# Patient Record
Sex: MALE | Race: WHITE | NOT HISPANIC OR LATINO | Employment: OTHER | URBAN - METROPOLITAN AREA
[De-identification: names, ages, dates, MRNs, and addresses within clinical notes are randomized per-mention and may not be internally consistent; named-entity substitution may affect disease eponyms.]

---

## 2019-08-06 ENCOUNTER — HOSPITAL ENCOUNTER (OUTPATIENT)
Facility: HOSPITAL | Age: 84
Setting detail: OBSERVATION
Discharge: HOME HEALTH | End: 2019-08-07
Attending: EMERGENCY MEDICINE | Admitting: INTERNAL MEDICINE
Payer: MEDICARE

## 2019-08-06 ENCOUNTER — APPOINTMENT (EMERGENCY)
Dept: RADIOLOGY | Facility: HOSPITAL | Age: 84
End: 2019-08-06
Payer: MEDICARE

## 2019-08-06 DIAGNOSIS — T63.461A TOXIC REACTION TO HORNETS, WASPS AND BEES, ACCIDENTAL OR UNINTENTIONAL, INITIAL ENCOUNTER: ICD-10-CM

## 2019-08-06 DIAGNOSIS — T63.441A BEE STING: ICD-10-CM

## 2019-08-06 DIAGNOSIS — N28.9 RENAL INSUFFICIENCY: ICD-10-CM

## 2019-08-06 DIAGNOSIS — T78.2XXA ANAPHYLAXIS: Primary | ICD-10-CM

## 2019-08-06 DIAGNOSIS — R00.0 TACHYCARDIA: ICD-10-CM

## 2019-08-06 DIAGNOSIS — T63.441A TOXIC REACTION TO HORNETS, WASPS AND BEES, ACCIDENTAL OR UNINTENTIONAL, INITIAL ENCOUNTER: ICD-10-CM

## 2019-08-06 DIAGNOSIS — I50.9 CONGESTIVE HEART FAILURE (CHF) (HCC): ICD-10-CM

## 2019-08-06 DIAGNOSIS — T63.451A TOXIC REACTION TO HORNETS, WASPS AND BEES, ACCIDENTAL OR UNINTENTIONAL, INITIAL ENCOUNTER: ICD-10-CM

## 2019-08-06 PROBLEM — N17.9 AKI (ACUTE KIDNEY INJURY) (HCC): Status: ACTIVE | Noted: 2019-08-06

## 2019-08-06 PROBLEM — E78.5 DYSLIPIDEMIA: Status: ACTIVE | Noted: 2019-08-06

## 2019-08-06 PROBLEM — I10 ESSENTIAL HYPERTENSION: Status: ACTIVE | Noted: 2019-08-06

## 2019-08-06 LAB
ALBUMIN SERPL BCP-MCNC: 3.8 G/DL (ref 3.5–5)
ALP SERPL-CCNC: 86 U/L (ref 46–116)
ALT SERPL W P-5'-P-CCNC: 17 U/L (ref 12–78)
ANION GAP SERPL CALCULATED.3IONS-SCNC: 14 MMOL/L (ref 4–13)
AST SERPL W P-5'-P-CCNC: 15 U/L (ref 5–45)
ATRIAL RATE: 109 BPM
ATRIAL RATE: 115 BPM
ATRIAL RATE: 182 BPM
BASOPHILS # BLD AUTO: 0.03 THOUSANDS/ΜL (ref 0–0.1)
BASOPHILS NFR BLD AUTO: 0 % (ref 0–1)
BILIRUB SERPL-MCNC: 1 MG/DL (ref 0.2–1)
BUN SERPL-MCNC: 19 MG/DL (ref 5–25)
CALCIUM SERPL-MCNC: 8.9 MG/DL (ref 8.3–10.1)
CHLORIDE SERPL-SCNC: 103 MMOL/L (ref 100–108)
CO2 SERPL-SCNC: 22 MMOL/L (ref 21–32)
CREAT SERPL-MCNC: 1.32 MG/DL (ref 0.6–1.3)
EOSINOPHIL # BLD AUTO: 0.03 THOUSAND/ΜL (ref 0–0.61)
EOSINOPHIL NFR BLD AUTO: 0 % (ref 0–6)
ERYTHROCYTE [DISTWIDTH] IN BLOOD BY AUTOMATED COUNT: 13.5 % (ref 11.6–15.1)
GFR SERPL CREATININE-BSD FRML MDRD: 49 ML/MIN/1.73SQ M
GLUCOSE SERPL-MCNC: 147 MG/DL (ref 65–140)
HCT VFR BLD AUTO: 43.6 % (ref 36.5–49.3)
HGB BLD-MCNC: 14.4 G/DL (ref 12–17)
IMM GRANULOCYTES # BLD AUTO: 0.05 THOUSAND/UL (ref 0–0.2)
IMM GRANULOCYTES NFR BLD AUTO: 0 % (ref 0–2)
LYMPHOCYTES # BLD AUTO: 1.96 THOUSANDS/ΜL (ref 0.6–4.47)
LYMPHOCYTES NFR BLD AUTO: 16 % (ref 14–44)
MCH RBC QN AUTO: 30.5 PG (ref 26.8–34.3)
MCHC RBC AUTO-ENTMCNC: 33 G/DL (ref 31.4–37.4)
MCV RBC AUTO: 92 FL (ref 82–98)
MONOCYTES # BLD AUTO: 0.67 THOUSAND/ΜL (ref 0.17–1.22)
MONOCYTES NFR BLD AUTO: 5 % (ref 4–12)
NEUTROPHILS # BLD AUTO: 9.68 THOUSANDS/ΜL (ref 1.85–7.62)
NEUTS SEG NFR BLD AUTO: 79 % (ref 43–75)
NRBC BLD AUTO-RTO: 0 /100 WBCS
NT-PROBNP SERPL-MCNC: 1138 PG/ML
P AXIS: 46 DEGREES
PLATELET # BLD AUTO: 141 THOUSANDS/UL (ref 149–390)
PMV BLD AUTO: 10.9 FL (ref 8.9–12.7)
POTASSIUM SERPL-SCNC: 3.4 MMOL/L (ref 3.5–5.3)
PROT SERPL-MCNC: 7.1 G/DL (ref 6.4–8.2)
QRS AXIS: 62 DEGREES
QRS AXIS: 65 DEGREES
QRS AXIS: 67 DEGREES
QRSD INTERVAL: 106 MS
QRSD INTERVAL: 80 MS
QRSD INTERVAL: 88 MS
QT INTERVAL: 286 MS
QT INTERVAL: 348 MS
QT INTERVAL: 382 MS
QTC INTERVAL: 451 MS
QTC INTERVAL: 479 MS
QTC INTERVAL: 514 MS
RBC # BLD AUTO: 4.72 MILLION/UL (ref 3.88–5.62)
SODIUM SERPL-SCNC: 139 MMOL/L (ref 136–145)
T WAVE AXIS: 107 DEGREES
T WAVE AXIS: 60 DEGREES
T WAVE AXIS: 89 DEGREES
TROPONIN I SERPL-MCNC: <0.02 NG/ML
VENTRICULAR RATE: 109 BPM
VENTRICULAR RATE: 114 BPM
VENTRICULAR RATE: 150 BPM
WBC # BLD AUTO: 12.42 THOUSAND/UL (ref 4.31–10.16)

## 2019-08-06 PROCEDURE — 96375 TX/PRO/DX INJ NEW DRUG ADDON: CPT

## 2019-08-06 PROCEDURE — 94640 AIRWAY INHALATION TREATMENT: CPT

## 2019-08-06 PROCEDURE — 99291 CRITICAL CARE FIRST HOUR: CPT | Performed by: EMERGENCY MEDICINE

## 2019-08-06 PROCEDURE — 71045 X-RAY EXAM CHEST 1 VIEW: CPT

## 2019-08-06 PROCEDURE — 94760 N-INVAS EAR/PLS OXIMETRY 1: CPT

## 2019-08-06 PROCEDURE — 1123F ACP DISCUSS/DSCN MKR DOCD: CPT | Performed by: EMERGENCY MEDICINE

## 2019-08-06 PROCEDURE — 99219 PR INITIAL OBSERVATION CARE/DAY 50 MINUTES: CPT | Performed by: INTERNAL MEDICINE

## 2019-08-06 PROCEDURE — 96365 THER/PROPH/DIAG IV INF INIT: CPT

## 2019-08-06 PROCEDURE — 36415 COLL VENOUS BLD VENIPUNCTURE: CPT | Performed by: EMERGENCY MEDICINE

## 2019-08-06 PROCEDURE — 84484 ASSAY OF TROPONIN QUANT: CPT | Performed by: EMERGENCY MEDICINE

## 2019-08-06 PROCEDURE — 96376 TX/PRO/DX INJ SAME DRUG ADON: CPT

## 2019-08-06 PROCEDURE — 93005 ELECTROCARDIOGRAM TRACING: CPT

## 2019-08-06 PROCEDURE — 80053 COMPREHEN METABOLIC PANEL: CPT | Performed by: EMERGENCY MEDICINE

## 2019-08-06 PROCEDURE — 99292 CRITICAL CARE ADDL 30 MIN: CPT | Performed by: EMERGENCY MEDICINE

## 2019-08-06 PROCEDURE — 83880 ASSAY OF NATRIURETIC PEPTIDE: CPT | Performed by: EMERGENCY MEDICINE

## 2019-08-06 PROCEDURE — 85025 COMPLETE CBC W/AUTO DIFF WBC: CPT | Performed by: EMERGENCY MEDICINE

## 2019-08-06 PROCEDURE — 93010 ELECTROCARDIOGRAM REPORT: CPT | Performed by: INTERNAL MEDICINE

## 2019-08-06 PROCEDURE — 99285 EMERGENCY DEPT VISIT HI MDM: CPT

## 2019-08-06 PROCEDURE — 96366 THER/PROPH/DIAG IV INF ADDON: CPT

## 2019-08-06 RX ORDER — HEPARIN SODIUM 5000 [USP'U]/ML
5000 INJECTION, SOLUTION INTRAVENOUS; SUBCUTANEOUS EVERY 8 HOURS SCHEDULED
Status: DISCONTINUED | OUTPATIENT
Start: 2019-08-06 | End: 2019-08-07 | Stop reason: HOSPADM

## 2019-08-06 RX ORDER — HYDROMORPHONE HCL/PF 1 MG/ML
0.2 SYRINGE (ML) INJECTION ONCE
Status: COMPLETED | OUTPATIENT
Start: 2019-08-06 | End: 2019-08-06

## 2019-08-06 RX ORDER — FENTANYL CITRATE 50 UG/ML
50 INJECTION, SOLUTION INTRAMUSCULAR; INTRAVENOUS ONCE
Status: COMPLETED | OUTPATIENT
Start: 2019-08-06 | End: 2019-08-06

## 2019-08-06 RX ORDER — METHYLPREDNISOLONE SOD SUCC 125 MG
1 VIAL (EA) INJECTION ONCE
Status: COMPLETED | OUTPATIENT
Start: 2019-08-06 | End: 2019-08-06

## 2019-08-06 RX ORDER — FAMOTIDINE 20 MG/1
20 TABLET, FILM COATED ORAL DAILY
Status: DISCONTINUED | OUTPATIENT
Start: 2019-08-07 | End: 2019-08-07 | Stop reason: HOSPADM

## 2019-08-06 RX ORDER — ONDANSETRON 2 MG/ML
4 INJECTION INTRAMUSCULAR; INTRAVENOUS EVERY 6 HOURS PRN
Status: DISCONTINUED | OUTPATIENT
Start: 2019-08-06 | End: 2019-08-07 | Stop reason: HOSPADM

## 2019-08-06 RX ORDER — METOPROLOL TARTRATE 5 MG/5ML
5 INJECTION INTRAVENOUS ONCE
Status: COMPLETED | OUTPATIENT
Start: 2019-08-06 | End: 2019-08-06

## 2019-08-06 RX ORDER — SODIUM CHLORIDE 9 MG/ML
125 INJECTION, SOLUTION INTRAVENOUS ONCE
Status: COMPLETED | OUTPATIENT
Start: 2019-08-06 | End: 2019-08-07

## 2019-08-06 RX ORDER — LISINOPRIL 5 MG/1
5 TABLET ORAL DAILY
Status: DISCONTINUED | OUTPATIENT
Start: 2019-08-07 | End: 2019-08-07 | Stop reason: HOSPADM

## 2019-08-06 RX ORDER — DIPHENHYDRAMINE HCL 25 MG
25 TABLET ORAL EVERY 6 HOURS PRN
Status: DISCONTINUED | OUTPATIENT
Start: 2019-08-06 | End: 2019-08-07 | Stop reason: HOSPADM

## 2019-08-06 RX ORDER — CHLORAL HYDRATE 500 MG
1 CAPSULE ORAL DAILY
COMMUNITY
Start: 2012-10-03

## 2019-08-06 RX ORDER — EPINEPHRINE 1 MG/ML
0.3 INJECTION, SOLUTION, CONCENTRATE INTRAVENOUS ONCE
Status: COMPLETED | OUTPATIENT
Start: 2019-08-06 | End: 2019-08-06

## 2019-08-06 RX ORDER — ASPIRIN 81 MG/1
81 TABLET, CHEWABLE ORAL EVERY OTHER DAY
Status: DISCONTINUED | OUTPATIENT
Start: 2019-08-06 | End: 2019-08-07 | Stop reason: HOSPADM

## 2019-08-06 RX ORDER — ACETAMINOPHEN 325 MG/1
650 TABLET ORAL EVERY 6 HOURS PRN
Status: DISCONTINUED | OUTPATIENT
Start: 2019-08-06 | End: 2019-08-07 | Stop reason: HOSPADM

## 2019-08-06 RX ORDER — MAGNESIUM SULFATE HEPTAHYDRATE 40 MG/ML
2 INJECTION, SOLUTION INTRAVENOUS ONCE
Status: COMPLETED | OUTPATIENT
Start: 2019-08-06 | End: 2019-08-06

## 2019-08-06 RX ORDER — LISINOPRIL 5 MG/1
5 TABLET ORAL DAILY
COMMUNITY
End: 2020-08-13 | Stop reason: HOSPADM

## 2019-08-06 RX ORDER — DIAPER,BRIEF,INFANT-TODD,DISP
EACH MISCELLANEOUS 4 TIMES DAILY PRN
Status: DISCONTINUED | OUTPATIENT
Start: 2019-08-06 | End: 2019-08-07 | Stop reason: HOSPADM

## 2019-08-06 RX ORDER — SIMVASTATIN 20 MG
20 TABLET ORAL DAILY
COMMUNITY
End: 2020-10-22 | Stop reason: SDUPTHER

## 2019-08-06 RX ORDER — ALBUTEROL SULFATE 2.5 MG/3ML
2.5 SOLUTION RESPIRATORY (INHALATION) EVERY 6 HOURS PRN
Status: DISCONTINUED | OUTPATIENT
Start: 2019-08-06 | End: 2019-08-07 | Stop reason: HOSPADM

## 2019-08-06 RX ORDER — POTASSIUM CHLORIDE 20 MEQ/1
40 TABLET, EXTENDED RELEASE ORAL ONCE
Status: COMPLETED | OUTPATIENT
Start: 2019-08-06 | End: 2019-08-06

## 2019-08-06 RX ORDER — FAMOTIDINE 20 MG/1
1 TABLET, FILM COATED ORAL DAILY
COMMUNITY
End: 2021-07-21 | Stop reason: ALTCHOICE

## 2019-08-06 RX ORDER — LANOLIN ALCOHOL/MO/W.PET/CERES
3 CREAM (GRAM) TOPICAL
Status: DISCONTINUED | OUTPATIENT
Start: 2019-08-06 | End: 2019-08-07 | Stop reason: HOSPADM

## 2019-08-06 RX ORDER — LEVALBUTEROL 1.25 MG/.5ML
1.25 SOLUTION, CONCENTRATE RESPIRATORY (INHALATION) ONCE
Status: COMPLETED | OUTPATIENT
Start: 2019-08-06 | End: 2019-08-06

## 2019-08-06 RX ORDER — METOPROLOL TARTRATE 5 MG/5ML
5 INJECTION INTRAVENOUS EVERY 6 HOURS PRN
Status: DISCONTINUED | OUTPATIENT
Start: 2019-08-06 | End: 2019-08-07 | Stop reason: HOSPADM

## 2019-08-06 RX ORDER — PRAVASTATIN SODIUM 40 MG
40 TABLET ORAL
Status: DISCONTINUED | OUTPATIENT
Start: 2019-08-06 | End: 2019-08-07 | Stop reason: HOSPADM

## 2019-08-06 RX ORDER — IPRATROPIUM BROMIDE AND ALBUTEROL SULFATE 2.5; .5 MG/3ML; MG/3ML
3 SOLUTION RESPIRATORY (INHALATION)
Status: DISCONTINUED | OUTPATIENT
Start: 2019-08-06 | End: 2019-08-07

## 2019-08-06 RX ADMIN — LEVALBUTEROL HYDROCHLORIDE 1.25 MG: 1.25 SOLUTION, CONCENTRATE RESPIRATORY (INHALATION) at 13:43

## 2019-08-06 RX ADMIN — DIPHENHYDRAMINE HYDROCHLORIDE 50 MG: 50 INJECTION, SOLUTION INTRAMUSCULAR; INTRAVENOUS at 23:43

## 2019-08-06 RX ADMIN — IPRATROPIUM BROMIDE AND ALBUTEROL SULFATE 3 ML: 2.5; .5 SOLUTION RESPIRATORY (INHALATION) at 21:42

## 2019-08-06 RX ADMIN — ASPIRIN 81 MG 81 MG: 81 TABLET ORAL at 22:40

## 2019-08-06 RX ADMIN — HEPARIN SODIUM 5000 UNITS: 5000 INJECTION INTRAVENOUS; SUBCUTANEOUS at 21:13

## 2019-08-06 RX ADMIN — HYDROMORPHONE HYDROCHLORIDE 0.2 MG: 1 INJECTION, SOLUTION INTRAMUSCULAR; INTRAVENOUS; SUBCUTANEOUS at 14:59

## 2019-08-06 RX ADMIN — METOPROLOL TARTRATE 5 MG: 1 INJECTION, SOLUTION INTRAVENOUS at 13:31

## 2019-08-06 RX ADMIN — MAGNESIUM SULFATE HEPTAHYDRATE 2 G: 40 INJECTION, SOLUTION INTRAVENOUS at 13:28

## 2019-08-06 RX ADMIN — SODIUM CHLORIDE 125 ML/HR: 0.9 INJECTION, SOLUTION INTRAVENOUS at 21:13

## 2019-08-06 RX ADMIN — SODIUM CHLORIDE 1000 ML: 0.9 INJECTION, SOLUTION INTRAVENOUS at 13:29

## 2019-08-06 RX ADMIN — Medication 1 APPLICATION: at 12:02

## 2019-08-06 RX ADMIN — METOPROLOL TARTRATE 5 MG: 1 INJECTION, SOLUTION INTRAVENOUS at 11:50

## 2019-08-06 RX ADMIN — POTASSIUM CHLORIDE 40 MEQ: 1500 TABLET, EXTENDED RELEASE ORAL at 21:13

## 2019-08-06 RX ADMIN — HYDROCORTISONE 1 APPLICATION: 1 CREAM TOPICAL at 18:57

## 2019-08-06 RX ADMIN — FENTANYL CITRATE 50 MCG: 50 INJECTION, SOLUTION INTRAMUSCULAR; INTRAVENOUS at 11:50

## 2019-08-06 RX ADMIN — PRAVASTATIN SODIUM 40 MG: 40 TABLET ORAL at 21:13

## 2019-08-06 RX ADMIN — DIPHENHYDRAMINE HCL 25 MG: 25 TABLET ORAL at 17:53

## 2019-08-06 RX ADMIN — MELATONIN 3 MG: 3 TAB ORAL at 23:09

## 2019-08-06 RX ADMIN — FENTANYL CITRATE 50 MCG: 50 INJECTION, SOLUTION INTRAMUSCULAR; INTRAVENOUS at 13:30

## 2019-08-06 RX ADMIN — EPINEPHRINE 0.3 MG: 1 INJECTION, SOLUTION, CONCENTRATE INTRAVENOUS at 11:28

## 2019-08-06 NOTE — H&P
History and Physical - [age] [facility] Internal Medicine    Patient Information: Katty Monzon 80 y o  male MRN: [id number]  Unit/Bed#: ED 23 Encounter: [id number]  Admitting Physician: Devonte Altamirano MD  PCP: Olive Maria DO  Date of Admission:  08/06/19    Assessment/Plan:    Hospital Problem List:     Principal Problem:    Bee sting  Active Problems:    Essential hypertension    Dyslipidemia    OLLIE (acute kidney injury) ([facility] [address] )      Plan for the Primary Problem(s):  · 1  SOB secondary to bee stings- patient given epinephrine and is saturating well  · 2  HTN- will continue home medications  Metoprolol IV added prn  · 3  OLLIE- prerenal   On IVF  · 4  Dyslipidemia on statin  ·     Plan for Additional Problems:   ·     VTE Prophylaxis: Heparin  / sequential compression device   Code Status:   POLST: There is no POLST form on file for this patient (pre-hospital)    Anticipated Length of Stay:  Patient will be admitted on an Observation basis with an anticipated length of stay of  Less than 2 midnights  Justification for Hospital Stay: SOB, bee sting    Total Time for Visit, including Counseling / Coordination of Care: 30 minutes  Greater than 50% of this total time spent on direct patient counseling and coordination of care  Chief Complaint:   SOB,     History of Present Illness:    Katty Monzon is a 80 y o  male who presents with pmhx of HTN, dyslipidemia comes in with complaints of increased SOB after getting bit by bees after carrying out the trash  Patient was found to be wheezing in ER and was given epinephrine and benadryl  His blood pressure was also found to be very elevated and HR fluctuating from 's  It has now improved  Patient will be admitted for further observation  Review of Systems:    Review of Systems   Constitutional: Negative  HENT: Negative  Respiratory: Positive for shortness of breath and wheezing  Negative for apnea, cough, choking, chest tightness and stridor  Cardiovascular: Positive for palpitations  Negative for chest pain and leg swelling  Gastrointestinal: Negative  Genitourinary: Negative  Musculoskeletal: Negative  Skin: Positive for color change and rash  Negative for pallor and wound  Neurological: Positive for weakness  Negative for dizziness, tremors, seizures, syncope, facial asymmetry, speech difficulty, light-headedness, numbness and headaches  Past Medical and Surgical History:     No past medical history on file  No past surgical history on file  Meds/Allergies:    Prior to Admission medications    Medication Sig Start Date End Date Taking? Authorizing Provider   aspirin 81 MG tablet Take 81 mg by mouth every other day 10/3/12  Yes Historical Provider, MD   B Complex Vitamins (VITAMIN B COMPLEX PO) Take 482 2 mg by mouth daily 10/3/12  Yes Historical Provider, MD   cholecalciferol (VITAMIN D3) 1,000 units tablet Take 1,000 Units by mouth daily 10/3/12  Yes Historical Provider, MD   famotidine (PEPCID) 20 mg tablet Take 1 tablet by mouth daily   Yes Historical Provider, MD   lisinopril (ZESTRIL) 5 mg tablet Take 5 mg by mouth daily   Yes Historical Provider, MD   metoprolol tartrate (LOPRESSOR) 25 mg tablet Take 12 5 mg by mouth daily 8/12/13  Yes Historical Provider, MD   Multiple Vitamins-Minerals (PRESERVISION AREDS 2 PO) Take 24 mg by mouth 2 (two) times a day   Yes Historical Provider, MD   [place]-3 1000 MG CAPS Take 1 tablet by mouth daily 10/3/12  Yes Historical Provider, MD   simvastatin (ZOCOR) 20 mg tablet Take 20 mg by mouth   Yes Historical Provider, MD     I have reviewed home medications with patient personally      Allergies: No Known Allergies    Social History:     Marital Status: /Civil Union   Occupation:   Patient Pre-hospital Living Situation: home  Patient Pre-hospital Level of Mobility: walks  Patient Pre-hospital Diet Restrictions: cardiac  Substance Use History:   Social History     Substance and Sexual Activity   Alcohol Use Not on file     Social History     Tobacco Use   Smoking Status Not on file     Social History     Substance and Sexual Activity   Drug Use Not on file       Family History:    non-contributory    Physical Exam:     Vitals:   Blood Pressure: (!) 185/86 (08/06/19 1530)  Pulse: 89 (08/06/19 1530)  Temperature: 100 °F (37 8 °C) (08/06/19 1400)  Temp Source: Tympanic (08/06/19 1400)  Respirations: (!) 26 (08/06/19 1530)  Height: 5' 3" (160 cm) (08/06/19 1118)  Weight - Scale: 67 5 kg (148 lb 13 oz) (08/06/19 1118)  SpO2: 95 % (08/06/19 1530)    Physical Exam   Constitutional: He is oriented to person, place, and time  He appears well-developed and well-nourished  HENT:   Head: Normocephalic and atraumatic  Eyes: Pupils are equal, round, and reactive to light  EOM are normal    Neck: Normal range of motion  Neck supple  No JVD present  No tracheal deviation present  No thyromegaly present  Cardiovascular: Normal rate, regular rhythm and normal heart sounds  Exam reveals no gallop and no friction rub  No murmur heard  Pulmonary/Chest: Effort normal and breath sounds normal  No stridor  No respiratory distress  He has no wheezes  Abdominal: Soft  Bowel sounds are normal  He exhibits no distension  There is no tenderness  There is no guarding  Musculoskeletal: Normal range of motion  He exhibits no edema  Neurological: He is alert and oriented to person, place, and time  Skin: Skin is warm and dry  There is erythema  Bee stings on arm, erythema   Vitals reviewed  Additional Data:     Lab Results: I have personally reviewed pertinent reports        Results from last 7 days   Lab Units 08/06/19  1127   WBC Thousand/uL 12 42*   HEMOGLOBIN g/dL 14 4   HEMATOCRIT % 43 6   PLATELETS Thousands/uL 141*   NEUTROS PCT % 79*   LYMPHS PCT % 16   MONOS PCT % 5   EOS PCT % 0     Results from last 7 days   Lab Units 08/06/19  1127   POTASSIUM mmol/L 3 4*   CHLORIDE mmol/L 103   CO2 mmol/L 22   BUN mg/dL 19   CREATININE mg/dL 1 32*   CALCIUM mg/dL 8 9   ALK PHOS U/L 86   ALT U/L 17   AST U/L 15           Imaging: I have personally reviewed pertinent reports  Xr Chest 1 View Portable    Result Date: 8/6/2019  Narrative: CHEST INDICATION:   sob  COMPARISON:  None EXAM PERFORMED/VIEWS:  XR CHEST PORTABLE FINDINGS: Heart shadow appears unremarkable  Atherosclerotic vascular calcifications are noted  Interstitial prominence bilaterally with additional hazy densities in the left mid to lower lung field and right lung base  No pneumothorax  No significant effusion  Osseous structures appear within normal limits for patient age  Impression: Interstitial prominence bilaterally with additional hazy densities in the left mid to lower lung field and right lung base  Workstation performed: [id number]       EKG, Pathology, and Other Studies Reviewed on Admission:   · EKG:     Allscripts / Epic Records Reviewed: Yes     ** Please Note: This note has been constructed using a voice recognition system   **

## 2019-08-06 NOTE — ED PROVIDER NOTES
Pt Name: Julian Nash  MRN: [id number]  [place] 1934  Age/Sex: 80 y o  male  Date of evaluation: 8/6/2019  PCP: Misael Ayers DO    CHIEF COMPLAINT    Chief Complaint   Patient presents with    Bee Sting     pt was stung by approx 40 Hornets;EMS removed 22 stingers, PTA medicated Benadryl 50mg & Solumedrol 125mg IV         HPI    80 y o  male presenting with shortness of breath  Patient states he was stung by multiple hornets, per EMS he was found in his neighbor's house with approximately 40 dead hornets around him, some in his clothes, they state that they removed 22 separate stingers from his skin  Patient was given 50 of Benadryl and 125 Solu-Medrol IV prior to arrival   He now complains of shortness of breath, some twitching, and dry mouth  He denies fevers, loss conscious, nausea, vomiting, swelling of throat, changes in urine or bowel movements  HPI      Past Medical and Surgical History    No past medical history on file  No past surgical history on file  No family history on file  Social History     Tobacco Use    Smoking status: Not on file   Substance Use Topics    Alcohol use: Never     Frequency: Never     Binge frequency: Never    Drug use: Not on file           Allergies    No Known Allergies    Home Medications    Prior to Admission medications    Not on File           Review of Systems    Review of Systems   Constitutional: Negative for appetite change, chills and diaphoresis  HENT: Negative for drooling, facial swelling, trouble swallowing and voice change  Respiratory: Positive for chest tightness and shortness of breath  Negative for apnea and wheezing  Cardiovascular: Negative for chest pain and leg swelling  Gastrointestinal: Negative for abdominal distention, abdominal pain, diarrhea, nausea and vomiting  Genitourinary: Negative for dysuria and urgency  Musculoskeletal: Negative for arthralgias, back pain, gait problem and neck pain     Skin: Positive for rash  Negative for color change and wound  Neurological: Negative for seizures, speech difficulty, weakness and headaches  Psychiatric/Behavioral: Negative for agitation, behavioral problems and dysphoric mood  The patient is not nervous/anxious  All other systems reviewed and negative  Physical Exam      ED Triage Vitals   Temperature Pulse Respirations Blood Pressure SpO2   08/06/19 1118 08/06/19 1118 08/06/19 1118 08/06/19 1118 08/06/19 1118   (!) 100 6 °F (38 1 °C) (!) 116 (!) 30 (!) 196/94 95 %      Temp Source Heart Rate Source Patient Position - Orthostatic VS BP Location FiO2 (%)   08/06/19 1106 08/06/19 1118 08/06/19 1118 08/06/19 1118 --   Oral Monitor Sitting Left arm       Pain Score       08/06/19 1150       Worst Possible Pain               Physical Exam   Constitutional: He is oriented to person, place, and time  He appears well-developed and well-nourished  He appears distressed  HENT:   Head: Normocephalic and atraumatic  Nose: Nose normal    Oropharynx clear and dry, no significant swelling   Eyes: Pupils are equal, round, and reactive to light  Conjunctivae and EOM are normal    Neck: Normal range of motion  Neck supple  No tracheal deviation present  Cardiovascular: Normal rate, regular rhythm, normal heart sounds and intact distal pulses  No murmur heard  Pulmonary/Chest: No stridor  He is in respiratory distress  He has wheezes  He has no rales  Patient tachypneic, in some respiratory distress, breathing rapidly and shallowly, wheezes throughout all lung fields, bibasilar crackles   Abdominal: Soft  He exhibits no distension  There is no tenderness  There is no rebound and no guarding  Musculoskeletal: Normal range of motion  He exhibits edema  He exhibits no deformity  Edema of arms and ears   Neurological: He is alert and oriented to person, place, and time  Skin: Skin is warm  Rash noted  He is diaphoretic     Erythematous rash on both arms and head as well as somewhat on trunk  Multiple rounds lesions consistent with insect stings, primarily on arms and ears the face although 1 noticed on right groin  Stinger in place in left forehead   Psychiatric: He has a normal mood and affect  His behavior is normal  Judgment and thought content normal    Nursing note and vitals reviewed  Diagnostic Results  EKG Interpretation 1131    Rate:  109  BPM  Rhythm:  Sinus tachycardia with PVCs   Axis:  Normal   Intervals: Normal, no blocks, QTc  514 ms  Q waves:  Normal   T waves:  T-waves flattened in aVL, V3, V5 V6   ST segments:  No significant elevations or depressions     Impression:  Sinus tachycardia with PVCs and nonspecific ST-T changes but no STEMI      EKG for comparison:  None available    EKG interpreted by me  EKG Interpretation 1146    Rate:  150  BPM  Rhythm:  Supraventricular tachycardia with PVCs   Axis:  Normal   Intervals: Normal, no blocks, QTc  451 ms  Q waves:  Q-wave in V1   T waves:  Flattening in inferior leads   ST segments:  ST depression in lateral leads     Impression:  Supraventricular tachycardia interrupted by PVCs with nonspecific T-wave changes and  lateral ST depression concerning for rate dependent ischemia    EKG for comparison:  ST depressions new from prior study    EKG interpreted by me  EKG Interpretation 1154    Rate:  114  BPM  Rhythm:  Sinus tachycardia with PVCs   Axis:  Normal   Intervals: Normal, no blocks, QTc  479 ms  Q waves:  None   T waves:  Flattened in inferior and lateral leads, inverted in V3   ST segments:  No significant elevations or depressions     Impression:  Sinus tachycardia with PVCs and nonspecific ST-T changes but no STEMI    EKG for comparison:  ST segment depressions resolved in the interval since last study    EKG interpreted by me         Labs:    Results for orders placed or performed during the hospital encounter of 08/06/19   CBC and differential   Result Value Ref Range    WBC 12 42 (H) 4 31 - 10 16 Thousand/uL    RBC 4 72 3 88 - 5 62 Million/uL    Hemoglobin 14 4 12 0 - 17 0 g/dL    Hematocrit 43 6 36 5 - 49 3 %    MCV 92 82 - 98 fL    MCH 30 5 26 8 - 34 3 pg    MCHC 33 0 31 4 - 37 4 g/dL    RDW 13 5 11 6 - 15 1 %    MPV 10 9 8 9 - 12 7 fL    Platelets 871 (L) 380 - 390 Thousands/uL    nRBC 0 /100 WBCs    Neutrophils Relative 79 (H) 43 - 75 %    Immat GRANS % 0 0 - 2 %    Lymphocytes Relative 16 14 - 44 %    Monocytes Relative 5 4 - 12 %    Eosinophils Relative 0 0 - 6 %    Basophils Relative 0 0 - 1 %    Neutrophils Absolute 9 68 (H) 1 85 - 7 62 Thousands/µL    Immature Grans Absolute 0 05 0 00 - 0 20 Thousand/uL    Lymphocytes Absolute 1 96 0 60 - 4 47 Thousands/µL    Monocytes Absolute 0 67 0 17 - 1 22 Thousand/µL    Eosinophils Absolute 0 03 0 00 - 0 61 Thousand/µL    Basophils Absolute 0 03 0 00 - 0 10 Thousands/µL   Comprehensive metabolic panel   Result Value Ref Range    Sodium 139 136 - 145 mmol/L    Potassium 3 4 (L) 3 5 - 5 3 mmol/L    Chloride 103 100 - 108 mmol/L    CO2 22 21 - 32 mmol/L    ANION GAP 14 (H) 4 - 13 mmol/L    BUN 19 5 - 25 mg/dL    Creatinine 1 32 (H) 0 60 - 1 30 mg/dL    Glucose 147 (H) 65 - 140 mg/dL    Calcium 8 9 8 3 - 10 1 mg/dL    AST 15 5 - 45 U/L    ALT 17 12 - 78 U/L    Alkaline Phosphatase 86 46 - 116 U/L    Total Protein 7 1 6 4 - 8 2 g/dL    Albumin 3 8 3 5 - 5 0 g/dL    Total Bilirubin 1 00 0 20 - 1 00 mg/dL    eGFR 49 ml/min/1 73sq m   NT-BNP PRO (BNP for [place], [place], [place], [place], [place], [facility], [facility], [place] campuses)   Result Value Ref Range    NT-proBNP 1,138 (H) <450 pg/mL   Troponin I   Result Value Ref Range    Troponin I <0 02 <=0 04 ng/mL       All labs reviewed and utilized in the medical decision making process    Radiology:    XR chest 1 view portable   Final Result      Interstitial prominence bilaterally with additional hazy densities in the left mid to lower lung field and right lung base              Workstation performed: [id number]             All radiology studies independently viewed by me and interpreted by the radiologist     Procedure    CriticalCare Time  Performed by: Daruis Corbin MD  Authorized by: Darius Corbin MD     Critical care provider statement:     Critical care time (minutes):  75    Critical care was necessary to treat or prevent imminent or life-threatening deterioration of the following conditions:  Cardiac failure (anaphylaxis)    Critical care was time spent personally by me on the following activities:  Obtaining history from patient or surrogate, development of treatment plan with patient or surrogate, discussions with consultants, evaluation of patient's response to treatment, examination of patient, ordering and performing treatments and interventions, ordering and review of laboratory studies, re-evaluation of patient's condition, ordering and review of radiographic studies and review of old charts            ED Course of Care and Re-Assessments      Stinger removed from left forehead with gentle horizontal traction with Hemoccult card  Initial presentation consistent with anaphylaxis based on skin findings and respiratory distress, already given Solu-Medrol and Benadryl prior to arrival, given epinephrine 0 3 mg IM  Given cautious fluid bolus  Approximately 15-20 minutes after administration, patient began having episodes of supraventricular tachycardia that resolved with vagal maneuvers  Pain initially treated with fentanyl, as well as a let to ears with improvement of pain  After recurrent SVT, accompanied by hypertension, metoprolol given with improvement of tachycardia, patient still having occasional episodes 2-3 hours after appy, resolved with magnesium and additional metoprolol      Discussed with critical care team for possible step-down admission versus med surg tele, patient evaluated by Dr Lito Gallardo of critical care team, who recommended further fluids and Xopenex and observation, after that completed, patient seemed more stable and comfortable, admitted Internal Medicine, hemodynamically stable and comfortable at that time    Medications   aspirin tablet 81 mg (has no administration in time range)   famotidine (PEPCID) tablet 20 mg (has no administration in time range)   lisinopril (ZESTRIL) tablet 5 mg (has no administration in time range)   metoprolol tartrate (LOPRESSOR) partial tablet 12 5 mg (has no administration in time range)   pravastatin (PRAVACHOL) tablet 40 mg (has no administration in time range)   sodium chloride 0 9 % infusion (has no administration in time range)   ondansetron (ZOFRAN) injection 4 mg (has no administration in time range)   heparin (porcine) subcutaneous injection 5,000 Units (has no administration in time range)   acetaminophen (TYLENOL) tablet 650 mg (has no administration in time range)   metoprolol (LOPRESSOR) injection 5 mg (has no administration in time range)   diphenhydrAMINE (BENADRYL) tablet 25 mg (25 mg Oral Given 8/6/19 1753)   potassium chloride (K-DUR,KLOR-CON) CR tablet 40 mEq (has no administration in time range)   hydrocortisone 1 % cream (1 application Topical Given 8/6/19 1857)   albuterol inhalation solution 2 5 mg (has no administration in time range)   EPINEPHrine PF (ADRENALIN) 1 mg/mL injection 0 3 mg (0 mg Intramuscular Override Pull 8/6/19 1451)   EPINEPHrine (ADRENALIN) 1 mg/mL injection **ADS Override Pull** (0 3 mg Intramuscular Given 8/6/19 1128)   diphenhydrAMINE (FOR EMS ONLY) (BENADRYL) injection 50 mg (0 mg Does not apply Given to EMS 8/6/19 1449)   methylPREDNISolone sodium succinate (FOR EMS ONLY) (Solu-MEDROL) 125 MG injection 125 mg (0 mg Does not apply Given to EMS 8/6/19 1449)   fentanyl citrate (PF) 100 MCG/2ML 50 mcg (50 mcg Intravenous Given 8/6/19 1150)   metoprolol (LOPRESSOR) injection 5 mg (5 mg Intravenous Given 8/6/19 1150)   LET gel 1 application (1 application Topical Given 8/6/19 1202)   magnesium sulfate 2 g/50 mL IVPB (premix) 2 g (0 g Intravenous Stopped 8/6/19 1540)   metoprolol (LOPRESSOR) injection 5 mg (5 mg Intravenous Given 8/6/19 1331)   fentanyl citrate (PF) 100 MCG/2ML 50 mcg (50 mcg Intravenous Given 8/6/19 1330)   levalbuterol (XOPENEX) inhalation solution 1 25 mg (1 25 mg Nebulization Given 8/6/19 1343)   sodium chloride 0 9 % bolus 1,000 mL (0 mL Intravenous Stopped 8/6/19 1355)   HYDROmorphone (DILAUDID) injection 0 2 mg (0 2 mg Intravenous Given 8/6/19 1459)           FINAL IMPRESSION    Final diagnoses:   Toxic reaction to hornets, wasps and bees, accidental or unintentional, initial encounter   Congestive heart failure (CHF) ([facility] [address] )   Tachycardia   Renal insufficiency   Anaphylaxis          DISPOSITION/PLAN    Presentation is above felt most likely secondary to anaphylaxis versus possible reaction to large venom load  Patient initially tachycardia, SVT and hypertension possibly secondary to anticholinergic toxidrome versus potential effects from epinephrine used to treat anaphylaxis, although runs of SVT 2 hours after single dose of epinephrine unlikely to be caused by that medication  Chest x-ray and lab work also consistent with congestive heart failure with patient stating he has no history of same, wife states that he had an echo last month that was read as normal   Unclear if congestive heart failure contributing to symptoms or if it is an effect of tachycardia and stress to the heart from envenomation and allergic reaction  Treated as above, observed in emergency department until stabilized admitted Internal Medicine, hemodynamically stable and comfortable at that time    Time reflects when diagnosis was documented in both MDM as applicable and the Disposition within this note     Time User Action Codes Description Comment    8/6/2019  3:58 PM Alexander Dylan Add [L02 178B,  F34 686H,  T63 461A] Toxic reaction to hornets, wasps and bees, accidental or unintentional, initial encounter     8/6/2019  3:58 PM Patricia Buddle Add [I50 9] Congestive heart failure (CHF) ([facility] [address] )     8/6/2019  3:59 PM Patricia Buddle Add [R00 0] Tachycardia     8/6/2019  3:59 PM Patricia Buddle Add [N28 9] Renal insufficiency     8/6/2019  8:26 PM Floria Keto T Add [T78  2XXA] Anaphylaxis     8/6/2019  8:26 PM Patricia Buddle Modify [W34 568H,  S18 932K,  T63 461A] Toxic reaction to hornets, wasps and bees, accidental or unintentional, initial encounter     8/6/2019  8:26 PM Gaurav Reamer  2XXA] Anaphylaxis       ED Disposition     ED Disposition Condition Date/Time Comment    Admit Stable Tue Aug 6, 2019  3:58 PM Case was discussed with NANCY and the patient's admission status was agreed to be Admission Status: observation status to the service of Dr Gary Gonzalez   Follow-up Information    None           PATIENT REFERRED TO:    No follow-up provider specified  DISCHARGE MEDICATIONS:    Current Discharge Medication List      CONTINUE these medications which have NOT CHANGED    Details   aspirin 81 MG tablet Take 81 mg by mouth every other day      B Complex Vitamins (VITAMIN B COMPLEX PO) Take 482 2 mg by mouth daily      cholecalciferol (VITAMIN D3) 1,000 units tablet Take 1,000 Units by mouth daily      famotidine (PEPCID) 20 mg tablet Take 1 tablet by mouth daily      lisinopril (ZESTRIL) 5 mg tablet Take 5 mg by mouth daily      metoprolol tartrate (LOPRESSOR) 25 mg tablet Take 12 5 mg by mouth daily      Multiple Vitamins-Minerals (PRESERVISION AREDS 2 PO) Take 24 mg by mouth 2 (two) times a day      [place]-3 1000 MG CAPS Take 1 tablet by mouth daily      simvastatin (ZOCOR) 20 mg tablet Take 20 mg by mouth             No discharge procedures on file           MD Esperanza Wiggins MD  08/06/19 2029

## 2019-08-06 NOTE — PLAN OF CARE
Problem: PAIN - ADULT  Goal: Verbalizes/displays adequate comfort level or baseline comfort level  Description  Interventions:  - Encourage patient to monitor pain and request assistance  - Assess pain using appropriate pain scale  - Administer analgesics based on type and severity of pain and evaluate response  - Implement non-pharmacological measures as appropriate and evaluate response  - Consider cultural and social influences on pain and pain management  - Notify physician/advanced practitioner if interventions unsuccessful or patient reports new pain  Outcome: Not Progressing     Problem: INFECTION - ADULT  Goal: Absence or prevention of progression during hospitalization  Description  INTERVENTIONS:  - Assess and monitor for signs and symptoms of infection  - Monitor lab/diagnostic results  - Monitor all insertion sites, i e  indwelling lines, tubes, and drains  - Monitor endotracheal (as able) and nasal secretions for changes in amount and color  - [place] appropriate cooling/warming therapies per order  - Administer medications as ordered  - Instruct and encourage patient and family to use good hand hygiene technique  - Identify and instruct in appropriate isolation precautions for identified infection/condition  Outcome: Not Progressing     Problem: SAFETY ADULT  Goal: Patient will remain free of falls  Description  INTERVENTIONS:  - Assess patient frequently for physical needs  -  Identify cognitive and physical deficits and behaviors that affect risk of falls    -  [place] fall precautions as indicated by assessment   - Educate patient/family on patient safety including physical limitations  - Instruct patient to call for assistance with activity based on assessment  - Modify environment to reduce risk of injury  - Consider OT/PT consult to assist with strengthening/mobility  Outcome: Not Progressing  Goal: Maintain or return to baseline ADL function  Description  INTERVENTIONS:  -  Assess patient's ability to carry out ADLs; assess patient's baseline for ADL function and identify physical deficits which impact ability to perform ADLs (bathing, care of mouth/teeth, toileting, grooming, dressing, etc )  - Assess/evaluate cause of self-care deficits   - Assess range of motion  - Assess patient's mobility; develop plan if impaired  - Assess patient's need for assistive devices and provide as appropriate  - Encourage maximum independence but intervene and supervise when necessary  ¯ Involve family in performance of ADLs  ¯ Assess for home care needs following discharge   ¯ Request OT consult to assist with ADL evaluation and planning for discharge  ¯ Provide patient education as appropriate  Outcome: Not Progressing  Goal: Maintain or return mobility status to optimal level  Description  INTERVENTIONS:  - Assess patient's baseline mobility status (ambulation, transfers, stairs, etc )    - Identify cognitive and physical deficits and behaviors that affect mobility  - Identify mobility aids required to assist with transfers and/or ambulation (gait belt, sit-to-stand, lift, walker, cane, etc )  - [place] fall precautions as indicated by assessment  - Record patient progress and toleration of activity level on Mobility SBAR; progress patient to next Phase/Stage  - Instruct patient to call for assistance with activity based on assessment  - Request Rehabilitation consult to assist with strengthening/weightbearing, etc   Outcome: Not Progressing     Problem: DISCHARGE PLANNING  Goal: Discharge to home or other facility with appropriate resources  Description  INTERVENTIONS:  - Identify barriers to discharge w/patient and caregiver  - Arrange for needed discharge resources and transportation as appropriate  - Identify discharge learning needs (meds, wound care, etc )  - Arrange for interpretive services to assist at discharge as needed  - Refer to Case Management Department for coordinating discharge planning if the patient needs post-hospital services based on physician/advanced practitioner order or complex needs related to functional status, cognitive ability, or social support system  Outcome: Not Progressing     Problem: Knowledge Deficit  Goal: Patient/family/caregiver demonstrates understanding of disease process, treatment plan, medications, and discharge instructions  Description  Complete learning assessment and assess knowledge base    Interventions:  - Provide teaching at level of understanding  - Provide teaching via preferred learning methods  Outcome: Not Progressing

## 2019-08-07 VITALS
DIASTOLIC BLOOD PRESSURE: 64 MMHG | RESPIRATION RATE: 17 BRPM | SYSTOLIC BLOOD PRESSURE: 143 MMHG | WEIGHT: 148.81 LBS | BODY MASS INDEX: 26.37 KG/M2 | HEIGHT: 63 IN | HEART RATE: 83 BPM | OXYGEN SATURATION: 92 % | TEMPERATURE: 98.4 F

## 2019-08-07 LAB
ANION GAP SERPL CALCULATED.3IONS-SCNC: 16 MMOL/L (ref 4–13)
BUN SERPL-MCNC: 28 MG/DL (ref 5–25)
CALCIUM SERPL-MCNC: 9 MG/DL (ref 8.3–10.1)
CHLORIDE SERPL-SCNC: 102 MMOL/L (ref 100–108)
CO2 SERPL-SCNC: 21 MMOL/L (ref 21–32)
CREAT SERPL-MCNC: 1.6 MG/DL (ref 0.6–1.3)
ERYTHROCYTE [DISTWIDTH] IN BLOOD BY AUTOMATED COUNT: 13.7 % (ref 11.6–15.1)
GFR SERPL CREATININE-BSD FRML MDRD: 39 ML/MIN/1.73SQ M
GLUCOSE SERPL-MCNC: 146 MG/DL (ref 65–140)
GLUCOSE SERPL-MCNC: 155 MG/DL (ref 65–140)
HCT VFR BLD AUTO: 43.1 % (ref 36.5–49.3)
HGB BLD-MCNC: 14.5 G/DL (ref 12–17)
MCH RBC QN AUTO: 30.7 PG (ref 26.8–34.3)
MCHC RBC AUTO-ENTMCNC: 33.6 G/DL (ref 31.4–37.4)
MCV RBC AUTO: 91 FL (ref 82–98)
PLATELET # BLD AUTO: 138 THOUSANDS/UL (ref 149–390)
PMV BLD AUTO: 10.7 FL (ref 8.9–12.7)
POTASSIUM SERPL-SCNC: 4.3 MMOL/L (ref 3.5–5.3)
RBC # BLD AUTO: 4.73 MILLION/UL (ref 3.88–5.62)
SODIUM SERPL-SCNC: 139 MMOL/L (ref 136–145)
WBC # BLD AUTO: 7.31 THOUSAND/UL (ref 4.31–10.16)

## 2019-08-07 PROCEDURE — 94760 N-INVAS EAR/PLS OXIMETRY 1: CPT

## 2019-08-07 PROCEDURE — 99217 PR OBSERVATION CARE DISCHARGE MANAGEMENT: CPT | Performed by: INTERNAL MEDICINE

## 2019-08-07 PROCEDURE — 85027 COMPLETE CBC AUTOMATED: CPT | Performed by: INTERNAL MEDICINE

## 2019-08-07 PROCEDURE — 80048 BASIC METABOLIC PNL TOTAL CA: CPT | Performed by: INTERNAL MEDICINE

## 2019-08-07 PROCEDURE — 82948 REAGENT STRIP/BLOOD GLUCOSE: CPT

## 2019-08-07 PROCEDURE — 94640 AIRWAY INHALATION TREATMENT: CPT

## 2019-08-07 RX ORDER — LEVALBUTEROL 1.25 MG/.5ML
1.25 SOLUTION, CONCENTRATE RESPIRATORY (INHALATION) EVERY 6 HOURS PRN
Status: DISCONTINUED | OUTPATIENT
Start: 2019-08-07 | End: 2019-08-07

## 2019-08-07 RX ORDER — DIPHENHYDRAMINE HCL 25 MG
25 TABLET ORAL EVERY 6 HOURS PRN
Qty: 30 TABLET | Refills: 0 | Status: ON HOLD | OUTPATIENT
Start: 2019-08-07 | End: 2020-09-16 | Stop reason: CLARIF

## 2019-08-07 RX ORDER — LEVALBUTEROL 1.25 MG/.5ML
1.25 SOLUTION, CONCENTRATE RESPIRATORY (INHALATION)
Status: DISCONTINUED | OUTPATIENT
Start: 2019-08-07 | End: 2019-08-07

## 2019-08-07 RX ORDER — DIAPER,BRIEF,INFANT-TODD,DISP
EACH MISCELLANEOUS 4 TIMES DAILY PRN
Qty: 30 G | Refills: 0 | Status: SHIPPED | OUTPATIENT
Start: 2019-08-07 | End: 2021-07-21 | Stop reason: ALTCHOICE

## 2019-08-07 RX ADMIN — LISINOPRIL 5 MG: 5 TABLET ORAL at 10:15

## 2019-08-07 RX ADMIN — FAMOTIDINE 20 MG: 20 TABLET ORAL at 10:16

## 2019-08-07 RX ADMIN — DIPHENHYDRAMINE HYDROCHLORIDE 50 MG: 50 INJECTION, SOLUTION INTRAMUSCULAR; INTRAVENOUS at 10:33

## 2019-08-07 RX ADMIN — IPRATROPIUM BROMIDE AND ALBUTEROL SULFATE 3 ML: 2.5; .5 SOLUTION RESPIRATORY (INHALATION) at 01:40

## 2019-08-07 RX ADMIN — LEVALBUTEROL HYDROCHLORIDE 1.25 MG: 1.25 SOLUTION, CONCENTRATE RESPIRATORY (INHALATION) at 08:29

## 2019-08-07 RX ADMIN — PRAVASTATIN SODIUM 40 MG: 40 TABLET ORAL at 17:00

## 2019-08-07 RX ADMIN — METOPROLOL TARTRATE 12.5 MG: 25 TABLET ORAL at 10:15

## 2019-08-07 RX ADMIN — DIPHENHYDRAMINE HYDROCHLORIDE 50 MG: 50 INJECTION, SOLUTION INTRAMUSCULAR; INTRAVENOUS at 04:43

## 2019-08-07 RX ADMIN — DIPHENHYDRAMINE HYDROCHLORIDE 50 MG: 50 INJECTION, SOLUTION INTRAMUSCULAR; INTRAVENOUS at 17:00

## 2019-08-07 RX ADMIN — HEPARIN SODIUM 5000 UNITS: 5000 INJECTION INTRAVENOUS; SUBCUTANEOUS at 05:38

## 2019-08-07 RX ADMIN — IPRATROPIUM BROMIDE 0.5 MG: 0.5 SOLUTION RESPIRATORY (INHALATION) at 08:29

## 2019-08-07 NOTE — PLAN OF CARE
Problem: PAIN - ADULT  Goal: Verbalizes/displays adequate comfort level or baseline comfort level  Description  Interventions:  - Encourage patient to monitor pain and request assistance  - Assess pain using appropriate pain scale  - Administer analgesics based on type and severity of pain and evaluate response  - Implement non-pharmacological measures as appropriate and evaluate response  - Consider cultural and social influences on pain and pain management  - Notify physician/advanced practitioner if interventions unsuccessful or patient reports new pain  Outcome: Progressing     Problem: INFECTION - ADULT  Goal: Absence or prevention of progression during hospitalization  Description  INTERVENTIONS:  - Assess and monitor for signs and symptoms of infection  - Monitor lab/diagnostic results  - Monitor all insertion sites, i e  indwelling lines, tubes, and drains  - Monitor endotracheal (as able) and nasal secretions for changes in amount and color  - [place] appropriate cooling/warming therapies per order  - Administer medications as ordered  - Instruct and encourage patient and family to use good hand hygiene technique  - Identify and instruct in appropriate isolation precautions for identified infection/condition  Outcome: Progressing     Problem: SAFETY ADULT  Goal: Patient will remain free of falls  Description  INTERVENTIONS:  - Assess patient frequently for physical needs  -  Identify cognitive and physical deficits and behaviors that affect risk of falls    -  [place] fall precautions as indicated by assessment   - Educate patient/family on patient safety including physical limitations  - Instruct patient to call for assistance with activity based on assessment  - Modify environment to reduce risk of injury  - Consider OT/PT consult to assist with strengthening/mobility  Outcome: Progressing  Goal: Maintain or return to baseline ADL function  Description  INTERVENTIONS:  -  Assess patient's ability to carry out ADLs; assess patient's baseline for ADL function and identify physical deficits which impact ability to perform ADLs (bathing, care of mouth/teeth, toileting, grooming, dressing, etc )  - Assess/evaluate cause of self-care deficits   - Assess range of motion  - Assess patient's mobility; develop plan if impaired  - Assess patient's need for assistive devices and provide as appropriate  - Encourage maximum independence but intervene and supervise when necessary  ¯ Involve family in performance of ADLs  ¯ Assess for home care needs following discharge   ¯ Request OT consult to assist with ADL evaluation and planning for discharge  ¯ Provide patient education as appropriate  Outcome: Progressing  Goal: Maintain or return mobility status to optimal level  Description  INTERVENTIONS:  - Assess patient's baseline mobility status (ambulation, transfers, stairs, etc )    - Identify cognitive and physical deficits and behaviors that affect mobility  - Identify mobility aids required to assist with transfers and/or ambulation (gait belt, sit-to-stand, lift, walker, cane, etc )  - [place] fall precautions as indicated by assessment  - Record patient progress and toleration of activity level on Mobility SBAR; progress patient to next Phase/Stage  - Instruct patient to call for assistance with activity based on assessment  - Request Rehabilitation consult to assist with strengthening/weightbearing, etc   Outcome: Progressing     Problem: DISCHARGE PLANNING  Goal: Discharge to home or other facility with appropriate resources  Description  INTERVENTIONS:  - Identify barriers to discharge w/patient and caregiver  - Arrange for needed discharge resources and transportation as appropriate  - Identify discharge learning needs (meds, wound care, etc )  - Arrange for interpretive services to assist at discharge as needed  - Refer to Case Management Department for coordinating discharge planning if the patient needs post-hospital services based on physician/advanced practitioner order or complex needs related to functional status, cognitive ability, or social support system  Outcome: Progressing     Problem: Knowledge Deficit  Goal: Patient/family/caregiver demonstrates understanding of disease process, treatment plan, medications, and discharge instructions  Description  Complete learning assessment and assess knowledge base    Interventions:  - Provide teaching at level of understanding  - Provide teaching via preferred learning methods  Outcome: Progressing

## 2019-08-07 NOTE — RESPIRATORY THERAPY NOTE
RT Protocol Note  Julian Nash 80 y o  male MRN: [id number]  Unit/Bed#: -01 Encounter: [id number]    Assessment    Principal Problem:    Bee sting  Active Problems:    Essential hypertension    Dyslipidemia    OLLIE (acute kidney injury) ([facility] )      Home Pulmonary Medications:  Unsure by pt       No past medical history on file    Social History     Socioeconomic History    Marital status: /Civil Union     Spouse name: Not on file    Number of children: Not on file    Years of education: Not on file    Highest education level: Not on file   Occupational History    Not on file   Social Needs    Financial resource strain: Not on file    Food insecurity:     Worry: Not on file     Inability: Not on file    Transportation needs:     Medical: Not on file     Non-medical: Not on file   Tobacco Use    Smoking status: Not on file   Substance and Sexual Activity    Alcohol use: Never     Frequency: Never     Binge frequency: Never    Drug use: Not on file    Sexual activity: Not on file   Lifestyle    Physical activity:     Days per week: Not on file     Minutes per session: Not on file    Stress: Not on file   Relationships    Social connections:     Talks on phone: Not on file     Gets together: Not on file     Attends Alevism service: Not on file     Active member of club or organization: Not on file     Attends meetings of clubs or organizations: Not on file     Relationship status: Not on file    Intimate partner violence:     Fear of current or ex partner: Not on file     Emotionally abused: Not on file     Physically abused: Not on file     Forced sexual activity: Not on file   Other Topics Concern    Not on file   Social History Narrative    Not on file       Subjective         Objective    Physical Exam:   Assessment Type: During-treatment  General Appearance: Alert, Awake  Respiratory Pattern: Dyspnea with exertion, Tachypneic, Spontaneous, Symmetrical, Normal  Chest Assessment: Chest expansion symmetrical, Trachea midline  Bilateral Breath Sounds: Scattered, Expiratory wheezes, Diminished, Coarse  Cough: None  O2 Device: 4L NC     Vitals:  Blood pressure 158/88, pulse 89, temperature 98 1 °F (36 7 °C), resp  rate (!) 24, height 5' 3" (1 6 m), weight 67 5 kg (148 lb 13 oz), SpO2 95 %  Imaging and other studies: I have personally reviewed pertinent reports  O2 Device: 4L NC      Plan    Respiratory Plan: Mild Distress pathway        Resp Comments: pt came into ED for multiple BEE stings today  SOB and slight distress  States he has inhalers at home but does not know what kind  TXs ordered  CHF stated on Chart  COPD stated by pt

## 2019-08-07 NOTE — PLAN OF CARE
Problem: PAIN - ADULT  Goal: Verbalizes/displays adequate comfort level or baseline comfort level  Description  Interventions:  - Encourage patient to monitor pain and request assistance  - Assess pain using appropriate pain scale  - Administer analgesics based on type and severity of pain and evaluate response  - Implement non-pharmacological measures as appropriate and evaluate response  - Consider cultural and social influences on pain and pain management  - Notify physician/advanced practitioner if interventions unsuccessful or patient reports new pain  8/7/2019 1748 by Mirian Tam RN  Outcome: Adequate for Discharge  8/7/2019 1447 by Mirian Tam RN  Outcome: Progressing     Problem: INFECTION - ADULT  Goal: Absence or prevention of progression during hospitalization  Description  INTERVENTIONS:  - Assess and monitor for signs and symptoms of infection  - Monitor lab/diagnostic results  - Monitor all insertion sites, i e  indwelling lines, tubes, and drains  - Monitor endotracheal (as able) and nasal secretions for changes in amount and color  - [place] appropriate cooling/warming therapies per order  - Administer medications as ordered  - Instruct and encourage patient and family to use good hand hygiene technique  - Identify and instruct in appropriate isolation precautions for identified infection/condition  8/7/2019 1748 by Mirian Tam RN  Outcome: Adequate for Discharge  8/7/2019 1447 by Mirian Tam RN  Outcome: Progressing     Problem: SAFETY ADULT  Goal: Patient will remain free of falls  Description  INTERVENTIONS:  - Assess patient frequently for physical needs  -  Identify cognitive and physical deficits and behaviors that affect risk of falls    -  [place] fall precautions as indicated by assessment   - Educate patient/family on patient safety including physical limitations  - Instruct patient to call for assistance with activity based on assessment  - Modify environment to reduce risk of injury  - Consider OT/PT consult to assist with strengthening/mobility  8/7/2019 1748 by Travon Earl RN  Outcome: Adequate for Discharge  8/7/2019 1447 by Travon aErl RN  Outcome: Progressing  Goal: Maintain or return to baseline ADL function  Description  INTERVENTIONS:  -  Assess patient's ability to carry out ADLs; assess patient's baseline for ADL function and identify physical deficits which impact ability to perform ADLs (bathing, care of mouth/teeth, toileting, grooming, dressing, etc )  - Assess/evaluate cause of self-care deficits   - Assess range of motion  - Assess patient's mobility; develop plan if impaired  - Assess patient's need for assistive devices and provide as appropriate  - Encourage maximum independence but intervene and supervise when necessary  ¯ Involve family in performance of ADLs  ¯ Assess for home care needs following discharge   ¯ Request OT consult to assist with ADL evaluation and planning for discharge  ¯ Provide patient education as appropriate  8/7/2019 1748 by Travon Earl RN  Outcome: Adequate for Discharge  8/7/2019 1447 by Travon Earl RN  Outcome: Progressing  Goal: Maintain or return mobility status to optimal level  Description  INTERVENTIONS:  - Assess patient's baseline mobility status (ambulation, transfers, stairs, etc )    - Identify cognitive and physical deficits and behaviors that affect mobility  - Identify mobility aids required to assist with transfers and/or ambulation (gait belt, sit-to-stand, lift, walker, cane, etc )  - [place] fall precautions as indicated by assessment  - Record patient progress and toleration of activity level on Mobility SBAR; progress patient to next Phase/Stage  - Instruct patient to call for assistance with activity based on assessment  - Request Rehabilitation consult to assist with strengthening/weightbearing, etc   8/7/2019 1748 by Travon Earl RN  Outcome: Adequate for Discharge  8/7/2019 1447 by Lorna Osorio RN  Outcome: Progressing     Problem: DISCHARGE PLANNING  Goal: Discharge to home or other facility with appropriate resources  Description  INTERVENTIONS:  - Identify barriers to discharge w/patient and caregiver  - Arrange for needed discharge resources and transportation as appropriate  - Identify discharge learning needs (meds, wound care, etc )  - Arrange for interpretive services to assist at discharge as needed  - Refer to Case Management Department for coordinating discharge planning if the patient needs post-hospital services based on physician/advanced practitioner order or complex needs related to functional status, cognitive ability, or social support system  8/7/2019 1748 by Lorna Osorio RN  Outcome: Adequate for Discharge  8/7/2019 1447 by Lorna Osorio RN  Outcome: Progressing     Problem: Knowledge Deficit  Goal: Patient/family/caregiver demonstrates understanding of disease process, treatment plan, medications, and discharge instructions  Description  Complete learning assessment and assess knowledge base    Interventions:  - Provide teaching at level of understanding  - Provide teaching via preferred learning methods  8/7/2019 1748 by Lorna Osorio RN  Outcome: Adequate for Discharge  8/7/2019 1447 by Lorna Osorio RN  Outcome: Progressing

## 2019-08-07 NOTE — SOCIAL WORK
Nurse expressed concerns about patient's ability to walk independently  CM phoned wife-Rosalia who informed there is a walker at home for patient  Wife states she would like [facility] services at home for patient  A post acute care recommendation was made by your care team for [address] 78  Discussed Freedom of Choice with caregiver  List of agencies given to caregiver via in person  caregiver aware the list is custom filtered for them by preference  and that [facility]'s post acute providers are designated  Referral sent  Nurse and SLIM notified

## 2019-08-07 NOTE — UTILIZATION REVIEW
Initial Clinical Review    Admission: Date/Time/Statement: Observation 8/6/19 @1559    Orders Placed This Encounter   Procedures    Place in Observation     Standing Status:   Standing     Number of Occurrences:   1     Order Specific Question:   Admitting Physician     Answer:   Roselia Cano     Order Specific Question:   Level of Care     Answer:   Med Surg [16]     ED Arrival Information     Expected Arrival Acuity Means of Arrival Escorted By Service Admission Type    - 8/6/2019 10:59 [facility] General Medicine Emergency    Arrival Complaint    Multi Bee sting        Chief Complaint   Patient presents with    Bee Sting     pt was stung by approx 40 Hornets;EMS removed 22 stingers, PTA medicated Benadryl 50mg & Solumedrol 125mg IV     Assessment/Plan:  80year old male to the ED from home via EMS after being stung by multiple hornets  Removed 22 stingers from his skin  Given benadryl and Solumedrol IV prior to arrival   Upon arrival to the ED, he complains of shortness of breath, twitching and dry mouth  Oropharynx clear and dry, no significant swelling  He has some tachypnea, wheezing, and rapid shallow breathing with bibasilar crackles  Arms and ears are edematous  Multiple rounds lesions consistent with insect stings, primarily on arms and ears the face although 1 noticed on right groin  Stinger in place in left forehead  Heart rate has improved since getting epinephrine and benadryl  Anticipated Length of Stay:  Patient will be admitted on an Observation basis with an anticipated length of stay of  Less than 2 midnights     Justification for Hospital Stay: SOB, bee sting    ED Triage Vitals   Temperature Pulse Respirations Blood Pressure SpO2   08/06/19 1118 08/06/19 1118 08/06/19 1118 08/06/19 1118 08/06/19 1118   (!) 100 6 °F (38 1 °C) (!) 116 (!) 30 (!) 196/94 95 %      Temp Source Heart Rate Source Patient Position - Orthostatic VS BP Location FiO2 (%) 08/06/19 1106 08/06/19 1118 08/06/19 1118 08/06/19 1118 --   Oral Monitor Sitting Left arm       Pain Score       08/06/19 1150       Worst Possible Pain        Wt Readings from Last 1 Encounters:   08/06/19 67 5 kg (148 lb 13 oz)     Additional Vital Signs:   Date/Time Temp Pulse Resp BP MAP (mmHg) SpO2 O2 Device   08/07/19 0833 -- -- -- -- -- 92 % None (Room air)   08/07/19 0831 -- -- -- -- -- 92 % --   08/07/19 07:50:42 98 3 °F (36 8 °C) 96 18 145/84 104 95 % --   08/07/19 0616 -- 97 -- -- -- 95 % --   08/07/19 0303 98 1 °F (36 7 °C) 112Abnormal  32Abnormal  138/74 95 94 % --   08/07/19 0302 98 1 °F (36 7 °C) 112Abnormal  -- 138/74 95 94 % --   08/06/19 19:33:28 98 1 °F (36 7 °C) 97 30Abnormal  178/102Abnormal  127 96 % --   08/06/19 1830 -- 94 25Abnormal  194/88Abnormal  -- 95 % --   08/06/19 1800 -- 97 18 177/107Abnormal  -- 95 % --   08/06/19 1730 -- 94 25Abnormal  185/93Abnormal  -- 95 % --   08/06/19 1700 -- 92 22 183/86Abnormal  -- 95 % --   08/06/19 1530 -- 89 26Abnormal  185/86Abnormal  -- 95 % --   08/06/19 1500 -- 86 34Abnormal  179/91Abnormal  -- 95 % Nasal cannula   08/06/19 1430 -- 87 31Abnormal  160/111Abnormal  -- 95 % --   08/06/19 1400 100 °F (37 8 °C) 88 31Abnormal  191/93Abnormal  -- 95 % --   08/06/19 1330 -- 97 30Abnormal  136/97 -- 95 % --   08/06/19 1230 -- 108Abnormal  22 159/89 -- 95 % --   08/06/19 1156 -- 115Abnormal  30Abnormal  191/97Abnormal  -- 95 % Nasal cannula   08/06/19 1130 -- 116Abnormal  41Abnormal  196/105Abnormal           Pertinent Labs/Diagnostic Test Results:   CXR:  Interstitial prominence bilaterally with additional hazy densities in the left mid to lower lung field and right lung base    [id number];  Rate:  109  BPM  Rhythm:  Sinus tachycardia with PVCs   Axis:  Normal   Intervals: Normal, no blocks, QTc  514 ms  Q waves:  Normal   T waves:  T-waves flattened in aVL, V3, V5 V6   ST segments:  No significant elevations or depressions   Impression:  Sinus tachycardia with PVCs and nonspecific ST-T changes but no STEMI    EKG 1146: Rate:  150  BPM  Rhythm:  Supraventricular tachycardia with PVCs   Axis:  Normal   Intervals: Normal, no blocks, QTc  451 ms  Q waves:  Q-wave in V1   T waves:  Flattening in inferior leads   ST segments:  ST depression in lateral leads      Impression:  Supraventricular tachycardia interrupted by PVCs with nonspecific T-wave changes and  lateral ST depression concerning for rate dependent ischemia       Results from last 7 days   Lab Units 08/07/19  0250 08/06/19  1127   WBC Thousand/uL 7 31 12 42*   HEMOGLOBIN g/dL 14 5 14 4   HEMATOCRIT % 43 1 43 6   PLATELETS Thousands/uL 138* 141*   NEUTROS ABS Thousands/µL  --  9 68*         Results from last 7 days   Lab Units 08/07/19  0250 08/06/19  1127   SODIUM mmol/L 139 139   POTASSIUM mmol/L 4 3 3 4*   CHLORIDE mmol/L 102 103   CO2 mmol/L 21 22   ANION GAP mmol/L 16* 14*   BUN mg/dL 28* 19   CREATININE mg/dL 1 60* 1 32*   EGFR ml/min/1 73sq m 39 49   CALCIUM mg/dL 9 0 8 9     Results from last 7 days   Lab Units 08/06/19  1127   AST U/L 15   ALT U/L 17   ALK PHOS U/L 86   TOTAL PROTEIN g/dL 7 1   ALBUMIN g/dL 3 8   TOTAL BILIRUBIN mg/dL 1 00         Results from last 7 days   Lab Units 08/07/19  0250 08/06/19  1127   GLUCOSE RANDOM mg/dL 155* 147*       Results from last 7 days   Lab Units 08/06/19  1127   TROPONIN I ng/mL <0 02       Results from last 7 days   Lab Units 08/06/19  1127   NT-PRO BNP pg/mL 1,138*     ED Treatment:   Medication Administration from 08/06/2019 1059 to 08/06/2019 1908       Date/Time Order Dose Route Action     08/06/2019 1128 EPINEPHrine (ADRENALIN) 1 mg/mL injection **ADS Override Pull** 0 3 mg Intramuscular Given     08/06/2019 1150 fentanyl citrate (PF) 100 MCG/2ML 50 mcg 50 mcg Intravenous Given     08/06/2019 1150 metoprolol (LOPRESSOR) injection 5 mg 5 mg Intravenous Given     08/06/2019 2218 LET gel 1 application 1 application Topical Given     08/06/2019 1328 magnesium sulfate 2 g/50 mL IVPB (premix) 2 g 2 g Intravenous New Bag     08/06/2019 1331 metoprolol (LOPRESSOR) injection 5 mg 5 mg Intravenous Given     08/06/2019 1330 fentanyl citrate (PF) 100 MCG/2ML 50 mcg 50 mcg Intravenous Given     08/06/2019 1343 levalbuterol (XOPENEX) inhalation solution 1 25 mg 1 25 mg Nebulization Given     08/06/2019 1329 sodium chloride 0 9 % bolus 1,000 mL 1,000 mL Intravenous New Bag     08/06/2019 1459 HYDROmorphone (DILAUDID) injection 0 2 mg 0 2 mg Intravenous Given     08/06/2019 1753 diphenhydrAMINE (BENADRYL) tablet 25 mg 25 mg Oral Given     08/06/2019 1857 hydrocortisone 1 % cream 1 application Topical Given        Admitting Diagnosis: Tachycardia [R00 0]  Renal insufficiency [N28 9]  Bee sting [T63 441A]  Congestive heart failure (CHF) ([address] ) [I50 9]  Toxic reaction to hornets, wasps and bees, accidental or unintentional, initial encounter [T63 451A, T63 441A, T63 461A]  Age/Sex: 80 y o  male  Admission Orders:  Neuro checks every 4 hours    Current Facility-Administered Medications:  acetaminophen 650 mg Oral Q6H PRN   albuterol 2 5 mg Nebulization Q6H PRN   aspirin 81 mg Oral Every Other Day   diphenhydrAMINE (BENADRYL) IVPB 50 mg Intravenous Q6H x3   diphenhydrAMINE 25 mg Oral Q6H PRN   famotidine 20 mg Oral Daily   heparin (porcine) 5,000 Units Subcutaneous Q8H [facility]   hydrocortisone  Topical 4x Daily PRN   lisinopril 5 mg Oral Daily   melatonin 3 mg Oral HS   metoprolol 5 mg Intravenous Q6H PRN   metoprolol tartrate 12 5 mg Oral Daily   ondansetron 4 mg Intravenous Q6H PRN   pravastatin 40 mg Oral Daily With Dinner     Dilaudid IV x1  None    Network Utilization Review Department  Phone: [phone]; Fax [phone]  Renetta@"[employer]"  org  ATTENTION: Please call with any questions or concerns to [phone]  and carefully listen to the prompts so that you are directed to the right person     Send all requests for admission clinical reviews, approved or denied determinations and any other requests to fax [phone]   All voicemails are confidential

## 2019-08-07 NOTE — WOUND OSTOMY CARE
Progress Note - Wound   Lizy Summers 80 y o  male MRN: [id number]  Unit/Bed#: -01 Encounter: [id number]      Assessment:  Wound care to see patient at request of nursing for assessment of potential pressure injury  Patient cooperative with assessment  Minimal assist of one with turning  Per nursing patient wears briefs at home and is occasionally incontinent  B/l heels, sacrum and buttocks are intact with no redness or wounds noted  No maceration noted to the sacral slit  Recommend offloading and hydraguard cream      Lower back just above the buttocks - likely the brief line, noted with a well defined linear shaped area of redness that is intact and blanchable  Per nursing this area is fading now and improving since patient has not been wearing briefs in the hospital  All aspects are blanchable and non-tender- no pressure injury noted  Recommend offloading of pressure / hydraguard cream     Educated patient on the importance of frequent offloading of pressure via turning, repositioning and weight-shifting  Verbalized understanding of plan of care  Patient tolerated well  Primary nurse aware of plan of care  Plan:   1  Apply hydraguard to b/l heels, buttocks, lower back and sacrum BID and PRN for prevention and protection  2  Apply skin nourishing cream the entire skin daily for moisture  3  Turn and reposition patient every  2 hours   4  Elevate heels off of bed with pillows to offload pressure   5  Apply EHOB waffle cushion to chair when OOB, if able    Objective:    Vitals: Blood pressure 145/84, pulse 96, temperature 98 3 °F (36 8 °C), resp  rate 18, height 5' 3" (1 6 m), weight 67 5 kg (148 lb 13 oz), SpO2 92 %  ,Body mass index is 26 36 kg/m²  Wound care will sign off as patient has no active wounds, please re-consult if needed     Manny Marie RN BSN CWON

## 2019-08-07 NOTE — DISCHARGE SUMMARY
Discharge Summary - [facility]    Patient Information: Emmanuelle Benjamin 80 y o  male MRN: [id number]  Unit/Bed#: -01 Encounter: [id number]    Discharging Physician / Practitioner: Alana Winter MD  PCP: Breanna Aldrich DO  Admission Date: 8/6/2019  Discharge Date: 08/07/19    Disposition:     Home    Reason for Admission: Bee sting    Discharge Diagnoses:     Principal Problem:    Bee sting  Active Problems:    Essential hypertension    Dyslipidemia    OLLIE (acute kidney injury) ([address] )  Resolved Problems:    * No resolved hospital problems  *      Consultations During Hospital Stay:  · none    Procedures Performed:     ·     Significant Findings / Test Results:     ·     Incidental Findings:   ·      Test Results Pending at Discharge (will require follow up):   ·      Outpatient Tests Requested:  ·     Complications:  None    History of Present Illness:     Emmanuelle Benjamin is a 80 y o  male who presents with pmhx of HTN, dyslipidemia comes in with complaints of increased SOB after getting bit by bees after carrying out the trash  Patient was found to be wheezing in ER and was given epinephrine and benadryl  His blood pressure was also found to be very elevated and HR fluctuating from 's  It has now improved  Patient will be admitted for further observation  Hospital Course:     Emmanuelle Benjamin is a 80 y o  male patient who originally presented to the hospital on 8/6/2019 due to complaints of bee stings  Patient was very SOB and was given IV epinephrine for anaphylaxis  He was also tachycardic and was observed overnight  Patient HR normalized and came back to baseline  Patient itch also has improved  He is currently hemodynamically stable and will be discharged home with PO benadryl and cortisone cream for itching  Condition at Discharge: stable     Discharge Day Visit / Exam:     Subjective:  Patient seen and examined at bedside  Patient has no new complaints    Vitals: Blood Pressure: 127/61 (08/07/19 1118)  Pulse: 93 (08/07/19 1118)  Temperature: (!) 97 4 °F (36 3 °C) (08/07/19 1118)  Temp Source: Tympanic (08/06/19 1400)  Respirations: 19 (08/07/19 1118)  Height: 5' 3" (160 cm) (08/06/19 1118)  Weight - Scale: 67 5 kg (148 lb 13 oz) (08/06/19 1118)  SpO2: 93 % (08/07/19 1118)  Exam:   Physical Exam   Constitutional: He is oriented to person, place, and time  He appears well-developed and well-nourished  HENT:   Head: Normocephalic and atraumatic  Eyes: Pupils are equal, round, and reactive to light  EOM are normal    Neck: Normal range of motion  Neck supple  No JVD present  No tracheal deviation present  No thyromegaly present  Cardiovascular: Normal rate, regular rhythm and normal heart sounds  Exam reveals no gallop and no friction rub  No murmur heard  Pulmonary/Chest: Effort normal and breath sounds normal  No stridor  No respiratory distress  He has no wheezes  Abdominal: Soft  Bowel sounds are normal  He exhibits no distension  There is no tenderness  There is no guarding  Musculoskeletal: Normal range of motion  He exhibits no edema  Neurological: He is alert and oriented to person, place, and time  He displays normal reflexes  No cranial nerve deficit  Coordination normal    Skin: Skin is warm and dry  Vitals reviewed  Discussion with Family:     Discharge instructions/Information to patient and family:   See after visit summary for information provided to patient and family  Provisions for Follow-Up Care:  See after visit summary for information related to follow-up care and any pertinent home health orders  Planned Readmission: none     Discharge Statement:  I spent 50 minutes discharging the patient  This time was spent on the day of discharge  I had direct contact with the patient on the day of discharge   Greater than 50% of the total time was spent examining patient, answering all patient questions, arranging and discussing plan of care with patient as well as directly providing post-discharge instructions  Additional time then spent on discharge activities  Discharge Medications:  See after visit summary for reconciled discharge medications provided to patient and family        ** Please Note: This note has been constructed using a voice recognition system **

## 2019-08-07 NOTE — SOCIAL WORK
CM met w/ pt notified of OBS status and provided OBS notice  Signed OBS in pt chart  Pt currently lives w/ wife in [place]  Pt sees Dr Kev Moreno for PCP  Pt uses [facility] in [place] and denies any barriers to Rx  No anticipated needs  Wife will transp home for d/c

## 2019-08-07 NOTE — DISCHARGE INSTR - OTHER ORDERS
1  Apply hydraguard to b/l heels, buttocks, lower back and sacrum BID and PRN for prevention and protection  2  Apply skin nourishing cream the entire skin daily for moisture  3  Turn and reposition patient every  2 hours   4  Elevate heels off of bed with pillows to offload pressure   5   Apply EHOB waffle cushion to chair when OOB, if able

## 2020-08-09 ENCOUNTER — HOSPITAL ENCOUNTER (INPATIENT)
Facility: HOSPITAL | Age: 85
LOS: 4 days | Discharge: NURSING, REHAB OR LONG-TERM CARE | DRG: 057 | End: 2020-08-13
Attending: EMERGENCY MEDICINE | Admitting: INTERNAL MEDICINE
Payer: MEDICARE

## 2020-08-09 ENCOUNTER — APPOINTMENT (EMERGENCY)
Dept: CT IMAGING | Facility: HOSPITAL | Age: 85
DRG: 057 | End: 2020-08-09
Payer: MEDICARE

## 2020-08-09 DIAGNOSIS — K59.00 CONSTIPATION, UNSPECIFIED CONSTIPATION TYPE: ICD-10-CM

## 2020-08-09 DIAGNOSIS — R10.13 DYSPEPSIA: ICD-10-CM

## 2020-08-09 DIAGNOSIS — S22.000S THORACIC COMPRESSION FRACTURE, SEQUELA: ICD-10-CM

## 2020-08-09 DIAGNOSIS — R29.6 MULTIPLE FALLS: Primary | ICD-10-CM

## 2020-08-09 DIAGNOSIS — S32.000S COMPRESSION FX, LUMBAR SPINE, SEQUELA: ICD-10-CM

## 2020-08-09 DIAGNOSIS — R26.2 AMBULATORY DYSFUNCTION: ICD-10-CM

## 2020-08-09 PROBLEM — I49.9 IRREGULAR HEARTBEAT: Status: ACTIVE | Noted: 2020-08-09

## 2020-08-09 PROBLEM — S22.080A CLOSED COMPRESSION FRACTURE OF THORACOLUMBAR VERTEBRA (HCC): Status: ACTIVE | Noted: 2020-08-09

## 2020-08-09 PROBLEM — S32.010A CLOSED COMPRESSION FRACTURE OF THORACOLUMBAR VERTEBRA (HCC): Status: ACTIVE | Noted: 2020-08-09

## 2020-08-09 LAB
ALBUMIN SERPL BCP-MCNC: 3.6 G/DL (ref 3.5–5)
ALP SERPL-CCNC: 51 U/L (ref 46–116)
ALT SERPL W P-5'-P-CCNC: 16 U/L (ref 12–78)
ANION GAP SERPL CALCULATED.3IONS-SCNC: 10 MMOL/L (ref 4–13)
APTT PPP: 24 SECONDS (ref 23–37)
AST SERPL W P-5'-P-CCNC: 19 U/L (ref 5–45)
ATRIAL RATE: 67 BPM
BACTERIA UR QL AUTO: ABNORMAL /HPF
BASOPHILS # BLD AUTO: 0.01 THOUSANDS/ΜL (ref 0–0.1)
BASOPHILS NFR BLD AUTO: 0 % (ref 0–1)
BILIRUB SERPL-MCNC: 0.93 MG/DL (ref 0.2–1)
BILIRUB UR QL STRIP: NEGATIVE
BUN SERPL-MCNC: 38 MG/DL (ref 5–25)
CALCIUM SERPL-MCNC: 9.2 MG/DL (ref 8.3–10.1)
CHLORIDE SERPL-SCNC: 96 MMOL/L (ref 100–108)
CLARITY UR: CLEAR
CO2 SERPL-SCNC: 28 MMOL/L (ref 21–32)
COLOR UR: YELLOW
CREAT SERPL-MCNC: 1.52 MG/DL (ref 0.6–1.3)
EOSINOPHIL # BLD AUTO: 0.02 THOUSAND/ΜL (ref 0–0.61)
EOSINOPHIL NFR BLD AUTO: 0 % (ref 0–6)
ERYTHROCYTE [DISTWIDTH] IN BLOOD BY AUTOMATED COUNT: 13.9 % (ref 11.6–15.1)
GFR SERPL CREATININE-BSD FRML MDRD: 41 ML/MIN/1.73SQ M
GLUCOSE SERPL-MCNC: 126 MG/DL (ref 65–140)
GLUCOSE UR STRIP-MCNC: NEGATIVE MG/DL
HCT VFR BLD AUTO: 41.9 % (ref 36.5–49.3)
HGB BLD-MCNC: 14.4 G/DL (ref 12–17)
HGB UR QL STRIP.AUTO: ABNORMAL
IMM GRANULOCYTES # BLD AUTO: 0.04 THOUSAND/UL (ref 0–0.2)
IMM GRANULOCYTES NFR BLD AUTO: 0 % (ref 0–2)
INR PPP: 1.16 (ref 0.84–1.19)
KETONES UR STRIP-MCNC: NEGATIVE MG/DL
LEUKOCYTE ESTERASE UR QL STRIP: ABNORMAL
LIPASE SERPL-CCNC: 195 U/L (ref 73–393)
LYMPHOCYTES # BLD AUTO: 1.2 THOUSANDS/ΜL (ref 0.6–4.47)
LYMPHOCYTES NFR BLD AUTO: 13 % (ref 14–44)
MCH RBC QN AUTO: 31.5 PG (ref 26.8–34.3)
MCHC RBC AUTO-ENTMCNC: 34.4 G/DL (ref 31.4–37.4)
MCV RBC AUTO: 92 FL (ref 82–98)
MONOCYTES # BLD AUTO: 0.84 THOUSAND/ΜL (ref 0.17–1.22)
MONOCYTES NFR BLD AUTO: 9 % (ref 4–12)
NEUTROPHILS # BLD AUTO: 6.95 THOUSANDS/ΜL (ref 1.85–7.62)
NEUTS SEG NFR BLD AUTO: 78 % (ref 43–75)
NITRITE UR QL STRIP: NEGATIVE
NON-SQ EPI CELLS URNS QL MICRO: ABNORMAL /HPF
NRBC BLD AUTO-RTO: 0 /100 WBCS
P AXIS: 35 DEGREES
PH UR STRIP.AUTO: 7 [PH] (ref 4.5–8)
PLATELET # BLD AUTO: 137 THOUSANDS/UL (ref 149–390)
PMV BLD AUTO: 11.4 FL (ref 8.9–12.7)
POTASSIUM SERPL-SCNC: 3.8 MMOL/L (ref 3.5–5.3)
PR INTERVAL: 256 MS
PROT SERPL-MCNC: 7.3 G/DL (ref 6.4–8.2)
PROT UR STRIP-MCNC: NEGATIVE MG/DL
PROTHROMBIN TIME: 14.9 SECONDS (ref 11.6–14.5)
QRS AXIS: 3 DEGREES
QRSD INTERVAL: 80 MS
QT INTERVAL: 404 MS
QTC INTERVAL: 427 MS
RBC # BLD AUTO: 4.57 MILLION/UL (ref 3.88–5.62)
RBC #/AREA URNS AUTO: ABNORMAL /HPF
SODIUM SERPL-SCNC: 134 MMOL/L (ref 136–145)
SP GR UR STRIP.AUTO: 1.01 (ref 1–1.03)
T WAVE AXIS: -52 DEGREES
TROPONIN I SERPL-MCNC: <0.02 NG/ML
UROBILINOGEN UR QL STRIP.AUTO: 0.2 E.U./DL
VENTRICULAR RATE: 67 BPM
WBC # BLD AUTO: 9.06 THOUSAND/UL (ref 4.31–10.16)
WBC #/AREA URNS AUTO: ABNORMAL /HPF

## 2020-08-09 PROCEDURE — 85730 THROMBOPLASTIN TIME PARTIAL: CPT | Performed by: EMERGENCY MEDICINE

## 2020-08-09 PROCEDURE — 93010 ELECTROCARDIOGRAM REPORT: CPT | Performed by: INTERNAL MEDICINE

## 2020-08-09 PROCEDURE — 85025 COMPLETE CBC W/AUTO DIFF WBC: CPT | Performed by: EMERGENCY MEDICINE

## 2020-08-09 PROCEDURE — 85610 PROTHROMBIN TIME: CPT | Performed by: EMERGENCY MEDICINE

## 2020-08-09 PROCEDURE — 70450 CT HEAD/BRAIN W/O DYE: CPT

## 2020-08-09 PROCEDURE — 71260 CT THORAX DX C+: CPT

## 2020-08-09 PROCEDURE — 84484 ASSAY OF TROPONIN QUANT: CPT | Performed by: EMERGENCY MEDICINE

## 2020-08-09 PROCEDURE — 83690 ASSAY OF LIPASE: CPT | Performed by: EMERGENCY MEDICINE

## 2020-08-09 PROCEDURE — 96361 HYDRATE IV INFUSION ADD-ON: CPT

## 2020-08-09 PROCEDURE — 82607 VITAMIN B-12: CPT | Performed by: INTERNAL MEDICINE

## 2020-08-09 PROCEDURE — 99285 EMERGENCY DEPT VISIT HI MDM: CPT | Performed by: EMERGENCY MEDICINE

## 2020-08-09 PROCEDURE — 99223 1ST HOSP IP/OBS HIGH 75: CPT | Performed by: PHYSICIAN ASSISTANT

## 2020-08-09 PROCEDURE — 96360 HYDRATION IV INFUSION INIT: CPT

## 2020-08-09 PROCEDURE — 74177 CT ABD & PELVIS W/CONTRAST: CPT

## 2020-08-09 PROCEDURE — 99285 EMERGENCY DEPT VISIT HI MDM: CPT

## 2020-08-09 PROCEDURE — 81001 URINALYSIS AUTO W/SCOPE: CPT

## 2020-08-09 PROCEDURE — 80053 COMPREHEN METABOLIC PANEL: CPT | Performed by: EMERGENCY MEDICINE

## 2020-08-09 PROCEDURE — 1123F ACP DISCUSS/DSCN MKR DOCD: CPT | Performed by: PHYSICIAN ASSISTANT

## 2020-08-09 PROCEDURE — 93005 ELECTROCARDIOGRAM TRACING: CPT

## 2020-08-09 PROCEDURE — G1004 CDSM NDSC: HCPCS

## 2020-08-09 PROCEDURE — 36415 COLL VENOUS BLD VENIPUNCTURE: CPT | Performed by: EMERGENCY MEDICINE

## 2020-08-09 RX ORDER — OXYCODONE HYDROCHLORIDE 5 MG/1
2.5 TABLET ORAL EVERY 4 HOURS PRN
Status: DISCONTINUED | OUTPATIENT
Start: 2020-08-09 | End: 2020-08-13 | Stop reason: HOSPADM

## 2020-08-09 RX ORDER — SENNOSIDES 8.6 MG
2 TABLET ORAL DAILY PRN
Status: DISCONTINUED | OUTPATIENT
Start: 2020-08-09 | End: 2020-08-13 | Stop reason: HOSPADM

## 2020-08-09 RX ORDER — CHLORAL HYDRATE 500 MG
1000 CAPSULE ORAL DAILY
Status: DISCONTINUED | OUTPATIENT
Start: 2020-08-10 | End: 2020-08-13 | Stop reason: HOSPADM

## 2020-08-09 RX ORDER — HYDROMORPHONE HCL/PF 1 MG/ML
0.2 SYRINGE (ML) INJECTION EVERY 4 HOURS PRN
Status: DISCONTINUED | OUTPATIENT
Start: 2020-08-09 | End: 2020-08-13 | Stop reason: HOSPADM

## 2020-08-09 RX ORDER — ACETAMINOPHEN 325 MG/1
975 TABLET ORAL EVERY 8 HOURS SCHEDULED
Status: DISCONTINUED | OUTPATIENT
Start: 2020-08-09 | End: 2020-08-13 | Stop reason: HOSPADM

## 2020-08-09 RX ORDER — OXYCODONE HYDROCHLORIDE 5 MG/1
5 TABLET ORAL EVERY 4 HOURS PRN
Status: DISCONTINUED | OUTPATIENT
Start: 2020-08-09 | End: 2020-08-13 | Stop reason: HOSPADM

## 2020-08-09 RX ORDER — GABAPENTIN 100 MG/1
100 CAPSULE ORAL
Status: DISCONTINUED | OUTPATIENT
Start: 2020-08-09 | End: 2020-08-13 | Stop reason: HOSPADM

## 2020-08-09 RX ORDER — LIDOCAINE 50 MG/G
2 PATCH TOPICAL DAILY
Status: COMPLETED | OUTPATIENT
Start: 2020-08-10 | End: 2020-08-10

## 2020-08-09 RX ORDER — ONDANSETRON 2 MG/ML
4 INJECTION INTRAMUSCULAR; INTRAVENOUS EVERY 6 HOURS PRN
Status: DISCONTINUED | OUTPATIENT
Start: 2020-08-09 | End: 2020-08-13 | Stop reason: HOSPADM

## 2020-08-09 RX ORDER — MELATONIN
1000 DAILY
Status: DISCONTINUED | OUTPATIENT
Start: 2020-08-10 | End: 2020-08-13 | Stop reason: HOSPADM

## 2020-08-09 RX ORDER — LISINOPRIL 5 MG/1
5 TABLET ORAL DAILY
Status: DISCONTINUED | OUTPATIENT
Start: 2020-08-10 | End: 2020-08-11

## 2020-08-09 RX ORDER — PRAVASTATIN SODIUM 40 MG
40 TABLET ORAL
Status: DISCONTINUED | OUTPATIENT
Start: 2020-08-10 | End: 2020-08-13 | Stop reason: HOSPADM

## 2020-08-09 RX ORDER — HEPARIN SODIUM 5000 [USP'U]/ML
5000 INJECTION, SOLUTION INTRAVENOUS; SUBCUTANEOUS EVERY 8 HOURS SCHEDULED
Status: DISCONTINUED | OUTPATIENT
Start: 2020-08-09 | End: 2020-08-13 | Stop reason: HOSPADM

## 2020-08-09 RX ORDER — FAMOTIDINE 20 MG/1
20 TABLET, FILM COATED ORAL DAILY
Status: DISCONTINUED | OUTPATIENT
Start: 2020-08-10 | End: 2020-08-13 | Stop reason: HOSPADM

## 2020-08-09 RX ORDER — ASPIRIN 81 MG/1
81 TABLET, CHEWABLE ORAL DAILY
Status: DISCONTINUED | OUTPATIENT
Start: 2020-08-10 | End: 2020-08-13 | Stop reason: HOSPADM

## 2020-08-09 RX ORDER — POLYETHYLENE GLYCOL 3350 17 G/17G
17 POWDER, FOR SOLUTION ORAL DAILY
Status: DISCONTINUED | OUTPATIENT
Start: 2020-08-10 | End: 2020-08-13 | Stop reason: HOSPADM

## 2020-08-09 RX ADMIN — IOHEXOL 100 ML: 350 INJECTION, SOLUTION INTRAVENOUS at 17:19

## 2020-08-09 RX ADMIN — SODIUM CHLORIDE 500 ML: 0.9 INJECTION, SOLUTION INTRAVENOUS at 17:41

## 2020-08-09 NOTE — ED PROVIDER NOTES
History  Chief Complaint   Patient presents with    Constipation     C/o ABD discomfort  LBM two days ago  Neg OTC remedies  History provided by:  Patient and relative   used: No    Constipation   Associated symptoms: abdominal pain and back pain    Associated symptoms: no nausea and no vomiting    80year-old male brought for evaluation abdominal pain, loss of appetite over the last 2 days  Last bowel movement at least 2 days ago  Reports having a fall a few days ago, landing on his side and then falling again yesterday landing directly on his back  He was initially seen at his family doctor's office 2 days ago, had x-rays of the lumbar spine noting age indeterminate compression fractures of all lumbar vertebral bodies with worst at L4/L5  Patient has been taking extra-strength Tylenol with some mild improvement in pain  He struck his head when he fell yesterday  Takes 81 mg aspirin  No headache, dizziness, vomiting  No external signs of trauma on exam   Abdomen somewhat distended significant tenderness in the left lower quadrant with some guarding  Has a reducible umbilical hernia which is tender  Given multiple falls, age indeterminate lumbar fractures and new abdominal pain will CT head, chest/abdomen/pelvis with recons of the spine  He declines pain control at this time  Also check labs and will re-evaluate  Prior to Admission Medications   Prescriptions Last Dose Informant Patient Reported? Taking?    B Complex Vitamins (VITAMIN B COMPLEX PO)  Spouse/Significant Other Yes Yes   Sig: Take 482 2 mg by mouth daily   Multiple Vitamins-Minerals (PRESERVISION AREDS 2 PO)  Spouse/Significant Other Yes Yes   Sig: Take 24 mg by mouth 2 (two) times a day   Omega-3 1000 MG CAPS  Spouse/Significant Other Yes Yes   Sig: Take 1 tablet by mouth daily   aspirin 81 MG tablet 8/8/2020 at Unknown time Spouse/Significant Other Yes Yes   Sig: Take 81 mg by mouth every other day cholecalciferol (VITAMIN D3) 1,000 units tablet  Spouse/Significant Other Yes Yes   Sig: Take 1,000 Units by mouth daily   diphenhydrAMINE (BENADRYL) 25 mg tablet   No Yes   Sig: Take 1 tablet (25 mg total) by mouth every 6 (six) hours as needed for itching   famotidine (PEPCID) 20 mg tablet Unknown at Unknown time Spouse/Significant Other Yes No   Sig: Take 1 tablet by mouth daily   hydrochlorothiazide (MICROZIDE) 12 5 mg capsule   Yes Yes   Sig: Take 12 5 mg by mouth daily   hydrocortisone 1 % cream   No No   Sig: Apply topically 4 (four) times a day as needed for irritation for up to 14 days   lisinopril (ZESTRIL) 5 mg tablet  Spouse/Significant Other Yes Yes   Sig: Take 5 mg by mouth daily   metoprolol tartrate (LOPRESSOR) 25 mg tablet  Spouse/Significant Other Yes Yes   Sig: Take 12 5 mg by mouth daily   simvastatin (ZOCOR) 20 mg tablet  Spouse/Significant Other Yes Yes   Sig: Take 20 mg by mouth      Facility-Administered Medications: None       Past Medical History:   Diagnosis Date    Hyperlipidemia     Hypertension        History reviewed  No pertinent surgical history  History reviewed  No pertinent family history  I have reviewed and agree with the history as documented  E-Cigarette/Vaping     E-Cigarette/Vaping Substances     Social History     Tobacco Use    Smoking status: Never Smoker    Smokeless tobacco: Never Used   Substance Use Topics    Alcohol use: Never     Frequency: Never     Binge frequency: Never    Drug use: Never       Review of Systems   Constitutional: Positive for activity change and appetite change  Negative for diaphoresis and fatigue  Respiratory: Negative for chest tightness and shortness of breath  Gastrointestinal: Positive for abdominal distention, abdominal pain and constipation  Negative for nausea and vomiting  Musculoskeletal: Positive for back pain  Negative for neck pain  Skin: Negative for color change and wound     Neurological: Negative for dizziness, weakness and headaches  All other systems reviewed and are negative  Physical Exam  Physical Exam  Vitals signs and nursing note reviewed  Constitutional:       Comments: Appears uncomfortable  HENT:      Head: Normocephalic and atraumatic  Eyes:      Extraocular Movements: Extraocular movements intact  Pupils: Pupils are equal, round, and reactive to light  Neck:      Musculoskeletal: Normal range of motion and neck supple  Comments: No C-spine tenderness  Abdominal:      Comments: Somewhat distended  Significant tenderness in left lower quadrant with some guarding  Reducible umbilical hernia which is tender  Musculoskeletal: Normal range of motion  Right lower leg: No edema  Left lower leg: No edema  Comments: Tenderness of the mid lumbar spine without step-off  No ecchymosis  Skin:     General: Skin is warm and dry  Findings: No bruising or erythema  Neurological:      General: No focal deficit present  Mental Status: He is alert and oriented to person, place, and time  Mental status is at baseline     Psychiatric:         Mood and Affect: Mood normal          Behavior: Behavior normal          Vital Signs  ED Triage Vitals [08/09/20 1445]   Temperature Pulse Respirations Blood Pressure SpO2   98 4 °F (36 9 °C) 79 16 161/77 94 %      Temp Source Heart Rate Source Patient Position - Orthostatic VS BP Location FiO2 (%)   Oral Monitor Sitting Right arm --      Pain Score       7           Vitals:    08/12/20 1100 08/12/20 1454 08/12/20 2348 08/13/20 0701   BP: 145/65 148/66 144/60 141/83   Pulse: 74 76 59 67   Patient Position - Orthostatic VS: Lying Sitting Lying Lying         Visual Acuity  Visual Acuity      Most Recent Value   L Pupil Size (mm)  3   R Pupil Size (mm)  3   L Pupil Shape  Round   R Pupil Shape  Round          ED Medications  Medications   sodium chloride 0 9 % bolus 500 mL (0 mL Intravenous Stopped 8/9/20 2003)   iohexol (OMNIPAQUE) 350 MG/ML injection (MULTI-DOSE) 100 mL (100 mL Intravenous Given 8/9/20 1719)   lidocaine (LIDODERM) 5 % patch 2 patch (2 patches Topical Patch Removed 8/10/20 2127)       Diagnostic Studies  Results Reviewed     Procedure Component Value Units Date/Time    Vitamin B12 [[id number]]  (Abnormal) Collected:  08/09/20 1535    Lab Status:  Final result Specimen:  Blood from Arm, Left Updated:  08/10/20 1545     Vitamin B-12 972 pg/mL     Urine Microscopic [[id number]]  (Abnormal) Collected:  08/09/20 1810    Lab Status:  Final result Specimen:  Urine, Clean Catch Updated:  08/09/20 1807     RBC, UA 0-1 /hpf      WBC, UA 0-1 /hpf      Epithelial Cells Occasional /hpf      Bacteria, UA Occasional /hpf     Urine Macroscopic, POC [[id number]]  (Abnormal) Collected:  08/09/20 1810    Lab Status:  Final result Specimen:  Urine Updated:  08/09/20 1754     Color, UA Yellow     Clarity, UA Clear     pH, UA 7 0     Leukocytes, UA Trace     Nitrite, UA Negative     Protein, UA Negative mg/dl      Glucose, UA Negative mg/dl      Ketones, UA Negative mg/dl      Urobilinogen, UA 0 2 E U /dl      Bilirubin, UA Negative     Blood, UA Trace     Specific [place], UA 1 010    Narrative:       CLINITEK RESULT    CBC and differential [[id number]]  (Abnormal) Collected:  08/09/20 1535    Lab Status:  Final result Specimen:  Blood from Arm, Left Updated:  08/09/20 1619     WBC 9 06 Thousand/uL      RBC 4 57 Million/uL      Hemoglobin 14 4 g/dL      Hematocrit 41 9 %      MCV 92 fL      MCH 31 5 pg      MCHC 34 4 g/dL      RDW 13 9 %      MPV 11 4 fL      Platelets 529 Thousands/uL      nRBC 0 /100 WBCs      Neutrophils Relative 78 %      Immat GRANS % 0 %      Lymphocytes Relative 13 %      Monocytes Relative 9 %      Eosinophils Relative 0 %      Basophils Relative 0 %      Neutrophils Absolute 6 95 Thousands/µL      Immature Grans Absolute 0 04 Thousand/uL      Lymphocytes Absolute 1 20 Thousands/µL      Monocytes Absolute 0 84 Thousand/µL      Eosinophils Absolute 0 02 Thousand/µL      Basophils Absolute 0 01 Thousands/µL     Troponin I [[id number]]  (Normal) Collected:  08/09/20 1535    Lab Status:  Final result Specimen:  Blood from Arm, Left Updated:  08/09/20 1600     Troponin I <0 02 ng/mL     APTT [[id number]]  (Normal) Collected:  08/09/20 1535    Lab Status:  Final result Specimen:  Blood from Arm, Left Updated:  08/09/20 1559     PTT 24 seconds     Protime-INR [[id number]]  (Abnormal) Collected:  08/09/20 1535    Lab Status:  Final result Specimen:  Blood from Arm, Left Updated:  08/09/20 1559     Protime 14 9 seconds      INR 1 16    Comprehensive metabolic panel [[id number]]  (Abnormal) Collected:  08/09/20 1535    Lab Status:  Final result Specimen:  Blood from Arm, Left Updated:  08/09/20 1559     Sodium 134 mmol/L      Potassium 3 8 mmol/L      Chloride 96 mmol/L      CO2 28 mmol/L      ANION GAP 10 mmol/L      BUN 38 mg/dL      Creatinine 1 52 mg/dL      Glucose 126 mg/dL      Calcium 9 2 mg/dL      AST 19 U/L      ALT 16 U/L      Alkaline Phosphatase 51 U/L      Total Protein 7 3 g/dL      Albumin 3 6 g/dL      Total Bilirubin 0 93 mg/dL      eGFR 41 ml/min/1 73sq m     Narrative:       [place] guidelines for Chronic Kidney Disease (CKD):     Stage 1 with normal or high GFR (GFR > 90 mL/min/1 73 square meters)    Stage 2 Mild CKD (GFR = 60-89 mL/min/1 73 square meters)    Stage 3A Moderate CKD (GFR = 45-59 mL/min/1 73 square meters)    Stage 3B Moderate CKD (GFR = 30-44 mL/min/1 73 square meters)    Stage 4 Severe CKD (GFR = 15-29 mL/min/1 73 square meters)    Stage 5 End Stage CKD (GFR <15 mL/min/1 73 square meters)  Note: GFR calculation is accurate only with a steady state creatinine    Lipase [[id number]]  (Normal) Collected:  08/09/20 1535    Lab Status:  Final result Specimen:  Blood from Arm, Left Updated:  08/09/20 1559     Lipase 195 u/L                  XR spine lumbar 2 or 3 views injury   Final Result by Bri Stein MD (08/12 6728)      Progressive loss of height of the known L2 compression deformity  Compression deformities of L1, L4 and L5 are unchanged in appearance  The study was marked in [facility] for immediate notification  Workstation performed: [id number]         VAS carotid complete study   Final Result by Chelsy Braga MD (08/10 1713)      CT head without contrast   Final Result by Alden Medel MD (08/09 1035)   1  No acute intracranial abnormality  2  Peripheral cortical sulcal enlargement  Disproportionate enlargement of the lateral and 3rd ventricles relative to the peripheral cortical sulcal enlargement  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  Clinical correlation is    recommended  Workstation performed: [id number]         CT chest abdomen pelvis w contrast   Final Result by Alden Medel MD (58/65 4139)   1  Age indeterminate superior endplate compression deformities of the L1, L2, L4 and L5 vertebral bodies  2   Age indeterminate mild superior endplate compression deformity of the T3 and T11 vertebral body  3   Hepatic steatosis  4   Colonic diverticulosis without evidence of acute diverticulitis  5   Cholelithiasis  Workstation performed: [id number]         CT recon only thoracolumbar   Final Result by Alden Medel MD (17/20 9313)   1  Age indeterminate superior compression deformities of the L1, L2, L4, L5 vertebral bodies  No evident retropulsed fragment  2   Age indeterminate compression deformities of the T3 and T11 vertebral body  3   Moderate degenerative changes throughout the thoracolumbar spine           Workstation performed: [id number]                    Procedures  ECG 12 Lead Documentation Only    Date/Time: 8/9/2020 4:32 PM  Performed by: Rema Mota MD  Authorized by: Rema Mota MD     Indications / Diagnosis:  ACS  ECG reviewed by me, the ED Provider: yes Patient location:  ED  Previous ECG:     Previous ECG:  Compared to current    Comparison ECG info:  8/6/19    Similarity:  No change  Rate:     ECG rate:  67  Rhythm:     Rhythm: sinus rhythm    Ectopy:     Ectopy: none    QRS:     QRS axis:  Normal  ST segments:     ST segments:  Normal  Q waves:     Q waves:  III             ED Course       US AUDIT      Most Recent Value   Initial Alcohol Screen: US AUDIT-C    1  How often do you have a drink containing alcohol?  0 Filed at: 08/09/2020 1444   2  How many drinks containing alcohol do you have on a typical day you are drinking? 0 Filed at: 08/09/2020 1444   3a  Male UNDER 65: How often do you have five or more drinks on one occasion? 0 Filed at: 08/09/2020 1444   3b  FEMALE Any Age, or MALE 65+: How often do you have 4 or more drinks on one occassion? 0 Filed at: 08/09/2020 1444   Audit-C Score  0 Filed at: 08/09/2020 1444                  RAUL/DAST-10      Most Recent Value   How many times in the past year have you    Used an illegal drug or used a prescription medication for non-medical reasons? Never Filed at: 08/09/2020 1444                                MDM  Number of Diagnoses or Management Options  Compression fx, lumbar spine, sequela: new and requires workup  Multiple falls: new and requires workup  Thoracic compression fracture, sequela: new and requires workup  Diagnosis management comments: 70-year-old male with multiple recent falls at home  Found on imaging to have thoracic compression fractures as well as lumbar compression fractures of unknown age complaining of new back pain  No significant tenderness or step-off on exam   No retropulsion of fragments on CT  Patient also complaining constipation over the last 2 days  No obstruction on imaging  Admitted for pain control, PT         Amount and/or Complexity of Data Reviewed  Clinical lab tests: ordered and reviewed  Tests in the radiology section of CPT®: ordered and reviewed  Discuss the patient with other providers: yes    Patient Progress  Patient progress: stable        Disposition  Final diagnoses:   Multiple falls   Compression fx, lumbar spine, sequela   Thoracic compression fracture, sequela     Time reflects when diagnosis was documented in both MDM as applicable and the Disposition within this note     Time User Action Codes Description Comment    8/9/2020  7:40 PM Doneramonita Parks J Add [R29 6] Multiple falls     8/9/2020  7:41 PM [address]  [S32 000S] Compression fx, lumbar spine, sequela     8/9/2020  7:41 PM CherelleKate mottajohnreal [address]  [S22 000A] Compression fracture of thoracic vertebra, initial encounter ([facility] [address] )     8/9/2020  8:04 PM Gloria Villarreal Remove [S22 000A] Compression fracture of thoracic vertebra, initial encounter ([facility] [address] )     8/9/2020  8:04 PM Doneramonita Radon J Add [S22 000S] Thoracic compression fracture, sequela     8/13/2020 10:18 AM General DadAris Add [R10 13] Dyspepsia     8/13/2020 10:18 AM Dipesh Buttner Add [R26 2] Ambulatory dysfunction     8/13/2020 10:18 AM Dipesh Buttner Add [K59 00] Constipation, unspecified constipation type       ED Disposition     ED Disposition Condition Date/Time Comment    Admit Stable Sun Aug 9, 2020  8:04 PM Case was discussed with Austin Jimenez, Inpatient under Dr Casi Arellano MD Documentation      Evern Modest Name, [facility] (Company and Unit #)  [facility]      RN Documentation      Most Recent Value   Accepting Facility Name, [facility] (Company and Unit #)  [facility]      Follow-up Information     Follow up With Specialties Details Why Contact Info Additional [address], DO Family Medicine Follow up in 1 week(s)  [address]  [place] [id number] [address] Follow up in 1 month(s)  [address] [place] [address]  [facility], [address] [place] [address] [address], [place], [place], [place]    [address] [facility]  Follow up Agency will call within 48hrs to schedule first visit  Any questions please call number provided  [address] [facility] [address]  [phone]             Discharge Medication List as of 8/13/2020 10:51 AM      START taking these medications    Details   acetaminophen (TYLENOL) 325 mg tablet Take 3 tablets (975 mg total) by mouth every 8 (eight) hours, Starting Thu 8/13/2020, Normal      calcium carbonate (TUMS) 500 mg chewable tablet Chew 1 tablet (500 mg total) daily for 14 days, Starting Thu 8/13/2020, Until Thu 8/27/2020, Normal      dexamethasone (DECADRON) 4 mg tablet Multiple Dosages:Starting Thu 8/13/2020, Last dose on Sun 8/16/2020, THEN Starting Mon 8/17/2020, Last dose on Thu 8/20/2020, THEN Starting Fri 8/21/2020, Last dose on Mon 8/24/2020, THEN Starting Tue 8/25/2020, Last dose on Fri 8/28/2020, THEN Star ting Sat 8/29/2020, Last dose on Sat 9/5/2020Take 1 tablet (4 mg total) by mouth 2 (two) times a day with meals for 4 days, THEN 0 5 tablets (2 mg total) 3 (three) times a day with meals for 4 days, THEN 0 5 tablets (2 mg total) 2 (two) times a day with  meals for 4 days, THEN 0 5 tablets (2 mg total) daily with breakfast for 4 days, THEN 0 5 tablets (2 mg total) every other day for 8 days  , Normal      gabapentin (NEURONTIN) 100 mg capsule Take 1 capsule (100 mg total) by mouth daily at bedtime, Starting Thu 8/13/2020, Until Sat 9/12/2020, Normal      oxyCODONE (ROXICODONE) 5 mg immediate release tablet Take 0 5 tablets (2 5 mg total) by mouth every 4 (four) hours as needed for moderate pain for up to 3 daysMax Daily Amount: 15 mg, Starting Thu 8/13/2020, Until Sun 8/16/2020, Print      polyethylene glycol (MIRALAX) 17 g packet Take 17 g by mouth daily, Starting Thu 8/13/2020, Normal         CONTINUE these medications which have NOT CHANGED    Details   aspirin 81 MG tablet Take 81 mg by mouth every other day, Starting Wed 10/3/2012, Historical Med      B Complex Vitamins (VITAMIN B COMPLEX PO) Take 482 2 mg by mouth daily, Starting Wed 10/3/2012, Historical Med      cholecalciferol (VITAMIN D3) 1,000 units tablet Take 1,000 Units by mouth daily, Starting Wed 10/3/2012, Historical Med      diphenhydrAMINE (BENADRYL) 25 mg tablet Take 1 tablet (25 mg total) by mouth every 6 (six) hours as needed for itching, Starting Wed 8/7/2019, Print      hydrochlorothiazide (MICROZIDE) 12 5 mg capsule Take 12 5 mg by mouth daily, Historical Med      metoprolol tartrate (LOPRESSOR) 25 mg tablet Take 12 5 mg by mouth daily, Starting Mon 8/12/2013, Historical Med      Multiple Vitamins-Minerals (PRESERVISION AREDS 2 PO) Take 24 mg by mouth 2 (two) times a day, Historical Med      Omega-3 1000 MG CAPS Take 1 tablet by mouth daily, Starting Wed 10/3/2012, Historical Med      simvastatin (ZOCOR) 20 mg tablet Take 20 mg by mouth, Historical Med      famotidine (PEPCID) 20 mg tablet Take 1 tablet by mouth daily, Historical Med      hydrocortisone 1 % cream Apply topically 4 (four) times a day as needed for irritation for up to 14 days, Starting Wed 8/7/2019, Until Wed 8/21/2019, Print         STOP taking these medications       lisinopril (ZESTRIL) 5 mg tablet Comments:   Reason for Stopping:                 PDMP Review     None          ED Provider  Electronically Signed by           Bharati Caballero MD  08/14/20 1423

## 2020-08-10 ENCOUNTER — APPOINTMENT (INPATIENT)
Dept: ULTRASOUND IMAGING | Facility: HOSPITAL | Age: 85
DRG: 057 | End: 2020-08-10
Payer: MEDICARE

## 2020-08-10 PROBLEM — G91.2 NPH (NORMAL PRESSURE HYDROCEPHALUS) (HCC): Status: ACTIVE | Noted: 2020-08-10

## 2020-08-10 PROBLEM — H40.1290 NORMAL PRESSURE GLAUCOMA: Status: ACTIVE | Noted: 2020-08-10

## 2020-08-10 LAB — VIT B12 SERPL-MCNC: 972 PG/ML (ref 100–900)

## 2020-08-10 PROCEDURE — 99223 1ST HOSP IP/OBS HIGH 75: CPT | Performed by: PHYSICIAN ASSISTANT

## 2020-08-10 PROCEDURE — 99232 SBSQ HOSP IP/OBS MODERATE 35: CPT | Performed by: INTERNAL MEDICINE

## 2020-08-10 PROCEDURE — 93880 EXTRACRANIAL BILAT STUDY: CPT | Performed by: SURGERY

## 2020-08-10 PROCEDURE — 93880 EXTRACRANIAL BILAT STUDY: CPT

## 2020-08-10 RX ORDER — DOCUSATE SODIUM 100 MG/1
100 CAPSULE, LIQUID FILLED ORAL 2 TIMES DAILY
Status: DISCONTINUED | OUTPATIENT
Start: 2020-08-10 | End: 2020-08-13 | Stop reason: HOSPADM

## 2020-08-10 RX ORDER — DEXAMETHASONE SODIUM PHOSPHATE 4 MG/ML
4 INJECTION, SOLUTION INTRA-ARTICULAR; INTRALESIONAL; INTRAMUSCULAR; INTRAVENOUS; SOFT TISSUE EVERY 8 HOURS SCHEDULED
Status: DISCONTINUED | OUTPATIENT
Start: 2020-08-10 | End: 2020-08-13 | Stop reason: HOSPADM

## 2020-08-10 RX ADMIN — DOCUSATE SODIUM 100 MG: 100 CAPSULE, LIQUID FILLED ORAL at 17:06

## 2020-08-10 RX ADMIN — ACETAMINOPHEN 975 MG: 325 TABLET, FILM COATED ORAL at 13:05

## 2020-08-10 RX ADMIN — ACETAMINOPHEN 975 MG: 325 TABLET, FILM COATED ORAL at 21:26

## 2020-08-10 RX ADMIN — POLYETHYLENE GLYCOL 3350 17 G: 17 POWDER, FOR SOLUTION ORAL at 08:23

## 2020-08-10 RX ADMIN — Medication 1000 UNITS: at 08:23

## 2020-08-10 RX ADMIN — DEXAMETHASONE SODIUM PHOSPHATE 4 MG: 4 INJECTION, SOLUTION INTRAMUSCULAR; INTRAVENOUS at 21:26

## 2020-08-10 RX ADMIN — GABAPENTIN 100 MG: 100 CAPSULE ORAL at 21:26

## 2020-08-10 RX ADMIN — LISINOPRIL 5 MG: 5 TABLET ORAL at 08:23

## 2020-08-10 RX ADMIN — Medication 1000 MG: at 08:23

## 2020-08-10 RX ADMIN — HEPARIN SODIUM 5000 UNITS: 5000 INJECTION INTRAVENOUS; SUBCUTANEOUS at 13:05

## 2020-08-10 RX ADMIN — HEPARIN SODIUM 5000 UNITS: 5000 INJECTION INTRAVENOUS; SUBCUTANEOUS at 21:26

## 2020-08-10 RX ADMIN — DOCUSATE SODIUM 100 MG: 100 CAPSULE, LIQUID FILLED ORAL at 10:45

## 2020-08-10 RX ADMIN — ACETAMINOPHEN 975 MG: 325 TABLET, FILM COATED ORAL at 05:16

## 2020-08-10 RX ADMIN — HEPARIN SODIUM 5000 UNITS: 5000 INJECTION INTRAVENOUS; SUBCUTANEOUS at 05:16

## 2020-08-10 RX ADMIN — DEXAMETHASONE SODIUM PHOSPHATE 4 MG: 4 INJECTION, SOLUTION INTRAMUSCULAR; INTRAVENOUS at 13:05

## 2020-08-10 RX ADMIN — GABAPENTIN 100 MG: 100 CAPSULE ORAL at 00:17

## 2020-08-10 RX ADMIN — METOPROLOL TARTRATE 12.5 MG: 25 TABLET, FILM COATED ORAL at 08:24

## 2020-08-10 RX ADMIN — LIDOCAINE 2 PATCH: 50 PATCH CUTANEOUS at 08:23

## 2020-08-10 RX ADMIN — ACETAMINOPHEN 975 MG: 325 TABLET, FILM COATED ORAL at 00:18

## 2020-08-10 RX ADMIN — ASPIRIN 81 MG 81 MG: 81 TABLET ORAL at 08:23

## 2020-08-10 RX ADMIN — FAMOTIDINE 20 MG: 20 TABLET, FILM COATED ORAL at 08:23

## 2020-08-10 RX ADMIN — HEPARIN SODIUM 5000 UNITS: 5000 INJECTION INTRAVENOUS; SUBCUTANEOUS at 00:18

## 2020-08-10 RX ADMIN — PRAVASTATIN SODIUM 40 MG: 40 TABLET ORAL at 17:05

## 2020-08-10 NOTE — ASSESSMENT & PLAN NOTE
· Patient states no BM in 1 week  · Abdomen is distended, most tender LLQ  · CT A/P w/o evidence of ascites/obstruction  · Start bowel regimen

## 2020-08-10 NOTE — PROGRESS NOTES
Progress Note - Paola Mcfadden 1934, 80 y o  male MRN: [id number]    Unit/Bed#: S -01 Encounter: [id number]    Primary Care Provider: Noni Sewell DO   Date and time admitted to hospital: 8/9/2020  2:45 PM        NPH (normal pressure hydrocephalus) [facility])  Assessment & Plan  Patient states he has been having trouble with his balance  Patient states he feels some lightheadedness when he has trouble with balance  States that he does have difficulties holding his urine and sometimes has difficulties making it to the bathroom on time  CT Brain: IMPRESSION:  1  No acute intracranial abnormality  2  Peripheral cortical sulcal enlargement  Disproportionate enlargement of the lateral and 3rd ventricles relative to the peripheral cortical sulcal enlargement  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  Clinical correlation is   Recommended  It is possible that patient's multiple falls could be secondary to his NPH  1  Neurosurgery consult appreciated  2  Will recommend patient walker if his NPH gets worse  Irregular heartbeat  Assessment & Plan  · Patient has a history of PVC  · Continue to monitor  Constipation  Assessment & Plan  · Patient states no BM in 2 days - 1 week  · Abdomen is distended, lower quadrant tenderness on palpation  · Start bowel regimen   · Colace and Senna    Ambulatory dysfunction  Assessment & Plan  · Since multiple falls, unable to ambulate without pain  · Pain control  · PT/OT    Essential hypertension  Assessment & Plan  · /74  · Continue home medications: lisinopril, lopressor    * Closed compression fracture of thoracolumbar vertebra (HCC)  Assessment & Plan  · Patient with multiple falls over the last few weeks-- most recently on Tuesday of this week   · CT thoracolumbar spine: 1  Age indeterminate superior compression deformities of the L1, L2, L4, L5 vertebral bodies  No evident retropulsed fragment   2   Age indeterminate compression deformities of the T3 and T11 vertebral body  3   Moderate degenerative changes throughout the thoracolumbar spine  · Pain control  · PT/OT  · Neurosurgery consulted  VTE Pharmacologic Prophylaxis:   Pharmacologic: Enoxaparin (Lovenox)  Mechanical VTE Prophylaxis in Place: Yes    Discussions with Specialists or Other Care Team Provider:  Neuro surgery and PT/OT    Education and Discussions with Family / Patient:  Discussed with patient who will update his wife  Current Length of Stay: 1 day(s)    Current Patient Status: Inpatient     Discharge Plan / Estimated Discharge Date:  24-48 hours depending on patient's pain control  Code Status: Level 1 - Full Code      Subjective:   Patient states he feels pain in his back  Patient denies having any dizziness, abdominal pain, or any fevers  Patient's only complaint at this time is his back pain  Patient states that the pain medications have helped slightly, and pain is improved since yesterday  Objective:     Vitals:   Temp (24hrs), Av 2 °F (36 8 °C), Min:97 8 °F (36 6 °C), Max:98 4 °F (36 9 °C)    Temp:  [97 8 °F (36 6 °C)-98 4 °F (36 9 °C)] 97 8 °F (36 6 °C)  HR:  [63-72] 70  Resp:  [15-16] 15  BP: (132-167)/(62-77) 132/62  SpO2:  [94 %-97 %] 94 %  Body mass index is 27 34 kg/m²  Input and Output Summary (last 24 hours): Intake/Output Summary (Last 24 hours) at 8/10/2020 195  Last data filed at 8/10/2020 0501  Gross per 24 hour   Intake 500 ml   Output 100 ml   Net 400 ml       Physical Exam:     Physical Exam  Constitutional:       General: He is not in acute distress  Appearance: He is ill-appearing  He is not diaphoretic  HENT:      Head: Normocephalic and atraumatic  Eyes:      General:         Right eye: No discharge  Left eye: No discharge  Extraocular Movements: Extraocular movements intact  Conjunctiva/sclera: Conjunctivae normal       Pupils: Pupils are equal, round, and reactive to light  Neck:      Musculoskeletal: No neck rigidity or muscular tenderness  Cardiovascular:      Rate and Rhythm: Normal rate and regular rhythm  Pulses: Normal pulses  Heart sounds: Normal heart sounds  No murmur  No gallop  Pulmonary:      Effort: Pulmonary effort is normal  No respiratory distress  Breath sounds: Normal breath sounds  No wheezing  Abdominal:      General: Abdomen is flat  Bowel sounds are normal  There is distension  Palpations: Abdomen is soft  Tenderness: There is abdominal tenderness  Musculoskeletal:         General: No tenderness  Right lower leg: No edema  Left lower leg: No edema  Comments: Patient has a positive right leg raise test    Lymphadenopathy:      Cervical: No cervical adenopathy  Skin:     General: Skin is dry  Capillary Refill: Capillary refill takes less than 2 seconds  Findings: No bruising, erythema or rash  Neurological:      Mental Status: He is alert  Additional Data:     Labs:    Results from last 7 days   Lab Units 08/09/20  1535   WBC Thousand/uL 9 06   HEMOGLOBIN g/dL 14 4   HEMATOCRIT % 41 9   PLATELETS Thousands/uL 137*   NEUTROS PCT % 78*   LYMPHS PCT % 13*   MONOS PCT % 9   EOS PCT % 0     Results from last 7 days   Lab Units 08/09/20  1535   POTASSIUM mmol/L 3 8   CHLORIDE mmol/L 96*   CO2 mmol/L 28   BUN mg/dL 38*   CREATININE mg/dL 1 52*   CALCIUM mg/dL 9 2   ALK PHOS U/L 51   ALT U/L 16   AST U/L 19     Results from last 7 days   Lab Units 08/09/20  1535   INR  1 16       * I Have Reviewed All Lab Data Listed Above  * Additional Pertinent Lab Tests Reviewed:  All Labs Within Last 24 Hours Reviewed    Imaging:    Imaging Reports Reviewed Today Include:   Imaging Personally Reviewed by Myself Includes:      Recent Cultures (last 7 days):           Last 24 Hours Medication List:   Current Facility-Administered Medications   Medication Dose Route Frequency Provider Last Rate    acetaminophen 975 mg Oral Q8H [address] Gretta KALANI Erwinmalvin, PA-C      aspirin  81 mg Oral Daily Gretta Erwinmalvin, PA-C      cholecalciferol  1,000 Units Oral Daily Gretta KALANI Erwinmalvin, PA-C      dexamethasone  4 mg Intravenous Q8H Cat Esparza MD      docusate sodium  100 mg Oral BID Navya Charles MD      famotidine  20 mg Oral Daily Gretta KALANI Erwin, PA-C      fish oil  1,000 mg Oral Daily Gretta KALANI Erwinmalvin, PA-C      gabapentin  100 mg Oral HS Gretta KALANI Erwin, PA-C      heparin (porcine)  5,000 Units Subcutaneous Q8H [address] Gretta KALANI Erwinmalvin, PA-C      HYDROmorphone  0 2 mg Intravenous Q4H PRN Gretta KALANI [place], PA-C      lidocaine  2 patch Topical Daily Gretta KALANI [place], PA-C      lisinopril  5 mg Oral Daily Gretta KALANI [place], PA-C      metoprolol tartrate  12 5 mg Oral Daily Gretta KALANI [place], PA-C      ondansetron  4 mg Intravenous Q6H PRN Gretta KALANI Erwin, PA-C      oxyCODONE  2 5 mg Oral Q4H PRN Gretta KALANI Erwin, PA-C      oxyCODONE  5 mg Oral Q4H PRN Gretta [facility]AL[facility], PA-[facility]      polyethylene glycol  17 g Oral Daily Gretta Erwinmalvin, PA-C      pravastatin  40 mg Oral Daily With [employer] [facility] KALANI Shayvictorinomalvin, PA-C      senna  2 tablet Oral Daily PRN Gretta URIARTE [place], PA-C          Today, Patient Was Seen By: Navya Charles MD    ** Please Note: This note has been constructed using a voice recognition system   **

## 2020-08-10 NOTE — ASSESSMENT & PLAN NOTE
· Patient with multiple falls over the last few weeks-- most recently on Tuesday of this week   · CT thoracolumbar spine: 1  Age indeterminate superior compression deformities of the L1, L2, L4, L5 vertebral bodies  No evident retropulsed fragment  2   Age indeterminate compression deformities of the T3 and T11 vertebral body  3   Moderate degenerative changes throughout the thoracolumbar spine    · ED D/W Trauma-- no indication for transfer given acuity of multi-level compression fracture  · Pain control  · PT/OT  · Neurosurgery consultation-- appreciate input

## 2020-08-10 NOTE — OCCUPATIONAL THERAPY NOTE
Occupational Therapy - Cancel Note        Patient Name: Isabel HERMANQ Date: 8/10/2020          OT orders received  Chart review completed  Per neurosurgery, recommend pt wear LSO brace when upright and at 45 degree head of bed due to noted compression fx  At this time pt does not have brace with him  Will continue to follow as appropriate and pending LSO brace fitting vs  LSO brace from home         Fe Castro, OTS

## 2020-08-10 NOTE — H&P
H&P- Trung Yepez 1934, 80 y o  male MRN: [id number]    Unit/Bed#: FT 04 Encounter: [id number]    Primary Care Provider: Katelynn Antonio DO   Date and time admitted to hospital: 8/9/2020  2:45 PM    * Closed compression fracture of thoracolumbar vertebra [facility])  Assessment & Plan  · Patient with multiple falls over the last few weeks-- most recently on Tuesday of this week   · CT thoracolumbar spine: 1  Age indeterminate superior compression deformities of the L1, L2, L4, L5 vertebral bodies  No evident retropulsed fragment  2   Age indeterminate compression deformities of the T3 and T11 vertebral body  3   Moderate degenerative changes throughout the thoracolumbar spine  · ED D/W Trauma-- no indication for transfer given acuity of multi-level compression fracture  · Pain control  · PT/OT  · Neurosurgery consultation-- appreciate input    Irregular heartbeat  Assessment & Plan  · Noted on telemetry-- PVCs vs a fib  · Monitor on telemetry overnight  · On Lopressor BID already  · Would need to discuss AC in this patient with frequent falls    Constipation  Assessment & Plan  · Patient states no BM in 1 week, though told the ED last was 2 days ago; likely 2/2 limited mobility w/ pain   · Abdomen is distended, most tender LLQ  · CT A/P w/o evidence of ascites/obstruction  · Start bowel regimen     Ambulatory dysfunction  Assessment & Plan  · Since multiple falls, unable to ambulate without pain  · Pain control  · PT/OT    Essential hypertension  Assessment & Plan  · /74  · Continue home medications: lisinopril, lopressor      VTE Prophylaxis: Heparin  / sequential compression device   Code Status: Level 1  POLST: POLST form is not discussed and not completed at this time  Discussion with family: family aware of admission    Anticipated Length of Stay:  Patient will be admitted on an Inpatient basis with an anticipated length of stay of  > 2 midnights     Justification for Hospital Stay: tx and eval of multilevel compression fracture      Total Time for Visit, including Counseling / Coordination of Care: 30 minutes  Greater than 50% of this total time spent on direct patient counseling and coordination of care  Chief Complaint:   Back pain, abdominal pain     History of Present Illness:    Fredi Wu is a 80 y o  male with PMH significant for HTN, HLD, presents to the ED for evaluation of back pain and abdominal pain x2 days  Patient reports that he has fallen 2 times in the last few weeks, the most recent one was on Tuesday of this week  He reports that when he fell he fell straight onto his back  He was able to get himself back up  Saw PCP 2 days ago, dx with multiple compression fractures  Has not been using brace at home  Has been taking OTC tylenol with minimal improvement  Pain is improved with rest, worsened with exertion  No numbness/tingling  No bowel/bladder incontinence  Non-radiating pain  Patient is typically independent with ambulation and lives at home with daughter  Also reports LLQ abdominal pain  No BM in 2 days  + loss of appetite  No fevers/chills  No nausea/vomiting  Review of Systems:    Review of Systems   Constitutional: Positive for appetite change  HENT: Negative  Eyes: Negative  Respiratory: Negative  Cardiovascular: Negative  Gastrointestinal: Positive for abdominal pain and constipation  Genitourinary: Negative  Musculoskeletal: Positive for back pain  Skin: Negative  Neurological: Negative  Hematological: Negative  Psychiatric/Behavioral: Negative  Past Medical and Surgical History:     Past Medical History:   Diagnosis Date    Hyperlipidemia     Hypertension        History reviewed  No pertinent surgical history  Meds/Allergies:    Prior to Admission medications    Medication Sig Start Date End Date Taking?  Authorizing Provider   aspirin 81 MG tablet Take 81 mg by mouth every other day 10/3/12  Yes Historical Provider, MD CARIAS Complex Vitamins (VITAMIN B COMPLEX PO) Take 482 2 mg by mouth daily 10/3/12  Yes Historical Provider, MD   cholecalciferol (VITAMIN D3) 1,000 units tablet Take 1,000 Units by mouth daily 10/3/12  Yes Historical Provider, MD   diphenhydrAMINE (BENADRYL) 25 mg tablet Take 1 tablet (25 mg total) by mouth every 6 (six) hours as needed for itching 8/7/19  Yes Loulou Ahmadi MD   lisinopril (ZESTRIL) 5 mg tablet Take 5 mg by mouth daily   Yes Historical Provider, MD   metoprolol tartrate (LOPRESSOR) 25 mg tablet Take 12 5 mg by mouth daily 8/12/13  Yes Historical Provider, MD   Multiple Vitamins-Minerals (PRESERVISION AREDS 2 PO) Take 24 mg by mouth 2 (two) times a day   Yes Historical Provider, MD   Vandervoort-3 1000 MG CAPS Take 1 tablet by mouth daily 10/3/12  Yes Historical Provider, MD   simvastatin (ZOCOR) 20 mg tablet Take 20 mg by mouth   Yes Historical Provider, MD   famotidine (PEPCID) 20 mg tablet Take 1 tablet by mouth daily    Historical Provider, MD   hydrocortisone 1 % cream Apply topically 4 (four) times a day as needed for irritation for up to 14 days 8/7/19 8/21/19  Loulou Ahmadi MD     I have reviewed home medications using allscripts  Allergies: No Known Allergies    Social History:     Marital Status: /Civil Union   Occupation: Retired  Patient Pre-hospital Living Situation: home with family  Patient Pre-hospital Level of Mobility: typically independent  Patient Pre-hospital Diet Restrictions: none  Substance Use History:   Social History     Substance and Sexual Activity   Alcohol Use Never    Frequency: Never    Binge frequency: Never     Social History     Tobacco Use   Smoking Status Never Smoker   Smokeless Tobacco Never Used     Social History     Substance and Sexual Activity   Drug Use Never       Family History:    History reviewed  No pertinent family history      Physical Exam:     Vitals:   Blood Pressure: 157/74 (08/09/20 2000)  Pulse: 72 (08/09/20 2000)  Temperature: 98 4 °F (36 9 °C) (08/09/20 1445)  Temp Source: Oral (08/09/20 1445)  Respirations: 16 (08/09/20 2000)  Weight - Scale: 63 5 kg (139 lb 15 9 oz) (08/09/20 1445)  SpO2: 97 % (08/09/20 2000)    Physical Exam  Constitutional:       Appearance: Normal appearance  HENT:      Head: Normocephalic and atraumatic  Mouth/Throat:      Mouth: Mucous membranes are moist    Eyes:      Pupils: Pupils are equal, round, and reactive to light  Cardiovascular:      Heart sounds: No murmur  No friction rub  No gallop  Comments: Normal rate; irregular rhythm-- some ectopy noted  Pulmonary:      Effort: Pulmonary effort is normal  No respiratory distress  Abdominal:      General: Bowel sounds are normal  There is distension  Palpations: Abdomen is soft  Tenderness: There is abdominal tenderness (LLQ)  There is guarding (LLQ)  Hernia: A hernia (umbilical-- reducible-- mildly tender) is present  Musculoskeletal:         General: Tenderness (mildline thoracolumbar spine; paraspinal regoin of thoracolumbar spine) present  No swelling  Comments: Limited ROM of b/l LE given pain in back  Neurological:      General: No focal deficit present  Mental Status: He is alert  Mental status is at baseline  Cranial Nerves: No cranial nerve deficit  Sensory: No sensory deficit  Motor: Weakness present  Gait: Abnormal gait: generalized b/l LE  Psychiatric:         Mood and Affect: Mood normal          Behavior: Behavior normal          Additional Data:     Lab Results: I have personally reviewed pertinent reports        Results from last 7 days   Lab Units 08/09/20  1535   WBC Thousand/uL 9 06   HEMOGLOBIN g/dL 14 4   HEMATOCRIT % 41 9   PLATELETS Thousands/uL 137*   NEUTROS PCT % 78*   LYMPHS PCT % 13*   MONOS PCT % 9   EOS PCT % 0     Results from last 7 days   Lab Units 08/09/20  1535   SODIUM mmol/L 134*   POTASSIUM mmol/L 3 8   CHLORIDE mmol/L 96*   CO2 mmol/L 28   BUN mg/dL 38*   CREATININE mg/dL 1 52*   ANION GAP mmol/L 10   CALCIUM mg/dL 9 2   ALBUMIN g/dL 3 6   TOTAL BILIRUBIN mg/dL 0 93   ALK PHOS U/L 51   ALT U/L 16   AST U/L 19   GLUCOSE RANDOM mg/dL 126     Results from last 7 days   Lab Units 08/09/20  1535   INR  1 16                   Imaging: I have personally reviewed pertinent reports  CT head without contrast   Final Result by Santi Priest MD (98/55 [id number])   1  No acute intracranial abnormality  2  Peripheral cortical sulcal enlargement  Disproportionate enlargement of the lateral and 3rd ventricles relative to the peripheral cortical sulcal enlargement  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  Clinical correlation is    recommended  Workstation performed: [id number]         CT chest abdomen pelvis w contrast   Final Result by Santi Priest MD (03/02 1740)   1  Age indeterminate superior endplate compression deformities of the L1, L2, L4 and L5 vertebral bodies  2   Age indeterminate mild superior endplate compression deformity of the T3 and T11 vertebral body  3   Hepatic steatosis  4   Colonic diverticulosis without evidence of acute diverticulitis  5   Cholelithiasis  Workstation performed: [id number]         CT recon only thoracolumbar   Final Result by Santi Priest MD (60/68 8413)   1  Age indeterminate superior compression deformities of the L1, L2, L4, L5 vertebral bodies  No evident retropulsed fragment  2   Age indeterminate compression deformities of the T3 and T11 vertebral body  3   Moderate degenerative changes throughout the thoracolumbar spine  Workstation performed: [id number]             EKG, Pathology, and Other Studies Reviewed on Admission:   · EKG: not available    Allscripts / [facility] Records Reviewed: Yes     ** Please Note: This note has been constructed using a voice recognition system   **

## 2020-08-10 NOTE — ASSESSMENT & PLAN NOTE
· Patient states no BM in 2 days - 1 week  · Abdomen is distended, lower quadrant tenderness on palpation    · Start bowel regimen   · Colace and Senna

## 2020-08-10 NOTE — ASSESSMENT & PLAN NOTE
· Patient with multiple falls over the last few weeks-- most recently on Tuesday of this week   · CT thoracolumbar spine: 1  Age indeterminate superior compression deformities of the L1, L2, L4, L5 vertebral bodies  No evident retropulsed fragment  2   Age indeterminate compression deformities of the T3 and T11 vertebral body  3   Moderate degenerative changes throughout the thoracolumbar spine  · Pain control  · PT/OT  · Neurosurgery consulted

## 2020-08-10 NOTE — CONSULTS
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 compression deformity of L1, L2, L4, and L5      Plan:   · Exam: Alert and oriented to place, year and month, but disoriented to president's name, year, and date  PERRL, EOMI, 3mm conjugate bilaterally  LOPES, finger-to-nose past pointing bilaterally, no drift in both arms;  strength is 5/5 bilaterally throughout  Sensation to LT  intact symmetrically  Tenderness in the Lumbar spine and limited ROM of L hip with tenderness in the left hip  · Imagings reviewed personally and by attending is as follows:  · CT reconstructed thoracolumbar on 08/09/2020 demonstrates age-indeterminate superior compression deformities of the L1, L2, L4, L5 vertebral bodies  No evident retropulsed fragment  Age-indeterminate compression deformity of T3 and T11 vertebral body  Moderate degenerative changes throughout the thoracolumbar spine  · CT head without contrast on 08/09/2020 demonstrates peripheral cortical sulcal enlargement  Disproportionate enlargement of the lateral and 3rd ventricles relative to the peripheral cortical sulcal enlargement suspicion for normal pressure hydrocephalus  · Baseline upright lumbar spine x-rays in brace  · Pain control:  Per primary team  · DVT ppx: SCDs bilateral legs  · Activity:  As tolerated  · PT/OT evaluation & treatment  · Brace:  Wear LSO brace when upright and at 45 degree head of bed  Wear when able and if he feels comfortable  · Medical Mx:  Per primary team  · Neurocheck:  Routine  · Recommend baseline upright lumbar spine x-rays in brace when able  LSO brace  Continue pain control and PT   Ortho evaluation for left hip pain  For NPH- would probably NPH clinic F/U ( plan to be discussed by the Team)  NSx will continue to monitor the patient  Call for question or concern  History of Present Illness     HPI: Joseph Martinez is a 80 y o  male came to emergency room for evaluation of abdominal pain associated with loss of appetite  Patient with PMHx of ambulatory dysfunction with frequent falls, reports fall incident about 2 weeks ago  Per EMR, patient fell twice few days ago, hitting his  head but denies loss of consciousness or seizure  Complains of back pain without radiculopathy pain in the extremities  Patient also has difficulty lifting left hip because of pain  reports gait instability and uses walker for walking  Patient appears slightly confused and poor historian  Hearing deficits  Denies numbness, weakness or paresthesia in the extremities  Patient denies history of fever, chills, rigors, cough or chest pain  Patient has history of hypertension but denies history of diabetes mellitus, congestive heart failure, stroke, bleeding disorder or taking anticoagulant medication except baby aspirin  Patient states he lives with his wife, denies history of smoking cigarettes or drink alcohol  I tried to reach to his wife to get more information but no one was answering  Imaging findings as described in the assessment section above  Review of Systems   Constitutional: Positive for appetite change  Negative for activity change, fatigue, fever and unexpected weight change  Also applied   HENT: Negative for trouble swallowing and voice change  Eyes: Negative for photophobia and visual disturbance  Respiratory: Negative for cough, shortness of breath and wheezing  Cardiovascular: Negative for chest pain and leg swelling  Gastrointestinal: Positive for constipation  Negative for diarrhea, nausea and vomiting  Genitourinary: Negative for difficulty urinating     Musculoskeletal: Positive for gait problem  Negative for neck pain and neck stiffness  Neurological: Negative for dizziness, tremors, seizures, syncope, facial asymmetry, speech difficulty, weakness, light-headedness, numbness and headaches  Hematological: Does not bruise/bleed easily  Psychiatric/Behavioral: Negative for confusion and decreased concentration  Historical Information   Past Medical History:   Diagnosis Date    Hyperlipidemia     Hypertension      History reviewed  No pertinent surgical history  Social History     Substance and Sexual Activity   Alcohol Use Never    Frequency: Never    Binge frequency: Never     Social History     Substance and Sexual Activity   Drug Use Never     Social History     Tobacco Use   Smoking Status Never Smoker   Smokeless Tobacco Never Used     History reviewed  No pertinent family history      Meds/Allergies   all current active meds have been reviewed, current meds:   Current Facility-Administered Medications   Medication Dose Route Frequency    acetaminophen (TYLENOL) tablet 975 mg  975 mg Oral Q8H [facility]    aspirin chewable tablet 81 mg  81 mg Oral Daily    cholecalciferol (VITAMIN D3) tablet 1,000 Units  1,000 Units Oral Daily    dexamethasone (DECADRON) injection 4 mg  4 mg Intravenous Q8H [facility]    docusate sodium (COLACE) capsule 100 mg  100 mg Oral BID    famotidine (PEPCID) tablet 20 mg  20 mg Oral Daily    fish oil capsule 1,000 mg  1,000 mg Oral Daily    gabapentin (NEURONTIN) capsule 100 mg  100 mg Oral HS    heparin (porcine) subcutaneous injection 5,000 Units  5,000 Units Subcutaneous Q8H [facility]    HYDROmorphone (DILAUDID) injection 0 2 mg  0 2 mg Intravenous Q4H PRN    lidocaine (LIDODERM) 5 % patch 2 patch  2 patch Topical Daily    lisinopril (ZESTRIL) tablet 5 mg  5 mg Oral Daily    metoprolol tartrate (LOPRESSOR) tablet 12 5 mg  12 5 mg Oral Daily    ondansetron (ZOFRAN) injection 4 mg  4 mg Intravenous Q6H PRN    oxyCODONE (ROXICODONE) IR tablet 2 5 mg  2 5 mg Oral Q4H PRN    oxyCODONE (ROXICODONE) IR tablet 5 mg  5 mg Oral Q4H PRN    polyethylene glycol (MIRALAX) packet 17 g  17 g Oral Daily    pravastatin (PRAVACHOL) tablet 40 mg  40 mg Oral Daily With Dinner    senna (SENOKOT) tablet 17 2 mg  2 tablet Oral Daily PRN    and PTA meds:   Prior to Admission Medications   Prescriptions Last Dose Informant Patient Reported? Taking?    B Complex Vitamins (VITAMIN B COMPLEX PO)  Spouse/Significant Other Yes Yes   Sig: Take 482 2 mg by mouth daily   Multiple Vitamins-Minerals (PRESERVISION AREDS 2 PO)  Spouse/Significant Other Yes Yes   Sig: Take 24 mg by mouth 2 (two) times a day   Omega-3 1000 MG CAPS  Spouse/Significant Other Yes Yes   Sig: Take 1 tablet by mouth daily   aspirin 81 MG tablet 8/8/2020 at Unknown time Spouse/Significant Other Yes Yes   Sig: Take 81 mg by mouth every other day   cholecalciferol (VITAMIN D3) 1,000 units tablet  Spouse/Significant Other Yes Yes   Sig: Take 1,000 Units by mouth daily   diphenhydrAMINE (BENADRYL) 25 mg tablet   No Yes   Sig: Take 1 tablet (25 mg total) by mouth every 6 (six) hours as needed for itching   famotidine (PEPCID) 20 mg tablet Unknown at Unknown time Spouse/Significant Other Yes No   Sig: Take 1 tablet by mouth daily   hydrocortisone 1 % cream   No No   Sig: Apply topically 4 (four) times a day as needed for irritation for up to 14 days   lisinopril (ZESTRIL) 5 mg tablet  Spouse/Significant Other Yes Yes   Sig: Take 5 mg by mouth daily   metoprolol tartrate (LOPRESSOR) 25 mg tablet  Spouse/Significant Other Yes Yes   Sig: Take 12 5 mg by mouth daily   simvastatin (ZOCOR) 20 mg tablet  Spouse/Significant Other Yes Yes   Sig: Take 20 mg by mouth      Facility-Administered Medications: None     No Known Allergies    Objective   I/O       08/08 0701 - 08/09 0700 08/09 0701 - 08/10 0700 08/10 0701 - 08/11 0700    IV Piggyback  500     Total Intake(mL/kg)  500 (7 9)     Urine (mL/kg/hr)  100     Total Output  100     Net +400                  Physical Exam  Constitutional:       Appearance: Normal appearance  HENT:      Head: Normocephalic and atraumatic  Eyes:      Extraocular Movements: Extraocular movements intact  Neck:      Musculoskeletal: Normal range of motion  Cardiovascular:      Rate and Rhythm: Normal rate  Pulmonary:      Effort: Pulmonary effort is normal    Skin:     General: Skin is warm  Neurological:      General: No focal deficit present  Mental Status: He is alert  GCS: GCS eye subscore is 4  GCS verbal subscore is 5  GCS motor subscore is 6  Cranial Nerves: Cranial nerves are intact  Sensory: Sensation is intact  Motor: Motor function is intact  Coordination: Finger-Nose-Finger Test normal       Deep Tendon Reflexes: Reflexes are normal and symmetric  Reflex Scores:       Tricep reflexes are 2+ on the right side and 2+ on the left side  Bicep reflexes are 2+ on the right side and 2+ on the left side  Brachioradialis reflexes are 2+ on the right side and 2+ on the left side  Patellar reflexes are 2+ on the right side and 2+ on the left side  Achilles reflexes are 2+ on the right side and 2+ on the left side  Comments: Patient slightly confused, hearing deficit but communicates and oriented to place year and month but disoriented to president's name and the date   Psychiatric:         Speech: Speech normal        Neurologic Exam     Mental Status   Speech: speech is normal   Level of consciousness: alert  Unable to perform simple calculations       Cranial Nerves     CN III, IV, VI   Nystagmus: none     CN XI   CN XI normal      Motor Exam   Muscle bulk: normal  Overall muscle tone: normal  Right arm tone: normal  Left arm tone: normal  Right arm pronator drift: absent  Left arm pronator drift: absent  Right leg tone: normal  Left leg tone: normal    Sensory Exam   Light touch normal      Gait, Coordination, and Reflexes     Coordination Finger to nose coordination: normal    Reflexes   Right brachioradialis: 2+  Left brachioradialis: 2+  Right biceps: 2+  Left biceps: 2+  Right triceps: 2+  Left triceps: 2+  Right patellar: 2+  Left patellar: 2+  Right achilles: 2+  Left achilles: 2+  Right : 2+  Left : 2+  Right Gonzalez: absent  Left Gonzalez: absent  Right ankle clonus: absent  Left ankle clonus: absent      Vitals:Blood pressure 167/77, pulse 63, temperature 98 4 °F (36 9 °C), temperature source Oral, resp  rate 16, height 5' (1 524 m), weight 63 5 kg (139 lb 15 9 oz), SpO2 94 %  ,Body mass index is 27 34 kg/m²  Lab Results:   Results from last 7 days   Lab Units 08/09/20  1535   WBC Thousand/uL 9 06   HEMOGLOBIN g/dL 14 4   HEMATOCRIT % 41 9   PLATELETS Thousands/uL 137*   NEUTROS PCT % 78*   MONOS PCT % 9     Results from last 7 days   Lab Units 08/09/20  1535   POTASSIUM mmol/L 3 8   CHLORIDE mmol/L 96*   CO2 mmol/L 28   BUN mg/dL 38*   CREATININE mg/dL 1 52*   CALCIUM mg/dL 9 2   ALK PHOS U/L 51   ALT U/L 16   AST U/L 19             Results from last 7 days   Lab Units 08/09/20  1535   INR  1 16   PTT seconds 24     No results found for: TROPONINT  ABG:No results found for: PHART, WDP4JQJ, PO2ART, EPR8LGS, H5ZUUYKD, BEART, SOURCE    Imaging Studies: I have personally reviewed pertinent reports   , I have personally reviewed pertinent films in PACS and I have personally reviewed pertinent films in PACS with a Radiologist     EKG, Pathology, and Other Studies: I have personally reviewed pertinent reports   , I have personally reviewed pertinent films in PACS and I have personally reviewed pertinent films in PACS with a Radiologist     VTE Prophylaxis: Sequential compression device (Venodyne)     Code Status: Level 1 - Full Code  Advance Directive and Living Will:      Power of :    POLST:      Counseling / Coordination of Care  I spent 20 minutes with the patient

## 2020-08-10 NOTE — ASSESSMENT & PLAN NOTE
Patient states he has been having trouble with his balance  Patient states he feels some lightheadedness when he has trouble with balance  States that he does have difficulties holding his urine and sometimes has difficulties making it to the bathroom on time  CT Brain: IMPRESSION:  1  No acute intracranial abnormality  2  Peripheral cortical sulcal enlargement  Disproportionate enlargement of the lateral and 3rd ventricles relative to the peripheral cortical sulcal enlargement  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  Clinical correlation is   Recommended  It is possible that patient's multiple falls could be secondary to his NPH  1  Neurosurgery consult appreciated  2  Will recommend patient walker if his NPH gets worse

## 2020-08-10 NOTE — PHYSICAL THERAPY NOTE
PHYSICAL THERAPY NOTE          Patient Name: Ruth Templeton  IVOSU'KALANI Date: 8/10/2020   Consult received, chart reviewed  At this time, awaiting further neurology input regarding noted compression fx and potential need for brace for OOB  Will continue to follow      Brandi Pang, PT

## 2020-08-10 NOTE — ASSESSMENT & PLAN NOTE
· Noted on telemetry-- PVCs vs a fib  · Monitor on telemetry overnight  · On Lopressor BID already  · Would need to discuss AC in this patient with frequent falls

## 2020-08-11 LAB
ANION GAP SERPL CALCULATED.3IONS-SCNC: 10 MMOL/L (ref 4–13)
BUN SERPL-MCNC: 41 MG/DL (ref 5–25)
CALCIUM SERPL-MCNC: 8.4 MG/DL (ref 8.3–10.1)
CHLORIDE SERPL-SCNC: 99 MMOL/L (ref 100–108)
CO2 SERPL-SCNC: 25 MMOL/L (ref 21–32)
CREAT SERPL-MCNC: 1.37 MG/DL (ref 0.6–1.3)
GFR SERPL CREATININE-BSD FRML MDRD: 46 ML/MIN/1.73SQ M
GLUCOSE SERPL-MCNC: 128 MG/DL (ref 65–140)
POTASSIUM SERPL-SCNC: 4.2 MMOL/L (ref 3.5–5.3)
SODIUM SERPL-SCNC: 134 MMOL/L (ref 136–145)

## 2020-08-11 PROCEDURE — 97167 OT EVAL HIGH COMPLEX 60 MIN: CPT

## 2020-08-11 PROCEDURE — 99232 SBSQ HOSP IP/OBS MODERATE 35: CPT | Performed by: INTERNAL MEDICINE

## 2020-08-11 PROCEDURE — 80048 BASIC METABOLIC PNL TOTAL CA: CPT | Performed by: INTERNAL MEDICINE

## 2020-08-11 PROCEDURE — NC001 PR NO CHARGE: Performed by: PHYSICIAN ASSISTANT

## 2020-08-11 RX ORDER — SODIUM CHLORIDE 9 MG/ML
75 INJECTION, SOLUTION INTRAVENOUS CONTINUOUS
Status: DISCONTINUED | OUTPATIENT
Start: 2020-08-11 | End: 2020-08-12

## 2020-08-11 RX ORDER — HYDROCHLOROTHIAZIDE 12.5 MG/1
12.5 CAPSULE, GELATIN COATED ORAL DAILY
COMMUNITY
End: 2020-10-22 | Stop reason: SDUPTHER

## 2020-08-11 RX ADMIN — HEPARIN SODIUM 5000 UNITS: 5000 INJECTION INTRAVENOUS; SUBCUTANEOUS at 13:05

## 2020-08-11 RX ADMIN — HEPARIN SODIUM 5000 UNITS: 5000 INJECTION INTRAVENOUS; SUBCUTANEOUS at 05:44

## 2020-08-11 RX ADMIN — DOCUSATE SODIUM 100 MG: 100 CAPSULE, LIQUID FILLED ORAL at 08:32

## 2020-08-11 RX ADMIN — METOPROLOL TARTRATE 12.5 MG: 25 TABLET, FILM COATED ORAL at 08:32

## 2020-08-11 RX ADMIN — DEXAMETHASONE SODIUM PHOSPHATE 4 MG: 4 INJECTION, SOLUTION INTRAMUSCULAR; INTRAVENOUS at 13:05

## 2020-08-11 RX ADMIN — DEXAMETHASONE SODIUM PHOSPHATE 4 MG: 4 INJECTION, SOLUTION INTRAMUSCULAR; INTRAVENOUS at 21:14

## 2020-08-11 RX ADMIN — Medication 1000 UNITS: at 08:32

## 2020-08-11 RX ADMIN — POLYETHYLENE GLYCOL 3350 17 G: 17 POWDER, FOR SOLUTION ORAL at 08:33

## 2020-08-11 RX ADMIN — Medication 1000 MG: at 08:32

## 2020-08-11 RX ADMIN — FAMOTIDINE 20 MG: 20 TABLET, FILM COATED ORAL at 08:32

## 2020-08-11 RX ADMIN — PRAVASTATIN SODIUM 40 MG: 40 TABLET ORAL at 16:59

## 2020-08-11 RX ADMIN — ACETAMINOPHEN 975 MG: 325 TABLET, FILM COATED ORAL at 13:05

## 2020-08-11 RX ADMIN — DOCUSATE SODIUM 100 MG: 100 CAPSULE, LIQUID FILLED ORAL at 17:00

## 2020-08-11 RX ADMIN — SODIUM CHLORIDE 75 ML/HR: 0.9 INJECTION, SOLUTION INTRAVENOUS at 16:10

## 2020-08-11 RX ADMIN — ASPIRIN 81 MG 81 MG: 81 TABLET ORAL at 08:32

## 2020-08-11 RX ADMIN — GABAPENTIN 100 MG: 100 CAPSULE ORAL at 21:14

## 2020-08-11 RX ADMIN — ACETAMINOPHEN 975 MG: 325 TABLET, FILM COATED ORAL at 21:14

## 2020-08-11 RX ADMIN — HEPARIN SODIUM 5000 UNITS: 5000 INJECTION INTRAVENOUS; SUBCUTANEOUS at 21:14

## 2020-08-11 RX ADMIN — LISINOPRIL 5 MG: 5 TABLET ORAL at 08:32

## 2020-08-11 RX ADMIN — ACETAMINOPHEN 975 MG: 325 TABLET, FILM COATED ORAL at 05:44

## 2020-08-11 RX ADMIN — DEXAMETHASONE SODIUM PHOSPHATE 4 MG: 4 INJECTION, SOLUTION INTRAMUSCULAR; INTRAVENOUS at 05:44

## 2020-08-11 NOTE — OCCUPATIONAL THERAPY NOTE
Occupational Therapy Cancel Note        Patient Name: Johnnie Paz  BKTJH'R Date: 8/11/2020    OT orders received and chart review completed  Spoke w/ Karyn VALDEZ and Anyi SALDANA  Pt's family will be bringing brace in later this afternoon   Will cancel OT eval this AM pending brace and continue to follow as appropriate and schedule allows to complete eval    Mayra Hooker OT

## 2020-08-11 NOTE — ASSESSMENT & PLAN NOTE
· Patient states no BM in 2 days - 1 week  · Abdomen is distended, lower quadrant tenderness on palpation  · Patient has some mild abdominal pain today, has been able to have a bowel movement  (8/11)      · Start bowel regimen   · Colace and MiraLax  · Patient states MiraLax helps, will prescribe to patient upon discharge

## 2020-08-11 NOTE — QUICK NOTE
[facility] Attending Physician Supervision Note - Progress Note    I have seen and examined Paola Mcfadden personally and have reviewed the medical record independently  I have reviewed all components of the note performed and documented by the resident physician  I personally performed all the required components for patient evaluation and examined the patient  I was the supervising / collaborating physician on August 11, 2020 in the morning   I agree with the resident's findings and plan of care with the following additions / exceptions: This is a 41-year-old male with L1-L2 0405 compression deformities and was very dysfunction  He is awaiting physical therapy evaluation following brace placement  He has not had a bowel movement and approximately 1 week, no saddle anesthesia no urinary retention and no lower extremity weakness  For completion will get a bladder scan however  Education and Discussions with Family / Patient:  Discussed with patient resident update family    Time Spent for Care: 30 minutes  More than 50% of total time spent on counseling and coordination of care as described above  I attest that this information is true, accurate, and complete to the best of my knowledge      Dread Cano MD

## 2020-08-11 NOTE — DISCHARGE SUMMARY
Discharge- Lay Mendoza 1934, 80 y o  male MRN: [id number]    Unit/Bed#: S -01 Encounter: [id number]    Primary Care Provider: Shy Frazier DO   Date and time admitted to hospital: 8/9/2020  2:45 PM        NPH (normal pressure hydrocephalus) [facility])  Assessment & Plan  Patient states he has been having trouble with his balance  Patient states he feels some lightheadedness when he has trouble with balance  States that he does have difficulties holding his urine and sometimes has difficulties making it to the bathroom on time  CT Brain: IMPRESSION:  1  No acute intracranial abnormality  2  Peripheral cortical sulcal enlargement  Disproportionate enlargement of the lateral and 3rd ventricles relative to the peripheral cortical sulcal enlargement  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  Clinical correlation is   Recommended  It is possible that patient's multiple falls could be secondary to his NPH  1  Patient will follow-up had NPH Clinic    Irregular heartbeat  Assessment & Plan  · Patient has a history of PVC  · Continue to monitor  Constipation  Assessment & Plan  · Patient states no BM in 2 days - 1 week  · Abdomen is distended, lower quadrant tenderness on palpation  · Patient has some mild abdominal pain today, has been able to have a bowel movement  (8/11)      · Start bowel regimen   · Colace and MiraLax  · Patient states MiraLax helps, will prescribe to patient upon discharge  Ambulatory dysfunction  Assessment & Plan  · Since multiple falls, unable to ambulate without pain  · Pain control  · PT/OT    OLLIE (acute kidney injury) ([facility] [address] )  Assessment & Plan  On presentation patient had a creatinine of 1 5 to  Patient's baseline was between 1 1 and 1 2  Patient's cr came down to 1 29 after hydration  Plan:  1  Lisinopril discontinued   2   Monitor BP    Essential hypertension  Assessment & Plan  · BP 140s/80s  · Continue home medications: lopressor  · Will discontinue lisinopril and have patient f/u outpatient  * Closed compression fracture of thoracolumbar vertebra [facility])  Assessment & Plan  · Patient with multiple falls over the last few weeks-- most recently on Tuesday of this week   · CT thoracolumbar spine: 1  Age indeterminate superior compression deformities of the L1, L2, L4, L5 vertebral bodies  No evident retropulsed fragment  2   Age indeterminate compression deformities of the T3 and T11 vertebral body  3   Moderate degenerative changes throughout the thoracolumbar spine  · Pain control  · PT/OT  · Neurosurgery consult appreciated  · Recommending pain management, back brace, physical therapy  Plan:     1  Continue pain management/pt  2  Pt agreeable to go to rehab      Discharging Resident Physician: Naveen Mark MD  Attending: Alf Frias MD  PCP: Lamonte Reynaga DO  Admission Date: 8/9/2020  Discharge Date: 08/13/20    Disposition:     Home    Reason for Admission:  Back pain    Consultations During Hospital Stay:  · Neurosurgery, PT/OT    Procedures Performed:     ·     Significant Findings / Test Results:     CT Head: IMPRESSION:  1  No acute intracranial abnormality  2  Peripheral cortical sulcal enlargement  Disproportionate enlargement of the lateral and 3rd ventricles relative to the peripheral cortical sulcal enlargement  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  Clinical correlation is   Recommended  CT Thoracolumbar:   IMPRESSION:  1  No acute intracranial abnormality  2  Peripheral cortical sulcal enlargement  Disproportionate enlargement of the lateral and 3rd ventricles relative to the peripheral cortical sulcal enlargement  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  Clinical correlation is   recommended      Incidental Findings:   ·      Test Results Pending at Discharge (will require follow up):   ·      Outpatient Tests Requested:  ·     Complications: Hospital Course:     Sergio Mckeon is a 80 y o  male patient with past medical history of hypertension, hyperlipidemia, CHFpEF 50% who originally presented to the hospital on 8/9/2020 due to back pain after experiencing multiple falls, most recent of which was the previous Tuesday  Patient states he fell straight on his back, went to the [facility] ER, for which they prescribed a back brace  Patient has not been using his back brace, states that the back pain has increased significantly, and is also experiencing abdominal pain  On CT was seen that the patient had compression fractures from the L2-L5 vertebra, neurosurgery was consulted  The recommendations were to control patient's pain, obtain LSO brace, and physical therapy  Patient is currently being treated with Decadron, which seems to be helping the patient's back pain  Will discharged with Decadron taper starting with 4 mg twice a day  Patient should follow up as an outpatient with the spine center  On CT of the head it was also found to have enlarged lateral and 3rd ventricle dilation, concerning for NPH  Given patient's history of imbalance, possible urinary incontinence, this is concerning for normal-pressure hydrocephalus  Patient should follow-up as an outpatient with the NPH Clinic at the spine center  Patient will be going to acute rehab  Condition at Discharge: fair     Discharge Day Visit / Exam:     Subjective:  Patient states he feels better today  Is able to tolerate back brace, still trying to get used to it  Has been able to have a bowel movement, and is not complaining of any abdominal pain, nausea, or vomiting      Vitals: Blood Pressure: 141/83 (08/13/20 0701)  Pulse: 67 (08/13/20 0701)  Temperature: 97 6 °F (36 4 °C) (08/13/20 0701)  Temp Source: Oral (08/13/20 0701)  Respirations: 20 (08/13/20 0701)  Height: 5' (152 4 cm) (08/09/20 2326)  Weight - Scale: 63 5 kg (139 lb 15 9 oz) (08/09/20 1445)  SpO2: 96 % (08/13/20 Hudson Gutierrez  Exam:   Physical Exam  Constitutional:       General: He is not in acute distress  Appearance: Normal appearance  He is not ill-appearing  HENT:      Head: Normocephalic and atraumatic  Eyes:      General:         Right eye: No discharge  Left eye: No discharge  Conjunctiva/sclera: Conjunctivae normal       Pupils: Pupils are equal, round, and reactive to light  Cardiovascular:      Rate and Rhythm: Normal rate and regular rhythm  Pulses: Normal pulses  Heart sounds: Normal heart sounds  No murmur  No gallop  Pulmonary:      Effort: Pulmonary effort is normal  No respiratory distress  Breath sounds: Normal breath sounds  No wheezing  Abdominal:      General: Abdomen is flat  Bowel sounds are normal  There is no distension  Palpations: Abdomen is soft  Tenderness: There is no abdominal tenderness  Musculoskeletal:         General: No swelling or tenderness  Right lower leg: No edema  Left lower leg: No edema  Skin:     General: Skin is warm and dry  Capillary Refill: Capillary refill takes less than 2 seconds  Findings: No erythema  Neurological:      General: No focal deficit present  Mental Status: He is alert and oriented to person, place, and time  Discussion with Family:  Called patient's spouse's home, work, mobile  Was not able to reach her  Patient states that he will update his wife  Discharge instructions/Information to patient and family:   See after visit summary for information provided to patient and family  Provisions for Follow-Up Care:  See after visit summary for information related to follow-up care and any pertinent home health orders  Planned Readmission: no     Discharge Medications:  See after visit summary for reconciled discharge medications provided to patient and family        ** Please Note: This note has been constructed using a voice recognition system **

## 2020-08-11 NOTE — ASSESSMENT & PLAN NOTE
On presentation patient had a creatinine of 1 5 to  Patient's baseline was between 1 1 and 1 2  Plan:  1  Gentle hydration: Normal saline at 75 mL/hr  2   Monitor

## 2020-08-11 NOTE — PHYSICAL THERAPY NOTE
PHYSICAL THERAPY NOTE          Patient Name: Carter Zamora  NYPKT'C Date: 8/11/2020  Attempted to see patient this AM for skilled PT evaluation  Pt still does not have LSO in room as recommended by neuro  Pt states wife will be bringing in brace later this afternoon  Will follow up this afternoon as schedule permits      Diego Patel PT

## 2020-08-11 NOTE — PLAN OF CARE
Problem: Potential for Falls  Goal: Patient will remain free of falls  Description: INTERVENTIONS:  - Assess patient frequently for physical needs  -  Identify cognitive and physical deficits and behaviors that affect risk of falls  -  [place] fall precautions as indicated by assessment   - Educate patient/family on patient safety including physical limitations  - Instruct patient to call for assistance with activity based on assessment  - Modify environment to reduce risk of injury  - Consider OT/PT consult to assist with strengthening/mobility  Outcome: Progressing     Problem: Nutrition/Hydration-ADULT  Goal: Nutrient/Hydration intake appropriate for improving, restoring or maintaining nutritional needs  Description: Monitor and assess patient's nutrition/hydration status for malnutrition  Collaborate with interdisciplinary team and initiate plan and interventions as ordered  Monitor patient's weight and dietary intake as ordered or per policy  Utilize nutrition screening tool and intervene as necessary  Determine patient's food preferences and provide high-protein, high-caloric foods as appropriate       INTERVENTIONS:  - Monitor oral intake, urinary output, labs, and treatment plans  - Assess nutrition and hydration status and recommend course of action  - Evaluate amount of meals eaten  - Assist patient with eating if necessary   - Allow adequate time for meals  - Recommend/ encourage appropriate diets, oral nutritional supplements, and vitamin/mineral supplements  - Order, calculate, and assess calorie counts as needed  - Recommend, monitor, and adjust tube feedings and TPN/PPN based on assessed needs  - Assess need for intravenous fluids  - Provide specific nutrition/hydration education as appropriate  - Include patient/family/caregiver in decisions related to nutrition  Outcome: Progressing     Problem: Prexisting or High Potential for Compromised Skin Integrity  Goal: Skin integrity is maintained or improved  Description: INTERVENTIONS:  - Identify patients at risk for skin breakdown  - Assess and monitor skin integrity  - Assess and monitor nutrition and hydration status  - Monitor labs   - Assess for incontinence   - Turn and reposition patient  - Assist with mobility/ambulation  - Relieve pressure over bony prominences  - Avoid friction and shearing  - Provide appropriate hygiene as needed including keeping skin clean and dry  - Evaluate need for skin moisturizer/barrier cream  - Collaborate with interdisciplinary team   - Patient/family teaching  - Consider wound care consult   Outcome: Progressing

## 2020-08-11 NOTE — ASSESSMENT & PLAN NOTE
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

## 2020-08-11 NOTE — SOCIAL WORK
LOS 2 days, not a bundle or readmission  CM met with patient to introduce self, explain role, complete CM assessment, and discuss DC planning  Patient lives with his wife in a 2 floor home with 3 [place]OR[place] with handrails  Patient functioned independent PTA with no use of DME for ambulation and shower chair when bathing  No Hx HHC or STR  No Hx MH or substance use  Patient reports he has LW and POA, reported POA is his wife, CM requested copy of documents  Patient is retired  PCP is Dr Uzma Bello, patient has prescription coverage, and prefers using [facility]  CM discussed care team's recommendation for post acute rehabilitation services pending progress  Patient reports that he would not be interested in STR at this time and would be okay with [facility] services at [place]  CM discussed freedom of choice in agencies and patient reports he would like a referral to [facility]  [facility] referral sent per preference  CM will continue to follow

## 2020-08-11 NOTE — PROGRESS NOTES
Progress Note - Sergio Mckeon 1934, 80 y o  male MRN: [id number]    Unit/Bed#: S -01 Encounter: [id number]    Primary Care Provider: Miri Soni DO   Date and time admitted to hospital: 8/9/2020  2:45 PM        NPH (normal pressure hydrocephalus) [facility])  Assessment & Plan  Patient states he has been having trouble with his balance  Patient states he feels some lightheadedness when he has trouble with balance  States that he does have difficulties holding his urine and sometimes has difficulties making it to the bathroom on time  CT Brain: IMPRESSION:  1  No acute intracranial abnormality  2  Peripheral cortical sulcal enlargement  Disproportionate enlargement of the lateral and 3rd ventricles relative to the peripheral cortical sulcal enlargement  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  Clinical correlation is   Recommended  It is possible that patient's multiple falls could be secondary to his NPH  1  Patient will follow-up had [facility]    Irregular heartbeat  Assessment & Plan  · Patient has a history of PVC  · Continue to monitor  Constipation  Assessment & Plan  · Patient states no BM in 2 days - 1 week  · Abdomen is distended, lower quadrant tenderness on palpation  · Patient has some mild abdominal pain today, has been able to have a bowel movement  (8/11)      · Start bowel regimen   · Colace and MiraLax  · Patient states MiraLax helps, will prescribe to patient upon discharge  OLLIE (acute kidney injury) ([facility] [address] )  Assessment & Plan  On presentation patient had a creatinine of 1 5 to  Patient's baseline was between 1 1 and 1 2  Plan:  1  Gentle hydration: Normal saline at 75 mL/hr  2   Monitor    Essential hypertension  Assessment & Plan  · /74  · Continue home medications: lisinopril, lopressor    * Closed compression fracture of thoracolumbar vertebra (HCC)  Assessment & Plan  · Patient with multiple falls over the last few weeks-- most recently on Tuesday of this week   · CT thoracolumbar spine: 1  Age indeterminate superior compression deformities of the L1, L2, L4, L5 vertebral bodies  No evident retropulsed fragment  2   Age indeterminate compression deformities of the T3 and T11 vertebral body  3   Moderate degenerative changes throughout the thoracolumbar spine  · Pain control  · PT/OT  · Neurosurgery consult appreciated  · Recommending pain management, back brace, physical therapy  Plan:     1  Continue pain management  2  Will try to obtain upright x-rays with back brace  VTE Pharmacologic Prophylaxis:   Pharmacologic: Enoxaparin (Lovenox)  Mechanical VTE Prophylaxis in Place: Yes    Discussions with Specialists or Other Care Team Provider:  Neurosurgery  PT/OT    Education and Discussions with Family / Patient:  Patient understands his care plan and stated that he will update his wife  Current Length of Stay: 2 day(s)    Current Patient Status: Inpatient     Discharge Plan / Estimated Discharge Date:  24 hours pending PT evaluation with back brace       Code Status: Level 1 - Full Code      Subjective:   Patient states he is feeling lot better, still has some mild pain  Was able to have a bowel movement today  Denies having any fevers or chills  Objective:     Vitals:   Temp (24hrs), Av 9 °F (36 6 °C), Min:97 8 °F (36 6 °C), Max:98 °F (36 7 °C)    Temp:  [97 8 °F (36 6 °C)-98 °F (36 7 °C)] 98 °F (36 7 °C)  HR:  [63-94] 63  Resp:  [15-18] 18  BP: (132-152)/(62-76) 152/64  SpO2:  [93 %-95 %] 95 %  Body mass index is 27 34 kg/m²  Input and Output Summary (last 24 hours): Intake/Output Summary (Last 24 hours) at 2020 1551  Last data filed at 2020 0900  Gross per 24 hour   Intake 310 ml   Output 200 ml   Net 110 ml       Physical Exam:     Physical Exam  Constitutional:       General: He is not in acute distress  Appearance: Normal appearance  HENT:      Head: Normocephalic and atraumatic  Mouth/Throat:      Mouth: Mucous membranes are moist       Pharynx: Oropharynx is clear  Eyes:      General:         Right eye: No discharge  Left eye: No discharge  Conjunctiva/sclera: Conjunctivae normal       Pupils: Pupils are equal, round, and reactive to light  Cardiovascular:      Rate and Rhythm: Normal rate and regular rhythm  Pulses: Normal pulses  Heart sounds: Normal heart sounds  No murmur  No friction rub  No gallop  Pulmonary:      Effort: Pulmonary effort is normal  No respiratory distress  Breath sounds: Normal breath sounds  No wheezing  Abdominal:      General: Abdomen is flat  Bowel sounds are normal  There is no distension  Palpations: Abdomen is soft  Tenderness: There is abdominal tenderness (Mild)  Musculoskeletal:         General: No swelling or tenderness  Right lower leg: No edema  Left lower leg: No edema  Comments: Still some moderate pain on leg raise test R>L  Skin:     General: Skin is warm and dry  Findings: No rash  Neurological:      Mental Status: He is alert  Additional Data:     Labs:    Results from last 7 days   Lab Units 08/09/20  1535   WBC Thousand/uL 9 06   HEMOGLOBIN g/dL 14 4   HEMATOCRIT % 41 9   PLATELETS Thousands/uL 137*   NEUTROS PCT % 78*   LYMPHS PCT % 13*   MONOS PCT % 9   EOS PCT % 0     Results from last 7 days   Lab Units 08/11/20  0621 08/09/20  1535   POTASSIUM mmol/L 4 2 3 8   CHLORIDE mmol/L 99* 96*   CO2 mmol/L 25 28   BUN mg/dL 41* 38*   CREATININE mg/dL 1 37* 1 52*   CALCIUM mg/dL 8 4 9 2   ALK PHOS U/L  --  51   ALT U/L  --  16   AST U/L  --  19     Results from last 7 days   Lab Units 08/09/20  1535   INR  1 16       * I Have Reviewed All Lab Data Listed Above  * Additional Pertinent Lab Tests Reviewed:  All Labs For Current Hospital Admission Reviewed    Imaging:    Imaging Reports Reviewed Today Include:   Imaging Personally Reviewed by Myself Includes:      Recent Cultures (last 7 days):           Last 24 Hours Medication List:   Current Facility-Administered Medications   Medication Dose Route Frequency Provider Last Rate    acetaminophen  975 mg Oral Q8H [address] Gretta Toledo, PA-C      aspirin  81 mg Oral Daily Gretta Toledo, PA-C      cholecalciferol  1,000 Units Oral Daily Gretta Toledo, PA-C      dexamethasone  4 mg Intravenous Q8H [address] Gemini Kaba MD      docusate sodium  100 mg Oral BID Gemini Kaba MD      famotidine  20 mg Oral Daily Gretta Toledo, PA-C      fish oil  1,000 mg Oral Daily Gretta Toledo, PA-C      gabapentin  100 mg Oral HS Gretta Toledo, PA-C      heparin (porcine)  5,000 Units Subcutaneous Q8H [address] Gretta Toledo, PA-C      HYDROmorphone  0 2 mg Intravenous Q4H PRN Gretta Toledo, PA-C      metoprolol tartrate  12 5 mg Oral Daily Gretta Toledo, PA-C      ondansetron  4 mg Intravenous Q6H PRN Gretta Toeldo, PA-C      oxyCODONE  2 5 mg Oral Q4H PRN Gretta Toledo, PA-C      oxyCODONE  5 mg Oral Q4H PRN Gretta Toledo, PA-C      polyethylene glycol  17 g Oral Daily Gretta Toledo, PA-C      pravastatin  40 mg Oral Daily With [employer] KALANI Toledo, PA-C      senna  2 tablet Oral Daily PRN Gretta Toledo, PA-C      sodium chloride  75 mL/hr Intravenous Continuous Gemini Kaba MD          Today, Patient Was Seen By: Gemini Kaba MD    ** Please Note: This note has been constructed using a voice recognition system   **

## 2020-08-11 NOTE — ASSESSMENT & PLAN NOTE
· Patient with multiple falls over the last few weeks-- most recently on Tuesday of this week   · CT thoracolumbar spine: 1  Age indeterminate superior compression deformities of the L1, L2, L4, L5 vertebral bodies  No evident retropulsed fragment  2   Age indeterminate compression deformities of the T3 and T11 vertebral body  3   Moderate degenerative changes throughout the thoracolumbar spine  · Pain control  · PT/OT  · Neurosurgery consult appreciated  · Recommending pain management, back brace, physical therapy  Plan:     1  Continue pain management  2  Will try to obtain upright x-rays with back brace

## 2020-08-11 NOTE — PLAN OF CARE
Problem: OCCUPATIONAL THERAPY ADULT  Goal: Performs self-care activities at highest level of function for planned discharge setting  See evaluation for individualized goals  Description: Treatment Interventions: ADL retraining, Functional transfer training, Activityengagement, Equipment evaluation/education          See flowsheet documentation for full assessment, interventions and recommendations  Note: Limitation: Decreased ADL status, Decreased UE strength, Decreased Safe judgement during ADL, Decreased endurance, Decreased cognition, Decreased high-level ADLs, Decreased self-care trans     Assessment: Pt is a 80 y o  male seen for OT evaluation s/p admit [facility] on 8/9/2020 w/ Closed compression fracture of thoracolumbar vertebra ([address] )  Per nuerosurgery, pt requires LSO brace when upright and @ 45 degree head of bed  Comorbidities affecting pt's functional performance at time of assessment include: HTN, hard of hearing, OLLIE, ambulatory dysfunction, and NPH    Personal factors affecting pt at time of IE include:steps to enter environment, difficulty performing ADLS, difficulty performing IADLS , limited insight into deficits, flat affect, decreased initiation and engagement  and health management   Prior to admission, pt was living in a [facility] w/ his spouse w/ 4STE w/ rails and able to live on 1st floor w/ half bath, full flight to get to full bath/bed  Pt reports no use of DME PTA, but reports owning a RW  Upon evaluation: Pt alert and cooperative but had a flat affect  Pt was incontinent prior to therapists arrival and required max A w/ personal hygiene  Pt required max A w/ LB dressing  Pt required Max A w/ donning his LSO brace  Pt required Min A for sit <> stand transitions and Min A w/ use of RW for functional mobility   The following deficits impacting occupational performance: Generalized weakness, decreased ROM, decreased strength, decreased balance, decreased activity tolerance, decreased standing tolerance, impaired attention and increased pain  Pt to benefit from continued skilled OT tx while in the hospital to address deficits as defined above and maximize level of functional independence w ADL's and functional mobility  Occupational Performance areas to address include: bathing/shower, toilet hygiene, dressing, health maintenance, functional mobility, community mobility and clothing management   From OT standpoint, recommendation at time of d/c would be post-acute rehabilitation pending progress w/ ADLs, functional sit <> stand transitions ,functional mobility w/ use of RW and medical optimization     OT Discharge Recommendation: Post-Acute Rehabilitation Services  OT - OK to Discharge: (when medically stable )

## 2020-08-11 NOTE — PLAN OF CARE
Problem: Potential for Falls  Goal: Patient will remain free of falls  Description: INTERVENTIONS:  - Assess patient frequently for physical needs  -  Identify cognitive and physical deficits and behaviors that affect risk of falls  -  [place] fall precautions as indicated by assessment   - Educate patient/family on patient safety including physical limitations  - Instruct patient to call for assistance with activity based on assessment  - Modify environment to reduce risk of injury  - Consider OT/PT consult to assist with strengthening/mobility  Outcome: Progressing     Problem: Nutrition/Hydration-ADULT  Goal: Nutrient/Hydration intake appropriate for improving, restoring or maintaining nutritional needs  Description: Monitor and assess patient's nutrition/hydration status for malnutrition  Collaborate with interdisciplinary team and initiate plan and interventions as ordered  Monitor patient's weight and dietary intake as ordered or per policy  Utilize nutrition screening tool and intervene as necessary  Determine patient's food preferences and provide high-protein, high-caloric foods as appropriate       INTERVENTIONS:  - Monitor oral intake, urinary output, labs, and treatment plans  - Assess nutrition and hydration status and recommend course of action  - Evaluate amount of meals eaten  - Assist patient with eating if necessary   - Allow adequate time for meals  - Recommend/ encourage appropriate diets, oral nutritional supplements, and vitamin/mineral supplements  - Order, calculate, and assess calorie counts as needed  - Recommend, monitor, and adjust tube feedings and TPN/PPN based on assessed needs  - Assess need for intravenous fluids  - Provide specific nutrition/hydration education as appropriate  - Include patient/family/caregiver in decisions related to nutrition  Outcome: Progressing     Problem: Prexisting or High Potential for Compromised Skin Integrity  Goal: Skin integrity is maintained or improved  Description: INTERVENTIONS:  - Identify patients at risk for skin breakdown  - Assess and monitor skin integrity  - Assess and monitor nutrition and hydration status  - Monitor labs   - Assess for incontinence   - Turn and reposition patient  - Assist with mobility/ambulation  - Relieve pressure over bony prominences  - Avoid friction and shearing  - Provide appropriate hygiene as needed including keeping skin clean and dry  - Evaluate need for skin moisturizer/barrier cream  - Collaborate with interdisciplinary team   - Patient/family teaching  - Consider wound care consult   Outcome: Progressing

## 2020-08-11 NOTE — OCCUPATIONAL THERAPY NOTE
Occupational Therapy Evaluation     Patient Name: Sergio LIONYCDavP Date: 8/11/2020  Problem List  Principal Problem:    Closed compression fracture of thoracolumbar vertebra ([facility] )  Active Problems:    Essential hypertension    OLLIE (acute kidney injury) ([facility] )    Ambulatory dysfunction    Constipation    Irregular heartbeat    NPH (normal pressure hydrocephalus) ([facility] )    Past Medical History  Past Medical History:   Diagnosis Date    Hyperlipidemia     Hypertension      Past Surgical History  History reviewed  No pertinent surgical history  Pt seen for OT eval from 2130-7777  Reported in flowsheet as 1451        08/11/20 1451   Note Type   Note type Eval only   Restrictions/Precautions   Weight Bearing Precautions Per Order No   Braces or Orthoses LSO   Other Precautions Bed Alarm; Chair Alarm; Fall Risk;Pain;Hard of hearing;Cognitive   Pain Assessment   Pain Assessment Tool FLACC   Effect of Pain on Daily Activities Pain limits pts ability to fully participate in ADLs   Patient's Stated Pain Goal No pain   Hospital Pain Intervention(s) Ambulation/increased activity;Repositioned; Emotional support   Pain Rating: FLACC (Rest) - Face 0   Pain Rating: FLACC (Rest) - Legs 1   Pain Rating: FLACC (Rest) - Activity 0   Pain Rating: FLACC (Rest) - Cry 0   Pain Rating: FLACC (Rest) - Consolability 0   Score: FLACC (Rest) 1   Pain Rating: FLACC (Activity) - Face 1   Pain Rating: FLACC (Activity) - Legs 1   Pain Rating: FLACC (Activity) - Activity 1   Pain Rating: FLACC (Activity) - Cry 0   Pain Rating: FLACC (Activity) - Consolability 1   Score: FLACC (Activity) 4   Home Living   Type of Home House   Home Layout Two level;1/2 bath on main level; Able to live on main level with bedroom/bathroom;Stairs to enter with rails  (4 SOCORRO w/ bilateral rails )   Bathroom Shower/Tub Tub/shower unit   Bathroom Toilet Standard   Bathroom Accessibility [address]  (reports he has a RW but does not use  ) Prior Function   Level of [place] Independent with ADLs and functional mobility   Lives With Spouse   Receives Help From Family   ADL Assistance Independent  (pt reports wife occassionally assists w/ ADLs)   IADLs Independent   Falls in the last 6 months 1 to 4  (pt reports 2 falls )   Vocational Retired  ( )   Comments Pt lives w/ spouse in a [facility]  Pt reports no use of DME PTA  Pt reports able to live on main level w/ 1/2 bath  Lifestyle   Autonomy Pt reports living w/ spouse in [facility]  Pt reports enjoying watching tv  Reciprocal Relationships spouse, son    Service to Others Pt reports retired     Intrinsic Gratification watching tv    Psychosocial   Psychosocial ([address]) [address] " I want to be able to walk again"    ADL   Where Assessed Edge of bed   Eating Assistance 6  Modified independent   Eating Deficit Setup   Grooming Assistance 5  Supervision/Setup   Grooming Deficit Setup;Supervision/safety;Steadying   UB Bathing Assistance Unable to assess   LB Bathing Assistance Unable to assess   UB Dressing Assistance 2  Maximal Assistance   UB Dressing Deficit Increased time to complete; Thread RUE; Thread LUE;Pull around back;Pull down in back   LB Dressing Assistance 2  Maximal Assistance   LB Dressing Deficit Setup; Increased time to complete;Supervision/safety; Don/doff R sock; Don/doff L sock  (Pt reports wife occassionally helps w/ ADLs and driving  )   Toileting Assistance  2  Maximal Assistance   Toileting Deficit   (pt incontinent in bed upon therapists arrival )   Additional Comments Pt incontinent upon therapists arrival  Pt reports he thought he was passing gas throughout the day and did not realize he had a bowel movement  Pt required Max A w/ personal hygiene  Pt required Max A to don his LSO brace  Bed Mobility   Rolling R 4  Minimal assistance   Additional items Assist x 1; Increased time required;LE management  (Phys A for LE mgmt  )   Supine to Sit 4 Minimal assistance   Additional items LE management; Increased time required; Bedrails   Additional Comments Pt educated on log rolling to maintain spinal precautions to get up and out of bed  Pt required Min A to log roll to the right  Pt reported feeling dizzy upon sitting upright and was educated on pursed lip breathing  Pts symptoms resolved ~1 minute  Transfers   Sit to Stand 4  Minimal assistance   Additional items Assist x 1;Bedrails; Increased time required;Verbal cues  (VC for hand placement )   Stand to Sit 4  Minimal assistance   Additional items Assist x 1; Armrests; Increased time required;Verbal cues  (VC for hand placement )   Functional Mobility   Functional Mobility 4  Minimal assistance   Additional Comments Pt engaged in short distance from EOB to recliner chair w/ Min A and use of RW  Additional items Rolling walker   Balance   Static Sitting Fair -   Static Standing Poor +  (w/ RW )   Ambulatory Poor +   Activity Tolerance   Activity Tolerance Patient limited by fatigue;Patient limited by pain   Medical Staff Made Aware Yes Anyi SALDANA    Nurse Made Aware Yes, RN Denis March    RUE Assessment   RUE Assessment WFL   RUE Strength   RUE Overall Strength Within Functional Limits - able to perform ADL tasks with strength   LUE Assessment   LUE Assessment WFL   LUE Strength   LUE Overall Strength Within Functional Limits - able to perform ADL tasks with strength   Hand Function   Gross Motor Coordination Functional   Fine Motor Coordination Functional   Vision-Basic Assessment   Current Vision Wears glasses all the time   Cognition   Overall Cognitive Status Impaired   Arousal/Participation Alert; Cooperative  (flat affect )   Attention Difficulty attending to directions   Orientation Level Oriented to person;Oriented to place   Memory Decreased recall of recent events;Decreased short term memory   Following Commands Follows one step commands with increased time or repetition   Comments Pt identified by full name and   Pt willing to participate in eval and was able to give social history  Pt appeared to have a flat affect and had difficulty attentding to directions  Will continue to assess  At end of session pt seated OOB in recliner chair w/ call bell in reach, chair alarm on and all needs met  Assessment   Limitation Decreased ADL status; Decreased UE strength;Decreased Safe judgement during ADL;Decreased endurance;Decreased cognition;Decreased high-level ADLs; Decreased self-care trans   Assessment Pt is a 80 y o  male seen for OT evaluation s/p [facility] on 2020 w/ Closed compression fracture of thoracolumbar vertebra ([address] )  Per nuerosurgery, pt requires LSO brace when upright and @ 45 degree head of bed  Comorbidities affecting pt's functional performance at time of assessment include: HTN, hard of hearing, OLLIE, ambulatory dysfunction, and NPH    Personal factors affecting pt at time of IE include:steps to enter environment, difficulty performing ADLS, difficulty performing IADLS , limited insight into deficits, flat affect, decreased initiation and engagement  and health management   Prior to admission, pt was living in a [facility] w/ his spouse w/ 4STE w/ rails and able to live on 1st floor w/ half bath, full flight to get to full bath/bed  Pt reports no use of DME PTA, but reports owning a RW  Upon evaluation: Pt alert and cooperative but had a flat affect  Pt was incontinent prior to therapists arrival and required max A w/ personal hygiene  Pt required max A w/ LB dressing  Pt required Max A w/ donning his LSO brace  Pt required Min A for sit <> stand transitions and Min A w/ use of RW for functional mobility  The following deficits impacting occupational performance: Generalized weakness, decreased ROM, decreased strength, decreased balance, decreased activity tolerance, decreased standing tolerance, impaired attention and increased pain   Pt to benefit from continued skilled OT tx while in the hospital to address deficits as defined above and maximize level of functional independence w ADL's and functional mobility  Occupational Performance areas to address include: bathing/shower, toilet hygiene, dressing, health maintenance, functional mobility, community mobility and clothing management  From OT standpoint, recommendation at time of d/c would be post-acute rehabilitation pending progress w/ ADLs, functional sit <> stand transitions ,functional mobility w/ use of RW and medical optimization   Goals   Patient Goals " To be able to walk again"    Plan   Treatment Interventions ADL retraining;Functional transfer training; Activityengagement;Equipment evaluation/education   Goal Expiration Date 08/18/20   OT Frequency 3-5x/wk   Recommendation   OT Discharge Recommendation Post-Acute Rehabilitation Services   OT - OK to Discharge   (when medically stable )   Barthel Index   Feeding 10   Bathing 0   Grooming Score 5   Dressing Score 5   Bladder Score 5   Bowels Score 5   Toilet Use Score 5   Transfers (Bed/Chair) Score 10   Mobility (Level Surface) Score 0   Stairs Score 0   Barthel Index Score 45   Modified Cutler Scale   Modified Ryan Scale 4     OT goals:     -Pt will increase bed mobility to MOD I to participate in functional activities with G tolerance and balance and return to PLOF  -Pt will improve transfers to MOD I on/off all surfaces w/ G balance/safety including toileting and use of DME as needed in order to return to home environment  - Pt will improve functional mobility during all ADL/IADL/leisure activities w/ use of DME as necessary and S in order to maximize I and return to home environment       -Pt will participate in light grooming task with S after set-up standing at sink ~3-5 minutes with G safety and balance in order to return to PLOF  -Pt will increase UB/LB dressing to Min A and use of AE/ AD as necessary in order to decrease caregiver burden and return to PLOF       -Pt will improve activity tolerance to G for 30 min txment sessions w/ G carry over of learned energy conservation techniques     -Pt will demonstrate G carryover of learned safety techniques and proper body mechanics in functional and leisure activities with use of DME in order to decrease risk of falls and return to PLOF  - Pt will follow multi-step directions and be able to attend to task w/ < 3 VC in order to A w/ functional cognition and safety in order to return to home environment        - Pt will demonstrate G attention and motivation to continue to assess cognition in order to A w/ d/c planning and to safely return to home environment    Guadalupe Shields 272, OTS

## 2020-08-11 NOTE — ASSESSMENT & PLAN NOTE
Patient states he has been having trouble with his balance  Patient states he feels some lightheadedness when he has trouble with balance  States that he does have difficulties holding his urine and sometimes has difficulties making it to the bathroom on time  CT Brain: IMPRESSION:  1  No acute intracranial abnormality  2  Peripheral cortical sulcal enlargement  Disproportionate enlargement of the lateral and 3rd ventricles relative to the peripheral cortical sulcal enlargement  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  Clinical correlation is   Recommended  It is possible that patient's multiple falls could be secondary to his NPH      1  Patient will follow-up had [facility]

## 2020-08-12 ENCOUNTER — APPOINTMENT (INPATIENT)
Dept: RADIOLOGY | Facility: HOSPITAL | Age: 85
DRG: 057 | End: 2020-08-12
Payer: MEDICARE

## 2020-08-12 LAB
ANION GAP SERPL CALCULATED.3IONS-SCNC: 10 MMOL/L (ref 4–13)
BASOPHILS # BLD AUTO: 0.01 THOUSANDS/ΜL (ref 0–0.1)
BASOPHILS NFR BLD AUTO: 0 % (ref 0–1)
BUN SERPL-MCNC: 44 MG/DL (ref 5–25)
CALCIUM SERPL-MCNC: 8.2 MG/DL (ref 8.3–10.1)
CHLORIDE SERPL-SCNC: 103 MMOL/L (ref 100–108)
CO2 SERPL-SCNC: 24 MMOL/L (ref 21–32)
CREAT SERPL-MCNC: 1.29 MG/DL (ref 0.6–1.3)
EOSINOPHIL # BLD AUTO: 0 THOUSAND/ΜL (ref 0–0.61)
EOSINOPHIL NFR BLD AUTO: 0 % (ref 0–6)
ERYTHROCYTE [DISTWIDTH] IN BLOOD BY AUTOMATED COUNT: 13.5 % (ref 11.6–15.1)
GFR SERPL CREATININE-BSD FRML MDRD: 50 ML/MIN/1.73SQ M
GLUCOSE SERPL-MCNC: 140 MG/DL (ref 65–140)
HCT VFR BLD AUTO: 41.8 % (ref 36.5–49.3)
HGB BLD-MCNC: 13.9 G/DL (ref 12–17)
IMM GRANULOCYTES # BLD AUTO: 0.05 THOUSAND/UL (ref 0–0.2)
IMM GRANULOCYTES NFR BLD AUTO: 1 % (ref 0–2)
LYMPHOCYTES # BLD AUTO: 0.69 THOUSANDS/ΜL (ref 0.6–4.47)
LYMPHOCYTES NFR BLD AUTO: 9 % (ref 14–44)
MCH RBC QN AUTO: 30.5 PG (ref 26.8–34.3)
MCHC RBC AUTO-ENTMCNC: 33.3 G/DL (ref 31.4–37.4)
MCV RBC AUTO: 92 FL (ref 82–98)
MONOCYTES # BLD AUTO: 0.23 THOUSAND/ΜL (ref 0.17–1.22)
MONOCYTES NFR BLD AUTO: 3 % (ref 4–12)
NEUTROPHILS # BLD AUTO: 7.05 THOUSANDS/ΜL (ref 1.85–7.62)
NEUTS SEG NFR BLD AUTO: 87 % (ref 43–75)
NRBC BLD AUTO-RTO: 0 /100 WBCS
PLATELET # BLD AUTO: 162 THOUSANDS/UL (ref 149–390)
PMV BLD AUTO: 10.8 FL (ref 8.9–12.7)
POTASSIUM SERPL-SCNC: 4.5 MMOL/L (ref 3.5–5.3)
RBC # BLD AUTO: 4.56 MILLION/UL (ref 3.88–5.62)
SARS-COV-2 RNA RESP QL NAA+PROBE: NEGATIVE
SODIUM SERPL-SCNC: 137 MMOL/L (ref 136–145)
WBC # BLD AUTO: 8.03 THOUSAND/UL (ref 4.31–10.16)

## 2020-08-12 PROCEDURE — 97116 GAIT TRAINING THERAPY: CPT

## 2020-08-12 PROCEDURE — 72100 X-RAY EXAM L-S SPINE 2/3 VWS: CPT

## 2020-08-12 PROCEDURE — 99232 SBSQ HOSP IP/OBS MODERATE 35: CPT | Performed by: INTERNAL MEDICINE

## 2020-08-12 PROCEDURE — 97530 THERAPEUTIC ACTIVITIES: CPT

## 2020-08-12 PROCEDURE — 85025 COMPLETE CBC W/AUTO DIFF WBC: CPT | Performed by: INTERNAL MEDICINE

## 2020-08-12 PROCEDURE — 80048 BASIC METABOLIC PNL TOTAL CA: CPT | Performed by: INTERNAL MEDICINE

## 2020-08-12 PROCEDURE — 87635 SARS-COV-2 COVID-19 AMP PRB: CPT | Performed by: PHYSICIAN ASSISTANT

## 2020-08-12 PROCEDURE — 97535 SELF CARE MNGMENT TRAINING: CPT

## 2020-08-12 PROCEDURE — 97163 PT EVAL HIGH COMPLEX 45 MIN: CPT

## 2020-08-12 RX ORDER — CALCIUM CARBONATE 200(500)MG
500 TABLET,CHEWABLE ORAL DAILY PRN
Status: DISCONTINUED | OUTPATIENT
Start: 2020-08-12 | End: 2020-08-13 | Stop reason: HOSPADM

## 2020-08-12 RX ADMIN — FAMOTIDINE 20 MG: 20 TABLET, FILM COATED ORAL at 08:09

## 2020-08-12 RX ADMIN — DOCUSATE SODIUM 100 MG: 100 CAPSULE, LIQUID FILLED ORAL at 08:09

## 2020-08-12 RX ADMIN — DOCUSATE SODIUM 100 MG: 100 CAPSULE, LIQUID FILLED ORAL at 17:34

## 2020-08-12 RX ADMIN — ACETAMINOPHEN 975 MG: 325 TABLET, FILM COATED ORAL at 13:00

## 2020-08-12 RX ADMIN — METOPROLOL TARTRATE 12.5 MG: 25 TABLET, FILM COATED ORAL at 08:09

## 2020-08-12 RX ADMIN — Medication 1000 UNITS: at 08:09

## 2020-08-12 RX ADMIN — DEXAMETHASONE SODIUM PHOSPHATE 4 MG: 4 INJECTION, SOLUTION INTRAMUSCULAR; INTRAVENOUS at 22:12

## 2020-08-12 RX ADMIN — ACETAMINOPHEN 975 MG: 325 TABLET, FILM COATED ORAL at 05:30

## 2020-08-12 RX ADMIN — Medication 1000 MG: at 08:09

## 2020-08-12 RX ADMIN — POLYETHYLENE GLYCOL 3350 17 G: 17 POWDER, FOR SOLUTION ORAL at 08:09

## 2020-08-12 RX ADMIN — DEXAMETHASONE SODIUM PHOSPHATE 4 MG: 4 INJECTION, SOLUTION INTRAMUSCULAR; INTRAVENOUS at 05:31

## 2020-08-12 RX ADMIN — SODIUM CHLORIDE 75 ML/HR: 0.9 INJECTION, SOLUTION INTRAVENOUS at 05:36

## 2020-08-12 RX ADMIN — ASPIRIN 81 MG 81 MG: 81 TABLET ORAL at 08:09

## 2020-08-12 RX ADMIN — DEXAMETHASONE SODIUM PHOSPHATE 4 MG: 4 INJECTION, SOLUTION INTRAMUSCULAR; INTRAVENOUS at 13:00

## 2020-08-12 RX ADMIN — OXYCODONE HYDROCHLORIDE 2.5 MG: 5 TABLET ORAL at 22:12

## 2020-08-12 RX ADMIN — ACETAMINOPHEN 975 MG: 325 TABLET, FILM COATED ORAL at 22:12

## 2020-08-12 RX ADMIN — CALCIUM CARBONATE (ANTACID) CHEW TAB 500 MG 500 MG: 500 CHEW TAB at 19:09

## 2020-08-12 RX ADMIN — HEPARIN SODIUM 5000 UNITS: 5000 INJECTION INTRAVENOUS; SUBCUTANEOUS at 22:12

## 2020-08-12 RX ADMIN — HEPARIN SODIUM 5000 UNITS: 5000 INJECTION INTRAVENOUS; SUBCUTANEOUS at 13:00

## 2020-08-12 RX ADMIN — PRAVASTATIN SODIUM 40 MG: 40 TABLET ORAL at 16:16

## 2020-08-12 RX ADMIN — OXYCODONE HYDROCHLORIDE 2.5 MG: 5 TABLET ORAL at 16:16

## 2020-08-12 RX ADMIN — HEPARIN SODIUM 5000 UNITS: 5000 INJECTION INTRAVENOUS; SUBCUTANEOUS at 05:31

## 2020-08-12 RX ADMIN — GABAPENTIN 100 MG: 100 CAPSULE ORAL at 22:12

## 2020-08-12 NOTE — PLAN OF CARE
Problem: PHYSICAL THERAPY ADULT  Goal: Performs mobility at highest level of function for planned discharge setting  See evaluation for individualized goals  Description: Treatment/Interventions: ADL retraining, Functional transfer training, LE strengthening/ROM, Elevations, Therapeutic exercise, Endurance training, Patient/family training, Equipment eval/education, Bed mobility, Gait training, Continued evaluation, Spoke to nursing, Spoke to case management, OT  Equipment Recommended: Jean Paul Copeland       See flowsheet documentation for full assessment, interventions and recommendations  Note: Prognosis: Fair  Problem List: Decreased strength, Decreased endurance, Impaired balance, Decreased mobility, Decreased coordination, Impaired judgement, Decreased safety awareness, Orthopedic restrictions  Assessment: Pt is 80 y o  male seen for PT evaluation s/p admit to [address] [place] [address] on 8/9/2020 w/ Closed compression fracture of thoracolumbar vertebra ([facility] )  PT consulted to assess pt's functional mobility and d/c needs  Order placed for PT eval and tx, w/ up w/ A and LSO OOB order  Comorbidities affecting pt's physical performance at time of assessment include those as noted above  PTA, pt was independent w/ all functional mobility w/ out AD, ambulates household distances, lives in multi-level home and has 4 SOCORRO  Personal factors affecting pt at time of IE include: lives in 2 story house, ambulating w/ assistive device, stairs to enter home, inability to navigate level surfaces w/o external assistance and positive fall history  Please find objective findings from PT assessment regarding body systems outlined above with impairments and limitations including weakness, impaired balance, gait deviations, decreased activity tolerance, decreased functional mobility tolerance, decreased safety awareness, impaired judgement, fall risk and orthopedic restrictions    Pt's clinical presentation is currently unstable/unpredictable seen in pt's presentation  Pt to benefit from continued PT tx to address deficits as defined above and maximize level of functional independent mobility and consistency  From PT/mobility standpoint, recommendation at time of d/c would be STR pending progress in order to facilitate return to PLOF  PT Discharge Recommendation: Post-Acute Rehabilitation Services(STR)     PT - OK to Discharge: Yes    See flowsheet documentation for full assessment

## 2020-08-12 NOTE — PROGRESS NOTES
Progress Note - Lm Cotto 1934, 80 y o  male MRN: [id number]    Unit/Bed#: S -01 Encounter: [id number]    Primary Care Provider: Jade Sanchez DO   Date and time admitted to hospital: 8/9/2020  2:45 PM        NPH (normal pressure hydrocephalus) [facility])  Assessment & Plan  Patient states he has been having trouble with his balance  Patient states he feels some lightheadedness when he has trouble with balance  States that he does have difficulties holding his urine and sometimes has difficulties making it to the bathroom on time  CT Brain: IMPRESSION:  1  No acute intracranial abnormality  2  Peripheral cortical sulcal enlargement  Disproportionate enlargement of the lateral and 3rd ventricles relative to the peripheral cortical sulcal enlargement  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  Clinical correlation is   Recommended  It is possible that patient's multiple falls could be secondary to his NPH  1  Patient will follow-up had [facility]    Irregular heartbeat  Assessment & Plan  · Patient has a history of PVC  · Continue to monitor  Constipation  Assessment & Plan  · Patient states no BM in 2 days - 1 week  · Abdomen is distended, lower quadrant tenderness on palpation  · Patient has some mild abdominal pain today, has been able to have a bowel movement  (8/11)      · Start bowel regimen   · Colace and MiraLax  · Patient states MiraLax helps, will prescribe to patient upon discharge  Ambulatory dysfunction  Assessment & Plan  · Since multiple falls, unable to ambulate without pain  · Pain control  · PT/OT    OLLIE (acute kidney injury) ([facility] )  Assessment & Plan  On presentation patient had a creatinine of 1 5 to  Patient's baseline was between 1 1 and 1 2  Plan:  1  Gentle hydration: Normal saline at 75 mL/hr  2   Monitor    Essential hypertension  Assessment & Plan  · /74  · Continue home medications: lisinopril, lopressor    * Closed compression fracture of thoracolumbar vertebra [facility])  Assessment & Plan  · Patient with multiple falls over the last few weeks-- most recently on Tuesday of this week   · CT thoracolumbar spine: 1  Age indeterminate superior compression deformities of the L1, L2, L4, L5 vertebral bodies  No evident retropulsed fragment  2   Age indeterminate compression deformities of the T3 and T11 vertebral body  3   Moderate degenerative changes throughout the thoracolumbar spine  · Pain control  · PT/OT  · Neurosurgery consult appreciated  · Recommending pain management, back brace, physical therapy  Plan:     1  Continue pain management/pt  2  Pt agreeable to go to rehab      VTE Pharmacologic Prophylaxis:   Pharmacologic: Enoxaparin (Lovenox)  Mechanical VTE Prophylaxis in Place: Yes    Discussions with Specialists or Other Care Team Provider: [facility]U, PT/OT    Education and Discussions with Family / Patient: Valerie Power to reach out to wife, was not able to reach her  CM spoke with wife and discussed rehab  Both patient and wife are amenable to rehab  Current Length of Stay: 3 day(s)    Current Patient Status: Inpatient     Discharge Plan / Estimated Discharge Date: tomorrow morning at 11 per rehab center  Code Status: Level 1 - Full Code      Subjective:  Patient states he feels better today  Is able to tolerate back brace, still trying to get used to it  Has been able to have a bowel movement, and is not complaining of any abdominal pain, nausea, or vomiting      Vitals: Blood Pressure: 140/67 (08/11/20 1620)  Pulse: 74 (08/11/20 1620)  Temperature: (!) 97 4 °F (36 3 °C) (08/11/20 1620)  Temp Source: Oral (08/11/20 1620)  Respirations: 18 (08/11/20 1620)  Height: 5' (152 4 cm) (08/09/20 2326)  Weight - Scale: 63 5 kg (139 lb 15 9 oz) (08/09/20 1445)  SpO2: 93 % (08/11/20 1620)  Exam:   Physical Exam  Constitutional:       General: He is not in acute distress  Appearance: Normal appearance  He is not ill-appearing  HENT:      Head: Normocephalic and atraumatic  Eyes:      General:         Right eye: No discharge  Left eye: No discharge  Conjunctiva/sclera: Conjunctivae normal       Pupils: Pupils are equal, round, and reactive to light  Cardiovascular:      Rate and Rhythm: Normal rate and regular rhythm  Pulses: Normal pulses  Heart sounds: Normal heart sounds  No murmur  No gallop  Pulmonary:      Effort: Pulmonary effort is normal  No respiratory distress  Breath sounds: Normal breath sounds  No wheezing  Abdominal:      General: Abdomen is flat  Bowel sounds are normal  There is no distension  Palpations: Abdomen is soft  Tenderness: There is no abdominal tenderness  Musculoskeletal:         General: No swelling or tenderness  Right lower leg: No edema  Left lower leg: No edema  Skin:     General: Skin is warm and dry  Capillary Refill: Capillary refill takes less than 2 seconds  Findings: No erythema  Neurological:      General: No focal deficit present  Mental Status: He is alert and oriented to person, place, and time    Additional Data:     Labs:    Results from last 7 days   Lab Units 08/12/20  0851   WBC Thousand/uL 8 03   HEMOGLOBIN g/dL 13 9   HEMATOCRIT % 41 8   PLATELETS Thousands/uL 162   NEUTROS PCT % 87*   LYMPHS PCT % 9*   MONOS PCT % 3*   EOS PCT % 0     Results from last 7 days   Lab Units 08/12/20  0504  08/09/20  1535   POTASSIUM mmol/L 4 5   < > 3 8   CHLORIDE mmol/L 103   < > 96*   CO2 mmol/L 24   < > 28   BUN mg/dL 44*   < > 38*   CREATININE mg/dL 1 29   < > 1 52*   CALCIUM mg/dL 8 2*   < > 9 2   ALK PHOS U/L  --   --  51   ALT U/L  --   --  16   AST U/L  --   --  19    < > = values in this interval not displayed  Results from last 7 days   Lab Units 08/09/20  1535   INR  1 16       * I Have Reviewed All Lab Data Listed Above  * Additional Pertinent Lab Tests Reviewed:  All Labs Within Last 24 Hours Reviewed    Imaging:    Imaging Reports Reviewed Today Include:   Imaging Personally Reviewed by Myself Includes:      Recent Cultures (last 7 days):           Last 24 Hours Medication List:   Current Facility-Administered Medications   Medication Dose Route Frequency Provider Last Rate    acetaminophen  975 mg Oral Q8H [facility] Gretta KALANI Paris, CHACHO      aspirin  81 mg Oral Daily [facility], PA-C      cholecalciferol  1,000 Units Oral Daily [facility], PA-C      dexamethasone  4 mg Intravenous Q8H [facility] Wolf Carmona MD      docusate sodium  100 mg Oral BID Wolf Carmona MD      famotidine  20 mg Oral Daily [facility], CHACHO      fish oil  1,000 mg Oral Daily [facility], PA-C      gabapentin  100 mg Oral HS [facility], PA-C      heparin (porcine)  5,000 Units Subcutaneous Q8H [facility] KALANI [facility], [facility]-C      HYDROmorphone  0 2 mg Intravenous Q4H PRN [facility], PA-C      metoprolol tartrate  12 5 mg Oral Daily [facility], PA-C      ondansetron  4 mg Intravenous Q6H PRN [facility], PA-C      oxyCODONE  2 5 mg Oral Q4H PRN [facility], PA-C      oxyCODONE  5 mg Oral Q4H PRN [facility], PA-C      polyethylene glycol  17 g Oral Daily [facility], PA-C      pravastatin  40 mg Oral Daily With [employer]  [facility], PA-C      senna  2 tablet Oral Daily PRN [facility] Paris, PA-C          Today, Patient Was Seen By: Wolf Carmona MD    ** Please Note: This note has been constructed using a voice recognition system   **

## 2020-08-12 NOTE — SOCIAL WORK
Pt has reconsidered STR  A post acute care recommendation was made by care team for STR  Discussed Freedom of Choice with both patient and caregiver  List of facilities given to both patient and caregiver via in person  both patient and caregiver aware the list is custom filtered for them by zip code location and that [facility] post acute providers are designated  Pt's wife requested for referrals to be placed to [facility], [facility], [facility]  Referrals placed and pt is accepted to [facility], however, facility can not accept the pt til tomorrow  Other facilities have not replied back regarding determination  Pt's wife prefers pt to go to [facility]  COVID test to be ordered  Pt can discharge tomorrow to [facility]  Contacted [facility] to setup a wheelchair transport for tomorrow morning around 11am  [facility] to call back with confirmed pick-up time

## 2020-08-12 NOTE — PLAN OF CARE
Problem: Potential for Falls  Goal: Patient will remain free of falls  Description: INTERVENTIONS:  - Assess patient frequently for physical needs  -  Identify cognitive and physical deficits and behaviors that affect risk of falls  -  [place] fall precautions as indicated by assessment   - Educate patient/family on patient safety including physical limitations  - Instruct patient to call for assistance with activity based on assessment  - Modify environment to reduce risk of injury  - Consider OT/PT consult to assist with strengthening/mobility  Outcome: Progressing     Problem: Nutrition/Hydration-ADULT  Goal: Nutrient/Hydration intake appropriate for improving, restoring or maintaining nutritional needs  Description: Monitor and assess patient's nutrition/hydration status for malnutrition  Collaborate with interdisciplinary team and initiate plan and interventions as ordered  Monitor patient's weight and dietary intake as ordered or per policy  Utilize nutrition screening tool and intervene as necessary  Determine patient's food preferences and provide high-protein, high-caloric foods as appropriate       INTERVENTIONS:  - Monitor oral intake, urinary output, labs, and treatment plans  - Assess nutrition and hydration status and recommend course of action  - Evaluate amount of meals eaten  - Assist patient with eating if necessary   - Allow adequate time for meals  - Recommend/ encourage appropriate diets, oral nutritional supplements, and vitamin/mineral supplements  - Order, calculate, and assess calorie counts as needed  - Recommend, monitor, and adjust tube feedings and TPN/PPN based on assessed needs  - Assess need for intravenous fluids  - Provide specific nutrition/hydration education as appropriate  - Include patient/family/caregiver in decisions related to nutrition  Outcome: Progressing     Problem: Prexisting or High Potential for Compromised Skin Integrity  Goal: Skin integrity is maintained or improved  Description: INTERVENTIONS:  - Identify patients at risk for skin breakdown  - Assess and monitor skin integrity  - Assess and monitor nutrition and hydration status  - Monitor labs   - Assess for incontinence   - Turn and reposition patient  - Assist with mobility/ambulation  - Relieve pressure over bony prominences  - Avoid friction and shearing  - Provide appropriate hygiene as needed including keeping skin clean and dry  - Evaluate need for skin moisturizer/barrier cream  - Collaborate with interdisciplinary team   - Patient/family teaching  - Consider wound care consult   Outcome: Progressing

## 2020-08-12 NOTE — PLAN OF CARE
Problem: OCCUPATIONAL THERAPY ADULT  Goal: Performs self-care activities at highest level of function for planned discharge setting  See evaluation for individualized goals  Description: Treatment Interventions: ADL retraining, Functional transfer training, Activityengagement, Equipment evaluation/education          See flowsheet documentation for full assessment, interventions and recommendations  Outcome: Progressing  Note: Limitation: Decreased ADL status, Decreased UE strength, Decreased Safe judgement during ADL, Decreased endurance, Decreased cognition, Decreased high-level ADLs, Decreased self-care trans     Assessment: Pt seen for skilled OT tx session from 1410 -1454  Pt agreeable to participate in session focusing on ADL retraining, functional mobility and continued evaluation of functional cognition  Pt seated OOB in recliner chair upon arrival, pt required Mod Ax1 to LB dress  Pt required VC to roll up pants in order to thread through R/L LE and phys A to pull up over hips  Pt required Min A to sit<> stand from recliner chair  Pt required Min A ( CGA) w/ VC for managing the RW for functional mobility  Pt SOB after engaging in household distances and took three breaths when counting to 15 at a normal pace which indicates a VRI level of 3  Therapist engaged pt in [facility] to A w/ safe d/c planning  Modifications were made due to pt being hard of hearing and not having his glasses with him  Pt's spouse was present during the assessment and made multiple attempts to assist pt  Pt scored 7/30 which indicates a severe cognitive impairment  MOCA score indicates cognitive impairment although pt appears to be functioning w/ less deficits then score would indicate  At end of session pt was seated in recliner chair w/ chair alarm on, call bell in reach and all needs met   Recommendation at time of d/c would be post-acute rehabilitation due to pt performing below baseline level     OT Discharge Recommendation: Post-Acute Rehabilitation Services  OT - OK to Discharge: (when medically stable )

## 2020-08-12 NOTE — OCCUPATIONAL THERAPY NOTE
Occupational Therapy Evaluation     Patient Name: Amanda Parry  NOZJQ'[id number] Date: 8/12/2020  Problem List  Principal Problem:    Closed compression fracture of thoracolumbar vertebra ([facility] )  Active Problems:    Essential hypertension    OLLIE (acute kidney injury) ([facility] )    Ambulatory dysfunction    Constipation    Irregular heartbeat    NPH (normal pressure hydrocephalus) ([facility] )    Past Medical History  Past Medical History:   Diagnosis Date    Hyperlipidemia     Hypertension      Past Surgical History  History reviewed  No pertinent surgical history  08/12/20 1454   Restrictions/Precautions   Weight Bearing Precautions Per Order No   Braces or Orthoses LSO   Other Precautions Chair Alarm; Fall Risk;Pain;Hard of hearing   General   Response to Previous Treatment Patient with no complaints from previous session   Family/Caregiver Present Pts spouse was present during session and was emotional at times due to spouse being sent to inpatient rehab  Lifestyle   Reciprocal Relationships   (supportive spouse Chip Gonzalez present during session  )   Pain Assessment   Pain Assessment Tool 0-10   Pain Score 5   Pain Location/Orientation Location: Back   Effect of Pain on Daily Activities Pain limits pts ability to fully participate in ADLs    Patient's Stated Pain Goal No pain   Hospital Pain Intervention(s) Repositioned; Ambulation/increased activity; Emotional support   ADL   Where Assessed Chair   UB Dressing Assistance 2  Maximal Assistance   UB Dressing Deficit Increased time to complete; Fasteners;Verbal cueing   UB Dressing Comments Pt required Max A to adjust/manage LSO brace  LB Dressing Assistance 3  Moderate Assistance   LB Dressing Deficit Setup; Requires assistive device for steadying;Supervision/safety; Increased time to complete;Pull up over hips;Verbal cueing   Bed Mobility   Rolling L Unable to assess   Supine to Sit Unable to assess   Sit to Supine Unable to assess   Additional Comments Pt seated OOB in recliner chair at end of session w/ spouse present and given todays newspaper  Pts chair alarm was on, call bell in reach and all needs were met  Transfers   Sit to Stand 4  Minimal assistance   Additional items Assist x 1; Armrests; Increased time required;Verbal cues   Stand to Sit 4  Minimal assistance   Additional items Assist x 1; Increased time required;Verbal cues   Functional Mobility   Functional Mobility 4  Minimal assistance   Additional Comments Pt engaged in household distances w/ use of RW w/ Min A for CGA/ guiding of RW  Pt seemed SOB  Pt counted to 15 w/ 3 pauses to take a breath which indicates a VRI level of 3  Additional items Rolling walker   Cognition   Overall Cognitive Status Impaired   Arousal/Participation Alert; Cooperative   Attention Difficulty attending to directions  (hard of hearing )   Orientation Level Oriented to person;Oriented to place;Oriented to situation;Disoriented to time   Memory Decreased recall of recent events;Decreased short term memory   Following Commands Follows one step commands with increased time or repetition   Comments Pt identified by full name and   Pt agreeable to participate in tx session  Pt engaged in [address] [place] [address], [place]  Pt required modifications due to being hard of hearing and not having his glasses w/ him  Spouse was present during session and attempted to assist multiple times but was able to self correct and tried to refrain from helping  Pt scored 7/30 on assessment indicating severe cognitive impairment  Pt appeared to be functioning w/ less deficits then score would indicate  Pt at times appeared overwhelmed and emotional about situation  Cognition Assessment Tools MOCA   Score 7   Activity Tolerance   Activity Tolerance Patient limited by fatigue   Medical Staff Made Aware Ladonna CABRERA    Assessment   Assessment Pt seen for skilled OT tx session from  [phone]   Pt agreeable to participate in session focusing on ADL retraining, functional mobility and continued evaluation of functional cognition  Pt seated OOB in recliner chair upon arrival, pt required Mod Ax1 to LB dress  Pt required VC to roll up pants in order to thread through R/L LE and phys A to pull up over hips  Pt required Min A to sit<> stand from recliner chair  Pt required Min A ( CGA) w/ VC for managing the RW for functional mobility  Pt SOB after engaging in household distances and took three breaths when counting to 15 at a normal pace which indicates a VRI level of 3  Therapist engaged pt in [facility] to A w/ safe d/c planning  Modifications were made due to pt being hard of hearing and not having his glasses with him  Pt's spouse was present during the assessment and made multiple attempts to assist pt  Pt scored 7/30 which indicates a severe cognitive impairment  MOCA score indicates cognitive impairment although pt appears to be functioning w/ less deficits then score would indicate  At end of session pt was seated in recliner chair w/ chair alarm on, call bell in reach and all needs met  Recommendation at time of d/c would be post-acute rehabilitation due to pt performing below baseline level   Plan   Treatment Interventions ADL retraining;Cognitive reorientation;Continued evaluation; Activityengagement; Energy conservation; Functional transfer training;Patient/family training   Goal Expiration Date 08/18/20   OT Frequency 3-5x/wk   Recommendation   OT Discharge Recommendation Post-Acute Rehabilitation Services   OT - OK to Discharge   (when medically stable )   Barthel Index   Feeding 10   Bathing 0   Grooming Score 5   Dressing Score 5   Bladder Score 5   Bowels Score 5   Toilet Use Score 5   Transfers (Bed/Chair) Score 10   Mobility (Level Surface) Score 0   Stairs Score 0   Barthel Index Score 45   Modified [place] Scale   Modified [place] Scale 4     Fe Castro, OTS

## 2020-08-12 NOTE — PHYSICAL THERAPY NOTE
Physical Therapy Evaluation      Patient Active Problem List   Diagnosis    Bee sting    Essential hypertension    Dyslipidemia    [facility]LLIE [facility]) [facility] )    Hypertension    Hyperlipidemia    Closed compression fracture of thoracolumbar vertebra (HCC)    Ambulatory dysfunction    Constipation    Irregular heartbeat    NPH (normal pressure hydrocephalus) ([facility])       Past Medical History:   Diagnosis Date    Hyperlipidemia     Hypertension        History reviewed  No pertinent surgical history  08/12/20 1205   Note Type   Note type Eval/Treat   Pain Assessment   Pain Assessment Tool Pain Assessment not indicated - pt denies pain   Pain Score No Pain   Home Living   Type of [address] [place] [address] Two level;1/2 bath on main level; Able to live on main level with bedroom/bathroom  (4 SOCORRO with B/L rail)   Bathroom Shower/Tub Tub/shower unit   [address] [facility] Function   Level of [place] Independent with ADLs and functional mobility   Lives With Spouse; Family   Receives Help From Family   ADL Assistance Independent   IADLs Independent   Falls in the last 6 months 1 to 4  (reports 2 falls in 2 weeks)   Vocational Retired   Restrictions/Precautions   [place] Bearing Precautions Per Order No   Braces or Orthoses LSO  (backpack)   Other Precautions Bed Alarm; Chair Alarm; Fall Risk;Pain   General   Family/Caregiver Present No   Cognition   Overall Cognitive Status Impaired   Arousal/Participation Alert   Attention Difficulty attending to directions   Orientation Level Oriented X4   Memory Decreased recall of recent events;Decreased short term memory   Following Commands Follows one step commands with increased time or repetition   RLE Assessment   RLE Assessment WNL   LLE Assessment   LLE Assessment WNL   Transfers   Sit to Stand 4  Minimal assistance   Additional items Assist x 1; Increased time required;Verbal cues Stand to Sit 4  Minimal assistance   Additional items Assist x 1; Increased time required;Verbal cues   Ambulation/Elevation   Gait pattern Improper Weight shift;Narrow MARLON; Decreased foot clearance; Inconsistent kenia  (unsteady)   Gait Assistance 4  Minimal assist   Additional items Assist x 1;Verbal cues; Tactile cues   Assistive Device None   Distance 10ft   Balance   Static Sitting Fair -   Static Standing Poor +   Ambulatory Poor +   Activity Tolerance   Activity Tolerance Patient limited by fatigue;Patient limited by pain   Medical Staff Made Aware CM   Nurse Made Aware RN agreeable to therapy session   Assessment   Prognosis Fair   Problem List Decreased strength;Decreased endurance; Impaired balance;Decreased mobility; Decreased coordination; Impaired judgement;Decreased safety awareness;Orthopedic restrictions   Assessment Pt is 80 y o  male seen for PT evaluation s/p admit to [address] [place] [address] on 8/9/2020 w/ Closed compression fracture of thoracolumbar vertebra ([facility] )  PT consulted to assess pt's functional mobility and d/c needs  Order placed for PT eval and tx, w/ up w/ A and LSO OOB order  Comorbidities affecting pt's physical performance at time of assessment include those as noted above  PTA, pt was independent w/ all functional mobility w/ out AD, ambulates household distances, lives in multi-level home and has 4 SOCORRO  Personal factors affecting pt at time of IE include: lives in 2 story house, ambulating w/ assistive device, stairs to enter home, inability to navigate level surfaces w/o external assistance and positive fall history  Please find objective findings from PT assessment regarding body systems outlined above with impairments and limitations including weakness, impaired balance, gait deviations, decreased activity tolerance, decreased functional mobility tolerance, decreased safety awareness, impaired judgement, fall risk and orthopedic restrictions    Pt's clinical presentation is currently unstable/unpredictable seen in pt's presentation  Pt to benefit from continued PT tx to address deficits as defined above and maximize level of functional independent mobility and consistency  From PT/mobility standpoint, recommendation at time of d/c would be STR pending progress in order to facilitate return to PLOF  Goals   Patient Goals To have better balance   STG Expiration Date 08/22/20   PT Treatment Day 1   Plan   Treatment/Interventions ADL retraining;Functional transfer training;LE strengthening/ROM; Elevations; Therapeutic exercise; Endurance training;Patient/family training;Equipment eval/education; Bed mobility;Gait training;Continued evaluation;Spoke to nursing;Spoke to case management;OT   PT Frequency Other (Comment)  (3-5x/wk)   Recommendation   PT Discharge Recommendation Post-Acute Rehabilitation Services  (STR)   Equipment Recommended Handy Garcia   PT - OK to Discharge Yes   Additional Comments to STR     Pt seen for skilled PT session following evaluation consisting of instruction in seated therex/ HEP instruction; for increased LE strengthening to assist w/ increasing independence w/ transfers and gait  Pt tolerates therex w/o complaints or increase in pain  Pt also instructed in additional gait training  Pt amb with RW 50ft x2 with cl S  Pt required VC's for RW negotitation and sequencing  Pt required less assistance with RW vs  without AD  Educated on use of AD  Will continue to  benefit from repeated instruction for correct technique and compliance      Johan Paez, PT

## 2020-08-12 NOTE — QUICK NOTE
[facility] Service Attending Physician Supervision Note - Progress Note    I have seen and examined Ruth Templeton personally and have reviewed the medical record independently  I have reviewed all components of the note performed and documented by the resident physician  I personally performed all the required components for patient evaluation and examined the patient  I was the supervising / collaborating physician on August 12, 2020 in the morning   I agree with the resident's findings and plan of care with the following additions / exceptions: This is a 77-year-old male with vertebral compression fractures and back pain as a consequence of this  Brace is in place and PT did see the patient  Initially he did refuse inpatient rehab but has not changed his mind and case management working on placement  Physical exam is benign, no leg weakness, no saddle anesthesia, no fevers or chills  Physical exam:  HS regular  No mRG  Chest - no crackles, no wheeze  Abdomen - SNT, BS present  Neuro -nil focal    Education and Discussions with Family / Patient:  Discussed with the patient resident updated family    Time Spent for Care: 30 minutes  More than 50% of total time spent on counseling and coordination of care as described above  I attest that this information is true, accurate, and complete to the best of my knowledge      Malvin Gore MD

## 2020-08-13 ENCOUNTER — TELEPHONE (OUTPATIENT)
Dept: NEUROSURGERY | Facility: CLINIC | Age: 85
End: 2020-08-13

## 2020-08-13 ENCOUNTER — TELEPHONE (OUTPATIENT)
Dept: OTHER | Facility: HOSPITAL | Age: 85
End: 2020-08-13

## 2020-08-13 VITALS
HEART RATE: 67 BPM | HEIGHT: 60 IN | SYSTOLIC BLOOD PRESSURE: 141 MMHG | OXYGEN SATURATION: 96 % | DIASTOLIC BLOOD PRESSURE: 83 MMHG | RESPIRATION RATE: 20 BRPM | WEIGHT: 139.99 LBS | TEMPERATURE: 97.6 F | BODY MASS INDEX: 27.48 KG/M2

## 2020-08-13 DIAGNOSIS — S32.000A LUMBAR COMPRESSION FRACTURE, CLOSED, INITIAL ENCOUNTER (HCC): Primary | ICD-10-CM

## 2020-08-13 PROBLEM — R10.13 DYSPEPSIA: Status: ACTIVE | Noted: 2020-08-13

## 2020-08-13 PROCEDURE — 99239 HOSP IP/OBS DSCHRG MGMT >30: CPT | Performed by: INTERNAL MEDICINE

## 2020-08-13 PROCEDURE — 97535 SELF CARE MNGMENT TRAINING: CPT

## 2020-08-13 RX ORDER — DEXAMETHASONE 4 MG/1
TABLET ORAL
Qty: 22 TABLET | Refills: 0 | Status: SHIPPED | OUTPATIENT
Start: 2020-08-13 | End: 2020-09-06

## 2020-08-13 RX ORDER — LANOLIN ALCOHOL/MO/W.PET/CERES
3 CREAM (GRAM) TOPICAL
Status: DISCONTINUED | OUTPATIENT
Start: 2020-08-13 | End: 2020-08-13 | Stop reason: HOSPADM

## 2020-08-13 RX ORDER — ACETAMINOPHEN 325 MG/1
975 TABLET ORAL EVERY 8 HOURS SCHEDULED
Qty: 30 TABLET | Refills: 0 | Status: ON HOLD | OUTPATIENT
Start: 2020-08-13 | End: 2020-09-20 | Stop reason: SDUPTHER

## 2020-08-13 RX ORDER — CALCIUM CARBONATE 200(500)MG
500 TABLET,CHEWABLE ORAL DAILY
Qty: 14 TABLET | Refills: 0 | Status: SHIPPED | OUTPATIENT
Start: 2020-08-13 | End: 2020-08-27

## 2020-08-13 RX ORDER — POLYETHYLENE GLYCOL 3350 17 G/17G
17 POWDER, FOR SOLUTION ORAL DAILY
Qty: 14 EACH | Refills: 0 | Status: SHIPPED | OUTPATIENT
Start: 2020-08-13 | End: 2022-03-04 | Stop reason: HOSPADM

## 2020-08-13 RX ORDER — OXYCODONE HYDROCHLORIDE 5 MG/1
2.5 TABLET ORAL EVERY 4 HOURS PRN
Qty: 12 TABLET | Refills: 0 | Status: SHIPPED | OUTPATIENT
Start: 2020-08-13 | End: 2020-08-16

## 2020-08-13 RX ORDER — GABAPENTIN 100 MG/1
100 CAPSULE ORAL
Qty: 30 CAPSULE | Refills: 0 | Status: SHIPPED | OUTPATIENT
Start: 2020-08-13 | End: 2020-10-22 | Stop reason: SDUPTHER

## 2020-08-13 RX ADMIN — DEXAMETHASONE SODIUM PHOSPHATE 4 MG: 4 INJECTION, SOLUTION INTRAMUSCULAR; INTRAVENOUS at 06:24

## 2020-08-13 RX ADMIN — ACETAMINOPHEN 975 MG: 325 TABLET, FILM COATED ORAL at 06:20

## 2020-08-13 RX ADMIN — FAMOTIDINE 20 MG: 20 TABLET, FILM COATED ORAL at 08:46

## 2020-08-13 RX ADMIN — HEPARIN SODIUM 5000 UNITS: 5000 INJECTION INTRAVENOUS; SUBCUTANEOUS at 06:20

## 2020-08-13 RX ADMIN — MELATONIN 3 MG: at 01:39

## 2020-08-13 RX ADMIN — Medication 1000 UNITS: at 08:46

## 2020-08-13 RX ADMIN — Medication 1000 MG: at 08:46

## 2020-08-13 RX ADMIN — CALCIUM CARBONATE (ANTACID) CHEW TAB 500 MG 500 MG: 500 CHEW TAB at 09:01

## 2020-08-13 RX ADMIN — DOCUSATE SODIUM 100 MG: 100 CAPSULE, LIQUID FILLED ORAL at 08:46

## 2020-08-13 RX ADMIN — POLYETHYLENE GLYCOL 3350 17 G: 17 POWDER, FOR SOLUTION ORAL at 08:46

## 2020-08-13 RX ADMIN — ASPIRIN 81 MG 81 MG: 81 TABLET ORAL at 08:46

## 2020-08-13 RX ADMIN — METOPROLOL TARTRATE 12.5 MG: 25 TABLET, FILM COATED ORAL at 08:46

## 2020-08-13 NOTE — SOCIAL WORK
Pt cleared for discharge to STR at [facility] today  Wheelchair transport setup for 11am pick-up with Jory  Pt and wife informed  IMM reviewed with patient's wife  Wife agree with discharge determination  Pt's wife also aware that transport would be billed at a later time

## 2020-08-13 NOTE — TELEPHONE ENCOUNTER
[facility] UP  DATE: 8/25/20  TIME: 10:00  LOCATION: [facility]   IMAGING: XRAY L-SPINE    PATIENT IS  [address]  I SPOKE TO STEPHANIE -[phone]  CONFIRMED DATE/TIME/LOCATION WITH HER  FAXED HER THE XRAY SCRIPT [phone]

## 2020-08-13 NOTE — ASSESSMENT & PLAN NOTE
On presentation patient had a creatinine of 1 5 to  Patient's baseline was between 1 1 and 1 2  Patient's cr came down to 1 29 after hydration  Plan:  1  Lisinopril discontinued   2   Monitor BP

## 2020-08-13 NOTE — INCIDENTAL FINDINGS
The following findings require follow up:  Radiographic finding   Finding:   XR spine lumbar 2 or 3 views injury: Progressive loss of height of the known L2 compression deformity  Compression deformities of L1, L4 and L5 are unchanged in appearance  , The study was marked in [facility] for immediate   notification  ,    Follow up required: upon discharge, please follow up with the [facility] spine [facility]     Follow up should be done within 2-3 week(s)    Please notify the following clinician to assist with the follow up:   Dr Ara Hermosillo

## 2020-08-13 NOTE — PLAN OF CARE
Problem: OCCUPATIONAL THERAPY ADULT  Goal: Performs self-care activities at highest level of function for planned discharge setting  See evaluation for individualized goals  Description: Treatment Interventions: ADL retraining, Functional transfer training, Activityengagement, Equipment evaluation/education          See flowsheet documentation for full assessment, interventions and recommendations  Outcome: Progressing  Note: Limitation: Decreased ADL status, Decreased UE strength, Decreased Safe judgement during ADL, Decreased endurance, Decreased cognition, Decreased high-level ADLs, Decreased self-care trans     Assessment: Pt seen for skilled OT tx session from 3125-3026  Pt alert and cooperative to participate in session focusing on ADL retraining, functional mobility and standing tolerance  Pt required Min A for sit <> stand w/ VC for hand placement  Pt required Min A for functional mobility w/ VC for walker mgmt and body mechanics  Pt stood at sink w/ RW for ~10 minutes while engaged in functional grooming tasks  Pt required Min A (CGA /Steadying) to complete grooming tasks at sink  Pt demonstrated increased activity tolerance and engagement  Pt attempted to perform a 30 second STS and scored a zero  Pt unable to complete STS w/o bilateral UE support  Pt is 80years old, norms for 80-80 years old that score < 8 indicates an increased risk of falls  Pt seated OOB in recliner chair at end of session w/ call bell in reach, chair alarm on and all needs met  Recommendation at time of d/c would be post-acute rehabilitation        OT Discharge Recommendation: Post-Acute Rehabilitation Services  OT - OK to Discharge: (when medically stable )

## 2020-08-13 NOTE — TELEPHONE ENCOUNTER
I discussed the x-rays of his Lumbar finding this morning with his Multiple fracture  He reports pain moderate, localized in his Lower back without radiculopathy  Informed patient that he doesn't need any surgical procedure at this juncture  Continue with pain meds, wear LSO brace when upright, walking or during physical therapy  PT to strengthen his range of motion and core body strengthening with balance training    Regarding his NPH, Bashir Garvin will reach out to him once discharged

## 2020-08-13 NOTE — ASSESSMENT & PLAN NOTE
· BP 140s/80s  · Continue home medications: lopressor  · Will discontinue lisinopril and have patient f/u outpatient

## 2020-08-13 NOTE — OCCUPATIONAL THERAPY NOTE
[address] Progress Note     Patient Name: Avril Garcia  QLLVC'I Date: 8/13/2020  Problem List  Principal Problem:    Closed compression fracture of thoracolumbar vertebra [facility])  Active Problems:    Essential hypertension    OLLIE (acute kidney injury) ([facility] [address] )    Ambulatory dysfunction    Constipation    Irregular heartbeat    NPH (normal pressure hydrocephalus) ([facility] [address] )    Dyspepsia          08/13/20 1101   Restrictions/Precautions   Weight Bearing Precautions Per Order No   Braces or Orthoses LSO   Other Precautions Chair Alarm; Bed Alarm; Fall Risk;Hard of hearing   General   Response to Previous Treatment Patient with no complaints from previous session   Family/Caregiver Present no   Pain Assessment   Pain Assessment Tool 0-10   Pain Score No Pain   Patient's Stated Pain Goal No pain   Hospital Pain Intervention(s) Ambulation/increased activity;Repositioned; Emotional support   ADL   Where Assessed Standing at sink   Eating Assistance 6  Modified independent   Eating Deficit Setup; Increased time to complete   Grooming Assistance 4  Minimal Assistance   Grooming Deficit Increased time to complete;Standing with assistive device;Steadying;Setup;Supervision/safety  (CGA/steadying )   Grooming Comments Pt stood at sink w/ use of RW for approximately 10 minutes  Pt demonstrated improved standing tolerance and was able to stand unsupported for ~ 30 % of the time  Pt demonstrated no LOB  Pt required VC for walker mgmt/ body mechanics  UB Bathing Assistance 4  Minimal Assistance   UB Bathing Deficit Steadying; Increased time to complete;Supervision/safety;Setup   Bed Mobility   Supine to Sit Unable to assess   Sit to Supine Unable to assess   Additional Comments Pt seated OOB in recliner chair at start of and end of session  At end of session chair alarm was on, call bell in reach and all needs were met  Transfers   Sit to Stand 4  Minimal assistance   Additional items Assist x 1; Armrests; Increased time required;Verbal cues  (vc hand placement)   Stand to Sit 4  Minimal assistance   Additional items Assist x 1; Increased time required;Verbal cues;Armrests   Additional Comments Pt attempted 30 second sit to stand and scored a zero  Norms for 80-80 age range that score <8 indicates an increased risk of falls  Pt unable to perform STS w/o bilateral UE support  Functional Mobility   Functional Mobility 4  Minimal assistance   Additional Comments Pt engaged in short household distance from recliner chair to the bathroom sink and back to recliner chair  Pt required VC for walker mgmt and body mechanics  Additional items Rolling walker   Cognition   Overall Cognitive Status Impaired   Arousal/Participation Alert; Cooperative   Attention Attends with cues to redirect  (hard of hearing )   Orientation Level Oriented to place;Oriented to person;Oriented to situation   Memory Decreased recall of recent events;Decreased short term memory   Following Commands Follows one step commands with increased time or repetition   Comments Pt identified by full name and   Pt reported feeling great and ready to start the day  Pt able to engage in conversation  Activity Tolerance   Activity Tolerance Patient tolerated treatment well   Medical Staff Made Aware RNCristóbal   Assessment   Assessment Pt seen for skilled OT tx session from [id number]  Pt alert and cooperative to participate in session focusing on ADL retraining, functional mobility and standing tolerance  Pt required Min A for sit <> stand w/ VC for hand placement  Pt required Min A for functional mobility w/ VC for walker mgmt and body mechanics  Pt stood at sink w/ RW for ~10 minutes while engaged in functional grooming tasks  Pt required Min A (CGA /Steadying) to complete grooming tasks at sink  Pt demonstrated increased activity tolerance and engagement  Pt attempted to perform a 30 second STS and scored a zero  Pt unable to complete STS w/o bilateral UE support   Pt is 80years old, norms for 80-80 years old that score < 8 indicates an increased risk of falls  Pt seated OOB in recliner chair at end of session w/ call bell in reach, chair alarm on and all needs met  Recommendation at time of d/c would be post-acute rehabilitation  Plan   Treatment Interventions ADL retraining;Functional transfer training; Endurance training; Activityengagement   Goal Expiration Date 08/18/20   OT Treatment Day 2   OT Frequency 3-5x/wk   Recommendation   OT Discharge Recommendation Post-Acute Rehabilitation Services   OT - OK to Discharge   (when medically stable )   Barthel Index   Feeding 10   Bathing 0   Grooming Score 5   Dressing Score 5   Bladder Score 5   Bowels Score 5   Toilet Use Score 5   Transfers (Bed/Chair) Score 10   Mobility (Level Surface) Score 0   Stairs Score 0   Barthel Index Score 45   Modified [place] Scale   Modified [place] Scale 4     Fe Castro, OTS

## 2020-08-14 ENCOUNTER — NURSING HOME VISIT (OUTPATIENT)
Dept: GERIATRICS | Facility: OTHER | Age: 85
End: 2020-08-14
Payer: MEDICARE

## 2020-08-14 DIAGNOSIS — I10 ESSENTIAL HYPERTENSION: ICD-10-CM

## 2020-08-14 DIAGNOSIS — N17.9 AKI (ACUTE KIDNEY INJURY) (HCC): ICD-10-CM

## 2020-08-14 DIAGNOSIS — S22.080D CLOSED COMPRESSION FRACTURE OF THORACOLUMBAR VERTEBRA WITH ROUTINE HEALING, SUBSEQUENT ENCOUNTER: ICD-10-CM

## 2020-08-14 DIAGNOSIS — H35.30 MACULAR DEGENERATION, UNSPECIFIED LATERALITY, UNSPECIFIED TYPE: ICD-10-CM

## 2020-08-14 DIAGNOSIS — R10.13 DYSPEPSIA: ICD-10-CM

## 2020-08-14 DIAGNOSIS — R52 ACUTE PAIN: ICD-10-CM

## 2020-08-14 DIAGNOSIS — G91.2 NPH (NORMAL PRESSURE HYDROCEPHALUS) (HCC): Primary | ICD-10-CM

## 2020-08-14 DIAGNOSIS — S32.010D CLOSED COMPRESSION FRACTURE OF THORACOLUMBAR VERTEBRA WITH ROUTINE HEALING, SUBSEQUENT ENCOUNTER: ICD-10-CM

## 2020-08-14 DIAGNOSIS — R45.89 TEARFULNESS: ICD-10-CM

## 2020-08-14 DIAGNOSIS — R26.2 AMBULATORY DYSFUNCTION: ICD-10-CM

## 2020-08-14 PROCEDURE — 99306 1ST NF CARE HIGH MDM 50: CPT | Performed by: FAMILY MEDICINE

## 2020-08-14 NOTE — ASSESSMENT & PLAN NOTE
· Patient with hx of worsening ambulatory dysfunction over the past couple of weeks  · CT H in the hospital concerning for disproportionate enlargement of the lateral and 3rd ventricles relative to the peripheral cortical sulcal enlargement  Findings suspicious for NPH  · Evaluated per Neurosurgery in the hospital, recommendation made for NPH clinic referral   · Will refer to Dr Yimi Márquez

## 2020-08-14 NOTE — PROGRESS NOTES
[address] [facility]  [address] [facility]  [address] [facility]  [phone]    [facility]  HISTORY & PHYSICAL        NAME: Pop Hassan  AGE: 80 y o  SEX: male  : 1934  DATE OF ENCOUNTER: 20  POS: 31 (SNF)  PCP: Renee Whitaker DO    Chief Complaint     Seen and examined today for admission to short term rehabilitation unit at [facility]  History of Present Illness     79 yo M with hypertension, hyperlipidemia, CHF ambulatory dysfunction, constipation, admitted to [facility] 2020-2020 due to complaints of back pain after experiencing multiple falls  He had previously been evaluated in [facility] after most recent fall for back pain and was prescribed a back prace, which he did not wear  On the day of presentation to the ED he was complaining of worsening back pain associated with abdominal pain  Work up in the ED included CT H that showed no acute intracranial abnormality, peripheral cortical sulcal enlargement  disproportionate enlargement of the lateral and 3rd ventricles relative to the peripheral cortical sulcal enlargement  Findings suspicious for NPH  He had a CT C/A/P that showed age indeterminate superior endplate compression deformities of L1, L2, L4 and L5 vertebral bodies  Age indeterminate mild superior endplate compression deformity of T3 and T11 vertebral body  Hepatic steatosis  Colonic diverticulosis without evidence of acute diverticulitis  Cholelithiasis  He was evaluated by Neurosurgery who recommended conservative measures for thoracolumbar fractures with LSO brace, pain control, activity as tolerated and outpatient follow up at NPH clinic for further evaluation  On physical exam, it was felt his carotid were asymetric , more prominent on the right  He had a carotid ultrasound that showed <50% stenosis in the internal carotid artery with homogeneous and smooth plaque bilaterally   vertebral artery flow antegrade without significant subclavian artery disease noted bilaterally  Mr Pablo Vera was complaining of constipation without BM for over a week during this hospitalization  He was started on bowel regimen with miralax and colace with favorable results 8/11/2020  He also had mild OLLIE noted on admission, his creatinine baseline per records is between 1 1-1 2 and was noted to be elevated on admission to 1 5   he was treated with gentle IV hydration with improvement  Patient was evaluated by PT/T and recommendation was made for subacute rehab services  Today, patient seen and examined with nursing staff for admission to short term rehab services at [facility]  Sitting comfortably in his recliner  States his pain is well controlled  He admits to feeling very emotional and states he cannot control his tears, he wants to go home  Reports difficulty ambulating that has worsened in the past couple of weeks  Denies lightheadedness/dizziness  Reports poor appetite but states he is trying to eat and tolerating diet well  Does complain of constipation but had a BM 8/13/2020  Per wife, patient had fallen a couple of weeks ago and had been evaluated in [facility] [place] and was told he had 3 compression fractures at that time, states she does not recall level  Review of Systems     Review of Systems   Constitutional: Positive for appetite change  Negative for activity change, fatigue and unexpected weight change  HENT: Negative for congestion, dental problem, hearing loss, mouth sores, trouble swallowing and voice change  Eyes: Negative for visual disturbance  Respiratory: Negative for cough, chest tightness and shortness of breath  Cardiovascular: Negative for chest pain, palpitations and leg swelling  Gastrointestinal: Positive for constipation  Negative for abdominal distention, abdominal pain, diarrhea, nausea and vomiting  Genitourinary: Negative for dysuria  Musculoskeletal: Positive for back pain and gait problem  Negative for arthralgias  Skin: Negative for color change, pallor, rash and wound  Neurological: Positive for weakness  Negative for light-headedness  All other systems reviewed and are negative  History   No Known Allergies    Past Medical History:   Diagnosis Date    Hyperlipidemia     Hypertension      No past surgical history on file  Family History: non-contributory  Social History     Tobacco Use    Smoking status: Never Smoker    Smokeless tobacco: Never Used   Substance Use Topics    Alcohol use: Never     Frequency: Never     Binge frequency: Never    Drug use: Never         He lives with wife in a 2 story home with 3 steps to enter with handrails  PTA he was independent with ADLs and ambulating without assistive device  Objective   Vital Signs   BP: 135/76    HR:85    T:98 2    RR:22    O2Sat:95% RA     W:140 2 lb    Physical Exam  Vitals signs reviewed  Constitutional:       General: He is not in acute distress  Appearance: Normal appearance  He is well-developed  He is not diaphoretic  HENT:      Head: Normocephalic and atraumatic  Right Ear: External ear normal       Left Ear: External ear normal       Nose: Rhinorrhea present  Mouth/Throat:      Mouth: Mucous membranes are moist       Pharynx: Oropharynx is clear  No oropharyngeal exudate  Eyes:      General: No scleral icterus  Conjunctiva/sclera: Conjunctivae normal       Pupils: Pupils are equal, round, and reactive to light  Neck:      Musculoskeletal: Normal range of motion and neck supple  Thyroid: No thyromegaly  Vascular: No JVD  Cardiovascular:      Rate and Rhythm: Normal rate and regular rhythm  Heart sounds: Normal heart sounds  No murmur  Pulmonary:      Effort: Pulmonary effort is normal  No respiratory distress  Breath sounds: Normal breath sounds  Abdominal:      General: Bowel sounds are normal  There is no distension  Palpations: Abdomen is soft  Tenderness: There is no abdominal tenderness  Musculoskeletal: Normal range of motion  General: No swelling, tenderness or deformity  Lymphadenopathy:      Cervical: No cervical adenopathy  Skin:     General: Skin is warm and dry  Coloration: Skin is not pale  Findings: No erythema or rash  Neurological:      Mental Status: He is alert and oriented to person, place, and time  Cranial Nerves: No cranial nerve deficit  Motor: No abnormal muscle tone  Deep Tendon Reflexes: Reflexes are normal and symmetric  Reflexes normal    Psychiatric:         Behavior: Behavior normal          Thought Content: Thought content normal          Judgment: Judgment normal       Comments: Becomes tearful during encounter  Pertinent Laboratory/Diagnostic Studies:     Lab Results   Component Value Date    WBC 8 03 08/12/2020    HGB 13 9 08/12/2020    HCT 41 8 08/12/2020    MCV 92 08/12/2020     08/12/2020     Lab Results   Component Value Date    CALCIUM 8 2 (L) 08/12/2020    K 4 5 08/12/2020    CO2 24 08/12/2020     08/12/2020    BUN 44 (H) 08/12/2020    CREATININE 1 29 08/12/2020     No results found for: [id number], TSH  No results found for: HGBA1C      Current Medications   Aspirin 81 mg daily  Oxycodone 2 5 mg Q4 hrs PRN for mod to severe pain  Dexamethasone taper   Miralax daily  Tylenol 975 mg po Q 8 hrs  Cholecalciferol 1000 mg po daily  Pepcid 20 mg daily  Gabapentin 100 mg daily  Calcium Carbonate 500 mg daily  hydrocholorthiazide 12 5 mg daily  Simvastatin 20 mg daily  Hydrocortisone cream 1% topically  Diphenhydramine 25 mg Q6H PRN for itching  Omega 3 1000 mg daily  Vitamin B complex  PreserVision AREDS 2  Metoprol Tartrate 12 5 mg  daily      All medications reviewed and updated in [facility] EMR/Chart  Assessment and Plan     NPH (normal pressure hydrocephalus) (HCC)  · Patient with hx of worsening ambulatory dysfunction over the past couple of weeks    · CT H in the hospital concerning for disproportionate enlargement of the lateral and 3rd ventricles relative to the peripheral cortical sulcal enlargement  Findings suspicious for NPH  · Evaluated per Neurosurgery in the hospital, recommendation made for NPH clinic referral   · Will refer to Dr Candelario Castellano  Closed compression fracture of thoracolumbar vertebra ([facility] [address] )  · Patient with hx of worsening ambulatory dysfunction with recurrent falls  · He was admitted to the hospital for pain control and CT Thoracolumbar spine showed age indeterminate superior compression deformities of L1, L2, L4, L5 vertebral bodies  Age indeterminate compression deformities of T3 and T11 vertebral bodies  Moderate degenerative changes throughout the thoracolumbar spine  · Evaluated per Neurosurgery, recommendation made for pain control, PT/OT, LSO brace  · PT/OT to evaluate and treat as appropriate  · Follow up with spine and pain Tee 9/11/20  Acute pain  · Acute back pain secondary to multiple thoracolumbar fractures in the setting of worsening ambulatory dysfunction and recurrent falls  · Patient admitted to the hospital for pain management  · He was started on TID Tylenol 975mg and dexamethasone taper  · Continue current management with ATC Tylenol, PRN Oxycodone 2 5 mg po daily  Ambulatory dysfunction  · In the setting of strong suspicion of NPH  · Refer to Dr Candelario Castellano of Neurosurgery  · PT/OT to evaluate and treat as appropriate  · Fall precautions are advised  Essential hypertension  · Lisinopril discontinued in the hospital   · Continue lopressor    OLLIE (acute kidney injury) ([facility] [address] )  · Resolving  · Managed with gentle IV hydration  · Lisinopril discontinued in the hospital   · Check BMP to monitor  Dyspepsia  · Continue Calcium Carbonate  · Continue Pepcid      Macular degeneration  · Consult Ophthalmology, Dr Colin Hooper for follow up appointment and please arrange transportation for appointment scheduled for 08/19/2020 at 1:15pm     Tearfulness  · Patient is very tearful during encounter  · States he is emotional and overwhelmed  · Per wife, he is overwhelmed because of everything that has been going on now  · Will monitor closely  · Continue supportive care  Discussed with wife, Dinorah Graft over the phone  Jeni Boyle MD  Geriatric Medicine  08/14/20    Please note:  Voice-recognition software may have been used in the preparation of this document  Occasional wrong word or "sound-alike" substitutions may have occurred due to the inherent limitations of voice recognition software  Interpretation should be guided by context

## 2020-08-14 NOTE — ASSESSMENT & PLAN NOTE
· Patient with hx of worsening ambulatory dysfunction with recurrent falls  · He was admitted to the hospital for pain control and CT Thoracolumbar spine showed age indeterminate superior compression deformities of L1, L2, L4, L5 vertebral bodies  Age indeterminate compression deformities of T3 and T11 vertebral bodies  Moderate degenerative changes throughout the thoracolumbar spine  · Evaluated per Neurosurgery, recommendation made for pain control, PT/OT, LSO brace  · PT/OT to evaluate and treat as appropriate  · Follow up with spine and pain Tee 9/11/20

## 2020-08-14 NOTE — ASSESSMENT & PLAN NOTE
· Acute back pain secondary to multiple thoracolumbar fractures in the setting of worsening ambulatory dysfunction and recurrent falls  · Patient admitted to the hospital for pain management  · He was started on TID Tylenol 975mg and dexamethasone taper  · Continue current management with ATC Tylenol, PRN Oxycodone 2 5 mg po daily

## 2020-08-14 NOTE — ASSESSMENT & PLAN NOTE
· In the setting of strong suspicion of NPH  · Refer to Dr Rafa Dodge of Neurosurgery  · PT/OT to evaluate and treat as appropriate  · Fall precautions are advised

## 2020-08-14 NOTE — ASSESSMENT & PLAN NOTE
· Patient is very tearful during encounter  · States he is emotional and overwhelmed  · Per wife, he is overwhelmed because of everything that has been going on now  · Will monitor closely  · Continue supportive care

## 2020-08-14 NOTE — ASSESSMENT & PLAN NOTE
· Consult Ophthalmology, Dr Lakshmi Kitchen for follow up appointment and please arrange transportation for appointment scheduled for 08/19/2020 at 1:15pm

## 2020-08-14 NOTE — ASSESSMENT & PLAN NOTE
· Resolving  · Managed with gentle IV hydration  · Lisinopril discontinued in the hospital   · Check BMP to monitor

## 2020-08-18 ENCOUNTER — NURSING HOME VISIT (OUTPATIENT)
Dept: GERIATRICS | Facility: OTHER | Age: 85
End: 2020-08-18
Payer: MEDICARE

## 2020-08-18 DIAGNOSIS — S32.010D CLOSED COMPRESSION FRACTURE OF THORACOLUMBAR VERTEBRA WITH ROUTINE HEALING, SUBSEQUENT ENCOUNTER: ICD-10-CM

## 2020-08-18 DIAGNOSIS — R26.2 AMBULATORY DYSFUNCTION: ICD-10-CM

## 2020-08-18 DIAGNOSIS — S22.080D CLOSED COMPRESSION FRACTURE OF THORACOLUMBAR VERTEBRA WITH ROUTINE HEALING, SUBSEQUENT ENCOUNTER: ICD-10-CM

## 2020-08-18 DIAGNOSIS — G91.2 NPH (NORMAL PRESSURE HYDROCEPHALUS) (HCC): ICD-10-CM

## 2020-08-18 DIAGNOSIS — N17.9 AKI (ACUTE KIDNEY INJURY) (HCC): ICD-10-CM

## 2020-08-18 DIAGNOSIS — I10 ESSENTIAL HYPERTENSION: Primary | ICD-10-CM

## 2020-08-18 PROCEDURE — 99309 SBSQ NF CARE MODERATE MDM 30: CPT | Performed by: NURSE PRACTITIONER

## 2020-08-18 NOTE — ASSESSMENT & PLAN NOTE
- Stable, no recent falls reported  - CT of Head suspicious for NPH  - Continue PT/OT therapy  - Follow-up with Neurosurgery  - Fall precautions advised

## 2020-08-18 NOTE — PROGRESS NOTES
[address]  [address] [facility]  [phone]  [address] [facility]   Progress Note  POS 31      NAME: Jaiden Buck  AGE: 80 y o  SEX: male  :  1934  DATE OF ENCOUNTER: 2020    Chief Complaint   Patient seen and examined for follow up on chronic conditions  History of Present Illness     80 year old male patient of subacute rehab at [facility] with hypertension, hyperlipidemia, CHF, ambulatory dysfunction, constipation seen and examined today to follow-up on acute and chronic medical conditions  He is status post hospitalization for acute back pain after experiencing multiple falls  CT C/A/P showed age indeterminate superior endplate compression deformities of L1, L2, L4 and L5 vertebral bodies  Age indeterminate mild superior endplate compression deformity of T3 and T11 vertebral body  Hepatic steatosis  Colonic diverticulosis without evidence of acute diverticulitis  Cholelithiasis  Patient is sitting in the chair, calm, cooperative and in no acute distress  Denies chest pain, sob, abdominal pain, nausea, vomiting, diarrhea, constipation, fever, chills, headache, dizziness or visual disturbance  He complains of mild low back pain that is controlled with his pain medications  He is currently a contact guard to supervision for transfers and is ambulating 10-15 ft with contact guard  He is a mod assist with upper body ADL's, a max assist with lower body ADL's and a min assist to contact guard for toileting  His MOCA is a   The following portions of the patient's history were reviewed and updated as appropriate: allergies, current medications, past family history, past medical history, past social history, past surgical history and problem list     Review of Systems     A review of systems was performed  All negative, except as per HPI      History     Past Medical History:   Diagnosis Date    Hyperlipidemia     Hypertension      No past surgical history on file  No family history on file    Social History     Socioeconomic History    Marital status: /Civil Union     Spouse name: Not on file    Number of children: Not on file    Years of education: Not on file    Highest education level: Not on file   Occupational History    Not on file   Social Needs    Financial resource strain: Not on file    Food insecurity     Worry: Not on file     Inability: Not on file   [employer] needs     Medical: Not on file     Non-medical: Not on file   Tobacco Use    Smoking status: Never Smoker    Smokeless tobacco: Never Used   Substance and Sexual Activity    Alcohol use: Never     Frequency: Never     Binge frequency: Never    Drug use: Never    Sexual activity: Not on file   Lifestyle    Physical activity     Days per week: Not on file     Minutes per session: Not on file    Stress: Not on file   Relationships    Social connections     Talks on phone: Not on file     Gets together: Not on file     Attends Alevism service: Not on file     Active member of club or organization: Not on file     Attends meetings of clubs or organizations: Not on file     Relationship status: Not on file    Intimate partner violence     Fear of current or ex partner: Not on file     Emotionally abused: Not on file     Physically abused: Not on file     Forced sexual activity: Not on file   Other Topics Concern    Not on file   Social History Narrative    Not on file     No Known Allergies    Objective     Vital Signs  BP: 177/85      HR: 76  T: 97 8    RR: 20  O2Sat: 96% RA W: 140 4 lbs  General: NAD, Well Nourished, Well Developed  Oral: Oropharynx Moist and Clear  Neck: Supple, +ROM  CV: S1, S2, normal rate, regular rhythm, no murmur appreciated  Pulmonary: Lung sounds clear to air, no wheezing, rhonchi, rales  Abdominal:BS + x4 in all quadrants, soft, no mass, no tenderness  Extremities: No edema, +ROM, +Strength  Skin: Warm, Dry, no lesions, no rash, no erythema present, no ecchymosis present  Neurological: CN 2-12 intact, PERRLA  Psych: Alert and oriented times 3, no mood, no affect, good judgement    Pertinent Laboratory/Diagnostic Studies:  08/17/2020: COVID-19 PCR: Not Detected  08/12/2020: Hemoglobin 13 9, Hematocrit 41 8, Platelet Count 973, WBC 8 03, Potassium 4 5, Sodium 147, CO2 24, [place] 103, BUN 44, Creatinine 1 29, eGFR 50     Current Medications     Current Medications Reviewed and updated in Nursing Home EMR      Assessment and Plan     Essential hypertension  - Patients bp is elevated this morning at 177/85  - Will recheck bp and continue to monitor  - Lisinopril was discontinued in the hospital due to OLLIE  - Continue Lopressor and consider increasing dose of Lopressor if bp remains elevated    NPH (normal pressure hydrocephalus) (HCC)  - Stable, patient has a history of worsening ambulatory dysfunction over the past several weeks  - CT H in the hospital concerning disproportionate enlargement of the lateral and 3rd ventricles relative to the peripheral cortical sulcal enlargement with findings suspicious for NPH  - Follow up with Dr Collette Edwards of Neurosurgery  - Continue PT/OT and fall precautions    Closed compression fracture of thoracolumbar vertebra ([facility] [address] )  - Stable, continue current pain medication regime, PT/OT and LSO brase  - Follow-up with spine and pain at [facility] on 09/11/2020    OLLIE (acute kidney injury) ([facility] [address] )  - Stable, baseline creatinine 1 1 - 1 2  - Creatinine currently at 1 29  - Will order a BMP for the am to monitor  - Avoid nephrotoxins and hypotension as possible    Ambulatory dysfunction  - Stable, no recent falls reported  - CT of Head suspicious for NPH  - Continue PT/OT therapy  - Follow-up with Neurosurgery  - Fall precautions advised      [facility]  8/18/2020

## 2020-08-18 NOTE — ASSESSMENT & PLAN NOTE
- Patients bp is elevated this morning at 177/85  - Will recheck bp and continue to monitor  - Lisinopril was discontinued in the hospital due to OLLIE  - Continue Lopressor and consider increasing dose of Lopressor if bp remains elevated

## 2020-08-18 NOTE — ASSESSMENT & PLAN NOTE
- Stable, continue current pain medication regime, PT/OT and LSO brase  - Follow-up with spine and pain at [facility] on 09/11/2020

## 2020-08-18 NOTE — ASSESSMENT & PLAN NOTE
- Stable, patient has a history of worsening ambulatory dysfunction over the past several weeks  - CT H in the hospital concerning disproportionate enlargement of the lateral and 3rd ventricles relative to the peripheral cortical sulcal enlargement with findings suspicious for NPH  - Follow up with Dr Cara Hdz of Neurosurgery  - Continue PT/OT and fall precautions

## 2020-08-18 NOTE — ASSESSMENT & PLAN NOTE
- Stable, baseline creatinine 1 1 - 1 2  - Creatinine currently at 1 29  - Will order a BMP for the am to monitor  - Avoid nephrotoxins and hypotension as possible

## 2020-08-20 ENCOUNTER — TELEPHONE (OUTPATIENT)
Dept: NEUROSURGERY | Facility: CLINIC | Age: 85
End: 2020-08-20

## 2020-08-20 ENCOUNTER — NURSING HOME VISIT (OUTPATIENT)
Dept: GERIATRICS | Facility: OTHER | Age: 85
End: 2020-08-20
Payer: MEDICARE

## 2020-08-20 DIAGNOSIS — S32.010D CLOSED COMPRESSION FRACTURE OF THORACOLUMBAR VERTEBRA WITH ROUTINE HEALING, SUBSEQUENT ENCOUNTER: Primary | ICD-10-CM

## 2020-08-20 DIAGNOSIS — S22.080D CLOSED COMPRESSION FRACTURE OF THORACOLUMBAR VERTEBRA WITH ROUTINE HEALING, SUBSEQUENT ENCOUNTER: Primary | ICD-10-CM

## 2020-08-20 DIAGNOSIS — H35.30 MACULAR DEGENERATION, UNSPECIFIED LATERALITY, UNSPECIFIED TYPE: ICD-10-CM

## 2020-08-20 DIAGNOSIS — R26.2 AMBULATORY DYSFUNCTION: ICD-10-CM

## 2020-08-20 DIAGNOSIS — I10 ESSENTIAL HYPERTENSION: ICD-10-CM

## 2020-08-20 PROCEDURE — 99309 SBSQ NF CARE MODERATE MDM 30: CPT | Performed by: NURSE PRACTITIONER

## 2020-08-20 NOTE — TELEPHONE ENCOUNTER
Received message from [facility] regarding scheduling patient appointment through NPH clinic  Called and spoke with [facility], she asked if she can give us a call back to schedule, I gave her my phone number and she stated she will call at a better time

## 2020-08-21 NOTE — ASSESSMENT & PLAN NOTE
- Stable, status post eye appointment yesterday with eye injections  - Right eye conjunctival erythema present, no eye pain or drainage reported  - Will continue to monitor  - Follow-up with Ophthalmology

## 2020-08-21 NOTE — ASSESSMENT & PLAN NOTE
- Occasional elevated bp's noted upon review however they are currently stable  - Will monitor vital signs, electrolytes and renal function

## 2020-08-21 NOTE — ASSESSMENT & PLAN NOTE
- Stable, patient currently denies pain  - Continue PT/OT and LSO brace  - Follow-up with spine and pain at [facility] on 09/11/2020

## 2020-08-21 NOTE — PROGRESS NOTES
[address]  [facility]  [phone]  [facility]   Progress Note  POS 31      NAME: Fredi Wu  AGE: 80 y o  SEX: male  :  1934  DATE OF ENCOUNTER: 2020    Chief Complaint   Patient seen and examined for follow up on chronic conditions  History of Present Illness     80 year old male patient of subacute rehab at [facility] with hypertension, hyperlipidemia, CHF, ambulatory dysfunction, constipation seen and examined today to follow-up on acute and chronic medical conditions  He is status post hospitalization for acute back pain after experiencing multiple falls  CT C/A/P showed age indeterminate superior endplate compression deformities of L1, L2, L4 and L5 vertebral bodies  Age indeterminate mild superior endplate compression deformity of T3 and T11 vertebral body  Hepatic steatosis  Colonic diverticulosis without evidence of acute diverticulitis  Cholelithiasis      Patient is sitting in the chair, calm, cooperative and in no acute distress  Denies chest pain, sob, abdominal pain, nausea, vomiting, diarrhea, constipation, fever, chills, headache, dizziness or visual disturbance  He denies low back pain or discomfort today        He is currently a contact guard to supervision for transfers and is ambulating 10-15 ft with contact guard  He is a mod assist with upper body ADL's, a max assist with lower body ADL's and a min assist to contact guard for toileting  His MOCA is a          The following portions of the patient's history were reviewed and updated as appropriate: allergies, current medications, past family history, past medical history, past social history, past surgical history and problem list     Review of Systems     A review of systems was performed  All negative, except as per HPI  History     Past Medical History:   Diagnosis Date    Hyperlipidemia     Hypertension      No past surgical history on file    No family history on file  Social History     Socioeconomic History    Marital status: /Civil Union     Spouse name: Not on file    Number of children: Not on file    Years of education: Not on file    Highest education level: Not on file   Occupational History    Not on file   Social Needs    Financial resource strain: Not on file    Food insecurity     Worry: Not on file     Inability: Not on file   [employer] needs     Medical: Not on file     Non-medical: Not on file   Tobacco Use    Smoking status: Never Smoker    Smokeless tobacco: Never Used   Substance and Sexual Activity    Alcohol use: Never     Frequency: Never     Binge frequency: Never    Drug use: Never    Sexual activity: Not on file   Lifestyle    Physical activity     Days per week: Not on file     Minutes per session: Not on file    Stress: Not on file   Relationships    Social connections     Talks on phone: Not on file     Gets together: Not on file     Attends Denominational service: Not on file     Active member of club or organization: Not on file     Attends meetings of clubs or organizations: Not on file     Relationship status: Not on file    Intimate partner violence     Fear of current or ex partner: Not on file     Emotionally abused: Not on file     Physically abused: Not on file     Forced sexual activity: Not on file   Other Topics Concern    Not on file   Social History Narrative    Not on file     No Known Allergies    Objective     Vital Signs  BP: 136/72     HR: 80 T: 97 0     RR: 17  O2Sat: 97% RA W: 140 4 lbs  General: NAD, Well Nourished, Well Developed  Oral: Oropharynx Moist and Clear  Eyes: Right eye conjunctival erythema noted, Left eye conjunctiva clear, no drainage or pain upon palpation present  EOMI     Neck: Supple, +ROM  CV: S1, S2, normal rate, regular rhythm, no murmur appreciated  Pulmonary: Lung sounds clear to air, no wheezing, rhonchi, rales  Abdominal:BS + x4 in all quadrants, soft, no mass, no tenderness  Extremities: No edema, +ROM, +Strength, + TLSO brace in place  Skin: Warm, Dry, no lesions, no rash, no erythema present, no ecchymosis present  Neurological: CN 2-12 intact, PERRLA  Psych: Alert and oriented times 3, no mood, no affect, good judgement    Pertinent Laboratory/Diagnostic Studies:  08/19/2020: Glucose 86, BUN 31, Creatinine 1 01, Cl[place] 102, CO2 28, Sodium 136, Potassium 4 8, eGFR 67      Current Medications     Current Medications Reviewed and updated in Nursing Home EMR      Assessment and Plan     Closed compression fracture of thoracolumbar vertebra (HCC)  - Stable, patient currently denies pain  - Continue PT/OT and LSO brace  - Follow-up with spine and pain at [facility] on 09/11/2020    Hypertension  - Occasional elevated bp's noted upon review however they are currently stable  - Will monitor vital signs, electrolytes and renal function    Macular degeneration  - Stable, status post eye appointment yesterday with eye injections  - Right eye conjunctival erythema present, no eye pain or drainage reported  - Will continue to monitor  - Follow-up with Ophthalmology    Ambulatory dysfunction  - Stable, no recent falls reported  - Continue PT/OT services  - Fall precautions advised        [address] [facility]  08/20/2020

## 2020-08-24 DIAGNOSIS — R26.9 GAIT DISTURBANCE: Primary | ICD-10-CM

## 2020-08-24 NOTE — TELEPHONE ENCOUNTER
Spoke with Kirby Renee of [employer] [employer] ([phone]) regarding scheduling patient for NPH clinic, patient scheduled with Dr Vikram Hull for Tuesday, Sept 22 at 10 am and PT at 9 am  Transportation will be provided for the patient

## 2020-08-26 ENCOUNTER — TELEPHONE (OUTPATIENT)
Dept: OTHER | Facility: OTHER | Age: 85
End: 2020-08-26

## 2020-08-26 NOTE — TELEPHONE ENCOUNTER
[phone]/[id number] from [facility] / Pt wants something for indigestion  Pt vomited twice      Dr Darrell Pal was paged and called at 12

## 2020-08-27 ENCOUNTER — NURSING HOME VISIT (OUTPATIENT)
Dept: GERIATRICS | Facility: OTHER | Age: 85
End: 2020-08-27
Payer: MEDICARE

## 2020-08-27 DIAGNOSIS — I10 ESSENTIAL HYPERTENSION: ICD-10-CM

## 2020-08-27 DIAGNOSIS — K59.00 CONSTIPATION, UNSPECIFIED CONSTIPATION TYPE: ICD-10-CM

## 2020-08-27 DIAGNOSIS — R26.2 AMBULATORY DYSFUNCTION: ICD-10-CM

## 2020-08-27 DIAGNOSIS — N17.9 AKI (ACUTE KIDNEY INJURY) (HCC): ICD-10-CM

## 2020-08-27 DIAGNOSIS — R10.13 DYSPEPSIA: ICD-10-CM

## 2020-08-27 DIAGNOSIS — H35.30 MACULAR DEGENERATION, UNSPECIFIED LATERALITY, UNSPECIFIED TYPE: ICD-10-CM

## 2020-08-27 DIAGNOSIS — R11.2 NON-INTRACTABLE VOMITING WITH NAUSEA, UNSPECIFIED VOMITING TYPE: ICD-10-CM

## 2020-08-27 DIAGNOSIS — S32.010D CLOSED COMPRESSION FRACTURE OF THORACOLUMBAR VERTEBRA WITH ROUTINE HEALING, SUBSEQUENT ENCOUNTER: ICD-10-CM

## 2020-08-27 DIAGNOSIS — G91.2 NPH (NORMAL PRESSURE HYDROCEPHALUS) (HCC): Primary | ICD-10-CM

## 2020-08-27 DIAGNOSIS — S22.080D CLOSED COMPRESSION FRACTURE OF THORACOLUMBAR VERTEBRA WITH ROUTINE HEALING, SUBSEQUENT ENCOUNTER: ICD-10-CM

## 2020-08-27 DIAGNOSIS — R45.89 TEARFULNESS: ICD-10-CM

## 2020-08-27 DIAGNOSIS — D72.829 LEUKOCYTOSIS, UNSPECIFIED TYPE: ICD-10-CM

## 2020-08-27 PROBLEM — R52 ACUTE PAIN: Status: RESOLVED | Noted: 2020-08-14 | Resolved: 2020-08-27

## 2020-08-27 PROCEDURE — 99316 NF DSCHRG MGMT 30 MIN+: CPT | Performed by: FAMILY MEDICINE

## 2020-08-27 NOTE — ASSESSMENT & PLAN NOTE
· Had some constipation in the hospital with abdominal distension and some tenderness  ·  treated with bowel regimen favorably  · Has been stable at the facility  · Continue miralax and colace  · Last BM 8/26/2020

## 2020-08-27 NOTE — ASSESSMENT & PLAN NOTE
· Managed with gentle IV hydration in the hospital   · Now resolved  · Lisinopril was discontinued in the hospital   · Last Cr level 8/26/2020 stable at 0 95

## 2020-08-27 NOTE — ASSESSMENT & PLAN NOTE
· Pain is currently well controlled  · He has a history of ambulatory dysfunction with recurrent falls  · Admitted to the hospital for intractable back pain  · CT of thoracolumbar spine showed age-indeterminate superior compression deformities of L1, L2, L4, L5 vertebral bodies  Age indeterminate compression deformities of T3 and T11 vertebral bodies as well  Moderate degenerative changes throughout the thoracolumbar spine  · He was evaluated per nurse surgery in the hospital, recommendation made for pain control, PT/OT and LSO brace which she has been compliant with  · He completed a course of PT/OT at [facility] and will not discharge home with supervision and assistance from his wife as well as [facility] physical therapy services  · He will follow-up with Spine and Pain at [facility] 09/11/2020

## 2020-08-27 NOTE — PROGRESS NOTES
Quality 111:Pneumonia Vaccination Status For Older Adults: Pneumococcal Vaccination not Administered or Previously Received, Reason not Otherwise Specified [facility]  [address] [place] [address]  [facility]  [phone]    Mika 26 SUMMARY      NAME: Kusum Borden  AGE: 80 y o  SEX: male  : 1934  DATE OF ENCOUNTER: 20  POS: 31 (SNF)  PCP: Hi Soler DO    Chief Complaint     Seen and examined today for admission to short term rehabilitation unit at [facility]  History of Present Illness     79 yo M with hypertension, hyperlipidemia, CHF ambulatory dysfunction, constipation, admitted to [facility] 2020-2020 due to complaints of back pain after experiencing multiple falls  He had previously been evaluated in [facility] ED after most recent fall for back pain and was prescribed a back prace, which he did not wear  On the day of presentation to the ED he was complaining of worsening back pain associated with abdominal pain  Work up in the ED included CT H that showed no acute intracranial abnormality, peripheral cortical sulcal enlargement  disproportionate enlargement of the lateral and 3rd ventricles relative to the peripheral cortical sulcal enlargement  Findings suspicious for NPH  He had a CT C/A/P that showed age indeterminate superior endplate compression deformities of L1, L2, L4 and L5 vertebral bodies  Age indeterminate mild superior endplate compression deformity of T3 and T11 vertebral body  Hepatic steatosis  Colonic diverticulosis without evidence of acute diverticulitis  Cholelithiasis       He was evaluated by Neurosurgery who recommended conservative measures for thoracolumbar fractures with LSO brace, pain control, activity as tolerated and outpatient follow up at NPH clinic for further evaluation      On physical exam, it was felt his carotid were asymetric , more prominent on the right  He had a carotid ultrasound that showed <50% stenosis in the internal carotid artery with homogeneous and smooth plaque bilaterally   vertebral artery flow antegrade without significant subclavian Detail Level: Detailed artery disease noted bilaterally      Mr  Ender Kyle was complaining of constipation without BM for over a week during this hospitalization  He was started on bowel regimen with miralax and colace with favorable results 8/11/2020      He also had mild OLLIE noted on admission, his creatinine baseline per records is between 1 1-1 2 and was noted to be elevated on admission to 1 5   he was treated with gentle IV hydration with improvement       Patient was evaluated by PT/T and recommendation was made for subacute rehab services  He was admitted to [facility] and completed a comprehensive therapy course  At this time he is ambulating independently in his room and using a walker  He does require supervision and set up for ADLs  He is now considered stable to discharge home with wife who will provide supervision and assistance  He will return home with [facility] PT  Of note, patient was very emotional and often became tearful during his stay at [facility]  I had previously discussed this with patient as well as his wife over the phone, they were both reluctant to any pharmacological intervention  Today, patient seen and examined for discharge planning  He is sitting comfortably in recliner  Denies any pain  States he is sleepy but otherwise feeling well  Denies any fever/chills, chest pain/shortness of breath, abdominal discomfort, nausea/vomiting, diarrhea/constipation or dysuria  Review of Systems     Review of Systems   Constitutional: Negative for activity change, appetite change, fatigue and unexpected weight change  HENT: Negative for congestion, dental problem, hearing loss, mouth sores, trouble swallowing and voice change  Eyes: Negative for visual disturbance  Respiratory: Negative for cough, chest tightness and shortness of breath  Cardiovascular: Negative for chest pain, palpitations and leg swelling     Gastrointestinal: Negative for abdominal distention, Quality 265: Biopsy Follow-Up: Biopsy results reviewed, communicated, tracked, and documented abdominal pain, constipation, diarrhea, nausea and vomiting  Genitourinary: Negative for dysuria  Musculoskeletal: Positive for back pain and gait problem  Negative for arthralgias  Skin: Negative for color change, pallor, rash and wound  Neurological: Negative for weakness and light-headedness  Psychiatric/Behavioral:        Reports feeling more "emotional"  All other systems reviewed and are negative  History   No Known Allergies    Past Medical History:   Diagnosis Date    Hyperlipidemia     Hypertension      No past surgical history on file  Family History: non-contributory  Social History     Tobacco Use    Smoking status: Never Smoker    Smokeless tobacco: Never Used   Substance Use Topics    Alcohol use: Never     Frequency: Never     Binge frequency: Never    Drug use: Never         He lives with wife in a 2 story home with 3 steps to enter  To admission patient was independent with ADLs and ambulating without assistive device  Objective   Vital Signs   BP:   130/60    HR:  69     T:  97 9°     RR:  20     O2Sat:  97% on RA      W:  137 5 lb    Physical Exam  GEN: NAD  Non-toxic appearing  Looks frail and deconditioned  HEENT: NC/AT  MANASA  EIOMI  Oropharynx moist and clear  No oral lesions  CV: S1, S2   RRR, No murmur appreciated  PULM: Non-labored respirations  CTA bilaterally  ABD: Soft, NT/ND  BSx4  EXT: No peripheral edema  NEURO:  Awake, alert and oriented x 3         Pertinent Laboratory/Diagnostic Studies:     08/26/2020:  WBC 19, HB 13 6, HCT 40 2,   08/26/2020:  Na 137, K 4 8, Cl 105, Ca O2 21, BUN 33, CR 0 95, GLU 78, EGFR 72  08/26/2020:  Alk-phos 107, albumin 2 9, total bili 0 7, total protein 5 3, AST 35, ALT 41      Lab Results   Component Value Date    WBC 8 03 08/12/2020    HGB 13 9 08/12/2020    HCT 41 8 08/12/2020    MCV 92 08/12/2020     08/12/2020     Lab Results   Component Value Date    CALCIUM 8 2 (L) 08/12/2020    K 4 5 08/12/2020    CO2 24 08/12/2020     08/12/2020    BUN 44 (H) 08/12/2020    CREATININE 1 29 08/12/2020     No results found for: [id number], TSH  No results found for: HGBA1C      Current Medications     All medications reviewed and updated in EMR/Chart  Assessment and Plan     NPH (normal pressure hydrocephalus) (HCC)  · Patient with history of worsening ambulatory dysfunction over the past couple of weeks  · CT of the head in the hospital concerning for disproportionate enlargement of the lateral and 3rd ventricles relative to the peripheral cortical sulcal enlargement  Finding suspicious for NPH  · Evaluated per nurse surgery in the hospital, recommendation made for NPH clinic referral   · We have referred patient to Dr Kendrick Link and he has a scheduled appointment on 09/22/2020  Closed compression fracture of thoracolumbar vertebra (HCC)  · Pain is currently well controlled  · He has a history of ambulatory dysfunction with recurrent falls  · Admitted to the hospital for intractable back pain  · CT of thoracolumbar spine showed age-indeterminate superior compression deformities of L1, L2, L4, L5 vertebral bodies  Age indeterminate compression deformities of T3 and T11 vertebral bodies as well  Moderate degenerative changes throughout the thoracolumbar spine  · He was evaluated per nurse surgery in the hospital, recommendation made for pain control, PT/OT and LSO brace which she has been compliant with  · He completed a course of PT/OT at [facility] and will not discharge home with supervision and assistance from his wife as well as [facility] physical therapy services  · He will follow-up with Spine and Pain at [facility] 09/11/2020  OLLIE (acute kidney injury) ([facility] [address] )  · Managed with gentle IV hydration in the hospital   · Now resolved  · Lisinopril was discontinued in the hospital   · Last Cr level 8/26/2020 stable at 0 95  Leukocytosis  · WBC increased to 19    · Uncertain etiology, no Quality 110: Preventive Care And Screening: Influenza Immunization: Influenza Immunization not Administered because Patient Refused. evidence of infection on exam   · He has had a few emetic episodes in AM which he reports as not new  · Had a UA that showed no nitrates or blood, 6-10 WBC and few bacteria  Has no urinary complaints  · Has remained afebrile throughout his stay at [facility]  · Will check STAT CBC to monitor  · He is on Dexamethasone taper which may be contributing  Dyspepsia  · Continue TUMS and pepcid  · Had a few emetic episodes during his stay at [facility] that he reported as chronic and intermittent  · Will defer to PCP further work up  · Mild improvement with addition of Mylanta QAM     Ambulatory dysfunction  · Worsening in the past couple of weeks associated with recurrent falls  · Per CT H report in the hospital there is suspicion for NPH  · Referred to Dr Rian Braga of Neurosurgery, has scheduled appointment 9/22/20  · Continue fall precautions  · At this time, ambulating with walker short distances, has completed short term rehab course at the facility  Will discharge home with [place] PT services  Constipation  · Had some constipation in the hospital with abdominal distension and some tenderness  ·  treated with bowel regimen favorably  · Has been stable at the facility  · Continue miralax and colace  · Last BM 8/26/2020  Nausea and vomiting  · Had a few intermittent episodes of nausea with vomiting during rehab stay  · Patient reports these as chronic  · Has had regular BM, last one 8/26/2020  · Improved with addition of Mylanta 30 ml in AM   · CMP fairly unremarkable except for low albumin levels  No abdominal pain, or fever  · Defer further work up to PCP  Essential hypertension  · His lisinopril was discontinued in the hospital   · BP has remained stable  · Continue Lopressor  Macular degeneration  · Last ophthalmology visit with Dr Bhavani Aldana 8/19/2020  · continue to follow up with eye doctor      Tearfulness  · Patient reports feeling very emotional and overwhelmed recently  · Discussed with patient and wife, state it is situational and are not interested in pharmacological intervention at this time  · Follow up with PCP  I spent 35 minutes for this encounter  Greater than 50% of the total time was spent on coordination of care and direct patient care during a face-to-face visit with patient  Discharge instructions provided  All quesitons were answered  Claudine Anderson MD  Geriatric Medicine  08/27/20    Please note:  Voice-recognition software may have been used in the preparation of this document  Occasional wrong word or "sound-alike" substitutions may have occurred due to the inherent limitations of voice recognition software  Interpretation should be guided by context

## 2020-08-27 NOTE — ASSESSMENT & PLAN NOTE
· Had a few intermittent episodes of nausea with vomiting during rehab stay  · Patient reports these as chronic  · Has had regular BM, last one 8/26/2020  · Improved with addition of Mylanta 30 ml in AM   · CMP fairly unremarkable except for low albumin levels  No abdominal pain, or fever  · Defer further work up to PCP

## 2020-08-27 NOTE — ASSESSMENT & PLAN NOTE
· WBC increased to 19  · Uncertain etiology, no evidence of infection on exam   · He has had a few emetic episodes in AM which he reports as not new  · Had a UA that showed no nitrates or blood, 6-10 WBC and few bacteria  Has no urinary complaints  · Has remained afebrile throughout his stay at [facility]  · Will check STAT CBC to monitor  · He is on Dexamethasone taper which may be contributing

## 2020-08-27 NOTE — ASSESSMENT & PLAN NOTE
· Worsening in the past couple of weeks associated with recurrent falls  · Per CT H report in the hospital there is suspicion for NPH  · Referred to Dr Shaun Yarbrough of Neurosurgery, has scheduled appointment 9/22/20  · Continue fall precautions  · At this time, ambulating with walker short distances, has completed short term rehab course at the facility  Will discharge home with [place] PT services

## 2020-08-27 NOTE — ASSESSMENT & PLAN NOTE
· Continue TUMS and pepcid  · Had a few emetic episodes during his stay at [facility] that he reported as chronic and intermittent  · Will defer to PCP further work up     · Mild improvement with addition of Mylanta QAM

## 2020-08-27 NOTE — ASSESSMENT & PLAN NOTE
· Patient reports feeling very emotional and overwhelmed recently  · Discussed with patient and wife, state it is situational and are not interested in pharmacological intervention at this time  · Follow up with PCP

## 2020-08-28 ENCOUNTER — OFFICE VISIT (OUTPATIENT)
Dept: NEUROSURGERY | Facility: CLINIC | Age: 85
End: 2020-08-28
Payer: MEDICARE

## 2020-08-28 ENCOUNTER — HOSPITAL ENCOUNTER (OUTPATIENT)
Dept: RADIOLOGY | Facility: HOSPITAL | Age: 85
Discharge: HOME | End: 2020-08-28
Payer: MEDICARE

## 2020-08-28 VITALS
WEIGHT: 146 LBS | TEMPERATURE: 97.6 F | RESPIRATION RATE: 16 BRPM | HEIGHT: 60 IN | BODY MASS INDEX: 28.66 KG/M2 | DIASTOLIC BLOOD PRESSURE: 70 MMHG | SYSTOLIC BLOOD PRESSURE: 136 MMHG

## 2020-08-28 DIAGNOSIS — S32.000A LUMBAR COMPRESSION FRACTURE, CLOSED, INITIAL ENCOUNTER (HCC): ICD-10-CM

## 2020-08-28 DIAGNOSIS — S32.000D COMPRESSION FRACTURE OF LUMBAR VERTEBRA WITH ROUTINE HEALING, UNSPECIFIED LUMBAR VERTEBRAL LEVEL, SUBSEQUENT ENCOUNTER: Primary | ICD-10-CM

## 2020-08-28 PROCEDURE — 72100 X-RAY EXAM L-S SPINE 2/3 VWS: CPT

## 2020-08-28 PROCEDURE — 99214 OFFICE O/P EST MOD 30 MIN: CPT | Performed by: PHYSICIAN ASSISTANT

## 2020-08-28 NOTE — PROGRESS NOTES
Office Note - Neurosurgery   Bijan Smoker 80 y o  male MRN: [id number]      Assessment:  Patient is a 80years old gentleman with severe hearing deficit, ambulatory dysfunction and falls, here today for 2 weeks follow-up of age-indeterminate compression fracture of L1, L2, L3 and L4  Patient was wearing TLSO brace  Had follow-up x-rays of lumbar spine in brace which demonstrates stable multiple lumbar spine compression fractures and anatomical alignment  Patient appears in no pain, denies any radiculopathy symptoms in the lower extremities including numbness, weakness or paresthesia  Denies bowel/bladder dysfunction  I called his RN who  takes care of him at [facility] and she states patient can walk with a walker  to the bathroom independently  Per his wife, patient starts to lose balance recently and had repeated falls, slight memory issues but denies any urinary problem  Exam-patient is alert and oriented x3, except hearing deficits, EOMI bilateral conjugate, moves all extremities  Strength is 5/5 bilaterally  Sensation to light touch intact bilaterally  Was able to walk in the exam room  Wearing TLSO brace  Slight tenderness in the lumbar spine palpation noted  Hx, PEx, and images reviewed with the patient  Management plan discussed  Four weeks follow-up upright lumbar spine x-rays in brace ordered  Advised patient fall precaution, excessive bending or twisting his back  Continue wearing TLSO brace when upright and at 45 degree head of bed  Questions and concerns were answered  Patient understands instructions and agreed with the plan  Plan:  1  Follow-up upright lumbar spine x-rays in brace, AP and lateral, 2-3 views ordered/4 weeks  2  Continue wearing TLSO brace when upright and at 45 degree head of bed  3  Continue Tylenol for pain  4  Fall precaution/avoid stress activities, excessive bending or twisting your back  5  Call 4 question or concern  6   Follow up in 4 weeks      Subjective/Objective     Chief Complaint: " 2 weeks follow-up for multiple lumbar spine fractures"        HPI  Patient is here for 2 weeks follow-up of multiple lumbar spine fractures including compression fracture of L1, L2, L3 and L4  Patient had 2 weeks follow-up x-rays today which demonstrates stable multiple lumbar fractures with anatomical alignment  Patient is wearing TLSO brace, currently residing at [facility] RN, patient is able to walk to the bathroom independently using walker  He also takes Tylenol scheduled for his back pain, though he denied any back pain on questioning during this visit  Patient denies any radiculopathy symptoms in the lower extremities including numbness, weakness or paresthesia  Denies history of fever, chills, rigors, cough or chest pain  ROS  Review of system personally reviewed and updated  Review of Systems   Constitutional: Negative  HENT: Positive for hearing loss (bilateral hearing aids)  Eyes: Negative  Respiratory: Negative  Cardiovascular: Negative  Gastrointestinal: Negative  Endocrine: Negative  Genitourinary: Negative  Musculoskeletal: Negative  Skin: Negative  Allergic/Immunologic: Negative  Neurological: Positive for weakness (using wheelchair)  Hematological: Bruises/bleeds easily (baby asa)  Psychiatric/Behavioral: Negative  Family History    History reviewed  No pertinent family history      Social History    Social History     Socioeconomic History    Marital status: /Civil Union     Spouse name: Not on file    Number of children: Not on file    Years of education: Not on file    Highest education level: Not on file   Occupational History    Not on file   Social Needs    Financial resource strain: Not on file    Food insecurity     Worry: Not on file     Inability: Not on file    Transportation needs     Medical: Not on file     Non-medical: Not on file   Tobacco Use  Smoking status: Never Smoker    Smokeless tobacco: Never Used   Substance and Sexual Activity    Alcohol use: Never     Frequency: Never     Binge frequency: Never    Drug use: Never    Sexual activity: Not on file   Lifestyle    Physical activity     Days per week: Not on file     Minutes per session: Not on file    Stress: Not on file   Relationships    Social connections     Talks on phone: Not on file     Gets together: Not on file     Attends Congregational service: Not on file     Active member of club or organization: Not on file     Attends meetings of clubs or organizations: Not on file     Relationship status: Not on file    Intimate partner violence     Fear of current or ex partner: Not on file     Emotionally abused: Not on file     Physically abused: Not on file     Forced sexual activity: Not on file   Other Topics Concern    Not on file   Social History Narrative    Not on file       Past Medical History    Past Medical History:   Diagnosis Date    Hyperlipidemia     Hypertension        Surgical History    History reviewed  No pertinent surgical history  Medications      Current Outpatient Medications:     acetaminophen (TYLENOL) 325 mg tablet, Take 3 tablets (975 mg total) by mouth every 8 (eight) hours, Disp: 30 tablet, Rfl: 0    aspirin 81 MG tablet, Take 81 mg by mouth every other day, Disp: , Rfl:     B Complex Vitamins (VITAMIN B COMPLEX PO), Take 482 2 mg by mouth daily, Disp: , Rfl:     cholecalciferol (VITAMIN D3) 1,000 units tablet, Take 1,000 Units by mouth daily, Disp: , Rfl:     dexamethasone (DECADRON) 4 mg tablet, Take 1 tablet (4 mg total) by mouth 2 (two) times a day with meals for 4 days, THEN 0 5 tablets (2 mg total) 3 (three) times a day with meals for 4 days, THEN 0 5 tablets (2 mg total) 2 (two) times a day with meals for 4 days, THEN 0 5 tablets (2 mg total) daily with breakfast for 4 days, THEN 0 5 tablets (2 mg total) every other day for 8 days  , Disp: 22 tablet, Rfl: 0    diphenhydrAMINE (BENADRYL) 25 mg tablet, Take 1 tablet (25 mg total) by mouth every 6 (six) hours as needed for itching, Disp: 30 tablet, Rfl: 0    famotidine (PEPCID) 20 mg tablet, Take 1 tablet by mouth daily, Disp: , Rfl:     gabapentin (NEURONTIN) 100 mg capsule, Take 1 capsule (100 mg total) by mouth daily at bedtime, Disp: 30 capsule, Rfl: 0    hydrochlorothiazide (MICROZIDE) 12 5 mg capsule, Take 12 5 mg by mouth daily, Disp: , Rfl:     metoprolol tartrate (LOPRESSOR) 25 mg tablet, Take 12 5 mg by mouth daily, Disp: , Rfl:     Multiple Vitamins-Minerals (PRESERVISION AREDS 2 PO), Take 24 mg by mouth 2 (two) times a day, Disp: , Rfl:     Omega-3 1000 MG CAPS, Take 1 tablet by mouth daily, Disp: , Rfl:     polyethylene glycol (MIRALAX) 17 g packet, Take 17 g by mouth daily, Disp: 14 each, Rfl: 0    simvastatin (ZOCOR) 20 mg tablet, Take 20 mg by mouth, Disp: , Rfl:     hydrocortisone 1 % cream, Apply topically 4 (four) times a day as needed for irritation for up to 14 days, Disp: 30 g, Rfl: 0    Allergies    No Known Allergies    The following portions of the patient's history were reviewed and updated as appropriate: allergies, current medications, past family history, past medical history, past social history, past surgical history and problem list     Investigations    I personally reviewed the XRAY results with the patient:    Findings as described in the assessment section above    Physical Exam    Vitals:    08/28/20 1033   BP: 136/70   Resp: 16   Temp: 97 6 °F (36 4 °C)       Physical Exam  Constitutional:       Appearance: Normal appearance  HENT:      Head: Normocephalic and atraumatic  Eyes:      Extraocular Movements: Extraocular movements intact  Neck:      Musculoskeletal: Normal range of motion  Cardiovascular:      Rate and Rhythm: Normal rate  Pulses: Normal pulses  Musculoskeletal:         General: Tenderness present        Comments: Tenderness on palpation of lumbar spine noted   Skin:     General: Skin is warm  Neurological:      General: No focal deficit present  Mental Status: He is alert and oriented to person, place, and time  GCS: GCS eye subscore is 4  GCS verbal subscore is 5  GCS motor subscore is 6  Cranial Nerves: Cranial nerves are intact  Sensory: Sensation is intact  Motor: Motor function is intact  Coordination: Finger-Nose-Finger Test normal       Deep Tendon Reflexes: Reflexes are normal and symmetric  Babinski sign absent on the right side  Babinski sign absent on the left side  Reflex Scores:       Tricep reflexes are 2+ on the right side and 2+ on the left side  Bicep reflexes are 2+ on the right side and 2+ on the left side  Brachioradialis reflexes are 2+ on the right side and 2+ on the left side  Patellar reflexes are 2+ on the right side and 2+ on the left side  Achilles reflexes are 2+ on the right side and 2+ on the left side  Psychiatric:         Speech: Speech normal        Neurologic Exam     Mental Status   Oriented to person, place, and time  Speech: speech is normal   Level of consciousness: alert    Cranial Nerves     CN II   Visual fields full to confrontation       CN III, IV, VI   Nystagmus: none     CN XI   CN XI normal      Motor Exam   Muscle bulk: normal  Overall muscle tone: normal  Right arm tone: normal  Left arm tone: normal  Right arm pronator drift: absent  Left arm pronator drift: absent  Right leg tone: normal  Left leg tone: normal    Sensory Exam   Light touch normal      Gait, Coordination, and Reflexes     Coordination   Finger to nose coordination: normal    Reflexes   Right brachioradialis: 2+  Left brachioradialis: 2+  Right biceps: 2+  Left biceps: 2+  Right triceps: 2+  Left triceps: 2+  Right patellar: 2+  Left patellar: 2+  Right achilles: 2+  Left achilles: 2+  Right : 2+  Left : 2+  Right Gonzalez: absent  Left Gonzalez: absent  Right ankle clonus: absent  Left ankle clonus: absent

## 2020-09-03 ENCOUNTER — TELEPHONE (OUTPATIENT)
Dept: NEUROSURGERY | Facility: CLINIC | Age: 85
End: 2020-09-03

## 2020-09-03 NOTE — TELEPHONE ENCOUNTER
Received a call from [facility] radiology re: xray completed 8/28 and finalized today  Requested studies be reviewed

## 2020-09-14 ENCOUNTER — APPOINTMENT (INPATIENT)
Dept: RADIOLOGY | Facility: HOSPITAL | Age: 85
DRG: 725 | End: 2020-09-14
Payer: MEDICARE

## 2020-09-14 ENCOUNTER — HOSPITAL ENCOUNTER (INPATIENT)
Facility: HOSPITAL | Age: 85
LOS: 2 days | Discharge: NURSING, REHAB OR LONG-TERM CARE | DRG: 725 | End: 2020-09-16
Attending: EMERGENCY MEDICINE | Admitting: INTERNAL MEDICINE
Payer: MEDICARE

## 2020-09-14 ENCOUNTER — APPOINTMENT (EMERGENCY)
Dept: CT IMAGING | Facility: HOSPITAL | Age: 85
DRG: 725 | End: 2020-09-14
Payer: MEDICARE

## 2020-09-14 DIAGNOSIS — R53.1 GENERALIZED WEAKNESS: ICD-10-CM

## 2020-09-14 DIAGNOSIS — D50.8 OTHER IRON DEFICIENCY ANEMIA: ICD-10-CM

## 2020-09-14 DIAGNOSIS — R33.9 URINARY RETENTION: Primary | ICD-10-CM

## 2020-09-14 PROBLEM — D64.9 ANEMIA: Status: ACTIVE | Noted: 2020-09-14

## 2020-09-14 LAB
ALBUMIN SERPL BCP-MCNC: 2.8 G/DL (ref 3.5–5)
ALP SERPL-CCNC: 74 U/L (ref 46–116)
ALT SERPL W P-5'-P-CCNC: 22 U/L (ref 12–78)
ANION GAP SERPL CALCULATED.3IONS-SCNC: 9 MMOL/L (ref 4–13)
AST SERPL W P-5'-P-CCNC: 21 U/L (ref 5–45)
ATRIAL RATE: 100 BPM
BACTERIA UR QL AUTO: ABNORMAL /HPF
BASOPHILS # BLD AUTO: 0.01 THOUSANDS/ΜL (ref 0–0.1)
BASOPHILS NFR BLD AUTO: 0 % (ref 0–1)
BILIRUB SERPL-MCNC: 1.59 MG/DL (ref 0.2–1)
BILIRUB UR QL STRIP: NEGATIVE
BUN SERPL-MCNC: 23 MG/DL (ref 5–25)
CALCIUM SERPL-MCNC: 8.4 MG/DL (ref 8.3–10.1)
CHLORIDE SERPL-SCNC: 97 MMOL/L (ref 100–108)
CLARITY UR: CLEAR
CO2 SERPL-SCNC: 26 MMOL/L (ref 21–32)
COLOR UR: YELLOW
CREAT SERPL-MCNC: 1.1 MG/DL (ref 0.6–1.3)
EOSINOPHIL # BLD AUTO: 0.04 THOUSAND/ΜL (ref 0–0.61)
EOSINOPHIL NFR BLD AUTO: 1 % (ref 0–6)
ERYTHROCYTE [DISTWIDTH] IN BLOOD BY AUTOMATED COUNT: 15.1 % (ref 11.6–15.1)
GFR SERPL CREATININE-BSD FRML MDRD: 60 ML/MIN/1.73SQ M
GLUCOSE SERPL-MCNC: 100 MG/DL (ref 65–140)
GLUCOSE UR STRIP-MCNC: NEGATIVE MG/DL
HCT VFR BLD AUTO: 32.4 % (ref 36.5–49.3)
HGB BLD-MCNC: 10.8 G/DL (ref 12–17)
HGB UR QL STRIP.AUTO: ABNORMAL
IMM GRANULOCYTES # BLD AUTO: 0.08 THOUSAND/UL (ref 0–0.2)
IMM GRANULOCYTES NFR BLD AUTO: 1 % (ref 0–2)
KETONES UR STRIP-MCNC: NEGATIVE MG/DL
LEUKOCYTE ESTERASE UR QL STRIP: NEGATIVE
LYMPHOCYTES # BLD AUTO: 1.34 THOUSANDS/ΜL (ref 0.6–4.47)
LYMPHOCYTES NFR BLD AUTO: 21 % (ref 14–44)
MCH RBC QN AUTO: 30.4 PG (ref 26.8–34.3)
MCHC RBC AUTO-ENTMCNC: 33.3 G/DL (ref 31.4–37.4)
MCV RBC AUTO: 91 FL (ref 82–98)
MONOCYTES # BLD AUTO: 0.69 THOUSAND/ΜL (ref 0.17–1.22)
MONOCYTES NFR BLD AUTO: 11 % (ref 4–12)
NEUTROPHILS # BLD AUTO: 4.31 THOUSANDS/ΜL (ref 1.85–7.62)
NEUTS SEG NFR BLD AUTO: 66 % (ref 43–75)
NITRITE UR QL STRIP: NEGATIVE
NON-SQ EPI CELLS URNS QL MICRO: ABNORMAL /HPF
NRBC BLD AUTO-RTO: 0 /100 WBCS
P AXIS: 51 DEGREES
PH UR STRIP.AUTO: 6 [PH] (ref 4.5–8)
PLATELET # BLD AUTO: 182 THOUSANDS/UL (ref 149–390)
PMV BLD AUTO: 10.5 FL (ref 8.9–12.7)
POTASSIUM SERPL-SCNC: 3.5 MMOL/L (ref 3.5–5.3)
PR INTERVAL: 192 MS
PROT SERPL-MCNC: 6.5 G/DL (ref 6.4–8.2)
PROT UR STRIP-MCNC: NEGATIVE MG/DL
QRS AXIS: 14 DEGREES
QRSD INTERVAL: 90 MS
QT INTERVAL: 360 MS
QTC INTERVAL: 460 MS
RBC # BLD AUTO: 3.55 MILLION/UL (ref 3.88–5.62)
RBC #/AREA URNS AUTO: ABNORMAL /HPF
SODIUM SERPL-SCNC: 132 MMOL/L (ref 136–145)
SP GR UR STRIP.AUTO: 1.01 (ref 1–1.03)
T WAVE AXIS: 69 DEGREES
TROPONIN I SERPL-MCNC: <0.02 NG/ML
TROPONIN I SERPL-MCNC: <0.02 NG/ML
UROBILINOGEN UR QL STRIP.AUTO: 1 E.U./DL
VENTRICULAR RATE: 98 BPM
WBC # BLD AUTO: 6.47 THOUSAND/UL (ref 4.31–10.16)
WBC #/AREA URNS AUTO: ABNORMAL /HPF

## 2020-09-14 PROCEDURE — 85025 COMPLETE CBC W/AUTO DIFF WBC: CPT | Performed by: EMERGENCY MEDICINE

## 2020-09-14 PROCEDURE — 96361 HYDRATE IV INFUSION ADD-ON: CPT

## 2020-09-14 PROCEDURE — 81001 URINALYSIS AUTO W/SCOPE: CPT

## 2020-09-14 PROCEDURE — 84484 ASSAY OF TROPONIN QUANT: CPT | Performed by: EMERGENCY MEDICINE

## 2020-09-14 PROCEDURE — 80053 COMPREHEN METABOLIC PANEL: CPT | Performed by: EMERGENCY MEDICINE

## 2020-09-14 PROCEDURE — G1004 CDSM NDSC: HCPCS

## 2020-09-14 PROCEDURE — 84484 ASSAY OF TROPONIN QUANT: CPT | Performed by: INTERNAL MEDICINE

## 2020-09-14 PROCEDURE — 74177 CT ABD & PELVIS W/CONTRAST: CPT

## 2020-09-14 PROCEDURE — 99285 EMERGENCY DEPT VISIT HI MDM: CPT | Performed by: EMERGENCY MEDICINE

## 2020-09-14 PROCEDURE — 99285 EMERGENCY DEPT VISIT HI MDM: CPT

## 2020-09-14 PROCEDURE — 93010 ELECTROCARDIOGRAM REPORT: CPT | Performed by: INTERNAL MEDICINE

## 2020-09-14 PROCEDURE — 93005 ELECTROCARDIOGRAM TRACING: CPT

## 2020-09-14 PROCEDURE — 71046 X-RAY EXAM CHEST 2 VIEWS: CPT

## 2020-09-14 PROCEDURE — 96374 THER/PROPH/DIAG INJ IV PUSH: CPT

## 2020-09-14 PROCEDURE — 36415 COLL VENOUS BLD VENIPUNCTURE: CPT | Performed by: EMERGENCY MEDICINE

## 2020-09-14 PROCEDURE — 99223 1ST HOSP IP/OBS HIGH 75: CPT | Performed by: INTERNAL MEDICINE

## 2020-09-14 RX ORDER — TAMSULOSIN HYDROCHLORIDE 0.4 MG/1
0.4 CAPSULE ORAL
Status: DISCONTINUED | OUTPATIENT
Start: 2020-09-14 | End: 2020-09-16 | Stop reason: HOSPADM

## 2020-09-14 RX ORDER — ACETAMINOPHEN 325 MG/1
975 TABLET ORAL EVERY 8 HOURS SCHEDULED
Status: DISCONTINUED | OUTPATIENT
Start: 2020-09-14 | End: 2020-09-16 | Stop reason: HOSPADM

## 2020-09-14 RX ORDER — PRAVASTATIN SODIUM 40 MG
40 TABLET ORAL
Status: DISCONTINUED | OUTPATIENT
Start: 2020-09-14 | End: 2020-09-16 | Stop reason: HOSPADM

## 2020-09-14 RX ORDER — HYDROCHLOROTHIAZIDE 12.5 MG/1
12.5 TABLET ORAL DAILY
Status: DISCONTINUED | OUTPATIENT
Start: 2020-09-14 | End: 2020-09-16 | Stop reason: HOSPADM

## 2020-09-14 RX ORDER — MELATONIN
2000 DAILY
Status: DISCONTINUED | OUTPATIENT
Start: 2020-09-14 | End: 2020-09-16 | Stop reason: HOSPADM

## 2020-09-14 RX ORDER — FAMOTIDINE 20 MG/1
20 TABLET, FILM COATED ORAL DAILY
Status: DISCONTINUED | OUTPATIENT
Start: 2020-09-14 | End: 2020-09-16 | Stop reason: HOSPADM

## 2020-09-14 RX ORDER — SODIUM CHLORIDE 9 MG/ML
125 INJECTION, SOLUTION INTRAVENOUS CONTINUOUS
Status: DISCONTINUED | OUTPATIENT
Start: 2020-09-14 | End: 2020-09-14

## 2020-09-14 RX ORDER — POLYETHYLENE GLYCOL 3350 17 G/17G
17 POWDER, FOR SOLUTION ORAL DAILY PRN
Status: DISCONTINUED | OUTPATIENT
Start: 2020-09-14 | End: 2020-09-16 | Stop reason: HOSPADM

## 2020-09-14 RX ORDER — ASPIRIN 81 MG/1
81 TABLET, CHEWABLE ORAL DAILY
Status: DISCONTINUED | OUTPATIENT
Start: 2020-09-14 | End: 2020-09-16 | Stop reason: HOSPADM

## 2020-09-14 RX ORDER — SODIUM CHLORIDE 9 MG/ML
75 INJECTION, SOLUTION INTRAVENOUS CONTINUOUS
Status: DISCONTINUED | OUTPATIENT
Start: 2020-09-14 | End: 2020-09-16 | Stop reason: HOSPADM

## 2020-09-14 RX ORDER — ONDANSETRON 2 MG/ML
4 INJECTION INTRAMUSCULAR; INTRAVENOUS ONCE
Status: COMPLETED | OUTPATIENT
Start: 2020-09-14 | End: 2020-09-14

## 2020-09-14 RX ADMIN — ACETAMINOPHEN 975 MG: 325 TABLET, FILM COATED ORAL at 21:44

## 2020-09-14 RX ADMIN — HYDROCHLOROTHIAZIDE 12.5 MG: 12.5 TABLET ORAL at 14:25

## 2020-09-14 RX ADMIN — Medication 2000 UNITS: at 14:25

## 2020-09-14 RX ADMIN — SODIUM CHLORIDE 75 ML/HR: 0.9 INJECTION, SOLUTION INTRAVENOUS at 16:19

## 2020-09-14 RX ADMIN — SODIUM CHLORIDE 125 ML/HR: 0.9 INJECTION, SOLUTION INTRAVENOUS at 07:54

## 2020-09-14 RX ADMIN — B-COMPLEX W/ C & FOLIC ACID TAB 1 TABLET: TAB at 14:26

## 2020-09-14 RX ADMIN — TAMSULOSIN HYDROCHLORIDE 0.4 MG: 0.4 CAPSULE ORAL at 16:19

## 2020-09-14 RX ADMIN — ASPIRIN 81 MG 81 MG: 81 TABLET ORAL at 14:25

## 2020-09-14 RX ADMIN — PRAVASTATIN SODIUM 40 MG: 40 TABLET ORAL at 16:19

## 2020-09-14 RX ADMIN — ENOXAPARIN SODIUM 40 MG: 40 INJECTION SUBCUTANEOUS at 14:26

## 2020-09-14 RX ADMIN — IOHEXOL 100 ML: 350 INJECTION, SOLUTION INTRAVENOUS at 09:58

## 2020-09-14 RX ADMIN — ONDANSETRON 4 MG: 2 INJECTION INTRAMUSCULAR; INTRAVENOUS at 07:52

## 2020-09-14 RX ADMIN — Medication 12.5 MG: at 14:26

## 2020-09-14 NOTE — ED NOTES
Prior to insertion of duarte catheter, pts wife asked to step on the other side of the curtain  While A  Emily RN inserting duarte, this RN hears pts wife dry heaving  This RN goes to other side of curtain to check on wife and ask if she's ok  This RN instructs pts wife to sit down and gives her an emesis basin  Pts wife vomits x1  This RN asks if she's been sick and she states "no it's just my anxiety and I get like this when I'm in the hospital " This RN informs pts wife that if she isn't feeling well she either needs to leave or sign in   Pts wife refusing to do either stating "I'm not sick it's just nerves "     Rachael Arellano, DEBORAH  09/14/20 5255

## 2020-09-14 NOTE — ASSESSMENT & PLAN NOTE
Inability to urinate for the past 2 days, although wife reports that he has had troubles with urination for more than 1 year  Denies any dysuria but endorses increased urinary hesitancy, frequency and urgency  In the ED, had suprapubic fullness  On bladder scan had 200 ml and with duarte insertion, 300 ml output  Retention likely due to urinary outflow obstruction from BPH       Plan:  -Duarte placed in ED  -UA negative for infection, positive for blood  -urology consult placed  -Will start on Flomax  -No OLLIE noted on labs, Cr is 1 1 and baseline Cr is 1 1-1 2  -CrCl: 42  -poor po intake reported, started on IVF  -BMP tomorrow AM

## 2020-09-14 NOTE — ED NOTES
Provider at bedside       Ernie Sampson RN  09/14/20 7709 no fever/no chills/no tingling/no weakness/no decreased eating/drinking

## 2020-09-14 NOTE — H&P
H&P- Joseph Martinez 1934, 80 y o  male MRN: [id number]    Unit/Bed#: S -01 Encounter: [id number]    Primary Care Provider: Wendy Soto DO   Date and time admitted to hospital: 9/14/2020  7:21 AM        * Generalized weakness  Assessment & Plan  Patient's wife reports that patient has had generalized weakness for few days but worsened yesterday to the point that he was too weak to stand or ambulate on his own  In addition, noted poor po intake and urinary retention for 2 days  Denies loss of sensation in LE but has had motor weakness in legs since lumbar fractures last month  Normally, wife reports that he is able to ambulate with walker  Likely secondary to poor po intake, age, recent back injury and urinary retention, but need to rule out infection, vitamin deficiency, hypothyroidism, and medication induced  Plan:  -will check TSH, Vit B12, Folate levels  -CXR to r/o PNA as wife reported some SOB and patient noted with expiratory wheezing  -UA negative for infection, Barton placed in ED  -PT/OT eval  -Noted with mild hyponatremia at 132  -IVF NS at 75 cc/hr  -Gabapentin was started recently after the back injury, will hold for now  -CBC, CMP tomorrow AM  -will consider possible Head CT if no improvement by tomorrow  No focal neurologic deficits noted on exam     Ambulatory dysfunction  Assessment & Plan  Wife reports that patient is normally able to ambulate at home with walker but since yesterday, too weak to ambulate  He was discharged from acute rehab about 2 weeks ago following his lumbar fractures (L2-L5 vertebral compression fractures) and was getting therapy at home  Follows up with neurosurgery as outpatient, last office visit was 8/28/20  Plan as outlined above  -PT/OT eval    Urinary retention  Assessment & Plan  Inability to urinate for the past 2 days, although wife reports that he has had troubles with urination for more than 1 year   Denies any dysuria but endorses increased urinary hesitancy, frequency and urgency  In the ED, had suprapubic fullness  On bladder scan had 200 ml and with duarte insertion, 300 ml output  Retention likely due to urinary outflow obstruction from BPH  Plan:  -Durate placed in ED  -UA negative for infection, positive for blood  -urology consult placed  -Will start on Flomax  -No OLLIE noted on labs, Cr is 1 1 and baseline Cr is 1 1-1 2  -CrCl: 42  -poor po intake reported, started on IVF  -BMP tomorrow AM    Anemia  Assessment & Plan  POA: Hb of 10 8, normocytic  Hb from 8/12/20 was 13 9  Plan:  -no overt signs of acute blood loss  -may be contributing to his weakness  -check FOBT to r/o GI blood loss  -CBC tomorrow AM    Essential hypertension  Assessment & Plan  -Continue on home medication of Lopressor and HCTZ  -Monitor BP    Hyperlipidemia  Assessment & Plan  -Pravachol 40 mg          VTE Prophylaxis: Heparin  / sequential compression device   Code Status: Level 1  POLST: POLST form is not discussed and not completed at this time  Anticipated Length of Stay:  Patient will be admitted on an Inpatient basis with an anticipated length of stay of  2 midnights  Justification for Hospital Stay: weakness and urinary retention    Chief Complaint:   Weakness and inability to urinate    History of Present Illness:    Paola Mcfadden is a 80 y o  male with PMH of normal pressure hydrocephalus, frequent PVCs, HTN and HLD who presents with complaints of weakness and inability to urinate  Wife was present at bedside and provided most of the history as patient was hard of hearing  Wife reports that for past few days, patient has had weakness but that it worsened yesterday to the point that he couldn't stand up or ambulate  At baseline, he ambulates with a walker at home  In addition, he had not been able to urinate for the last two days prior to admission although he was drinking water albeit less than usual  Denies dysuria   Wife reports that patient has had urinary difficulty for more than 1 year, with increased urgency, frequency and hesitancy  He had recent admission last month for fall with L2-L5 fractures, treated with back brace and Decadron taper, discharged to acute rehab and returned home 2 1/2 weeks ago with home PT/OT  He was also recently started on Gabapentin following the fall  Patient endorses lower extremity weakness although he has had LE weakness since the lumbar fractures  Denies loss of sensation  Also reported lower back pain, no worsening from last month  His wife also reported that she had noticed that he gets winded during his therapy sessions at home for the last week  Denies cough, SOB at rest, palpitations or leg swelling  In the ED, patient also complained of some left sided chest pain but when asked again later, he reported resolution  Troponin was negative  Labs were significant for mild hyponatremia at 132, total bilirubin of 1 39, H/H of 10 8/32 4  UA was negative for infection but showed moderate blood  CT Abd and pelvis showed advanced sigmoid diverticulosis, multiple lumbar compression fracture similar to 8/28 study, no hydronephrosis and cholelithiasis  Bladder scan showed retained urine so duarte was inserted with return of 300 ml  Review of Systems:    Review of Systems   Constitutional: Positive for activity change and appetite change  Negative for chills, diaphoresis, fatigue and fever  HENT:        Hard of hearing   Eyes: Negative for visual disturbance  Respiratory: Positive for shortness of breath and wheezing  Negative for cough  SOB with prolonged therapy at home, wife reports   Cardiovascular: Negative for chest pain, palpitations and leg swelling  Reported mild left sided chest pain initially but reported resolution when seen later   Gastrointestinal: Negative for abdominal distention, blood in stool, constipation, diarrhea, nausea and vomiting     Genitourinary: Positive for decreased urine volume, difficulty urinating, frequency and urgency  Negative for dysuria and hematuria  Musculoskeletal: Positive for back pain and gait problem  Negative for neck pain and neck stiffness  Skin: Negative for rash  Neurological: Positive for weakness  Negative for dizziness, syncope, light-headedness, numbness and headaches  Psychiatric/Behavioral: Negative for agitation and confusion  Past Medical and Surgical History:     Past Medical History:   Diagnosis Date    Hyperlipidemia     Hypertension        History reviewed  No pertinent surgical history  Meds/Allergies:    Prior to Admission medications    Medication Sig Start Date End Date Taking?  Authorizing Provider   acetaminophen (TYLENOL) 325 mg tablet Take 3 tablets (975 mg total) by mouth every 8 (eight) hours 8/13/20  Yes Korin Morgan MD   aspirin 81 MG tablet Take 81 mg by mouth every other day 10/3/12  Yes Historical Provider, MD   B Complex Vitamins (VITAMIN B COMPLEX PO) Take 482 2 mg by mouth daily 10/3/12  Yes Historical Provider, MD   cholecalciferol (VITAMIN D3) 1,000 units tablet Take 2,000 Units by mouth daily  10/3/12  Yes Historical Provider, MD   gabapentin (NEURONTIN) 100 mg capsule Take 1 capsule (100 mg total) by mouth daily at bedtime 8/13/20 9/14/20 Yes Korin Morgan MD   hydrochlorothiazide (MICROZIDE) 12 5 mg capsule Take 12 5 mg by mouth daily   Yes Historical Provider, MD   metoprolol tartrate (LOPRESSOR) 25 mg tablet Take 12 5 mg by mouth daily 8/12/13  Yes Historical Provider, MD   Multiple Vitamins-Minerals (PRESERVISION AREDS 2 PO) Take 24 mg by mouth 2 (two) times a day   Yes Historical Provider, MD   [place]-3 1000 MG CAPS Take 1 tablet by mouth daily 10/3/12  Yes Historical Provider, MD   simvastatin (ZOCOR) 20 mg tablet Take 20 mg by mouth   Yes Historical Provider, MD   diphenhydrAMINE (BENADRYL) 25 mg tablet Take 1 tablet (25 mg total) by mouth every 6 (six) hours as needed for itching  Patient not taking: Reported on 9/14/2020 8/7/19   Ignacia Sanders MD   famotidine (PEPCID) 20 mg tablet Take 1 tablet by mouth daily    Historical Provider, MD   hydrocortisone 1 % cream Apply topically 4 (four) times a day as needed for irritation for up to 14 days 8/7/19 8/21/19  Ignacia Sanders MD   polyethylene glycol (MIRALAX) 17 g packet Take 17 g by mouth daily  Patient not taking: Reported on 9/14/2020 8/13/20   Aline Conn MD     I have reviewed home medications with patient family member  Allergies: No Known Allergies    Social History:     Marital Status: /Civil Union   Occupation: retired  Patient Pre-hospital Living Situation: at home with wife  Patient Pre-hospital Level of Mobility: ambulatory with walker  Patient Pre-hospital Diet Restrictions: none  Substance Use History:   Social History     Substance and Sexual Activity   Alcohol Use Never    Frequency: Never    Binge frequency: Never     Social History     Tobacco Use   Smoking Status Never Smoker   Smokeless Tobacco Never Used     Social History     Substance and Sexual Activity   Drug Use Never       Family History:    non-contributory    Physical Exam:     Vitals:   Blood Pressure: 127/60 (09/14/20 1353)  Pulse: 96 (09/14/20 1353)  Temperature: 98 4 °F (36 9 °C) (09/14/20 1353)  Temp Source: Oral (09/14/20 1353)  Respirations: 18 (09/14/20 1353)  Height: 5' (152 4 cm) (09/14/20 1353)  Weight - Scale: 63 5 kg (140 lb) (09/14/20 1353)  SpO2: 90 % (09/14/20 1353)    Physical Exam  Vitals signs and nursing note reviewed  Constitutional:       General: He is not in acute distress  Appearance: He is well-developed  He is not ill-appearing  Comments: Hard of hearing   HENT:      Head: Normocephalic and atraumatic  Mouth/Throat:      Mouth: Mucous membranes are moist       Pharynx: Oropharynx is clear  Eyes:      Extraocular Movements: Extraocular movements intact        Conjunctiva/sclera: Conjunctivae normal       Pupils: Pupils are equal, round, and reactive to light  Neck:      Musculoskeletal: Normal range of motion and neck supple  Cardiovascular:      Rate and Rhythm: Normal rate and regular rhythm  Pulses: Normal pulses  Heart sounds: Normal heart sounds  Pulmonary:      Effort: Pulmonary effort is normal  No respiratory distress  Breath sounds: Normal breath sounds  Comments: Some mild expiratory wheezing  Chest:      Chest wall: No tenderness  Abdominal:      General: Bowel sounds are normal  There is no distension  Palpations: Abdomen is soft  Comments: Lower abdominal discomfort to palpation   Genitourinary:     Comments: Has duarte draining clear yellow urine  Musculoskeletal:         General: No swelling or tenderness  Comments: B/l UE with 5/5 strength  B/l LE with 3/5 strength  Able to plantar but difficulty with dorsiflexion, not new   Skin:     General: Skin is warm and dry  Findings: No rash  Neurological:      General: No focal deficit present  Mental Status: He is alert and oriented to person, place, and time  Sensory: No sensory deficit  Comments: No facial asymmetry or slurred speech   Psychiatric:         Mood and Affect: Mood normal          Behavior: Behavior normal          Thought Content: Thought content normal          Judgment: Judgment normal            Additional Data:     Lab Results: I have personally reviewed pertinent reports  Results from last 7 days   Lab Units 09/14/20  0749   WBC Thousand/uL 6 47   HEMOGLOBIN g/dL 10 8*   HEMATOCRIT % 32 4*   PLATELETS Thousands/uL 182   NEUTROS PCT % 66   LYMPHS PCT % 21   MONOS PCT % 11   EOS PCT % 1     Results from last 7 days   Lab Units 09/14/20  0749   POTASSIUM mmol/L 3 5   CHLORIDE mmol/L 97*   CO2 mmol/L 26   BUN mg/dL 23   CREATININE mg/dL 1 10   CALCIUM mg/dL 8 4   ALK PHOS U/L 74   ALT U/L 22   AST U/L 21           Imaging: I have personally reviewed pertinent reports        Xr Spine Lumbar 2 Or 3 Views Injury    Result Date: 9/3/2020  Narrative: LUMBAR SPINE INDICATION:   S32 000A: Wedge compression fracture of unspecified lumbar vertebra, initial encounter for closed fracture  COMPARISON:  Lumbar spine radiograph 8/12/2020 VIEWS:  XR SPINE LUMBAR 2 OR 3 VIEWS INJURY FINDINGS: There are 5 non rib bearing lumbar vertebral bodies  Stable compression deformities at L4 and L5  Progressive loss of vertebral body height at known L1 and L2 compression deformities with increased sclerosis at the L2 vertebral body    There is no evidence of acute fracture or destructive osseous lesion  Moderate scoliotic deformity is noted  Alignment is otherwise unremarkable  Age-appropriate lumbar degenerative changes are seen  The pedicles appear intact  There are atherosclerotic calcifications  Soft tissues are otherwise unremarkable  Impression: Progressive loss of vertebral body height at known L1 and L2 compression deformities    Stable compression deformities at L4 and L5  The study was marked in EPIC for significant notification  Workstation performed: [id number]     Ct Abdomen Pelvis With Contrast    Result Date: 9/14/2020  Narrative: CT ABDOMEN AND PELVIS WITH IV CONTRAST INDICATION:   Weakness and difficulty urinating  COMPARISON:  8/9/2020 TECHNIQUE:  CT examination of the abdomen and pelvis was performed  Axial, sagittal, and coronal 2D reformatted images were created from the source data and submitted for interpretation  Radiation dose length product (DLP) for this visit:  587 mGy-cm   This examination, like all CT scans performed in the [facility], was performed utilizing techniques to minimize radiation dose exposure, including the use of iterative reconstruction and automated exposure control  IV Contrast:  100 mL of iohexol (OMNIPAQUE) Enteric Contrast:  Enteric contrast was not administered   FINDINGS: ABDOMEN LOWER CHEST:  Bibasilar subsegmental atelectasis and/or scarring, similar to the previous study  Coronary artery calcification  LIVER/BILIARY TREE:  Unremarkable  GALLBLADDER:  There are gallstone(s) within the gallbladder, without pericholecystic inflammatory changes  SPLEEN:  Unremarkable  PANCREAS:  Unremarkable  ADRENAL GLANDS:  Unremarkable  KIDNEYS/URETERS:  Unremarkable  No hydronephrosis  STOMACH AND BOWEL:  Sigmoid diverticulosis  No evidence of acute diverticulitis or bowel obstruction APPENDIX:  No findings to suggest appendicitis  ABDOMINOPELVIC CAVITY:  No ascites  No pneumoperitoneum  No lymphadenopathy  VESSELS:  Diffuse atherosclerotic changes of the intra-abdominal arteries  Ectasia of abdominal aorta  Maximal diameter 2 7 cm PELVIS REPRODUCTIVE ORGANS:  Mild prostatomegaly URINARY BLADDER:  Urinary bladder is decompressed with a Barton catheter in place  ABDOMINAL WALL/INGUINAL REGIONS:  Unremarkable  OSSEOUS STRUCTURES:  Multiple compression fractures of the lumbar spine similar to x-ray 8/28/2020     Impression: 1  Advanced sigmoid diverticulosis without evidence of bowel obstruction or diverticulitis 2  Multiple lumbar compression fractures similar to 8/28/2020 3  Urinary bladder decompressed with Barton catheter  No hydronephrosis  4  Cholelithiasis  Workstation performed: [id number]       EKG, Pathology, and Other Studies Reviewed on Admission:   · EKG: sinus with occasional PVCs and PACs    Epic / Care Everywhere Records Reviewed: Yes     ** Please Note: This note has been constructed using a voice recognition system   **

## 2020-09-14 NOTE — PLAN OF CARE
Problem: Potential for Falls  Goal: Patient will remain free of falls  Description: INTERVENTIONS:  - Assess patient frequently for physical needs  -  Identify cognitive and physical deficits and behaviors that affect risk of falls  -  [place] fall precautions as indicated by assessment   - Educate patient/family on patient safety including physical limitations  - Instruct patient to call for assistance with activity based on assessment  - Modify environment to reduce risk of injury  - Consider OT/PT consult to assist with strengthening/mobility  Outcome: Progressing     Problem: Nutrition/Hydration-ADULT  Goal: Nutrient/Hydration intake appropriate for improving, restoring or maintaining nutritional needs  Description: Monitor and assess patient's nutrition/hydration status for malnutrition  Collaborate with interdisciplinary team and initiate plan and interventions as ordered  Monitor patient's weight and dietary intake as ordered or per policy  Utilize nutrition screening tool and intervene as necessary  Determine patient's food preferences and provide high-protein, high-caloric foods as appropriate       INTERVENTIONS:  - Monitor oral intake, urinary output, labs, and treatment plans  - Assess nutrition and hydration status and recommend course of action  - Evaluate amount of meals eaten  - Assist patient with eating if necessary   - Allow adequate time for meals  - Recommend/ encourage appropriate diets, oral nutritional supplements, and vitamin/mineral supplements  - Order, calculate, and assess calorie counts as needed  - Recommend, monitor, and adjust tube feedings and TPN/PPN based on assessed needs  - Assess need for intravenous fluids  - Provide specific nutrition/hydration education as appropriate  - Include patient/family/caregiver in decisions related to nutrition  Outcome: Progressing     Problem: Prexisting or High Potential for Compromised Skin Integrity  Goal: Skin integrity is maintained or improved  Description: INTERVENTIONS:  - Identify patients at risk for skin breakdown  - Assess and monitor skin integrity  - Assess and monitor nutrition and hydration status  - Monitor labs   - Assess for incontinence   - Turn and reposition patient  - Assist with mobility/ambulation  - Relieve pressure over bony prominences  - Avoid friction and shearing  - Provide appropriate hygiene as needed including keeping skin clean and dry  - Evaluate need for skin moisturizer/barrier cream  - Collaborate with interdisciplinary team   - Patient/family teaching  - Consider wound care consult   Outcome: Progressing     Problem: SAFETY ADULT  Goal: Patient will remain free of falls  Description: INTERVENTIONS:  - Assess patient frequently for physical needs  -  Identify cognitive and physical deficits and behaviors that affect risk of falls    -  [place] fall precautions as indicated by assessment   - Educate patient/family on patient safety including physical limitations  - Instruct patient to call for assistance with activity based on assessment  - Modify environment to reduce risk of injury  - Consider OT/PT consult to assist with strengthening/mobility  Outcome: Progressing

## 2020-09-14 NOTE — ASSESSMENT & PLAN NOTE
Patient's wife reports that patient has had generalized weakness for few days but worsened yesterday to the point that he was too weak to stand or ambulate on his own  In addition, noted poor po intake and urinary retention for 2 days  Denies loss of sensation in LE but has had motor weakness in legs since lumbar fractures last month  Normally, wife reports that he is able to ambulate with walker  Likely secondary to poor po intake, age, recent back injury and urinary retention, but need to rule out infection, vitamin deficiency, hypothyroidism, and medication induced  Plan:  -will check TSH, Vit B12, Folate levels  -CXR to r/o PNA as wife reported some SOB and patient noted with expiratory wheezing  -UA negative for infection, Barton placed in ED  -PT/OT eval  -Noted with mild hyponatremia at 132  -IVF NS at 75 cc/hr  -Gabapentin was started recently after the back injury, will hold for now  -CBC, CMP tomorrow AM  -will consider possible Head CT if no improvement by tomorrow   No focal neurologic deficits noted on exam

## 2020-09-14 NOTE — ED PROVIDER NOTES
History  Chief Complaint   Patient presents with    Weakness - Generalized     patient presents today by EMS for weakness and trouble urinating      77-year-old male presents with chief complaint of generalized weakness and inability urinate  Wife reports he has only urinated over past 2 days  She states that his appetite has been poor and he has been weak but he has had adequate fluid intake  Patient reports he has some left-sided mild chest pain  Patient does have pain and distention in the suprapubic area on exam   No fevers or chills  Patient reportedly had a recent admission here with discharge rehabilitation/therapy  Patient's past medical history is significant for normal pressure hydrocephalus  Patient also has a history of frequent PVCs which are present on today's EKG  Patient also suffered from an OLLIE and ambulatory dysfunction during his recent admission  History provided by:  Patient and medical records   used: No    Malaise - 7 years or greater   Severity:  Moderate  Onset quality:  Sudden  Timing:  Constant  Progression:  Unchanged  Chronicity:  New  Relieved by:  Nothing  Worsened by:  Nothing  Ineffective treatments:  None tried  Associated symptoms: abdominal pain, anorexia, chest pain, difficulty walking and urgency    Associated symptoms: no diarrhea, no dysuria, no fever, no frequency, no nausea, no shortness of breath and no vomiting        Prior to Admission Medications   Prescriptions Last Dose Informant Patient Reported? Taking?    B Complex Vitamins (VITAMIN B COMPLEX PO)  Spouse/Significant Other Yes No   Sig: Take 482 2 mg by mouth daily   Multiple Vitamins-Minerals (PRESERVISION AREDS 2 PO)  Spouse/Significant Other Yes No   Sig: Take 24 mg by mouth 2 (two) times a day   Omega-3 1000 MG CAPS  Spouse/Significant Other Yes No   Sig: Take 1 tablet by mouth daily   acetaminophen (TYLENOL) 325 mg tablet   No No   Sig: Take 3 tablets (975 mg total) by mouth every 8 (eight) hours   aspirin 81 MG tablet  Spouse/Significant Other Yes No   Sig: Take 81 mg by mouth every other day   cholecalciferol (VITAMIN D3) 1,000 units tablet  Spouse/Significant Other Yes No   Sig: Take 1,000 Units by mouth daily   diphenhydrAMINE (BENADRYL) 25 mg tablet   No No   Sig: Take 1 tablet (25 mg total) by mouth every 6 (six) hours as needed for itching   famotidine (PEPCID) 20 mg tablet  Spouse/Significant Other Yes No   Sig: Take 1 tablet by mouth daily   gabapentin (NEURONTIN) 100 mg capsule   No No   Sig: Take 1 capsule (100 mg total) by mouth daily at bedtime   hydrochlorothiazide (MICROZIDE) 12 5 mg capsule   Yes No   Sig: Take 12 5 mg by mouth daily   hydrocortisone 1 % cream   No No   Sig: Apply topically 4 (four) times a day as needed for irritation for up to 14 days   metoprolol tartrate (LOPRESSOR) 25 mg tablet  Spouse/Significant Other Yes No   Sig: Take 12 5 mg by mouth daily   polyethylene glycol (MIRALAX) 17 g packet   No No   Sig: Take 17 g by mouth daily   simvastatin (ZOCOR) 20 mg tablet  Spouse/Significant Other Yes No   Sig: Take 20 mg by mouth      Facility-Administered Medications: None       Past Medical History:   Diagnosis Date    Hyperlipidemia     Hypertension        History reviewed  No pertinent surgical history  History reviewed  No pertinent family history  I have reviewed and agree with the history as documented  E-Cigarette/Vaping    E-Cigarette Use Never User      E-Cigarette/Vaping Substances     Social History     Tobacco Use    Smoking status: Never Smoker    Smokeless tobacco: Never Used   Substance Use Topics    Alcohol use: Never     Frequency: Never     Binge frequency: Never    Drug use: Never       Review of Systems   Constitutional: Negative for chills, diaphoresis and fever  Respiratory: Negative for shortness of breath  Cardiovascular: Positive for chest pain  Negative for palpitations     Gastrointestinal: Positive for abdominal pain and anorexia  Negative for diarrhea, nausea and vomiting  Genitourinary: Positive for urgency  Negative for dysuria and frequency  Skin: Negative for rash  Neurological: Positive for weakness  All other systems reviewed and are negative  Physical Exam  Physical Exam  Vitals signs and nursing note reviewed  Constitutional:       General: He is in acute distress  Appearance: He is well-developed  He is ill-appearing  HENT:      Head: Normocephalic and atraumatic  Eyes:      Pupils: Pupils are equal, round, and reactive to light  Neck:      Musculoskeletal: Normal range of motion  Vascular: No JVD  Cardiovascular:      Rate and Rhythm: Regular rhythm  Tachycardia present  Heart sounds: Normal heart sounds  No murmur  No friction rub  No gallop  Pulmonary:      Effort: Pulmonary effort is normal  No respiratory distress  Breath sounds: Normal breath sounds  No wheezing or rales  Chest:      Chest wall: No tenderness  Abdominal:      General: There is distension  Palpations: There is no mass  Tenderness: There is abdominal tenderness  There is no guarding or rebound  Hernia: No hernia is present  Musculoskeletal: Normal range of motion  General: No tenderness  Skin:     General: Skin is warm and dry  Neurological:      General: No focal deficit present  Mental Status: He is alert and oriented to person, place, and time  Psychiatric:         Behavior: Behavior normal          Thought Content:  Thought content normal          Judgment: Judgment normal          Vital Signs  ED Triage Vitals   Temperature Pulse Respirations Blood Pressure SpO2   09/14/20 0730 09/14/20 0724 09/14/20 0724 09/14/20 0724 09/14/20 0724   99 °F (37 2 °C) 103 18 136/72 92 %      Temp Source Heart Rate Source Patient Position - Orthostatic VS BP Location FiO2 (%)   09/14/20 0730 09/14/20 0724 09/14/20 0724 09/14/20 1050 --   Oral Monitor Lying Right arm Pain Score       09/14/20 0724       No Pain           Vitals:    09/14/20 0724 09/14/20 0815 09/14/20 1050   BP: 136/72 159/97 148/68   Pulse: 103 94 96   Patient Position - Orthostatic VS: Lying  Lying         Visual Acuity      ED Medications  Medications   sodium chloride 0 9 % infusion (125 mL/hr Intravenous New Bag 9/14/20 0754)   ondansetron (ZOFRAN) injection 4 mg (4 mg Intravenous Given 9/14/20 0752)   iohexol (OMNIPAQUE) 350 MG/ML injection (MULTI-DOSE) 100 mL (100 mL Intravenous Given 9/14/20 0958)       Diagnostic Studies  Results Reviewed     Procedure Component Value Units Date/Time    Urine Microscopic [[id number]]  (Abnormal) Collected:  09/14/20 0810    Lab Status:  Final result Specimen:  Urine, Indwelling Barton Catheter Updated:  09/14/20 0959     RBC, UA 4-10 /hpf      WBC, UA 0-1 /hpf      Epithelial Cells Occasional /hpf      Bacteria, UA Occasional /hpf     Urine Macroscopic, POC [[id number]]  (Abnormal) Collected:  09/14/20 0810    Lab Status:  Final result Specimen:  Urine Updated:  09/14/20 0812     Color, UA Yellow     Clarity, UA Clear     pH, UA 6 0     Leukocytes, UA Negative     Nitrite, UA Negative     Protein, UA Negative mg/dl      Glucose, UA Negative mg/dl      Ketones, UA Negative mg/dl      Urobilinogen, UA 1 0 E U /dl      Bilirubin, UA Negative     Blood, UA Moderate     Specific [place], UA 1 015    Narrative:       CLINITEK RESULT    Troponin I [[id number]]  (Normal) Collected:  09/14/20 0749    Lab Status:  Final result Specimen:  Blood from Arm, Right Updated:  09/14/20 0811     Troponin I <0 02 ng/mL     Comprehensive metabolic panel [[id number]]  (Abnormal) Collected:  09/14/20 0749    Lab Status:  Final result Specimen:  Blood from Arm, Right Updated:  09/14/20 0809     Sodium 132 mmol/L      Potassium 3 5 mmol/L      Chloride 97 mmol/L      CO2 26 mmol/L      ANION GAP 9 mmol/L      BUN 23 mg/dL      Creatinine 1 10 mg/dL      Glucose 100 mg/dL      Calcium 8 4 mg/dL AST 21 U/L      ALT 22 U/L      Alkaline Phosphatase 74 U/L      Total Protein 6 5 g/dL      Albumin 2 8 g/dL      Total Bilirubin 1 59 mg/dL      eGFR 60 ml/min/1 73sq m     Narrative:       [place] guidelines for Chronic Kidney Disease (CKD):     Stage 1 with normal or high GFR (GFR > 90 mL/min/1 73 square meters)    Stage 2 Mild CKD (GFR = 60-89 mL/min/1 73 square meters)    Stage 3A Moderate CKD (GFR = 45-59 mL/min/1 73 square meters)    Stage 3B Moderate CKD (GFR = 30-44 mL/min/1 73 square meters)    Stage 4 Severe CKD (GFR = 15-29 mL/min/1 73 square meters)    Stage 5 End Stage CKD (GFR <15 mL/min/1 73 square meters)  Note: GFR calculation is accurate only with a steady state creatinine    CBC and differential [[id number]]  (Abnormal) Collected:  09/14/20 0749    Lab Status:  Final result Specimen:  Blood from Arm, Right Updated:  09/14/20 0757     WBC 6 47 Thousand/uL      RBC 3 55 Million/uL      Hemoglobin 10 8 g/dL      Hematocrit 32 4 %      MCV 91 fL      MCH 30 4 pg      MCHC 33 3 g/dL      RDW 15 1 %      MPV 10 5 fL      Platelets 546 Thousands/uL      nRBC 0 /100 WBCs      Neutrophils Relative 66 %      Immat GRANS % 1 %      Lymphocytes Relative 21 %      Monocytes Relative 11 %      Eosinophils Relative 1 %      Basophils Relative 0 %      Neutrophils Absolute 4 31 Thousands/µL      Immature Grans Absolute 0 08 Thousand/uL      Lymphocytes Absolute 1 34 Thousands/µL      Monocytes Absolute 0 69 Thousand/µL      Eosinophils Absolute 0 04 Thousand/µL      Basophils Absolute 0 01 Thousands/µL                  CT abdomen pelvis with contrast   Final Result by Medina Mark MD (09/14 1023)      1  Advanced sigmoid diverticulosis without evidence of bowel obstruction or diverticulitis   2  Multiple lumbar compression fractures similar to 8/28/2020   3  Urinary bladder decompressed with Barton catheter  No hydronephrosis     4  Cholelithiasis                Workstation performed: [id number]                    Procedures  ECG 12 Lead Documentation Only    Date/Time: 9/14/2020 7:37 AM  Performed by: Ashlee Longoria MD  Authorized by: Ashlee Longoria MD     Indications / Diagnosis:  Weakness  ECG reviewed by me, the ED Provider: yes    Patient location:  ED  Previous ECG:     Previous ECG:  Compared to current    Comparison ECG info:  08/09/2020    Similarity:  Changes noted  Interpretation:     Interpretation: abnormal    Rate:     ECG rate:  98    ECG rate assessment: normal    Rhythm:     Rhythm: sinus rhythm    Ectopy:     Ectopy: PVCs      PVCs:  Frequent  QRS:     QRS axis:  Normal    QRS intervals:  Normal  Conduction:     Conduction: normal    ST segments:     ST segments:  Normal  T waves:     T waves: normal               ED Course                                       MDM  Number of Diagnoses or Management Options  Generalized weakness: new and requires workup  Urinary retention: new and requires workup  Diagnosis management comments: Background: 80 y o  male presents with s/s of urinary retention, also with chest pain and weakness    Differential DX includes but is not limited to: urinary retention - likely due to outlet obstruction, possible danitza/arf, anemia, metabolic derangement, atypical acs, arrhythmia     Plan: bladder scan, duarte catheter, cardiac workup, likely admission          Amount and/or Complexity of Data Reviewed  Clinical lab tests: ordered and reviewed  Tests in the radiology section of CPT®: ordered and reviewed    Risk of Complications, Morbidity, and/or Mortality  Presenting problems: high  Diagnostic procedures: high  Management options: high    Patient Progress  Patient progress: stable      Disposition  Final diagnoses:   Urinary retention   Generalized weakness     Time reflects when diagnosis was documented in both MDM as applicable and the Disposition within this note     Time User Action Codes Description Comment    9/14/2020 11:14 AM Micheal Flores B Add [R33 9] Urinary retention     9/14/2020 11:14 AM Richie CARIAS Add [R53 1] Generalized weakness       ED Disposition     ED Disposition Condition Date/Time Comment    Admit Stable Mon Sep 14, 2020 11:14 AM Case was discussed with Dr Farrah Ramirez and the patient's admission status was agreed to be Admission Status: inpatient status to the service of Dr Nafisa Gonzalez   Follow-up Information    None         Patient's Medications   Discharge Prescriptions    No medications on file     No discharge procedures on file      PDMP Review     None          ED Provider  Electronically Signed by           Danay Dumont MD  09/14/20 1116

## 2020-09-14 NOTE — ASSESSMENT & PLAN NOTE
POA: Hb of 10 8, normocytic  Hb from 8/12/20 was 13 9      Plan:  -no overt signs of acute blood loss  -may be contributing to his weakness  -check FOBT to r/o GI blood loss  -CBC tomorrow AM

## 2020-09-14 NOTE — ASSESSMENT & PLAN NOTE
Wife reports that patient is normally able to ambulate at home with walker but since yesterday, too weak to ambulate  He was discharged from acute rehab about 2 weeks ago following his lumbar fractures (L2-L5 vertebral compression fractures) and was getting therapy at home  Follows up with neurosurgery as outpatient, last office visit was 8/28/20      Plan as outlined above  -PT/OT maged

## 2020-09-15 LAB
ALBUMIN SERPL BCP-MCNC: 2.1 G/DL (ref 3.5–5)
ALP SERPL-CCNC: 57 U/L (ref 46–116)
ALT SERPL W P-5'-P-CCNC: 18 U/L (ref 12–78)
ANION GAP SERPL CALCULATED.3IONS-SCNC: 9 MMOL/L (ref 4–13)
AST SERPL W P-5'-P-CCNC: 16 U/L (ref 5–45)
BASOPHILS # BLD AUTO: 0.02 THOUSANDS/ΜL (ref 0–0.1)
BASOPHILS NFR BLD AUTO: 0 % (ref 0–1)
BILIRUB SERPL-MCNC: 1.29 MG/DL (ref 0.2–1)
BUN SERPL-MCNC: 23 MG/DL (ref 5–25)
CALCIUM SERPL-MCNC: 7.7 MG/DL (ref 8.3–10.1)
CHLORIDE SERPL-SCNC: 103 MMOL/L (ref 100–108)
CO2 SERPL-SCNC: 24 MMOL/L (ref 21–32)
CREAT SERPL-MCNC: 1.22 MG/DL (ref 0.6–1.3)
EOSINOPHIL # BLD AUTO: 0.03 THOUSAND/ΜL (ref 0–0.61)
EOSINOPHIL NFR BLD AUTO: 1 % (ref 0–6)
ERYTHROCYTE [DISTWIDTH] IN BLOOD BY AUTOMATED COUNT: 15.5 % (ref 11.6–15.1)
FERRITIN SERPL-MCNC: 917 NG/ML (ref 8–388)
FOLATE SERPL-MCNC: >20 NG/ML (ref 3.1–17.5)
GFR SERPL CREATININE-BSD FRML MDRD: 53 ML/MIN/1.73SQ M
GLUCOSE SERPL-MCNC: 84 MG/DL (ref 65–140)
HCT VFR BLD AUTO: 27.8 % (ref 36.5–49.3)
HGB BLD-MCNC: 8.8 G/DL (ref 12–17)
HGB BLD-MCNC: 9.7 G/DL (ref 12–17)
IMM GRANULOCYTES # BLD AUTO: 0.04 THOUSAND/UL (ref 0–0.2)
IMM GRANULOCYTES NFR BLD AUTO: 1 % (ref 0–2)
IRON SATN MFR SERPL: 21 %
IRON SERPL-MCNC: 31 UG/DL (ref 65–175)
LYMPHOCYTES # BLD AUTO: 1.47 THOUSANDS/ΜL (ref 0.6–4.47)
LYMPHOCYTES NFR BLD AUTO: 25 % (ref 14–44)
MCH RBC QN AUTO: 29.5 PG (ref 26.8–34.3)
MCHC RBC AUTO-ENTMCNC: 31.7 G/DL (ref 31.4–37.4)
MCV RBC AUTO: 93 FL (ref 82–98)
MONOCYTES # BLD AUTO: 0.69 THOUSAND/ΜL (ref 0.17–1.22)
MONOCYTES NFR BLD AUTO: 12 % (ref 4–12)
NEUTROPHILS # BLD AUTO: 3.7 THOUSANDS/ΜL (ref 1.85–7.62)
NEUTS SEG NFR BLD AUTO: 61 % (ref 43–75)
NRBC BLD AUTO-RTO: 0 /100 WBCS
PLATELET # BLD AUTO: 171 THOUSANDS/UL (ref 149–390)
PMV BLD AUTO: 10.1 FL (ref 8.9–12.7)
POTASSIUM SERPL-SCNC: 3.5 MMOL/L (ref 3.5–5.3)
PROT SERPL-MCNC: 5.3 G/DL (ref 6.4–8.2)
RBC # BLD AUTO: 2.98 MILLION/UL (ref 3.88–5.62)
SODIUM SERPL-SCNC: 136 MMOL/L (ref 136–145)
TIBC SERPL-MCNC: 148 UG/DL (ref 250–450)
TSH SERPL DL<=0.05 MIU/L-ACNC: 1.22 UIU/ML (ref 0.36–3.74)
VIT B12 SERPL-MCNC: 858 PG/ML (ref 100–900)
WBC # BLD AUTO: 5.95 THOUSAND/UL (ref 4.31–10.16)

## 2020-09-15 PROCEDURE — 82728 ASSAY OF FERRITIN: CPT | Performed by: INTERNAL MEDICINE

## 2020-09-15 PROCEDURE — 83550 IRON BINDING TEST: CPT | Performed by: INTERNAL MEDICINE

## 2020-09-15 PROCEDURE — 99222 1ST HOSP IP/OBS MODERATE 55: CPT | Performed by: NURSE PRACTITIONER

## 2020-09-15 PROCEDURE — 82746 ASSAY OF FOLIC ACID SERUM: CPT | Performed by: INTERNAL MEDICINE

## 2020-09-15 PROCEDURE — 85018 HEMOGLOBIN: CPT | Performed by: INTERNAL MEDICINE

## 2020-09-15 PROCEDURE — 83540 ASSAY OF IRON: CPT | Performed by: INTERNAL MEDICINE

## 2020-09-15 PROCEDURE — 97116 GAIT TRAINING THERAPY: CPT

## 2020-09-15 PROCEDURE — 97163 PT EVAL HIGH COMPLEX 45 MIN: CPT

## 2020-09-15 PROCEDURE — 82607 VITAMIN B-12: CPT | Performed by: INTERNAL MEDICINE

## 2020-09-15 PROCEDURE — 84443 ASSAY THYROID STIM HORMONE: CPT | Performed by: INTERNAL MEDICINE

## 2020-09-15 PROCEDURE — 99232 SBSQ HOSP IP/OBS MODERATE 35: CPT | Performed by: INTERNAL MEDICINE

## 2020-09-15 PROCEDURE — 80053 COMPREHEN METABOLIC PANEL: CPT | Performed by: INTERNAL MEDICINE

## 2020-09-15 PROCEDURE — 85025 COMPLETE CBC W/AUTO DIFF WBC: CPT | Performed by: INTERNAL MEDICINE

## 2020-09-15 RX ORDER — DOXYCYCLINE HYCLATE 50 MG/1
324 CAPSULE, GELATIN COATED ORAL
Status: DISCONTINUED | OUTPATIENT
Start: 2020-09-15 | End: 2020-09-16 | Stop reason: HOSPADM

## 2020-09-15 RX ADMIN — Medication 12.5 MG: at 09:29

## 2020-09-15 RX ADMIN — PRAVASTATIN SODIUM 40 MG: 40 TABLET ORAL at 16:57

## 2020-09-15 RX ADMIN — B-COMPLEX W/ C & FOLIC ACID TAB 1 TABLET: TAB at 09:30

## 2020-09-15 RX ADMIN — FERROUS GLUCONATE 324 MG: 324 TABLET ORAL at 16:57

## 2020-09-15 RX ADMIN — SODIUM CHLORIDE 75 ML/HR: 0.9 INJECTION, SOLUTION INTRAVENOUS at 06:15

## 2020-09-15 RX ADMIN — Medication 2000 UNITS: at 09:31

## 2020-09-15 RX ADMIN — HYDROCHLOROTHIAZIDE 12.5 MG: 12.5 TABLET ORAL at 09:30

## 2020-09-15 RX ADMIN — ACETAMINOPHEN 975 MG: 325 TABLET, FILM COATED ORAL at 05:14

## 2020-09-15 RX ADMIN — TAMSULOSIN HYDROCHLORIDE 0.4 MG: 0.4 CAPSULE ORAL at 16:57

## 2020-09-15 RX ADMIN — ASPIRIN 81 MG 81 MG: 81 TABLET ORAL at 09:30

## 2020-09-15 RX ADMIN — ENOXAPARIN SODIUM 40 MG: 40 INJECTION SUBCUTANEOUS at 09:28

## 2020-09-15 RX ADMIN — FAMOTIDINE 20 MG: 20 TABLET, FILM COATED ORAL at 09:29

## 2020-09-15 NOTE — PHYSICAL THERAPY NOTE
PHYSICAL THERAPY CANCELLATION NOTE    Patient Name: Haile BEDOLLA Date: 9/15/2020     PT orders received, chart review performed  Spoke to [employer]  Per most recent OP NS note from 8/28/2020, pt is to continue to wear TLSO when OOB and HOB >45 degrees  Per RN, TLSO is not presently in room but family member was contacted and stated they would bring in brace later today  Will hold off on PT eval until TLSO brace is present; will continue to follow as appropriate and as schedule allows      Isabella Dunlap, PT, DPT

## 2020-09-15 NOTE — PHYSICAL THERAPY NOTE
PHYSICAL THERAPY EVALUATION NOTE    Patient Name: Trung Yepez  ZMQQM'N Date: 9/15/2020     AGE:   80 y o  Mrn:   [id number]  ADMIT DX:  Urinary retention [R33 9]  Weakness generalized [R53 1]  Generalized weakness [R53 1]    Past Medical History:   Diagnosis Date    Hyperlipidemia     Hypertension      Length Of Stay: 1  PHYSICAL THERAPY EVALUATION :   Patient's identity confirmed via 2 patient identifiers (full name and ) at start of session       09/15/20 1440   Note Type   Note type Eval/Treat   Pain Assessment   Pain Assessment Tool 0-10   Pain Score No Pain   Home Living   Type of [address] [place] [address] Two level;Stairs to enter with rails;1/2 bath on main level; Able to live on main level with bedroom/bathroom  (4 SOCORRO w/ B rail)   Bathroom Shower/Tub Tub/shower unit   Home Equipment Walker;Quad cane  (Primarily using RW PTA)   Additional Comments Pt resides in a 2 SH w/ 4 SOCORRO, is able to have a FFSU  Mostly using RW but was intermittently using quad cane to get around after coming home from [facility] 2 weeks ago  Per chart review and wife, pt was independently ambulating at least household distances w/ RW or quad cane  Noted decline in mobility since returning home   Prior Function   Level of [place] Needs assistance with IADLs   Lives With Spouse; Family  (wife works a few hours in the morning a couple days/week)   Receives Help From Family   ADL Assistance Needs assistance   IADLs Needs assistance   Falls in the last 6 months 1 to 4  (none new from previous admission)   Vocational Retired  ([employer] )   Restrictions/Precautions   [place] Bearing Precautions Per Order No   Braces or Orthoses TLSO  (backpack, wife brought from home)   Other Precautions Chair Alarm; Bed Alarm;Multiple lines; Fall Risk;Hard of hearing  (Barton catheter)   General   Family/Caregiver Present Yes  (Wife)   Cognition   Overall Cognitive Status Impaired Arousal/Participation Responsive   Orientation Level Oriented to person;Oriented to place;Oriented to situation   Following Commands Follows one step commands with increased time or repetition   Comments Pt is intermittently emotionally liable, especially when talking about coming home from STR  Recovers quickly w/ motivation and encouragement from therapist  Pt benefits from (+) time to follow questions and directions  RLE Assessment   RLE Assessment WFL   Strength RLE   RLE Overall Strength 3+/5   LLE Assessment   LLE Assessment WFL   Strength LLE   LLE Overall Strength 3+/5   Coordination   Movements are Fluid and Coordinated 0   Coordination and Movement Description Slow, segmental   Bed Mobility   Supine to Sit 3  Moderate assistance   Additional items Assist x 1;HOB elevated; Increased time required;Verbal cues;LE management  (log roll technique)   Sit to Supine Unable to assess   Additional Comments Wife brought backpack TLSO from home  Per wife, pt requires A to put on brace  Pt was dependent for putting on brace when sitting EOB  Transfers   Sit to Stand 3  Moderate assistance   Additional items Assist x 2; Increased time required;Verbal cues   Stand to Sit 3  Moderate assistance   Additional items Assist x 1; Increased time required;Verbal cues   Ambulation/Elevation   Gait pattern Forward Flexion;Narrow MALRON; Decreased foot clearance; Short stride; Excessively slow  (Retropulsive at times)   Gait Assistance 3  Moderate assist   Additional items Assist x 1   Assistive Device Rolling walker   Distance 20 ft   Balance   Static Sitting Poor +   Static Standing Poor   Ambulatory Poor   Endurance Deficit   Endurance Deficit Yes   Endurance Deficit Description Pt visibly SOB after ambulating short distance   Activity Tolerance   Activity Tolerance Patient limited by fatigue   Medical Staff Made Aware Spoke to SHANA Barger via TT   Nurse Made Aware Spoke to DEBORAH Merino   Assessment   Prognosis Fair   Problem List Decreased strength;Decreased endurance; Impaired balance;Decreased mobility; Decreased cognition; Impaired hearing  (Spinal precautions)   Assessment Carter Zamora is an 79 y/o Male who [facility] from home w/ c/o generalized weakness and inability to urinate, with a diagnosis of generalized weakness, urinary retention  Received orders for PT eval and treat, with activity order(s) of bathroom priviledges and fall precautions  Pt w/ known L2-L5 vertebral compression fractures  Has a backpack TLSO that per OP NS visit on 8/28/2020, is still to wear TLSO when HOB >45 degrees and OOB  This patient presents w/ comorbidities of NPH, PVCs, HTN, HLD, hx of L2-L5 vertebral fx and has personal factors of advanced age, living in 2 story house, mobilizing w/ assistive device, stair(s) to enter home, inability to navigate level surfaces w/o external assistance, decreased cognition, positive fall history and hearing impairments  Patient presents with the following impairments at time of evaluation including: weakness, decreased endurance, impaired cognition, impaired balance, gait deviations, decreased safety awareness, fall risk and spinal precautions  These impairments are evident in findings from the physical examination weakness, mobility assessment (need for mod assist w/ all phases of mobility when usually mobilizing independently, tolerance to only 20 feet of ambulation and need for cueing for mobility technique), and objective measure(s) Barthel Index: 35/100  During session, pt needed input for task input and mobility technique  Due to physical deficits, patient is at an increased risk for falls   This patient's clinical presentation is unstable/unpredictable, which is evident in need for assist w/ all phases of mobility when usually mobilizing independently, tolerance to only 20 feet of ambulation, need for input for task focus and mobility technique, recent readmission (within 30 days) and recent history of falls  At this time patient would benefit from skilled inpatient PT in order to address abovementioned deficits in order to improve overall function and mobility  Discharge recommendation is for inpatient rehab in order to reduce fall risk and maximize level of functional independence  Goals   Patient Goals be able to move better   STG Expiration Date 20   Short Term Goal #1 Patient will: Increase bilateral LE strength 1/2 grade to facilitate independent mobility, Perform all bed mobility tasks w/ supervision to decrease fall risk factors, Perform all transfers w/ supervision to improve independence, Ambulate at least 100 ft  with roller walker w/ supervision w/o LOB, Increase all balance 1/2 grade to decrease risk for falls, Complete exercise program independently, Tolerate 3 hr OOB to faciliate upright tolerance and Improve Barthel Index score to 60 or greater to facilitate independence   PT Treatment Day 1  (see treatment note below)   Plan   Treatment/Interventions Functional transfer training;LE strengthening/ROM; Therapeutic exercise; Endurance training;Cognitive reorientation;Patient/family training;Equipment eval/education; Bed mobility;Gait training  (PT to see for stairs when appropriate)   PT Frequency Other (Comment)  (3-5x/wk)   Recommendation   PT Discharge Recommendation Post-Acute Rehabilitation Services   Equipment Recommended Walker   Barthel Index   Feeding 10   Bathing 0   Grooming Score 0   Dressing Score 5   Bladder Score 0   Bowels Score 10   Toilet Use Score 5   Transfers (Bed/Chair) Score 5   Mobility (Level Surface) Score 0   Stairs Score 0   Barthel Index Score 35             PHYSICAL THERAPY TREATMENT NOTE    Name: Carter Zamora  : 1934  Time in: 1430  Time out: 1440  Total treat time: 10 min    S: Pt continues to deny pain, and continues to be intermittently emotionally liable when talking about work history, etc  He is agreeable to continue to participate in therapy intervention  O: Pt requires mod A x 1 to perform STS from toilet w/ (+) grab bar  Pt performs STS x 2 from toilet after needing a seated rest break between standing x 1 min w/ RW for BUE support while therapist performs pericare  Pt is able to ambulate back to recliner chair w/ mod A x 1 and use of RW to ambulate 10 ft  Pt visibly SOB  Once in chair, therapist adjusted pt's TLSO for comfort  Pt seated OOB in recliner chair w/ TLSO on, chair alarm activated, w/ call bell and room phone within reach  Notified DEBORAH Merino  A: Pt limited secondary to decreased cognition and fatigue during session  He continues to require mod A x 1 for transfers from toilet (performed x 2 due to fatigue) and for ambulation  Pt benefits from verbal cues to keep the walker closer to his body  Pt continues to require total A to adjust TLSO brace  Pt would benefit from skilled inpatient PT during this admission in order to continue to address deficits and progress towards goals       P: Continue per evaluation above    Skilled inpatient PT is recommended during this admission in order to progress patient toward goals so patient is able to maximize independence    [place], PT, DPT

## 2020-09-15 NOTE — ASSESSMENT & PLAN NOTE
Hemoglobin on admission was 10 8 which was lower than his baseline, reportedly 13 of long ago  On 09/15/2020 his hemoglobin is 8 8  No signs of overt bleeding  No bowel movement since hospitalization aspirate nursing staff  Plan: We will repeat a hemoglobin this afternoon to confirm significant drop  If that is the case we will consider a GI evaluation given the fact that anemia may be contributing to his overall generalized weakness  Check also iron studies, B12, hemolytic panel

## 2020-09-15 NOTE — DISCHARGE INSTRUCTIONS
Duarte Cath Care instructions---Maintain duarte catheter to straight drainage  May use leg bag and shower  May flush TID prn using Wendy syringe and 120 ml NSS  May use more saline ad ivania to prevent/treat cath obstruction  Remove catheter on 8th day rehab by 0600 hrs  Bladder scan if no void or less than 200ml in 6hrs  Straight cath for bladder scan >350ml and repeat process  If patient requires straight cath x3 , re-insert duarte and call for Urologist follow-up  Information above  Duarte can remain in place for up to 4 weeks at a time   Duarte placed at [facility] on 9/14/20 20

## 2020-09-15 NOTE — ASSESSMENT & PLAN NOTE
Patient had retention on admission about 200 cc on bladder scan of 300 cc after insertion of Duarte catheter per admitting team   Given insertion of duarte catheter for acute retention urology has been consulted  Waiting on their input on that regard  Plan:  Continue tamsulosin  Wait for urological evaluation  Their preliminary impression that the initial retention was not that significant and, if patient needs to be discharged to SNF, a voiding trial can be tried at the skilled nursing facility

## 2020-09-15 NOTE — PROGRESS NOTES
Progress Note - Lucille Abo 1934, 80 y o  male MRN: [id number]    Unit/Bed#: S -01 Encounter: [id number]    Primary Care Provider: Champ Dubon DO   Date and time admitted to hospital: 9/14/2020  7:21 AM        * Generalized weakness  Assessment & Plan  Patient continues to remain very weak, limited ambulation  Currently he denies any significant pain  PLAN:  - Waiting for detailed PT/OT eval  May benefit from rehab again  Urinary retention  Assessment & Plan  Patient had retention on admission about 200 cc on bladder scan of 300 cc after insertion of Duarte catheter per admitting team   Given insertion of duarte catheter for acute retention urology has been consulted  Waiting on their input on that regard  Plan:  Continue tamsulosin  Wait for urological evaluation  Their preliminary impression that the initial retention was not that significant and, if patient needs to be discharged to SNF, a voiding trial can be tried at the skilled nursing facility  Anemia  Assessment & Plan  Hemoglobin on admission was 10 8 which was lower than his baseline, reportedly 13 of long ago  On 09/15/2020 his hemoglobin is 8 8  No signs of overt bleeding  No bowel movement since hospitalization aspirate nursing staff  Plan: We will repeat a hemoglobin this afternoon to confirm significant drop  If that is the case we will consider a GI evaluation given the fact that anemia may be contributing to his overall generalized weakness  Check also iron studies, B12, hemolytic panel  Hyperlipidemia  Assessment & Plan  Stable, continue pravastatin    Essential hypertension  Assessment & Plan  -Controlled, Continue on home medication of Lopressor and HCTZ  -Monitor BP            Subjective/Objective     Subjective:   Patient evaluated at bedside this morning, no significant complaints except for minor discomfort related to the Duarte catheter    He denies any significant back pain or suprapubic pain   Has remained essentially afebrile since hospitalization  Of concern, his hemoglobin is noted to be 8 8 this morning, this is significant drop compared to 10 8 on admission  Patient and nursing staff denies any overt bleeding  No bowel movements since hospitalization  Objective:  Vitals: Blood pressure 146/63, pulse 81, temperature 97 6 °F (36 4 °C), temperature source Oral, resp  rate 18, height 5' (1 524 m), weight 63 5 kg (140 lb), SpO2 94 %  ,Body mass index is 27 34 kg/m²        Intake/Output Summary (Last 24 hours) at 9/15/2020 1048  Last data filed at 9/15/2020 0300  Gross per 24 hour   Intake --   Output 600 ml   Net -600 ml       Invasive Devices     Peripheral Intravenous Line            Peripheral IV 09/14/20 Right Antecubital 1 day          Drain            Urethral Catheter Latex 16 Fr  1 day                Physical Exam: /63 (BP Location: Left arm)   Pulse 81   Temp 97 6 °F (36 4 °C) (Oral)   Resp 18   Ht 5' (1 524 m)   Wt 63 5 kg (140 lb)   SpO2 94%   BMI 27 34 kg/m²     General Appearance:  Elderly male lying in bed, awake, appears overall frail, otherwise AAO x4  , pale   Head:    Normocephalic, without obvious abnormality, atraumatic   Eyes:    PERRL, conjunctiva/corneas clear, EOM's intact, fundi     benign, both eyes     , no jaundice noted   Ears:    Normal TM's and external ear canals, both ears   Nose:   Nares normal, septum midline, mucosa normal, no drainage    or sinus tenderness   Throat:   Lips, mucosa, and tongue normal; teeth and gums normal   Neck:   Supple, symmetrical, trachea midline, no adenopathy;        thyroid:  No enlargement/tenderness/nodules; no carotid    bruit or JVD   Back:     Symmetric, no curvature, ROM normal, no CVA tenderness   Lungs:     Clear to auscultation bilaterally, respirations unlabored   Chest wall:    No tenderness or deformity   Heart:    Regular rate and rhythm, S1 and S2 normal, no murmur, rub   or gallop   Abdomen:     Soft, non-tender, bowel sounds active all four quadrants,     no masses, no organomegaly   Genitalia:    Normal male without lesion, discharge or tenderness  There is a Barton catheter in place there draining yellow urine   Rectal:    Normal tone, normal prostate, no masses or tenderness;    guaiac negative stool   Extremities:   Extremities normal, atraumatic, no cyanosis or edema   Pulses:   2+ and symmetric all extremities   Skin:   Skin color, texture, turgor normal, no rashes or lesions   Lymph nodes:   Cervical, supraclavicular, and axillary nodes normal   Neurologic: AAOx4  No facial mimic changes, DTR2+       Lab, Imaging and other studies: I have personally reviewed pertinent reports      VTE Pharmacologic Prophylaxis: Sequential compression device (Venodyne)   VTE Mechanical Prophylaxis: sequential compression device

## 2020-09-15 NOTE — ASSESSMENT & PLAN NOTE
Patient continues to remain very weak, limited ambulation  Currently he denies any significant pain  PLAN:  - Waiting for detailed PT/OT eval  May benefit from rehab again

## 2020-09-15 NOTE — OCCUPATIONAL THERAPY NOTE
Occupational Therapy Cancellation Note        Patient Name: Sung Núñez  MHPMJ'T Date: 9/15/2020    Chart review performed  OT orders received  At this time, OT treatment session cancelled due to TLSO brace not present in room  Per most recent outpatient neurosurgery note from 8/28/2020, pt is to continue to wear TLSO when OOB and HOB >45 degrees  Per RN Angelique, family is contacted and stating they will bring in brace later today  OT will follow and provide evaluation/interventions when TLSO is present and as appropriate/able      [facility], OTR/L

## 2020-09-15 NOTE — CONSULTS
CONSULT    Patient Name: Sung Núñez  Patient MRN: [id number]  Date of Service: 9/15/2020   Date / Time Note Created: 9/15/2020 12:53 PM   Referring Provider: Brit Noonan MD      Provider Creating Note: CLOVER Coe    PCP: Brock Cedillo  Attending Provider:  Brit Noonan MD    Reason for Consult: Urinary Retention    HPI --Sung Núñez is an 31-year-old male with recent diagnosis of vertebral fractures, released from rehab approximately 2 weeks ago  Patient endorses history of urologic evaluation very remotely and has not required urologic care for many years since  He denies chronic irritative or obstructive urinary symptoms including dysuria, hematuria, urinary frequency, flank, abdominal, suprapubic pain, he endorses positive weak urinary stream and recent hesitancy  Review of EMR shows patient is on diuretic, however, patient is not on chronic alpha blockade  He offered complaints in the emergency room of [address] [place] [address] documentation show bladder scan of approximately 200 mL  Barton catheter was inserted for volume of 300 mL  Barton remains in-situ post admission by Internal Medicine service for grossly clear yellow urine  Patient is afebrile and hemodynamically stable  Creatinine within normal limits  No leukocytosis seen on most recent CBC  Urine microscopic was negative for significant wbc's  Flomax now prescribed  Urologic consultation requested for further management recommendations        Source:chart review and the patient           Patient Active Problem List   Diagnosis    Bee sting    Essential hypertension    Dyslipidemia    OLLIE (acute kidney injury) ([facility] [address] )    Hypertension    Hyperlipidemia    Closed compression fracture of thoracolumbar vertebra (HCC)    Ambulatory dysfunction    Constipation    Irregular heartbeat    NPH (normal pressure hydrocephalus) ([facility])    Dyspepsia    Macular degeneration    Tearfulness    Leukocytosis    Nausea and vomiting    Generalized weakness    Urinary retention    Anemia             Impressions  Acute urinary retention--mild, multifactorial likely secondary to generalized weakness and underlying BPH  Please note, CT scan reviewed with evidence of only mild prostatomegaly  Upper tracts were well decompressed without evidence of hydronephrosis, obstructing ureteral calculi, or masses/lesions  Recommendations  1  Maintain duarte catheter  2  Do not remove  Not Nsg managed   3  Continue Flomax   4  In interim, increase ambulation, wean narcotics, hydrate and treat constipation  5  Void trial  when patient is clinically stronger  6  Patient will require duarte catheter atshort term rehab if patient remains sub-optimal    7  AVS updated with instructions for trial of void as outpatient  If patient develops refractory retention, will require urologic workup  Past Medical History:   Diagnosis Date    Hyperlipidemia     Hypertension        History reviewed  No pertinent surgical history  History reviewed  No pertinent family history      Social History     Socioeconomic History    Marital status: /Civil Union     Spouse name: Not on file    Number of children: Not on file    Years of education: Not on file    Highest education level: Not on file   Occupational History    Not on file   Social Needs    Financial resource strain: Not on file    Food insecurity     Worry: Not on file     Inability: Not on file   [employer] needs     Medical: Not on file     Non-medical: Not on file   Tobacco Use    Smoking status: Never Smoker    Smokeless tobacco: Never Used   Substance and Sexual Activity    Alcohol use: Never     Frequency: Never     Binge frequency: Never    Drug use: Never    Sexual activity: Not on file   Lifestyle    Physical activity     Days per week: Not on file     Minutes per session: Not on file    Stress: Not on file   Relationships    Social connections     Talks on phone: Not on file     Gets together: Not on file     Attends [employer] service: Not on file     Active member of club or organization: Not on file     Attends meetings of clubs or organizations: Not on file     Relationship status: Not on file    Intimate partner violence     Fear of current or ex partner: Not on file     Emotionally abused: Not on file     Physically abused: Not on file     Forced sexual activity: Not on file   Other Topics Concern    Not on file   Social History Narrative    Not on file       No Known Allergies    Review of Systems  Review of Systems - History obtained from chart review and the patient  General ROS: negative  Respiratory ROS: no cough, shortness of breath, or wheezing  Cardiovascular ROS: no chest pain or dyspnea on exertion  Gastrointestinal ROS: no abdominal pain, change in bowel habits, or black or bloody stools  Genito-Urinary ROS: positive for - urinary frequency/urgency  negative for - dysuria or hematuria  Neurological ROS: no TIA or stroke symptoms    Chart Review   Allergies, current medications, history, problem list    Vital Signs & Physical Exam  General appearance: alert and oriented, in no acute distress, appears stated age, cooperative, no distress and pale  Head: Normocephalic, without obvious abnormality, atraumatic  Neck: no adenopathy, no carotid bruit, no JVD, supple, symmetrical, trachea midline and thyroid not enlarged, symmetric, no tenderness/mass/nodules  Lungs: diminished breath sounds  Heart: regular rate and rhythm, S1, S2 normal, no murmur, click, rub or gallop  Abdomen: soft, non-tender; bowel sounds normal; no masses,  no organomegaly  Extremities: extremities normal, warm and well-perfused; no cyanosis, clubbing, or edema  Pulses: 2+ and symmetric  Neurologic: Grossly normal  Barton patent for grossly clear yellow urine     Laboratory Studies  Lab Results   Component Value Date    K 3 5 09/15/2020     09/15/2020 CO2 24 09/15/2020    CREATININE 1 22 09/15/2020    BUN 23 09/15/2020               Imaging and Other Studies  )Xr Chest Pa & Lateral    Result Date: 9/15/2020  Narrative: CHEST INDICATION:   Expiratory wheezing, shortness of breath  COMPARISON:  8/6/2019 EXAM PERFORMED/VIEWS:  XR CHEST PA & LATERAL FINDINGS: Mild enlargement of cardiac silhouette with tortuous thoracic aorta noted similar to previous study There is a shallow depth of inspiration with trace bilateral effusions  Interstitial markings appear somewhat prominent  There is no pneumothorax  No focal airspace consolidation  Compression deformity of lumbar spine again noted     Impression: Shallow depth of inspiration with somewhat prominent interstitial markings and trace bilateral effusions  Correlate for CHF/fluid overload  No pneumothorax or focal airspace consolidation  Workstation performed: [id number]     Xr Spine Lumbar 2 Or 3 Views Injury    Result Date: 9/3/2020  Narrative: LUMBAR SPINE INDICATION:   S32 000A: Wedge compression fracture of unspecified lumbar vertebra, initial encounter for closed fracture  COMPARISON:  Lumbar spine radiograph 8/12/2020 VIEWS:  XR SPINE LUMBAR 2 OR 3 VIEWS INJURY FINDINGS: There are 5 non rib bearing lumbar vertebral bodies  Stable compression deformities at L4 and L5  Progressive loss of vertebral body height at known L1 and L2 compression deformities with increased sclerosis at the L2 vertebral body    There is no evidence of acute fracture or destructive osseous lesion  Moderate scoliotic deformity is noted  Alignment is otherwise unremarkable  Age-appropriate lumbar degenerative changes are seen  The pedicles appear intact  There are atherosclerotic calcifications  Soft tissues are otherwise unremarkable  Impression: Progressive loss of vertebral body height at known L1 and L2 compression deformities    Stable compression deformities at L4 and L5   The study was marked in EPIC for significant notification  Workstation performed: [id number]     Ct Abdomen Pelvis With Contrast    Result Date: 9/14/2020  Narrative: CT ABDOMEN AND PELVIS WITH IV CONTRAST INDICATION:   Weakness and difficulty urinating  COMPARISON:  8/9/2020 TECHNIQUE:  CT examination of the abdomen and pelvis was performed  Axial, sagittal, and coronal 2D reformatted images were created from the source data and submitted for interpretation  Radiation dose length product (DLP) for this visit:  587 mGy-cm   This examination, like all CT scans performed in the [facility], was performed utilizing techniques to minimize radiation dose exposure, including the use of iterative reconstruction and automated exposure control  IV Contrast:  100 mL of iohexol (OMNIPAQUE) Enteric Contrast:  Enteric contrast was not administered  FINDINGS: ABDOMEN LOWER CHEST:  Bibasilar subsegmental atelectasis and/or scarring, similar to the previous study  Coronary artery calcification  LIVER/BILIARY TREE:  Unremarkable  GALLBLADDER:  There are gallstone(s) within the gallbladder, without pericholecystic inflammatory changes  SPLEEN:  Unremarkable  PANCREAS:  Unremarkable  ADRENAL GLANDS:  Unremarkable  KIDNEYS/URETERS:  Unremarkable  No hydronephrosis  STOMACH AND BOWEL:  Sigmoid diverticulosis  No evidence of acute diverticulitis or bowel obstruction APPENDIX:  No findings to suggest appendicitis  ABDOMINOPELVIC CAVITY:  No ascites  No pneumoperitoneum  No lymphadenopathy  VESSELS:  Diffuse atherosclerotic changes of the intra-abdominal arteries  Ectasia of abdominal aorta  Maximal diameter 2 7 cm PELVIS REPRODUCTIVE ORGANS:  Mild prostatomegaly URINARY BLADDER:  Urinary bladder is decompressed with a Barton catheter in place  ABDOMINAL WALL/INGUINAL REGIONS:  Unremarkable  OSSEOUS STRUCTURES:  Multiple compression fractures of the lumbar spine similar to x-ray 8/28/2020     Impression: 1   Advanced sigmoid diverticulosis without evidence of bowel obstruction or diverticulitis 2  Multiple lumbar compression fractures similar to 8/28/2020 3  Urinary bladder decompressed with Barton catheter  No hydronephrosis   4  Cholelithiasis  Workstation performed: [id number]         Medications   Scheduled Meds:  Current Facility-Administered Medications   Medication Dose Route Frequency Provider Last Rate    acetaminophen  975 mg Oral [facility] Dolly Moya MD      aspirin  81 mg Oral Daily Aly Martinez MD      cholecalciferol  2,000 Units Oral Daily Aly Martinez MD      enoxaparin  40 mg Subcutaneous Daily Aly Martinez MD      famotidine  20 mg Oral Daily Aly Martinez MD      hydrochlorothiazide  12 5 mg Oral Daily Aly Martinez MD      metoprolol tartrate  12 5 mg Oral Daily Aly Martinez MD      multivitamin stress formula  1 tablet Oral Daily Aly Martinez MD      polyethylene glycol  17 g Oral Daily PRN Aly Martinez MD      pravastatin  40 mg Oral Daily With Opal Molina MD      sodium chloride  75 mL/hr Intravenous Continuous Aly Martinez MD 75 mL/hr (09/15/20 0615)    tamsulosin  0 4 mg Oral Daily With Opal Molina MD       Continuous Infusions:sodium chloride, 75 mL/hr, Last Rate: 75 mL/hr (09/15/20 0615)      PRN Meds: polyethylene glycol          )CLOVER Sosa

## 2020-09-15 NOTE — PLAN OF CARE
Problem: Potential for Falls  Goal: Patient will remain free of falls  Description: INTERVENTIONS:  - Assess patient frequently for physical needs  -  Identify cognitive and physical deficits and behaviors that affect risk of falls  -  [place] fall precautions as indicated by assessment   - Educate patient/family on patient safety including physical limitations  - Instruct patient to call for assistance with activity based on assessment  - Modify environment to reduce risk of injury  - Consider OT/PT consult to assist with strengthening/mobility  9/15/2020 1759 by Sugar Campos RN  Outcome: Progressing  9/15/2020 1759 by Sugar Campos RN  Outcome: Progressing     Problem: Nutrition/Hydration-ADULT  Goal: Nutrient/Hydration intake appropriate for improving, restoring or maintaining nutritional needs  Description: Monitor and assess patient's nutrition/hydration status for malnutrition  Collaborate with interdisciplinary team and initiate plan and interventions as ordered  Monitor patient's weight and dietary intake as ordered or per policy  Utilize nutrition screening tool and intervene as necessary  Determine patient's food preferences and provide high-protein, high-caloric foods as appropriate       INTERVENTIONS:  - Monitor oral intake, urinary output, labs, and treatment plans  - Assess nutrition and hydration status and recommend course of action  - Evaluate amount of meals eaten  - Assist patient with eating if necessary   - Allow adequate time for meals  - Recommend/ encourage appropriate diets, oral nutritional supplements, and vitamin/mineral supplements  - Order, calculate, and assess calorie counts as needed  - Recommend, monitor, and adjust tube feedings and TPN/PPN based on assessed needs  - Assess need for intravenous fluids  - Provide specific nutrition/hydration education as appropriate  - Include patient/family/caregiver in decisions related to nutrition  9/15/2020 1759 by Sugar Campos RN  Outcome: Progressing  9/15/2020 1759 by Geoff Chauhan RN  Outcome: Progressing     Problem: Prexisting or High Potential for Compromised Skin Integrity  Goal: Skin integrity is maintained or improved  Description: INTERVENTIONS:  - Identify patients at risk for skin breakdown  - Assess and monitor skin integrity  - Assess and monitor nutrition and hydration status  - Monitor labs   - Assess for incontinence   - Turn and reposition patient  - Assist with mobility/ambulation  - Relieve pressure over bony prominences  - Avoid friction and shearing  - Provide appropriate hygiene as needed including keeping skin clean and dry  - Evaluate need for skin moisturizer/barrier cream  - Collaborate with interdisciplinary team   - Patient/family teaching  - Consider wound care consult   9/15/2020 1759 by Geoff Chauhan RN  Outcome: Progressing  9/15/2020 1759 by Geoff Chauhan RN  Outcome: Progressing     Problem: PAIN - ADULT  Goal: Verbalizes/displays adequate comfort level or baseline comfort level  Description: Interventions:  - Encourage patient to monitor pain and request assistance  - Assess pain using appropriate pain scale  - Administer analgesics based on type and severity of pain and evaluate response  - Implement non-pharmacological measures as appropriate and evaluate response  - Consider cultural and social influences on pain and pain management  - Notify physician/advanced practitioner if interventions unsuccessful or patient reports new pain  9/15/2020 1759 by Geoff Chauhan RN  Outcome: Progressing  9/15/2020 1759 by Geoff Chauhan RN  Outcome: Progressing     Problem: INFECTION - ADULT  Goal: Absence or prevention of progression during hospitalization  Description: INTERVENTIONS:  - Assess and monitor for signs and symptoms of infection  - Monitor lab/diagnostic results  - Monitor all insertion sites, i e  indwelling lines, tubes, and drains  - Monitor endotracheal if appropriate and nasal secretions for changes in amount and color  - [place] appropriate cooling/warming therapies per order  - Administer medications as ordered  - Instruct and encourage patient and family to use good hand hygiene technique  - Identify and instruct in appropriate isolation precautions for identified infection/condition  9/15/2020 1759 by Alyssa Katz RN  Outcome: Progressing  9/15/2020 1759 by Alyssa Katz RN  Outcome: Progressing  Goal: Absence of fever/infection during neutropenic period  Description: INTERVENTIONS:  - Monitor WBC    9/15/2020 1759 by Alyssa Katz RN  Outcome: Progressing  9/15/2020 1759 by Alyssa Katz RN  Outcome: Progressing     Problem: SAFETY ADULT  Goal: Patient will remain free of falls  Description: INTERVENTIONS:  - Assess patient frequently for physical needs  -  Identify cognitive and physical deficits and behaviors that affect risk of falls    -  [place] fall precautions as indicated by assessment   - Educate patient/family on patient safety including physical limitations  - Instruct patient to call for assistance with activity based on assessment  - Modify environment to reduce risk of injury  - Consider OT/PT consult to assist with strengthening/mobility  9/15/2020 1759 by Alyssa Katz RN  Outcome: Progressing  9/15/2020 1759 by Alyssa Katz RN  Outcome: Progressing  Goal: Maintain or return to baseline ADL function  Description: INTERVENTIONS:  -  Assess patient's ability to carry out ADLs; assess patient's baseline for ADL function and identify physical deficits which impact ability to perform ADLs (bathing, care of mouth/teeth, toileting, grooming, dressing, etc )  - Assess/evaluate cause of self-care deficits   - Assess range of motion  - Assess patient's mobility; develop plan if impaired  - Assess patient's need for assistive devices and provide as appropriate  - Encourage maximum independence but intervene and supervise when necessary  - Involve family in performance of ADLs  - Assess for home care needs following discharge   - Consider OT consult to assist with ADL evaluation and planning for discharge  - Provide patient education as appropriate  9/15/2020 1759 by Han Andrade RN  Outcome: Progressing  9/15/2020 1759 by Han Andrade RN  Outcome: Progressing  Goal: Maintain or return mobility status to optimal level  Description: INTERVENTIONS:  - Assess patient's baseline mobility status (ambulation, transfers, stairs, etc )    - Identify cognitive and physical deficits and behaviors that affect mobility  - Identify mobility aids required to assist with transfers and/or ambulation (gait belt, sit-to-stand, lift, walker, cane, etc )  - [place] fall precautions as indicated by assessment  - Record patient progress and toleration of activity level on Mobility SBAR; progress patient to next Phase/Stage  - Instruct patient to call for assistance with activity based on assessment  - Consider rehabilitation consult to assist with strengthening/weightbearing, etc   9/15/2020 1759 by Han Andrade RN  Outcome: Progressing  9/15/2020 1759 by Han Andrade RN  Outcome: Progressing     Problem: DISCHARGE PLANNING  Goal: Discharge to home or other facility with appropriate resources  Description: INTERVENTIONS:  - Identify barriers to discharge w/patient and caregiver  - Arrange for needed discharge resources and transportation as appropriate  - Identify discharge learning needs (meds, wound care, etc )  - Arrange for interpretive services to assist at discharge as needed  - Refer to Case Management Department for coordinating discharge planning if the patient needs post-hospital services based on physician/advanced practitioner order or complex needs related to functional status, cognitive ability, or social support system  9/15/2020 1759 by Han Andrade RN  Outcome: Progressing  9/15/2020 1759 by Han Andrade RN  Outcome: Progressing     Problem: Knowledge Deficit  Goal: Patient/family/caregiver demonstrates understanding of disease process, treatment plan, medications, and discharge instructions  Description: Complete learning assessment and assess knowledge base    Interventions:  - Provide teaching at level of understanding  - Provide teaching via preferred learning methods  9/15/2020 1759 by Andreea Andrade RN  Outcome: Progressing  9/15/2020 1759 by Andreea Andrade RN  Outcome: Progressing     Problem: GENITOURINARY - ADULT  Goal: Maintains or returns to baseline urinary function  Description: INTERVENTIONS:  - Assess urinary function  - Encourage oral fluids to ensure adequate hydration if ordered  - Administer IV fluids as ordered to ensure adequate hydration  - Administer ordered medications as needed  - Offer frequent toileting  - Follow urinary retention protocol if ordered  Outcome: Progressing  Goal: Urinary catheter remains patent  Description: INTERVENTIONS:  - Assess patency of urinary catheter  - If patient has a chronic duarte, consider changing catheter if non-functioning  - Follow guidelines for intermittent irrigation of non-functioning urinary catheter  Outcome: Progressing

## 2020-09-15 NOTE — PLAN OF CARE
Problem: PHYSICAL THERAPY ADULT  Goal: Performs mobility at highest level of function for planned discharge setting  See evaluation for individualized goals  Description: Treatment/Interventions: Functional transfer training, LE strengthening/ROM, Therapeutic exercise, Endurance training, Cognitive reorientation, Patient/family training, Equipment eval/education, Bed mobility, Gait training(PT to see for stairs when appropriate)  Equipment Recommended: Agus Watters       See flowsheet documentation for full assessment, interventions and recommendations  Note: Prognosis: Fair  Problem List: Decreased strength, Decreased endurance, Impaired balance, Decreased mobility, Decreased cognition, Impaired hearing(Spinal precautions)  Assessment: Lay Mendoza is an 79 y/o Male who [facility] from home w/ c/o generalized weakness and inability to urinate, with a diagnosis of generalized weakness, urinary retention  Received orders for PT eval and treat, with activity order(s) of bathroom priviledges and fall precautions  Pt w/ 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-L5 vertebral compression fractures  Has a backpack TLSO that per OP NS visit on 8/28/2020, is still to wear TLSO when HOB >45 degrees and OOB  This patient presents w/ comorbidities of NPH, PVCs, HTN, HLD, hx of L2-L5 vertebral fx and has personal factors of advanced age, living in 2 story house, mobilizing w/ assistive device, stair(s) to enter home, inability to navigate level surfaces w/o external assistance, decreased cognition, positive fall history and hearing impairments  Patient presents with the following impairments at time of evaluation including: weakness, decreased endurance, impaired cognition, impaired balance, gait deviations, decreased safety awareness, fall risk and spinal precautions   These impairments are evident in findings from the physical examination weakness, mobility assessment (need for mod assist w/ all phases of mobility when usually mobilizing independently, tolerance to only 20 feet of ambulation and need for cueing for mobility technique), and objective measure(s) Barthel Index: 35/100  During session, pt needed input for task input and mobility technique  Due to physical deficits, patient is at an increased risk for falls  This patient's clinical presentation is unstable/unpredictable, which is evident in need for assist w/ all phases of mobility when usually mobilizing independently, tolerance to only 20 feet of ambulation, need for input for task focus and mobility technique, recent readmission (within 30 days) and recent history of falls  At this time patient would benefit from skilled inpatient PT in order to address abovementioned deficits in order to improve overall function and mobility  Discharge recommendation is for inpatient rehab in order to reduce fall risk and maximize level of functional independence  PT Discharge Recommendation: Post-Acute Rehabilitation Services          See flowsheet documentation for full assessment

## 2020-09-15 NOTE — PLAN OF CARE
Problem: PHYSICAL THERAPY ADULT  Goal: Performs mobility at highest level of function for planned discharge setting  See evaluation for individualized goals  Description: Treatment/Interventions: Functional transfer training, LE strengthening/ROM, Therapeutic exercise, Endurance training, Cognitive reorientation, Patient/family training, Equipment eval/education, Bed mobility, Gait training(PT to see for stairs when appropriate)  Equipment Recommended: Inderjit Foster       See flowsheet documentation for full assessment, interventions and recommendations  9/15/2020 1601 by Isabella Dunlap PT  Note: Prognosis: Fair  Problem List: Decreased strength, Decreased endurance, Impaired balance, Decreased mobility, Decreased cognition, Impaired hearing(Spinal precautions)  Assessment: Pt limited secondary to decreased cognition and fatigue during session  He continues to require mod A x 1 for transfers from toilet (performed x 2 due to fatigue) and for ambulation  Pt benefits from verbal cues to keep the walker closer to his body  Pt continues to require total A to adjust TLSO brace  Pt would benefit from skilled inpatient PT during this admission in order to continue to address deficits and progress towards goals  PT Discharge Recommendation: Post-Acute Rehabilitation Services          See flowsheet documentation for full assessment

## 2020-09-15 NOTE — CASE MANAGEMENT
LOS 1 day, not a bundle or readmission  Patient was recently in hospital 8/9-8/13/20 and discharged to [facility] at [facility]  CM met with patient to introduce self, explain role, complete CM assessment, and discuss DC planning  Patient reports he lives with his wife in a 2 floor home with 3 SOCORRO with handrails  Patient reports he is hard of hearing and would prefer CM call and talk to his wife  CM discussed care team's recommendation for post acute rehabilitation services and discussed freedom of choice in facilities  Patient would be interested in STR and would like referral to [facility] as he was recently there  CM to call wife to obtain baseline information  CM called and spoke to patient's wife Stephanie Teran  Patient and wife's bedroom is on 2nd floor with 13 steps to get up to  Patient was ambulating with a walker at home  Wife reports they also have cane, patient has TLSO brace, and shower chair  Patient's wife reports recently he has not been showering and she is sponge bathing him  Since home from rehab patient is doing about 25% with wife assisting about 75% (about max assistance) with ADLs  Patient was utilizing [facility] for home therapy after his recent DC from [facility]  Patient has LW and POA, reported POA is wife, CM requested copy of documents  No Hx MH or substance use  Patient is retired  PCP is Dr Cornell Fried, patient has prescription coverage, and prefers using [facility]  CM reviewed care team's recommendations with wife as well via phone and she is in agreement with patient, and reports if [facility] is able to accept patient that would be their preference  [facility] referral sent to [facility]  CM will continue to follow

## 2020-09-16 ENCOUNTER — APPOINTMENT (EMERGENCY)
Dept: RADIOLOGY | Facility: HOSPITAL | Age: 85
DRG: 725 | End: 2020-09-16
Payer: MEDICARE

## 2020-09-16 ENCOUNTER — HOSPITAL ENCOUNTER (INPATIENT)
Facility: HOSPITAL | Age: 85
LOS: 3 days | Discharge: NURSING, REHAB OR LONG-TERM CARE | DRG: 725 | End: 2020-09-20
Attending: EMERGENCY MEDICINE | Admitting: INTERNAL MEDICINE
Payer: MEDICARE

## 2020-09-16 ENCOUNTER — APPOINTMENT (EMERGENCY)
Dept: CT IMAGING | Facility: HOSPITAL | Age: 85
DRG: 725 | End: 2020-09-16
Payer: MEDICARE

## 2020-09-16 ENCOUNTER — TELEPHONE (OUTPATIENT)
Dept: OTHER | Facility: OTHER | Age: 85
End: 2020-09-16

## 2020-09-16 VITALS
BODY MASS INDEX: 27.48 KG/M2 | DIASTOLIC BLOOD PRESSURE: 63 MMHG | OXYGEN SATURATION: 92 % | HEART RATE: 84 BPM | RESPIRATION RATE: 18 BRPM | HEIGHT: 60 IN | TEMPERATURE: 97.7 F | WEIGHT: 140 LBS | SYSTOLIC BLOOD PRESSURE: 141 MMHG

## 2020-09-16 DIAGNOSIS — N48.22 CELLULITIS OF PENIS: ICD-10-CM

## 2020-09-16 DIAGNOSIS — A41.9 SEPSIS (HCC): ICD-10-CM

## 2020-09-16 DIAGNOSIS — N48.89 PENILE SWELLING: ICD-10-CM

## 2020-09-16 DIAGNOSIS — N39.0 URINARY TRACT INFECTION ASSOCIATED WITH INDWELLING URETHRAL CATHETER, INITIAL ENCOUNTER (HCC): ICD-10-CM

## 2020-09-16 DIAGNOSIS — R04.0 EPISTAXIS: ICD-10-CM

## 2020-09-16 DIAGNOSIS — S32.000S COMPRESSION FX, LUMBAR SPINE, SEQUELA: ICD-10-CM

## 2020-09-16 DIAGNOSIS — I48.91 RAPID ATRIAL FIBRILLATION (HCC): ICD-10-CM

## 2020-09-16 DIAGNOSIS — T83.511A URINARY TRACT INFECTION ASSOCIATED WITH INDWELLING URETHRAL CATHETER, INITIAL ENCOUNTER (HCC): ICD-10-CM

## 2020-09-16 DIAGNOSIS — J34.89 NASAL DRYNESS: ICD-10-CM

## 2020-09-16 DIAGNOSIS — I50.9 CHF (CONGESTIVE HEART FAILURE) (HCC): Primary | ICD-10-CM

## 2020-09-16 DIAGNOSIS — I48.91 ATRIAL FIBRILLATION WITH RVR (HCC): ICD-10-CM

## 2020-09-16 DIAGNOSIS — N47.2 PARAPHIMOSIS: ICD-10-CM

## 2020-09-16 DIAGNOSIS — N39.0 UTI (URINARY TRACT INFECTION): ICD-10-CM

## 2020-09-16 DIAGNOSIS — R09.81 NASAL CONGESTION: ICD-10-CM

## 2020-09-16 PROBLEM — R33.9 URINARY RETENTION: Status: RESOLVED | Noted: 2020-09-14 | Resolved: 2020-09-16

## 2020-09-16 LAB
ALBUMIN SERPL BCP-MCNC: 2.7 G/DL (ref 3.5–5)
ALP SERPL-CCNC: 71 U/L (ref 46–116)
ALT SERPL W P-5'-P-CCNC: 22 U/L (ref 12–78)
ANION GAP SERPL CALCULATED.3IONS-SCNC: 11 MMOL/L (ref 4–13)
ANION GAP SERPL CALCULATED.3IONS-SCNC: 12 MMOL/L (ref 4–13)
APTT PPP: 45 SECONDS (ref 23–37)
AST SERPL W P-5'-P-CCNC: 23 U/L (ref 5–45)
BACTERIA UR QL AUTO: ABNORMAL /HPF
BASOPHILS # BLD AUTO: 0.02 THOUSANDS/ΜL (ref 0–0.1)
BASOPHILS # BLD AUTO: 0.02 THOUSANDS/ΜL (ref 0–0.1)
BASOPHILS NFR BLD AUTO: 0 % (ref 0–1)
BASOPHILS NFR BLD AUTO: 0 % (ref 0–1)
BILIRUB DIRECT SERPL-MCNC: 0.31 MG/DL (ref 0–0.2)
BILIRUB SERPL-MCNC: 0.88 MG/DL (ref 0.2–1)
BILIRUB SERPL-MCNC: 0.88 MG/DL (ref 0.2–1)
BILIRUB UR QL STRIP: NEGATIVE
BUN SERPL-MCNC: 19 MG/DL (ref 5–25)
BUN SERPL-MCNC: 22 MG/DL (ref 5–25)
CALCIUM ALBUM COR SERPL-MCNC: 9.5 MG/DL (ref 8.3–10.1)
CALCIUM SERPL-MCNC: 8 MG/DL (ref 8.3–10.1)
CALCIUM SERPL-MCNC: 8.5 MG/DL (ref 8.3–10.1)
CHLORIDE SERPL-SCNC: 101 MMOL/L (ref 100–108)
CHLORIDE SERPL-SCNC: 104 MMOL/L (ref 100–108)
CLARITY UR: CLEAR
CO2 SERPL-SCNC: 22 MMOL/L (ref 21–32)
CO2 SERPL-SCNC: 25 MMOL/L (ref 21–32)
COLOR UR: YELLOW
CREAT SERPL-MCNC: 0.94 MG/DL (ref 0.6–1.3)
CREAT SERPL-MCNC: 1.1 MG/DL (ref 0.6–1.3)
CRP SERPL HS-MCNC: >90 MG/L
D DIMER PPP FEU-MCNC: 1.62 UG/ML FEU
EOSINOPHIL # BLD AUTO: 0.02 THOUSAND/ΜL (ref 0–0.61)
EOSINOPHIL # BLD AUTO: 0.03 THOUSAND/ΜL (ref 0–0.61)
EOSINOPHIL NFR BLD AUTO: 0 % (ref 0–6)
EOSINOPHIL NFR BLD AUTO: 1 % (ref 0–6)
ERYTHROCYTE [DISTWIDTH] IN BLOOD BY AUTOMATED COUNT: 14.8 % (ref 11.6–15.1)
ERYTHROCYTE [DISTWIDTH] IN BLOOD BY AUTOMATED COUNT: 15.1 % (ref 11.6–15.1)
GFR SERPL CREATININE-BSD FRML MDRD: 60 ML/MIN/1.73SQ M
GFR SERPL CREATININE-BSD FRML MDRD: 73 ML/MIN/1.73SQ M
GLUCOSE SERPL-MCNC: 122 MG/DL (ref 65–140)
GLUCOSE SERPL-MCNC: 125 MG/DL (ref 65–140)
GLUCOSE SERPL-MCNC: 91 MG/DL (ref 65–140)
GLUCOSE UR STRIP-MCNC: NEGATIVE MG/DL
HCT VFR BLD AUTO: 27.8 % (ref 36.5–49.3)
HCT VFR BLD AUTO: 32.9 % (ref 36.5–49.3)
HGB BLD-MCNC: 10.9 G/DL (ref 12–17)
HGB BLD-MCNC: 9.2 G/DL (ref 12–17)
HGB UR QL STRIP.AUTO: ABNORMAL
IMM GRANULOCYTES # BLD AUTO: 0.08 THOUSAND/UL (ref 0–0.2)
IMM GRANULOCYTES # BLD AUTO: 0.08 THOUSAND/UL (ref 0–0.2)
IMM GRANULOCYTES NFR BLD AUTO: 1 % (ref 0–2)
IMM GRANULOCYTES NFR BLD AUTO: 2 % (ref 0–2)
INR PPP: 1.19 (ref 0.84–1.19)
KETONES UR STRIP-MCNC: ABNORMAL MG/DL
LACTATE SERPL-SCNC: 1.8 MMOL/L (ref 0.5–2)
LDH SERPL-CCNC: 328 U/L (ref 81–234)
LEUKOCYTE ESTERASE UR QL STRIP: NEGATIVE
LYMPHOCYTES # BLD AUTO: 1.25 THOUSANDS/ΜL (ref 0.6–4.47)
LYMPHOCYTES # BLD AUTO: 1.37 THOUSANDS/ΜL (ref 0.6–4.47)
LYMPHOCYTES NFR BLD AUTO: 23 % (ref 14–44)
LYMPHOCYTES NFR BLD AUTO: 23 % (ref 14–44)
MCH RBC QN AUTO: 30.2 PG (ref 26.8–34.3)
MCH RBC QN AUTO: 30.4 PG (ref 26.8–34.3)
MCHC RBC AUTO-ENTMCNC: 33.1 G/DL (ref 31.4–37.4)
MCHC RBC AUTO-ENTMCNC: 33.1 G/DL (ref 31.4–37.4)
MCV RBC AUTO: 91 FL (ref 82–98)
MCV RBC AUTO: 92 FL (ref 82–98)
MONOCYTES # BLD AUTO: 0.59 THOUSAND/ΜL (ref 0.17–1.22)
MONOCYTES # BLD AUTO: 0.61 THOUSAND/ΜL (ref 0.17–1.22)
MONOCYTES NFR BLD AUTO: 10 % (ref 4–12)
MONOCYTES NFR BLD AUTO: 11 % (ref 4–12)
NEUTROPHILS # BLD AUTO: 3.44 THOUSANDS/ΜL (ref 1.85–7.62)
NEUTROPHILS # BLD AUTO: 3.94 THOUSANDS/ΜL (ref 1.85–7.62)
NEUTS SEG NFR BLD AUTO: 63 % (ref 43–75)
NEUTS SEG NFR BLD AUTO: 66 % (ref 43–75)
NITRITE UR QL STRIP: NEGATIVE
NON-SQ EPI CELLS URNS QL MICRO: ABNORMAL /HPF
NRBC BLD AUTO-RTO: 0 /100 WBCS
NRBC BLD AUTO-RTO: 0 /100 WBCS
NT-PROBNP SERPL-MCNC: 5668 PG/ML
PH UR STRIP.AUTO: 6 [PH]
PLATELET # BLD AUTO: 192 THOUSANDS/UL (ref 149–390)
PLATELET # BLD AUTO: 222 THOUSANDS/UL (ref 149–390)
PMV BLD AUTO: 10.3 FL (ref 8.9–12.7)
PMV BLD AUTO: 9.9 FL (ref 8.9–12.7)
POTASSIUM SERPL-SCNC: 3.6 MMOL/L (ref 3.5–5.3)
POTASSIUM SERPL-SCNC: 3.8 MMOL/L (ref 3.5–5.3)
PROT SERPL-MCNC: 6.5 G/DL (ref 6.4–8.2)
PROT UR STRIP-MCNC: NEGATIVE MG/DL
PROTHROMBIN TIME: 15.3 SECONDS (ref 11.6–14.5)
RBC # BLD AUTO: 3.03 MILLION/UL (ref 3.88–5.62)
RBC # BLD AUTO: 3.61 MILLION/UL (ref 3.88–5.62)
RBC #/AREA URNS AUTO: ABNORMAL /HPF
RETICS # AUTO: NORMAL 10*3/UL (ref 14356–105094)
RETICS # CALC: 1.16 % (ref 0.37–1.87)
SARS-COV-2 RNA RESP QL NAA+PROBE: NEGATIVE
SODIUM SERPL-SCNC: 137 MMOL/L (ref 136–145)
SODIUM SERPL-SCNC: 138 MMOL/L (ref 136–145)
SP GR UR STRIP.AUTO: 1.02 (ref 1–1.03)
TROPONIN I SERPL-MCNC: <0.02 NG/ML
TSH SERPL DL<=0.05 MIU/L-ACNC: 2.3 UIU/ML (ref 0.36–3.74)
UROBILINOGEN UR QL STRIP.AUTO: 1 E.U./DL
WBC # BLD AUTO: 5.41 THOUSAND/UL (ref 4.31–10.16)
WBC # BLD AUTO: 6.04 THOUSAND/UL (ref 4.31–10.16)
WBC #/AREA URNS AUTO: ABNORMAL /HPF

## 2020-09-16 PROCEDURE — 36415 COLL VENOUS BLD VENIPUNCTURE: CPT | Performed by: EMERGENCY MEDICINE

## 2020-09-16 PROCEDURE — 71275 CT ANGIOGRAPHY CHEST: CPT

## 2020-09-16 PROCEDURE — 87040 BLOOD CULTURE FOR BACTERIA: CPT | Performed by: EMERGENCY MEDICINE

## 2020-09-16 PROCEDURE — 71045 X-RAY EXAM CHEST 1 VIEW: CPT

## 2020-09-16 PROCEDURE — 86141 C-REACTIVE PROTEIN HS: CPT | Performed by: EMERGENCY MEDICINE

## 2020-09-16 PROCEDURE — 82247 BILIRUBIN TOTAL: CPT | Performed by: INTERNAL MEDICINE

## 2020-09-16 PROCEDURE — 80048 BASIC METABOLIC PNL TOTAL CA: CPT | Performed by: INTERNAL MEDICINE

## 2020-09-16 PROCEDURE — 83605 ASSAY OF LACTIC ACID: CPT | Performed by: EMERGENCY MEDICINE

## 2020-09-16 PROCEDURE — 97116 GAIT TRAINING THERAPY: CPT

## 2020-09-16 PROCEDURE — 85025 COMPLETE CBC W/AUTO DIFF WBC: CPT | Performed by: INTERNAL MEDICINE

## 2020-09-16 PROCEDURE — 87086 URINE CULTURE/COLONY COUNT: CPT | Performed by: EMERGENCY MEDICINE

## 2020-09-16 PROCEDURE — 81001 URINALYSIS AUTO W/SCOPE: CPT | Performed by: EMERGENCY MEDICINE

## 2020-09-16 PROCEDURE — 87635 SARS-COV-2 COVID-19 AMP PRB: CPT | Performed by: INTERNAL MEDICINE

## 2020-09-16 PROCEDURE — 85610 PROTHROMBIN TIME: CPT | Performed by: EMERGENCY MEDICINE

## 2020-09-16 PROCEDURE — 87077 CULTURE AEROBIC IDENTIFY: CPT | Performed by: EMERGENCY MEDICINE

## 2020-09-16 PROCEDURE — 96375 TX/PRO/DX INJ NEW DRUG ADDON: CPT

## 2020-09-16 PROCEDURE — 85730 THROMBOPLASTIN TIME PARTIAL: CPT | Performed by: EMERGENCY MEDICINE

## 2020-09-16 PROCEDURE — 96365 THER/PROPH/DIAG IV INF INIT: CPT

## 2020-09-16 PROCEDURE — 83615 LACTATE (LD) (LDH) ENZYME: CPT | Performed by: INTERNAL MEDICINE

## 2020-09-16 PROCEDURE — 84443 ASSAY THYROID STIM HORMONE: CPT | Performed by: EMERGENCY MEDICINE

## 2020-09-16 PROCEDURE — 93005 ELECTROCARDIOGRAM TRACING: CPT

## 2020-09-16 PROCEDURE — 87147 CULTURE TYPE IMMUNOLOGIC: CPT | Performed by: EMERGENCY MEDICINE

## 2020-09-16 PROCEDURE — 83010 ASSAY OF HAPTOGLOBIN QUANT: CPT | Performed by: INTERNAL MEDICINE

## 2020-09-16 PROCEDURE — 85379 FIBRIN DEGRADATION QUANT: CPT | Performed by: EMERGENCY MEDICINE

## 2020-09-16 PROCEDURE — 85045 AUTOMATED RETICULOCYTE COUNT: CPT | Performed by: INTERNAL MEDICINE

## 2020-09-16 PROCEDURE — 80053 COMPREHEN METABOLIC PANEL: CPT | Performed by: EMERGENCY MEDICINE

## 2020-09-16 PROCEDURE — 99285 EMERGENCY DEPT VISIT HI MDM: CPT

## 2020-09-16 PROCEDURE — 97530 THERAPEUTIC ACTIVITIES: CPT

## 2020-09-16 PROCEDURE — 99291 CRITICAL CARE FIRST HOUR: CPT | Performed by: EMERGENCY MEDICINE

## 2020-09-16 PROCEDURE — 84484 ASSAY OF TROPONIN QUANT: CPT | Performed by: EMERGENCY MEDICINE

## 2020-09-16 PROCEDURE — 82948 REAGENT STRIP/BLOOD GLUCOSE: CPT

## 2020-09-16 PROCEDURE — G1004 CDSM NDSC: HCPCS

## 2020-09-16 PROCEDURE — 83880 ASSAY OF NATRIURETIC PEPTIDE: CPT | Performed by: EMERGENCY MEDICINE

## 2020-09-16 PROCEDURE — 85025 COMPLETE CBC W/AUTO DIFF WBC: CPT | Performed by: EMERGENCY MEDICINE

## 2020-09-16 PROCEDURE — 87186 SC STD MICRODIL/AGAR DIL: CPT | Performed by: EMERGENCY MEDICINE

## 2020-09-16 PROCEDURE — 82248 BILIRUBIN DIRECT: CPT | Performed by: INTERNAL MEDICINE

## 2020-09-16 RX ORDER — GINSENG 100 MG
1 CAPSULE ORAL ONCE
Status: COMPLETED | OUTPATIENT
Start: 2020-09-16 | End: 2020-09-16

## 2020-09-16 RX ORDER — ACETAMINOPHEN 325 MG/1
650 TABLET ORAL ONCE
Status: COMPLETED | OUTPATIENT
Start: 2020-09-16 | End: 2020-09-16

## 2020-09-16 RX ORDER — VANCOMYCIN HYDROCHLORIDE 1 G/200ML
15 INJECTION, SOLUTION INTRAVENOUS ONCE
Status: COMPLETED | OUTPATIENT
Start: 2020-09-16 | End: 2020-09-16

## 2020-09-16 RX ORDER — TAMSULOSIN HYDROCHLORIDE 0.4 MG/1
0.4 CAPSULE ORAL
Qty: 30 CAPSULE | Refills: 1 | Status: SHIPPED | OUTPATIENT
Start: 2020-09-16 | End: 2020-11-13 | Stop reason: SINTOL

## 2020-09-16 RX ORDER — FUROSEMIDE 10 MG/ML
20 INJECTION INTRAMUSCULAR; INTRAVENOUS ONCE
Status: COMPLETED | OUTPATIENT
Start: 2020-09-16 | End: 2020-09-16

## 2020-09-16 RX ORDER — DOXYCYCLINE HYCLATE 50 MG/1
324 CAPSULE, GELATIN COATED ORAL
Qty: 60 EACH | Refills: 1 | Status: SHIPPED | OUTPATIENT
Start: 2020-09-16 | End: 2020-10-22 | Stop reason: SDUPTHER

## 2020-09-16 RX ADMIN — CEFEPIME HYDROCHLORIDE 1000 MG: 1 INJECTION, POWDER, FOR SOLUTION INTRAMUSCULAR; INTRAVENOUS at 22:30

## 2020-09-16 RX ADMIN — Medication 12.5 MG: at 09:06

## 2020-09-16 RX ADMIN — FUROSEMIDE 20 MG: 10 INJECTION, SOLUTION INTRAMUSCULAR; INTRAVENOUS at 23:49

## 2020-09-16 RX ADMIN — HYDROCHLOROTHIAZIDE 12.5 MG: 12.5 TABLET ORAL at 09:05

## 2020-09-16 RX ADMIN — FERROUS GLUCONATE 324 MG: 324 TABLET ORAL at 17:28

## 2020-09-16 RX ADMIN — ACETAMINOPHEN 650 MG: 325 TABLET, FILM COATED ORAL at 22:24

## 2020-09-16 RX ADMIN — ASPIRIN 81 MG 81 MG: 81 TABLET ORAL at 09:06

## 2020-09-16 RX ADMIN — ACETAMINOPHEN 975 MG: 325 TABLET, FILM COATED ORAL at 05:57

## 2020-09-16 RX ADMIN — TAMSULOSIN HYDROCHLORIDE 0.4 MG: 0.4 CAPSULE ORAL at 17:27

## 2020-09-16 RX ADMIN — ACETAMINOPHEN 975 MG: 325 TABLET, FILM COATED ORAL at 14:04

## 2020-09-16 RX ADMIN — Medication 2000 UNITS: at 09:06

## 2020-09-16 RX ADMIN — FERROUS GLUCONATE 324 MG: 324 TABLET ORAL at 06:43

## 2020-09-16 RX ADMIN — BACITRACIN 1 SMALL APPLICATION: 500 OINTMENT TOPICAL at 22:29

## 2020-09-16 RX ADMIN — FAMOTIDINE 20 MG: 20 TABLET, FILM COATED ORAL at 09:06

## 2020-09-16 RX ADMIN — PRAVASTATIN SODIUM 40 MG: 40 TABLET ORAL at 17:28

## 2020-09-16 RX ADMIN — VANCOMYCIN HYDROCHLORIDE 1000 MG: 1 INJECTION, SOLUTION INTRAVENOUS at 22:35

## 2020-09-16 RX ADMIN — ENOXAPARIN SODIUM 40 MG: 40 INJECTION SUBCUTANEOUS at 09:05

## 2020-09-16 RX ADMIN — B-COMPLEX W/ C & FOLIC ACID TAB 1 TABLET: TAB at 09:06

## 2020-09-16 NOTE — PLAN OF CARE
Problem: PHYSICAL THERAPY ADULT  Goal: Performs mobility at highest level of function for planned discharge setting  See evaluation for individualized goals  Description: Treatment/Interventions: Functional transfer training, LE strengthening/ROM, Therapeutic exercise, Endurance training, Cognitive reorientation, Patient/family training, Equipment eval/education, Bed mobility, Gait training(PT to see for stairs when appropriate)  Equipment Recommended: Marita Ng       See flowsheet documentation for full assessment, interventions and recommendations  Outcome: Progressing  Note: Prognosis: Fair  Problem List: Decreased strength, Decreased endurance, Impaired balance, Decreased mobility, Decreased cognition, Impaired hearing(spinal precautions)  Assessment: Patient agreeable to participate in therapy session  Demonstrates progression with sit<>stand transfers requiring mod ax1 and verbal instruction for hand placement and body positioning  Patient able to ambulate increased gait distance with roller walker and mid ax1 for steadying upright balance and walker management  Gait distance remains limited secondary to fatigue  Continue to focus on OOB mobility with progression of transfers and ambulation as appropriate  PT Discharge Recommendation: Post-Acute Rehabilitation Services          See flowsheet documentation for full assessment

## 2020-09-16 NOTE — ASSESSMENT & PLAN NOTE
Patient continues to remain very weak, limited ambulation  Physical therapy recommends inpatient rehabilitation    Patient is planned to be discharged to the same to continue his rehabilitation and increase his endurance and strength

## 2020-09-16 NOTE — ASSESSMENT & PLAN NOTE
Anemia has stabilized around 9, no overt bleeding, hemodynamically stable  Given his low iron levels will be starting iron supplementation with ferrous gluconate once a day  Patient should follow-up with PCP as an outpatient regarding GI evaluation after risk/benefit discussion

## 2020-09-16 NOTE — CASE MANAGEMENT
CM made aware patient is medically stable for DC today  CM followed up on referral in [employer] and [facility] is able to accept patient for today  CM met with patient and spouse Emmanuelle Do at bedside to make them aware of same  Patient and wife are pleased to hear [facility] is able to accept  [facility] does not allow family to transport at this time  CM discussed [facility] transportation and cost associated  Wife request CM arrange [facility] transport  CM spoke to [facility] with [facility] to arrange [address]  [facility] transportation with [facility] for 545 pm   Facility contacts completed  CM made patient, wife Emmanuelle Do, bedside RN, SLIM, and facility via [employer] aware of transport time

## 2020-09-16 NOTE — QUICK NOTE
Date of service 09/16/2020    Patient medically stable  Hemoglobin remains low however is not dropping significantly, no signs of overt bleeding  Anemia is likely iron deficiency related  Urology has evaluated patient and patient has been recommended to be discharged with Barton catheter  PT/OT evaluated patient and patient needs rehab placement  Patient agrees  I have tried contacting Leela Tariq patients contact over the phone, no success   will start process of looking into a facility

## 2020-09-16 NOTE — DISCHARGE INSTR - AVS FIRST PAGE
Dear Isabel Abraham,     It was our pleasure to care for you here at [facility]  It is our hope that we were always able to exceed the expected standards for your care during your stay  You were hospitalized due to acute urinary retention, ambulatory dysfunction  You were cared for on the 2nd floor under the service of [facility]key [facility] who covers for your primary care physician (PCP), Marc West, DO, while you were hospitalized  If you have any questions or concerns related to this hospitalization, you may contact us at [phone]  For follow up as well as medication refills, we recommend that you follow up with your primary care physician  A registered nurse will reach out to you by phone within a few days after your discharge to answer any additional questions that you may have after going home  However, at this time we provide for you here, the most important instructions / recommendations at discharge:     · Notable Medication Adjustments -   · You have been started a medication called tamsulosin which will be taken once night  · You have also been started on ferrous gluconate given your anemia  The anemia can further be discussed with your primary care doctor and you may benefit from outpatient gastroenterology evaluation  · Important follow up information -   · You are being discharged with a Duarte catheter as per Urology recommendation, you are suspected to be evaluated by urology as an outpatient and potentially do a voiding trial but until then Duarte catheter should stay in place  · Duarte Cath Care instructions---Maintain duarte catheter to straight drainage  May use leg bag and shower  May flush TID prn using Wendy syringe and 120 ml NSS  May use more saline ad ivania to prevent/treat cath obstruction  Remove catheter on 8th day rehab by 0600 hrs  Bladder scan if no void or less than 200ml in 6hrs   Straight cath for bladder scan >350ml and repeat process  If patient requires straight cath x3 , re-insert duarte and call for Urologist follow-up  Information above  Duarte can remain in place for up to 4 weeks at a time  Duarte placed at [facility] on 9/14/20 20  · Other Instructions -   · Continue physical therapy and occupational therapy at your rehabilitation facility  · Please review this entire after visit summary as additional general instructions including medication list, appointments, activity, diet, any pertinent wound care, and other additional recommendations from your care team that may be provided for you        Sincerely,     Brandon Scharder MD

## 2020-09-16 NOTE — PHYSICAL THERAPY NOTE
PHYSICAL THERAPY NOTE    Patient Name: Sung Núñez  JJQRS'K Date: 20 1138   PT Last Visit   PT Visit Date 20   Pain Assessment   Pain Assessment Tool Pain Assessment not indicated - pt denies pain   Pain Score No Pain   Restrictions/Precautions   Weight Bearing Precautions Per Order No   Braces or Orthoses TLSO  (backpack, wife brought from home)   Other Precautions Chair Alarm; Bed Alarm;Multiple lines; Fall Risk;Hard of hearing  (duarte catheter)   General   Family/Caregiver Present No   Subjective   Subjective Patient seated OOB in recliner and is agreeable to participate in therapy session  Patient identifers obtained from name &   Bed Mobility   Supine to Sit Unable to assess   Sit to Supine Unable to assess   Additional Comments Patient seated OOB in recliner pre and post session with chair alarm engaged, call bell and belongings in reach  Transfers   Sit to Stand 3  Moderate assistance   Additional items Assist x 1; Armrests; Increased time required;Verbal cues   Stand to Sit 3  Moderate assistance   Additional items Assist x 1; Armrests; Increased time required;Verbal cues   Ambulation/Elevation   Gait pattern Forward Flexion;Narrow MARLON; Decreased foot clearance; Short stride; Excessively slow   Gait Assistance 3  Moderate assist   Additional items Assist x 1;Verbal cues   Assistive Device Rolling walker   Distance 50' x1, 20' x1   Balance   Static Sitting Fair -   Static Standing Poor   Ambulatory Poor   Endurance Deficit   Endurance Deficit Yes   Endurance Deficit Description limited ambulation distance and activity tolerance   Activity Tolerance   Activity Tolerance Patient limited by fatigue   Nurse Made Aware Spoke to [employer], RN    Assessment   Prognosis Fair   Problem List Decreased strength;Decreased endurance; Impaired balance;Decreased mobility; Decreased cognition; Impaired hearing  (spinal precautions)   Assessment Patient agreeable to participate in therapy session  Demonstrates progression with sit<>stand transfers requiring mod ax1 and verbal instruction for hand placement and body positioning  Patient able to ambulate increased gait distance with roller walker and mid ax1 for steadying upright balance and walker management  Gait distance remains limited secondary to fatigue  Continue to focus on OOB mobility with progression of transfers and ambulation as appropriate  Goals   Patient Goals none stated   STG Expiration Date 09/25/20   PT Treatment Day 2   Plan   Treatment/Interventions Functional transfer training;LE strengthening/ROM; Therapeutic exercise; Endurance training;Cognitive reorientation;Patient/family training;Equipment eval/education; Bed mobility;Gait training  (PT to see for stairs when appropriate)   PT Frequency Other (Comment)  (3-5x/week)   Recommendation   PT Discharge Recommendation Post-Acute Rehabilitation Services   Equipment Recommended Po Dyer

## 2020-09-16 NOTE — DISCHARGE SUMMARY
Discharge- Linda Brian 1934, 80 y o  male MRN: [id number]    Unit/Bed#: S -01 Encounter: [id number]    Primary Care Provider: Alissa Mohamud DO   Date and time admitted to hospital: 9/14/2020  7:21 AM        * Generalized weakness  Assessment & Plan  Patient continues to remain very weak, limited ambulation  Physical therapy recommends inpatient rehabilitation  Patient is planned to be discharged to the same to continue his rehabilitation and increase his endurance and strength      Ambulatory dysfunction  Assessment & Plan  Plan as above    Anemia  Assessment & Plan  Anemia has stabilized around 9, no overt bleeding, hemodynamically stable  Given his low iron levels will be starting iron supplementation with ferrous gluconate once a day  Patient should follow-up with PCP as an outpatient regarding GI evaluation after risk/benefit discussion          Discharge diagnosis:  Acute urinary retention, likely multifactorial-BPH/benadryl use prior to admission/poor mobility  Ambulatory dysfunction and generalized weakness impairing quantity of life and function  Anemia with iron deficiency  Secondary diagnosis:  Hyperlipidemia  Essential Hypertension  Diverticulosis without diverticulitis  Vertebrae Compression Fractures            Resolved Problems  Date Reviewed: 9/14/2020          Resolved    Urinary retention 9/16/2020     Resolved by  Sincere Sanchez MD          Admission Date:   Admission Orders (From admission, onward)     Ordered        09/14/20 1115  Inpatient Admission  Once                     Admitting Diagnosis: Urinary retention [R33 9]  Weakness generalized [R53 1]  Generalized weakness [R53 1]    HPI: HPI as per admission:    43-year-old male presenting with difficulty urinating and decreased urine output noted by wife since yesterday  Sayda Liyo also complaining of generalized weakness since yesterday  Merle Lundborg was recently diagnosed with vertebral compression fractures and was in rehab from where he was discharged 2 weeks ago to home  [facility] was doing fine when he went home   Was ambulating using a walker   But since yesterday he has been feeling more weak and tired   Wife also noticed that patient has not been urinating as much   So he brought her to the ER   No fever, chills, dysuria or increased frequency   No stool incontinence  Torin Villatoro mentions that patient has had issues with his prostate and has been having trouble urinating intermittently for almost more than a year now  Ministerio Miller never been seen by urologist      Patient also said that he had transient chest discomfort in the center of his chest yesterday and this morning   Resolved spontaneously   Denies any other associated symptoms with it, on exertional      In the ER patient a Barton catheter placed for acute urinary retention  Procedures Performed:   Orders Placed This Encounter   Procedures    ED ECG Documentation Only           Summary of Hospital Course:     Patient was hospitalized because of urinary retention  A Barton catheter was placed at time of admission and initially drained 300 cc and continue to drain urine without difficulty  Urology was consulted and recommended that patient should keep the Barton catheter for sometime while the patient gets stronger as he has poor mobility may be a contributing factor to his urinary retention  Benadryl has been discontinued due to urinary retention as well  Physical therapy and occupational therapy work consult pending the patient was a good candidate for rehabilitation  Anemia was noted during the hospitalization, this was attributed to iron deficiency given low serum iron and iron saturation  Patient was been started on iron supplementation with ferrous gluconate  No overt source of bleeding observed and hemoglobin has been stabilized around 9 2  Further workup if needed for GI causes may be necessary as an outpatient after discussion of risks benefits with patient    Since the urinary retention has been addressed with the Barton catheter which should stay for now according to Urology and the anemia is essentially stable with no indication of urgent workup there are no other limitations for patient to be discharged to a rehabilitation facility  Coronavirus testing test today is negative prior to discharge  VS day of discharge:  /63 (BP Location: Left arm)   Pulse 99   Temp 99 °F (37 2 °C) (Oral)   Resp 18   Ht 5' (1 524 m)   Wt 63 5 kg (140 lb)   SpO2 94%   BMI 27 34 kg/m²      Physical exam:    General:  Elderly male sitting in chair, awake, alert, no acute distress  Head and neck:  Normocephalic, atraumatic, neck supple, no masses, trachea midline  Chest/respiratory:  Normal abrading pulmonologist extension:  Suspect bilaterally no crackles rhonchi or wheezing appreciated  Cardiovascular:  RRR, normal S1 and S2 no JVD, no murmurs, rubs, or gallops  Abdomen:  Soft, nontender, nondistended, bowel sounds present  There is a Barton catheter in place draining yellow urine  Extremities:  Muscle tone is slightly reduced in lower extremities but otherwise preserved  Neurological:  AAO x4, no cranial nerve abnormalities, DTR2+, gait is impaired because of generalized weakness:  Patient moves with difficulty butthere is no focality in his exam    Other tests -  CT ABDOMEN AND PELVIS WITH IV CONTRAST 09/14/2020     INDICATION:   Weakness and difficulty urinating      COMPARISON:  8/9/2020     TECHNIQUE:  CT examination of the abdomen and pelvis was performed  Axial, sagittal, and coronal 2D reformatted images were created from the source data and submitted for interpretation      Radiation dose length product (DLP) for this visit:  587 mGy-cm     This examination, like all CT scans performed in the [facility], was performed utilizing techniques to minimize radiation dose exposure, including the use of iterative   reconstruction and automated exposure control      IV Contrast:  100 mL of iohexol (OMNIPAQUE)  Enteric Contrast:  Enteric contrast was not administered      FINDINGS:     ABDOMEN     LOWER CHEST:  Bibasilar subsegmental atelectasis and/or scarring, similar to the previous study  Coronary artery calcification      LIVER/BILIARY TREE:  Unremarkable      GALLBLADDER:  There are gallstone(s) within the gallbladder, without pericholecystic inflammatory changes      SPLEEN:  Unremarkable      PANCREAS:  Unremarkable      ADRENAL GLANDS:  Unremarkable      KIDNEYS/URETERS:  Unremarkable  No hydronephrosis      STOMACH AND BOWEL:  Sigmoid diverticulosis  No evidence of acute diverticulitis or bowel obstruction     APPENDIX:  No findings to suggest appendicitis      ABDOMINOPELVIC CAVITY:  No ascites  No pneumoperitoneum  No lymphadenopathy      VESSELS:  Diffuse atherosclerotic changes of the intra-abdominal arteries  Ectasia of abdominal aorta  Maximal diameter 2 7 cm     PELVIS     REPRODUCTIVE ORGANS:  Mild prostatomegaly     URINARY BLADDER:  Urinary bladder is decompressed with a Barton catheter in place      ABDOMINAL WALL/INGUINAL REGIONS:  Unremarkable      OSSEOUS STRUCTURES:  Multiple compression fractures of the lumbar spine similar to x-ray 8/28/2020     IMPRESSION:     1  Advanced sigmoid diverticulosis without evidence of bowel obstruction or diverticulitis  2  Multiple lumbar compression fractures similar to 8/28/2020  3  Urinary bladder decompressed with Barton catheter  No hydronephrosis  4  Cholelithiasis    Complications: None    Condition at Discharge: fair         Discharge instructions/Information to patient and family:   See after visit summary for information provided to patient and family  Provisions for Follow-Up Care:  See after visit summary for information related to follow-up care and any pertinent home health orders        PCP: Gen Esquivel DO    Disposition: Short-term rehab at [facility]    Planned Readmission: No    Discharge Statement   I spent 30 minutes discharging the patient  This time was spent on the day of discharge  I had direct contact with the patient on the day of discharge  Additional documentation is required if more than 30 minutes were spent on discharge  Discharge Medications:  See after visit summary for reconciled discharge medications provided to patient and family

## 2020-09-17 PROBLEM — I48.91 ATRIAL FIBRILLATION WITH RVR (HCC): Status: ACTIVE | Noted: 2020-09-17

## 2020-09-17 PROBLEM — T63.441A BEE STING: Status: RESOLVED | Noted: 2019-08-06 | Resolved: 2020-09-17

## 2020-09-17 PROBLEM — R11.2 NAUSEA AND VOMITING: Status: RESOLVED | Noted: 2020-08-27 | Resolved: 2020-09-17

## 2020-09-17 PROBLEM — D72.829 LEUKOCYTOSIS: Status: RESOLVED | Noted: 2020-08-27 | Resolved: 2020-09-17

## 2020-09-17 PROBLEM — N47.2 PARAPHIMOSIS: Status: ACTIVE | Noted: 2020-09-17

## 2020-09-17 PROBLEM — R45.89 TEARFULNESS: Status: RESOLVED | Noted: 2020-08-14 | Resolved: 2020-09-17

## 2020-09-17 PROBLEM — A41.9 SEPSIS (HCC): Status: ACTIVE | Noted: 2020-09-17

## 2020-09-17 PROBLEM — E87.6 HYPOKALEMIA: Status: ACTIVE | Noted: 2020-09-17

## 2020-09-17 PROBLEM — N39.0 UTI (URINARY TRACT INFECTION): Status: ACTIVE | Noted: 2020-09-17

## 2020-09-17 LAB
ANION GAP SERPL CALCULATED.3IONS-SCNC: 10 MMOL/L (ref 4–13)
APTT PPP: >210 SECONDS (ref 23–37)
ATRIAL RATE: 127 BPM
ATRIAL RATE: 156 BPM
ATRIAL RATE: 340 BPM
ATRIAL RATE: 89 BPM
BUN SERPL-MCNC: 16 MG/DL (ref 5–25)
CALCIUM SERPL-MCNC: 8 MG/DL (ref 8.3–10.1)
CHLORIDE SERPL-SCNC: 100 MMOL/L (ref 100–108)
CO2 SERPL-SCNC: 25 MMOL/L (ref 21–32)
CREAT SERPL-MCNC: 1.06 MG/DL (ref 0.6–1.3)
ERYTHROCYTE [DISTWIDTH] IN BLOOD BY AUTOMATED COUNT: 14.8 % (ref 11.6–15.1)
GFR SERPL CREATININE-BSD FRML MDRD: 63 ML/MIN/1.73SQ M
GLUCOSE SERPL-MCNC: 129 MG/DL (ref 65–140)
HAPTOGLOB SERPL-MCNC: 333 MG/DL (ref 38–329)
HCT VFR BLD AUTO: 28.6 % (ref 36.5–49.3)
HGB BLD-MCNC: 9.6 G/DL (ref 12–17)
INR PPP: 1.37 (ref 0.84–1.19)
MAGNESIUM SERPL-MCNC: 1.6 MG/DL (ref 1.6–2.6)
MCH RBC QN AUTO: 30 PG (ref 26.8–34.3)
MCHC RBC AUTO-ENTMCNC: 33.6 G/DL (ref 31.4–37.4)
MCV RBC AUTO: 89 FL (ref 82–98)
P AXIS: 0 DEGREES
P AXIS: 24 DEGREES
P AXIS: 36 DEGREES
PLATELET # BLD AUTO: 230 THOUSANDS/UL (ref 149–390)
PMV BLD AUTO: 10.2 FL (ref 8.9–12.7)
POTASSIUM SERPL-SCNC: 3 MMOL/L (ref 3.5–5.3)
PR INTERVAL: 184 MS
PR INTERVAL: 184 MS
PROCALCITONIN SERPL-MCNC: 0.09 NG/ML
PROTHROMBIN TIME: 17 SECONDS (ref 11.6–14.5)
QRS AXIS: 16 DEGREES
QRS AXIS: 17 DEGREES
QRS AXIS: 8 DEGREES
QRS AXIS: 9 DEGREES
QRSD INTERVAL: 90 MS
QRSD INTERVAL: 94 MS
QRSD INTERVAL: 94 MS
QRSD INTERVAL: 96 MS
QT INTERVAL: 302 MS
QT INTERVAL: 316 MS
QT INTERVAL: 334 MS
QT INTERVAL: 370 MS
QTC INTERVAL: 428 MS
QTC INTERVAL: 482 MS
QTC INTERVAL: 487 MS
QTC INTERVAL: 509 MS
RBC # BLD AUTO: 3.2 MILLION/UL (ref 3.88–5.62)
SODIUM SERPL-SCNC: 135 MMOL/L (ref 136–145)
T WAVE AXIS: -6 DEGREES
T WAVE AXIS: 176 DEGREES
T WAVE AXIS: 200 DEGREES
T WAVE AXIS: 53 DEGREES
VENTRICULAR RATE: 114 BPM
VENTRICULAR RATE: 143 BPM
VENTRICULAR RATE: 153 BPM
VENTRICULAR RATE: 99 BPM
WBC # BLD AUTO: 6.81 THOUSAND/UL (ref 4.31–10.16)

## 2020-09-17 PROCEDURE — 96374 THER/PROPH/DIAG INJ IV PUSH: CPT

## 2020-09-17 PROCEDURE — 99232 SBSQ HOSP IP/OBS MODERATE 35: CPT | Performed by: NURSE PRACTITIONER

## 2020-09-17 PROCEDURE — 85610 PROTHROMBIN TIME: CPT | Performed by: PHYSICIAN ASSISTANT

## 2020-09-17 PROCEDURE — 84145 PROCALCITONIN (PCT): CPT | Performed by: PHYSICIAN ASSISTANT

## 2020-09-17 PROCEDURE — 99222 1ST HOSP IP/OBS MODERATE 55: CPT | Performed by: INTERNAL MEDICINE

## 2020-09-17 PROCEDURE — 96375 TX/PRO/DX INJ NEW DRUG ADDON: CPT

## 2020-09-17 PROCEDURE — 93010 ELECTROCARDIOGRAM REPORT: CPT | Performed by: INTERNAL MEDICINE

## 2020-09-17 PROCEDURE — 83735 ASSAY OF MAGNESIUM: CPT | Performed by: PHYSICIAN ASSISTANT

## 2020-09-17 PROCEDURE — 93005 ELECTROCARDIOGRAM TRACING: CPT

## 2020-09-17 PROCEDURE — 85027 COMPLETE CBC AUTOMATED: CPT | Performed by: PHYSICIAN ASSISTANT

## 2020-09-17 PROCEDURE — 99223 1ST HOSP IP/OBS HIGH 75: CPT | Performed by: PHYSICIAN ASSISTANT

## 2020-09-17 PROCEDURE — 96365 THER/PROPH/DIAG IV INF INIT: CPT

## 2020-09-17 PROCEDURE — 80048 BASIC METABOLIC PNL TOTAL CA: CPT | Performed by: PHYSICIAN ASSISTANT

## 2020-09-17 PROCEDURE — 85730 THROMBOPLASTIN TIME PARTIAL: CPT | Performed by: PHYSICIAN ASSISTANT

## 2020-09-17 PROCEDURE — 99223 1ST HOSP IP/OBS HIGH 75: CPT | Performed by: INTERNAL MEDICINE

## 2020-09-17 RX ORDER — HYDROCHLOROTHIAZIDE 12.5 MG/1
12.5 TABLET ORAL DAILY
Status: DISCONTINUED | OUTPATIENT
Start: 2020-09-17 | End: 2020-09-20 | Stop reason: HOSPADM

## 2020-09-17 RX ORDER — HEPARIN SODIUM 1000 [USP'U]/ML
3600 INJECTION, SOLUTION INTRAVENOUS; SUBCUTANEOUS
Status: DISCONTINUED | OUTPATIENT
Start: 2020-09-17 | End: 2020-09-17

## 2020-09-17 RX ORDER — DOXYCYCLINE HYCLATE 50 MG/1
324 CAPSULE, GELATIN COATED ORAL
Status: DISCONTINUED | OUTPATIENT
Start: 2020-09-17 | End: 2020-09-20 | Stop reason: HOSPADM

## 2020-09-17 RX ORDER — GABAPENTIN 100 MG/1
100 CAPSULE ORAL
Status: DISCONTINUED | OUTPATIENT
Start: 2020-09-17 | End: 2020-09-20 | Stop reason: HOSPADM

## 2020-09-17 RX ORDER — METOPROLOL TARTRATE 5 MG/5ML
INJECTION INTRAVENOUS
Status: DISPENSED
Start: 2020-09-17 | End: 2020-09-17

## 2020-09-17 RX ORDER — DILTIAZEM HYDROCHLORIDE 5 MG/ML
10 INJECTION INTRAVENOUS ONCE
Status: COMPLETED | OUTPATIENT
Start: 2020-09-17 | End: 2020-09-17

## 2020-09-17 RX ORDER — FENTANYL CITRATE 50 UG/ML
25 INJECTION, SOLUTION INTRAMUSCULAR; INTRAVENOUS ONCE
Status: COMPLETED | OUTPATIENT
Start: 2020-09-17 | End: 2020-09-17

## 2020-09-17 RX ORDER — FENTANYL CITRATE 50 UG/ML
INJECTION, SOLUTION INTRAMUSCULAR; INTRAVENOUS
Status: COMPLETED
Start: 2020-09-17 | End: 2020-09-17

## 2020-09-17 RX ORDER — POLYETHYLENE GLYCOL 3350 17 G/17G
17 POWDER, FOR SOLUTION ORAL DAILY
Status: DISCONTINUED | OUTPATIENT
Start: 2020-09-17 | End: 2020-09-20 | Stop reason: HOSPADM

## 2020-09-17 RX ORDER — PRAVASTATIN SODIUM 40 MG
40 TABLET ORAL
Status: DISCONTINUED | OUTPATIENT
Start: 2020-09-17 | End: 2020-09-20 | Stop reason: HOSPADM

## 2020-09-17 RX ORDER — HEPARIN SODIUM 1000 [USP'U]/ML
1800 INJECTION, SOLUTION INTRAVENOUS; SUBCUTANEOUS
Status: DISCONTINUED | OUTPATIENT
Start: 2020-09-17 | End: 2020-09-17

## 2020-09-17 RX ORDER — METOPROLOL TARTRATE 5 MG/5ML
2.5 INJECTION INTRAVENOUS ONCE
Status: COMPLETED | OUTPATIENT
Start: 2020-09-17 | End: 2020-09-17

## 2020-09-17 RX ORDER — POTASSIUM CHLORIDE 20 MEQ/1
40 TABLET, EXTENDED RELEASE ORAL 2 TIMES DAILY
Status: DISCONTINUED | OUTPATIENT
Start: 2020-09-17 | End: 2020-09-17

## 2020-09-17 RX ORDER — HEPARIN SODIUM 1000 [USP'U]/ML
3600 INJECTION, SOLUTION INTRAVENOUS; SUBCUTANEOUS ONCE
Status: COMPLETED | OUTPATIENT
Start: 2020-09-17 | End: 2020-09-17

## 2020-09-17 RX ORDER — MAGNESIUM SULFATE HEPTAHYDRATE 40 MG/ML
2 INJECTION, SOLUTION INTRAVENOUS ONCE
Status: COMPLETED | OUTPATIENT
Start: 2020-09-17 | End: 2020-09-17

## 2020-09-17 RX ORDER — ASPIRIN 81 MG/1
81 TABLET, CHEWABLE ORAL DAILY
Status: DISCONTINUED | OUTPATIENT
Start: 2020-09-17 | End: 2020-09-17

## 2020-09-17 RX ORDER — HEPARIN SODIUM 10000 [USP'U]/100ML
3-20 INJECTION, SOLUTION INTRAVENOUS
Status: DISCONTINUED | OUTPATIENT
Start: 2020-09-17 | End: 2020-09-17

## 2020-09-17 RX ORDER — POTASSIUM CHLORIDE 20 MEQ/1
20 TABLET, EXTENDED RELEASE ORAL ONCE
Status: COMPLETED | OUTPATIENT
Start: 2020-09-17 | End: 2020-09-17

## 2020-09-17 RX ORDER — TAMSULOSIN HYDROCHLORIDE 0.4 MG/1
0.4 CAPSULE ORAL
Status: DISCONTINUED | OUTPATIENT
Start: 2020-09-17 | End: 2020-09-20 | Stop reason: HOSPADM

## 2020-09-17 RX ADMIN — HEPARIN SODIUM 3600 UNITS: 1000 INJECTION INTRAVENOUS; SUBCUTANEOUS at 04:19

## 2020-09-17 RX ADMIN — HYDROCHLOROTHIAZIDE 12.5 MG: 12.5 TABLET ORAL at 08:17

## 2020-09-17 RX ADMIN — HEPARIN SODIUM 12 UNITS/KG/HR: 10000 INJECTION, SOLUTION INTRAVENOUS at 04:20

## 2020-09-17 RX ADMIN — FERROUS GLUCONATE 324 MG: 324 TABLET ORAL at 17:04

## 2020-09-17 RX ADMIN — TAMSULOSIN HYDROCHLORIDE 0.4 MG: 0.4 CAPSULE ORAL at 17:03

## 2020-09-17 RX ADMIN — FENTANYL CITRATE 25 MCG: 50 INJECTION INTRAMUSCULAR; INTRAVENOUS at 01:45

## 2020-09-17 RX ADMIN — APIXABAN 5 MG: 5 TABLET, FILM COATED ORAL at 17:03

## 2020-09-17 RX ADMIN — DILTIAZEM HYDROCHLORIDE 10 MG: 5 INJECTION INTRAVENOUS at 00:47

## 2020-09-17 RX ADMIN — MAGNESIUM SULFATE IN WATER 2 G: 40 INJECTION, SOLUTION INTRAVENOUS at 11:02

## 2020-09-17 RX ADMIN — POTASSIUM CHLORIDE 20 MEQ: 1500 TABLET, EXTENDED RELEASE ORAL at 17:03

## 2020-09-17 RX ADMIN — GABAPENTIN 100 MG: 100 CAPSULE ORAL at 21:31

## 2020-09-17 RX ADMIN — POLYETHYLENE GLYCOL 3350 17 G: 17 POWDER, FOR SOLUTION ORAL at 08:16

## 2020-09-17 RX ADMIN — FERROUS GLUCONATE 324 MG: 324 TABLET ORAL at 06:08

## 2020-09-17 RX ADMIN — FENTANYL CITRATE 25 MCG: 50 INJECTION, SOLUTION INTRAMUSCULAR; INTRAVENOUS at 01:45

## 2020-09-17 RX ADMIN — IOHEXOL 85 ML: 350 INJECTION, SOLUTION INTRAVENOUS at 00:10

## 2020-09-17 RX ADMIN — DILTIAZEM HYDROCHLORIDE 10 MG/HR: 5 INJECTION INTRAVENOUS at 00:51

## 2020-09-17 RX ADMIN — PRAVASTATIN SODIUM 40 MG: 40 TABLET ORAL at 17:03

## 2020-09-17 RX ADMIN — DILTIAZEM HYDROCHLORIDE 10 MG: 5 INJECTION INTRAVENOUS at 01:05

## 2020-09-17 RX ADMIN — Medication 12.5 MG: at 08:17

## 2020-09-17 RX ADMIN — ASPIRIN 81 MG 81 MG: 81 TABLET ORAL at 08:17

## 2020-09-17 RX ADMIN — METOPROLOL TARTRATE 2.5 MG: 5 INJECTION INTRAVENOUS at 01:29

## 2020-09-17 RX ADMIN — POTASSIUM CHLORIDE 40 MEQ: 1500 TABLET, EXTENDED RELEASE ORAL at 11:02

## 2020-09-17 NOTE — ASSESSMENT & PLAN NOTE
· Secondary to tachycardia, tachypnea  · Suspected source UTI  · Lactic  · Followed blood cultures, and urine cultures  · Trend white blood cell count, fevers  ·  will decreased antibiotics to Rocephin

## 2020-09-17 NOTE — PROGRESS NOTES
Progress Note - Amelia Umanzor 80 y o  male MRN: [id number]    Unit/Bed#: S -01 Encounter: [id number]      Assessment:  Amelia Umanzor is an 75-year-old male with recent diagnosis of  For table fractures release well rehab approximately 2 weeks ago  He was seen during previous admission briefly for urinary retention and acute kidney injury  Caregiver at bedside states patient has had increasing a poor weak urinary stream and urinary hesitancy  He was discharged 2 days ago with Barton catheter in place to proceed with void trial as an outpatient  He was started on Flomax  He re-presented to [facility] emergency room yesterday evening and with admitted for tachycardia and tachypnea  Patient required brief Cardizem drip for rapid atrial fibrillation, chemically converted  Patient was briefly on cefepime wound transition to ceftriaxone this  Blood in urine culture pending  Our service was  consulted for paraphimosis  It was  reduced by Sade Bobo PA-C overnight  Barton catheter remains in-situ  Plan:  Continue medical optimization and antibiosis  Narrow antibiotics per sensitivities  Discharge with Barton catheter in place and continue reconditioning  Patient is suboptimal for void trial at this time  Continue Flomax  Our service will contact patient/caregiver with hospital follow-up appointment date and time once discharged  Subjective:   Office note complaints  Objective:     Vitals: Blood pressure 130/65, pulse 75, temperature 98 2 °F (36 8 °C), temperature source Oral, resp  rate 19, height 5' (1 524 m), weight 63 1 kg (139 lb 1 8 oz), SpO2 98 %  ,Body mass index is 27 17 kg/m²        Intake/Output Summary (Last 24 hours) at 9/17/2020 0936  Last data filed at 9/17/2020 0600  Gross per 24 hour   Intake 250 ml   Output 1900 ml   Net -1650 ml       Physical Exam: General appearance: alert and oriented, in no acute distress, appears stated age, cooperative, no distress, pale and Frail appearing  Head: Normocephalic, without obvious abnormality, atraumatic  Neck: no adenopathy, no carotid bruit, no JVD, supple, symmetrical, trachea midline and thyroid not enlarged, symmetric, no tenderness/mass/nodules  Lungs: diminished breath sounds  Heart: regular rate and rhythm, S1, S2 normal, no murmur, click, rub or gallop  Abdomen: soft, non-tender; bowel sounds normal; no masses,  no organomegaly  Extremities: extremities normal, warm and well-perfused; no cyanosis, clubbing, or edema  Pulses: 2+ and symmetric  Neurologic: Alert and oriented X 3, normal strength and tone  Normal symmetric reflexes  Normal coordination and gait  Genitalia--uncircumcised male Barton retractable foreskin, no preputial edema or erythema  Retraction reveals erythema glans penis, Center to touch without appreciable lesions or balanitis  Normal shaft, bilaterally descended testicles without edema, erythema, crepitus or necrosis  Invasive Devices     Peripheral Intravenous Line            Peripheral IV 09/16/20 Left;Upper Forearm less than 1 day    Peripheral IV 09/17/20 Left Forearm less than 1 day    Peripheral IV 09/17/20 Right Wrist less than 1 day          Drain            Urethral Catheter Latex 16 Fr  3 days              Lab Results   Component Value Date    WBC 6 81 09/17/2020    HGB 9 6 (L) 09/17/2020    HCT 28 6 (L) 09/17/2020    MCV 89 09/17/2020     09/17/2020     Lab Results   Component Value Date    SODIUM 135 (L) 09/17/2020    K 3 0 (L) 09/17/2020     09/17/2020    CO2 25 09/17/2020    BUN 16 09/17/2020    CREATININE 1 06 09/17/2020    GLUC 129 09/17/2020    CALCIUM 8 0 (L) 09/17/2020       Lab, Imaging and other studies: I have personally reviewed pertinent reports

## 2020-09-17 NOTE — ASSESSMENT & PLAN NOTE
· Paraphimosis noted on admission to the emergency department    · This was reduced emergently by on-call urology  · Barton was inserted  · Trend intake and output  · Consult urology  · Monitor for increasing swelling  · Currently he has significant pain with even slight manipulation of penis

## 2020-09-17 NOTE — QUICK NOTE
This is an 14-year-old gentleman with dementia, CHF, NPH, who was admitted this morning from [facility] due to penile redness and swelling  He was found to have phimosis on admission, s/p reduction by Urology team   (+) SIRS criteria on admission as evidenced by tachypnea and tachycardia, urinalysis showed WBC of 10 to 20 per high-power field and enumerable bacteria  Urine and blood cultures pending  Patient currently on ceftriaxone  Also found to be in new onset atrial fibrillation with RVR on admission, previously not on anticoagulation, has had multiple falls in the past   Started on Cardizem drip in the ED, patient was noted to have converted to sinus rhythm thereafter  Cardiology team input appreciated  Of note, patient was discharged from [facility] yesterday, admitted for urinary retention  And was discharged to [facility]  Prior to that admission patient lived at home with his wife  Called patient's spouse and daughter twice, but no answer  Cardiology team was able to get in touch with patient's wife and wants patient to be on anticoagulation  Eliquis was then started  Physical Exam  Vitals signs reviewed  Constitutional:       General: He is not in acute distress  HENT:      Nose: No congestion  Eyes:      General: No scleral icterus  Cardiovascular:      Rate and Rhythm: Normal rate and regular rhythm  Pulmonary:      Breath sounds: No wheezing  Comments: (+) rales on both lower lung fields (L>R)  Abdominal:      General: There is no distension  Tenderness: There is no abdominal tenderness  Genitourinary:     Comments: (+) erythema and mild penile swelling noted  Barton catheter in place  Musculoskeletal:         General: No swelling  Skin:     Coloration: Skin is not jaundiced  Neurological:      Mental Status: He is alert        Comments: Oriented to person but not to place or time

## 2020-09-17 NOTE — ED NOTES
Urology PA-C successfully reduced foreskin and all swelling resolved  Patient reports significant improvement to pain/discomfort        Hattie Lemos RN  09/17/20 0333

## 2020-09-17 NOTE — ED PROVIDER NOTES
History  Chief Complaint   Patient presents with    Penis Swelling     Presents via EMS from nursing home - staff report discharge from this facility approx 2hrs prior - during admission skin assessment noted to have swollen, red and painful penis  Patient was sent from nursing facility [facility] for a painful swollen red penis  States he has had penile pain for past 2 days  No fever  No N/V/D  Denies trauma  States he scratched his face  No cough  Has a duarte catheter  Patient has dementia and cannot provide accurate hx  Needed my urgent attention  Was last seen in this ED on 9/14/20 for weakness and was discharged from this hospital [facility] to nursing facility  [facility] website checked on this patient and last Rx filled was on 8/17/20 for oxycodone for 5 day supply  Last Td was in 2013 as per Epic  States he wants tylenol for pain  History provided by:  Patient, EMS personnel and nursing home  History limited by:  Dementia   used: No    Penis Swelling   Presenting symptoms: penile pain    Associated symptoms: penile swelling    Associated symptoms: no diarrhea, no fever, no nausea and no vomiting        Prior to Admission Medications   Prescriptions Last Dose Informant Patient Reported? Taking?    B Complex Vitamins (VITAMIN B COMPLEX PO)  Spouse/Significant Other Yes Yes   Sig: Take 482 2 mg by mouth daily   Multiple Vitamins-Minerals (PRESERVISION AREDS 2 PO)  Spouse/Significant Other Yes Yes   Sig: Take 24 mg by mouth 2 (two) times a day   Omega-3 1000 MG CAPS  Spouse/Significant Other Yes Yes   Sig: Take 1 tablet by mouth daily   acetaminophen (TYLENOL) 325 mg tablet   No Yes   Sig: Take 3 tablets (975 mg total) by mouth every 8 (eight) hours   aspirin 81 MG tablet  Spouse/Significant Other Yes Yes   Sig: Take 81 mg by mouth every other day   cholecalciferol (VITAMIN D3) 1,000 units tablet  Spouse/Significant Other Yes Yes   Sig: Take 2,000 Units by mouth daily    famotidine (PEPCID) 20 mg tablet  Spouse/Significant Other Yes Yes   Sig: Take 1 tablet by mouth daily   ferrous gluconate (FERGON) 324 mg tablet   No Yes   Sig: Take 1 tablet (324 mg total) by mouth 2 (two) times a day before meals   gabapentin (NEURONTIN) 100 mg capsule   No Yes   Sig: Take 1 capsule (100 mg total) by mouth daily at bedtime   hydrochlorothiazide (MICROZIDE) 12 5 mg capsule   Yes Yes   Sig: Take 12 5 mg by mouth daily   hydrocortisone 1 % cream   No Yes   Sig: Apply topically 4 (four) times a day as needed for irritation for up to 14 days   metoprolol tartrate (LOPRESSOR) 25 mg tablet  Spouse/Significant Other Yes Yes   Sig: Take 12 5 mg by mouth daily   polyethylene glycol (MIRALAX) 17 g packet   No Yes   Sig: Take 17 g by mouth daily   simvastatin (ZOCOR) 20 mg tablet  Spouse/Significant Other Yes Yes   Sig: Take 20 mg by mouth daily    tamsulosin (FLOMAX) 0 4 mg   No Yes   Sig: Take 1 capsule (0 4 mg total) by mouth daily with dinner      Facility-Administered Medications: None       Past Medical History:   Diagnosis Date    [facility]LLIE [facility]) [facility]     Cognitive communication deficit     Dementia ([facility]     Fracture of fifth lumbar vertebra ([facility]     Fracture of first lumbar vertebra ([address]     Fracture of fourth lumbar vertebra ([address] )     Fracture of second lumbar vertebra ([address] )     Fracture of T11 vertebra with routine healing     Fracture of T12 vertebra with routine healing     Fracture of third thoracic vertebra (HCC)     Gait abnormality     Hyperlipidemia     Hypertension     Macular degeneration     Normal pressure hydrocephalus ([address] )        History reviewed  No pertinent surgical history  History reviewed  No pertinent family history  I have reviewed and agree with the history as documented      E-Cigarette/Vaping    E-Cigarette Use Never User      E-Cigarette/Vaping Substances     Social History     Tobacco Use    Smoking status: Never Smoker    Smokeless tobacco: Never Used   Substance Use Topics    Alcohol use: Never     Frequency: Never     Binge frequency: Never    Drug use: Never       Review of Systems   Unable to perform ROS: Dementia   Constitutional: Negative for fever  Respiratory: Negative for cough  Gastrointestinal: Negative for diarrhea, nausea and vomiting  Genitourinary: Positive for penile pain and penile swelling  Psychiatric/Behavioral: Positive for confusion (has dementia)  Physical Exam  Physical Exam  Vitals signs and nursing note reviewed  Constitutional:       General: He is in acute distress (moderate)  HENT:      Head: Normocephalic  Mouth/Throat:      Comments: Mucous membranes somewhat dry  Eyes:      General: No scleral icterus  Extraocular Movements: Extraocular movements intact  Pupils: Pupils are equal, round, and reactive to light  Neck:      Musculoskeletal: Normal range of motion and neck supple  No neck rigidity or muscular tenderness  Cardiovascular:      Rate and Rhythm: Tachycardia present  Rhythm irregular  Heart sounds: Normal heart sounds  No murmur  Pulmonary:      Breath sounds: No stridor  Rales (bibasilar) present  No wheezing or rhonchi  Comments: Tachypnea  Abdominal:      General: Bowel sounds are normal  There is no distension  Palpations: Abdomen is soft  Tenderness: There is no abdominal tenderness  Genitourinary:     Comments: (+) distal penile swelling, erythema and tenderness  (+) duarte catheter in place  Musculoskeletal:      Right lower leg: No edema  Left lower leg: No edema  Skin:     General: Skin is warm and dry  Coloration: Skin is not jaundiced  Findings: No bruising, erythema or rash  Comments: Small linear superficial abrasion right infranasal region  Neurological:      Comments: Awake  Confused  Knows he is in a hospital and thinks it is August 2002      Psychiatric:         Mood and Affect: Mood normal          Vital Signs  ED Triage Vitals [09/16/20 2205]   Temperature Pulse Respirations Blood Pressure SpO2   (!) 97 3 °F (36 3 °C) 105 (!) 24 (!) 171/116 95 %      Temp Source Heart Rate Source Patient Position - Orthostatic VS BP Location FiO2 (%)   Oral Monitor Sitting Right arm --      Pain Score       5           Vitals:    09/17/20 0200 09/17/20 0205 09/17/20 0215 09/17/20 0220   BP: 120/86 110/72 123/64 97/56   Pulse: (!) 150 (!) 150 (S) 86 86   Patient Position - Orthostatic VS: Lying Lying Lying Lying         Visual Acuity  Visual Acuity      Most Recent Value   L Pupil Size (mm)  3   R Pupil Size (mm)  3          ED Medications  Medications   acetaminophen (TYLENOL) tablet 650 mg (650 mg Oral Given 9/16/20 2224)   cefepime (MAXIPIME) 1,000 mg in dextrose 5 % 50 mL IVPB (0 mg Intravenous Stopped 9/16/20 2244)   vancomycin (VANCOCIN) IVPB (premix in dextrose) 1,000 mg 200 mL (0 mg/kg × 64 5 kg Intravenous Stopped 9/16/20 2335)   bacitracin topical ointment 1 small application (1 small application Topical Given 9/16/20 2229)   furosemide (LASIX) injection 20 mg (20 mg Intravenous Given 9/16/20 2349)   iohexol (OMNIPAQUE) 350 MG/ML injection (SINGLE-DOSE) 85 mL (85 mL Intravenous Given 9/17/20 0010)   diltiazem (CARDIZEM) 125 mg in sodium chloride 0 9 % 125 mL infusion (0 mg/hr Intravenous Hold 9/17/20 0223)   diltiazem (CARDIZEM) injection 10 mg (10 mg Intravenous Given 9/17/20 0047)   fentanyl citrate (PF) 100 MCG/2ML 25 mcg (25 mcg Intravenous Given 9/17/20 0145)   diltiazem (CARDIZEM) injection 10 mg (10 mg Intravenous Given During Downtime 9/17/20 0105)   metoprolol (LOPRESSOR) injection 2 5 mg (2 5 mg Intravenous Given During Downtime 9/17/20 0129)       Diagnostic Studies  Results Reviewed     Procedure Component Value Units Date/Time    High sensitivity CRP [[id number]] Collected:  09/16/20 2220    Lab Status:  Final result Specimen:  Blood from Arm, Right Updated:  09/16/20 6605     CRP, High Sensitivity >90 00 mg/L Narrative:             HsCRP Level       Relative Risk           <1 0 mg/L          Low           1 0 to 3 0 mg/L    Average           >3 0 mg/L          High        Comprehensive metabolic panel [[id number]]  (Abnormal) Collected:  09/16/20 2220    Lab Status:  Final result Specimen:  Blood from Arm, Right Updated:  09/16/20 2324     Sodium 137 mmol/L      Potassium 3 6 mmol/L      Chloride 101 mmol/L      CO2 25 mmol/L      ANION GAP 11 mmol/L      BUN 19 mg/dL      Creatinine 1 10 mg/dL      Glucose 122 mg/dL      Calcium 8 5 mg/dL      Corrected Calcium 9 5 mg/dL      AST 23 U/L      ALT 22 U/L      Alkaline Phosphatase 71 U/L      Total Protein 6 5 g/dL      Albumin 2 7 g/dL      Total Bilirubin 0 88 mg/dL      eGFR 60 ml/min/1 73sq m     Narrative:       [place] guidelines for Chronic Kidney Disease (CKD):     Stage 1 with normal or high GFR (GFR > 90 mL/min/1 73 square meters)    Stage 2 Mild CKD (GFR = 60-89 mL/min/1 73 square meters)    Stage 3A Moderate CKD (GFR = 45-59 mL/min/1 73 square meters)    Stage 3B Moderate CKD (GFR = 30-44 mL/min/1 73 square meters)    Stage 4 Severe CKD (GFR = 15-29 mL/min/1 73 square meters)    Stage 5 End Stage CKD (GFR <15 mL/min/1 73 square meters)  Note: GFR calculation is accurate only with a steady state creatinine    NT-BNP PRO [[id number]]  (Abnormal) Collected:  09/16/20 2220    Lab Status:  Final result Specimen:  Blood from Arm, Right Updated:  09/16/20 2323     NT-proBNP 5,668 pg/mL     TSH, 3rd generation with Free T4 reflex [[id number]]  (Normal) Collected:  09/16/20 2220    Lab Status:  Final result Specimen:  Blood from Arm, Right Updated:  09/16/20 2323     TSH 3RD GENERATON 2 303 uIU/mL     Narrative:       Patients undergoing fluorescein dye angiography may retain small amounts of fluorescein in the body for 48-72 hours post procedure  Samples containing fluorescein can produce falsely depressed TSH values   If the patient had this procedure,a specimen should be resubmitted post fluorescein clearance  Urine Microscopic [[id number]]  (Abnormal) Collected:  09/16/20 2215    Lab Status:  Final result Specimen:  Urine, Indwelling Barton Catheter Updated:  09/16/20 2305     RBC, UA 30-50 /hpf      WBC, UA 10-20 /hpf      Epithelial Cells Occasional /hpf      Bacteria, UA Innumerable /hpf     Urine culture [[id number]] Collected:  09/16/20 2215    Lab Status: In process Specimen:  Urine, Indwelling Barton Catheter Updated:  09/16/20 2304    Lactic acid [[id number]]  (Normal) Collected:  09/16/20 2220    Lab Status:  Final result Specimen:  Blood from Arm, Right Updated:  09/16/20 2259     LACTIC ACID 1 8 mmol/L     Narrative:       Result may be elevated if tourniquet was used during collection      UA w Reflex to Microscopic w Reflex to Culture [[id number]]  (Abnormal) Collected:  09/16/20 2215    Lab Status:  Final result Specimen:  Urine, Indwelling Barton Catheter Updated:  09/16/20 2257     Color, UA Yellow     Clarity, UA Clear     Specific Gravity, UA 1 025     pH, UA 6 0     Leukocytes, UA Negative     Nitrite, UA Negative     Protein, UA Negative mg/dl      Glucose, UA Negative mg/dl      Ketones, UA 15 (1+) mg/dl      Urobilinogen, UA 1 0 E U /dl      Bilirubin, UA Negative     Blood, UA Moderate    Troponin I [[id number]]  (Normal) Collected:  09/16/20 2220    Lab Status:  Final result Specimen:  Blood from Arm, Right Updated:  09/16/20 2249     Troponin I <0 02 ng/mL     Protime-INR [[id number]]  (Abnormal) Collected:  09/16/20 2220    Lab Status:  Final result Specimen:  Blood from Arm, Right Updated:  09/16/20 2246     Protime 15 3 seconds      INR 1 19    APTT [[id number]]  (Abnormal) Collected:  09/16/20 2220    Lab Status:  Final result Specimen:  Blood from Arm, Right Updated:  09/16/20 2246     PTT 45 seconds     D-Dimer [[id number]]  (Abnormal) Collected:  09/16/20 2220    Lab Status:  Final result Specimen:  Blood from Arm, Right Updated:  09/16/20 2246     D-Dimer, Quant 1 62 ug/ml FEU     Blood culture #2 [[id number]] Collected:  09/16/20 2220    Lab Status: In process Specimen:  Blood from Arm, Right Updated:  09/16/20 2236    Blood culture #1 [[id number]] Collected:  09/16/20 2225    Lab Status: In process Specimen:  Blood from Hand, Right Updated:  09/16/20 2235    CBC and differential [[id number]]  (Abnormal) Collected:  09/16/20 2220    Lab Status:  Final result Specimen:  Blood from Arm, Right Updated:  09/16/20 2232     WBC 6 04 Thousand/uL      RBC 3 61 Million/uL      Hemoglobin 10 9 g/dL      Hematocrit 32 9 %      MCV 91 fL      MCH 30 2 pg      MCHC 33 1 g/dL      RDW 14 8 %      MPV 9 9 fL      Platelets 984 Thousands/uL      nRBC 0 /100 WBCs      Neutrophils Relative 66 %      Immat GRANS % 1 %      Lymphocytes Relative 23 %      Monocytes Relative 10 %      Eosinophils Relative 0 %      Basophils Relative 0 %      Neutrophils Absolute 3 94 Thousands/µL      Immature Grans Absolute 0 08 Thousand/uL      Lymphocytes Absolute 1 37 Thousands/µL      Monocytes Absolute 0 61 Thousand/µL      Eosinophils Absolute 0 02 Thousand/µL      Basophils Absolute 0 02 Thousands/µL     Fingerstick Glucose (POCT) [[id number]]  (Normal) Collected:  09/16/20 2230    Lab Status:  Final result Updated:  09/16/20 2232     POC Glucose 125 mg/dl                  CTA ED chest PE study   ED Interpretation by René Brumfield MD (09/17 0059)   FINDINGS:     PULMONARY ARTERIAL TREE:  No pulmonary embolus is seen  LUNGS:  Patchy dependent opacities in the lung bases consistent with atelectasis   Subsegmental atelectasis versus linear scarring in the anterior right upper lobe   Mild interlobular septal thickening is noted   Subsegmental atelectasis versus linear   scarring   There is no tracheal or endobronchial lesion  PLEURA:  Small left and trace right pleural effusions       HEART/GREAT VESSELS:  Atherosclerotic changes are noted in thoracic aorta and coronary arteries  MEDIASTINUM AND STUART:  Small hiatal hernia  CHEST WALL AND LOWER NECK:   Unremarkable  VISUALIZED STRUCTURES IN THE UPPER ABDOMEN:  Unremarkable  OSSEOUS STRUCTURES:  No acute fracture or destructive osseous lesion  Impression:       1   No evidence of pulmonary embolism  2   Mild interlobular septal thickening suspicious for edema  3   Small left and trace right pleural effusions  4   Small hiatal hernia  Workst   ation performed: [id number]           Final Result by Estevan Holloway MD (09/17 0057)      1  No evidence of pulmonary embolism  2   Mild interlobular septal thickening suspicious for edema  3   Small left and trace right pleural effusions  4   Small hiatal hernia  Workstation performed: [id number]         XR chest 1 view portable   ED Interpretation by Yandel Osorio MD (09/16 2247)   Cardiomegaly, tortuous thoracic aorta, increased interstitial markings read by me                    Procedures  ECG 12 Lead Documentation Only    Date/Time: 9/16/2020 10:26 PM  Performed by: Yandel Osorio MD  Authorized by: Yandel Osorio MD     Indications / Diagnosis:  Sepsis  ECG reviewed by me, the ED Provider: yes    Patient location:  ED  Previous ECG:     Previous ECG:  Compared to current    Comparison ECG info:  9/14/20    Similarity:  Changes noted (tachy and a  fib and NSSTT changes now)  Quality:     Tracing quality:  Limited by artifact  Rate:     ECG rate:  114    ECG rate assessment: tachycardic    Rhythm:     Rhythm: atrial fibrillation    Ectopy:     Ectopy: PVCs    QRS:     QRS axis:  Normal  ST segments:     ST segments:  Non-specific  T waves:     T waves: non-specific    ECG 12 Lead Documentation Only    Date/Time: 9/17/2020 12:42 AM  Performed by: Yandel Osorio MD  Authorized by: Yandel Osorio MD     Indications / Diagnosis:  Rapid a  fib  ECG reviewed by me, the ED Provider: yes Patient location:  ED  Previous ECG:     Previous ECG:  Compared to current    Comparison ECG info:  Earlier tonight    Similarity:  Changes noted (increased HR and NSSTT changes now)  Quality:     Tracing quality:  Limited by artifact  Rate:     ECG rate:  153    ECG rate assessment: tachycardic    Rhythm:     Rhythm: atrial fibrillation    Ectopy:     Ectopy: PVCs      PVCs:  Frequent  ST segments:     ST segments:  Non-specific  T waves:     T waves: non-specific    CriticalCare Time  Performed by: Rodrigo De La Rosa MD  Authorized by: Rodrigo De La Rosa MD     Critical care provider statement:     Critical care time (minutes):  40    Critical care time was exclusive of:  Separately billable procedures and treating other patients    Critical care was necessary to treat or prevent imminent or life-threatening deterioration of the following conditions:  Sepsis (rapid a  fib  sepsis)    Critical care was time spent personally by me on the following activities:  Obtaining history from patient or surrogate, development of treatment plan with patient or surrogate, discussions with consultants, evaluation of patient's response to treatment, examination of patient, ordering and performing treatments and interventions, ordering and review of laboratory studies, ordering and review of radiographic studies, re-evaluation of patient's condition and review of old charts    I assumed direction of critical care for this patient from another provider in my specialty: no    ECG 12 Lead Documentation Only    Date/Time: 9/17/2020 2:14 AM  Performed by: Rodrigo De La Rosa MD  Authorized by: Rodrigo De La Rosa MD     Indications / Diagnosis:  Repeat EKG #3 post lopressor  ECG reviewed by me, the ED Provider: yes    Patient location:  ED  Previous ECG:     Previous ECG:  Compared to current    Comparison ECG info:  Earlier tonight    Similarity:  Changes noted (PVC infrequent now)  Rate:     ECG rate:  143    ECG rate assessment: tachycardic    Rhythm:     Rhythm: atrial fibrillation    Ectopy:     Ectopy: PVCs      PVCs:  Infrequent  QRS:     QRS axis:  Normal  ST segments:     ST segments:  Non-specific  T waves:     T waves: non-specific    ECG 12 Lead Documentation Only    Date/Time: 9/17/2020 2:14 AM  Performed by: Kayla Gavin MD  Authorized by: Kayla Gavin MD     Indications / Diagnosis:  Repeat EKG when HR decreased  ECG reviewed by me, the ED Provider: yes    Patient location:  ED  Previous ECG:     Previous ECG:  Compared to current    Comparison ECG info:  Earlier EKG    Similarity:  Changes noted (HR decreased)  Quality:     Tracing quality:  Limited by artifact  Rate:     ECG rate:  99    ECG rate assessment: normal    Rhythm:     Rhythm: atrial fibrillation    Ectopy:     Ectopy: PVCs      PVCs:  Frequent  QRS:     QRS axis:  Normal  ST segments:     ST segments:  Non-specific  T waves:     T waves: non-specific               ED Course  ED Course as of Sep 17 0226   Wed Sep 16, 2020   2327 CT PE study ordered  D-Dimer, Quant(!): 1 62   2332 Labs, EKG and CXR d/w patient  2334 IV lasix ordered  NT-proBNP(!): 5,668   2334 IV abx ordered  Urine Microscopic(!)   2334 IVFs not given for sepsis since patient has CHF as well  Thu Sep 17, 2020   0031 Patient states he had a circumcision  36 D/w urology AP Rhea B  Who will see patient in ED        0045 HR increased to 150-180s in rapid a  Fib and IV cardizem bolus and drip ordered  0158 More IV cardizem bolus ordered for HR still in 140-160s and then IV lopressor ordered for HR still in 140-160s        0159 Urology AP seeing patient in ED        0214 HR decreased to 80s        0225 SBP decreased to 97/56 so cardizem drip held for now                 HEART Risk Score      Most Recent Value   Heart Score Risk Calculator   History  1 Filed at: 09/16/2020 2251   ECG  1 Filed at: 09/16/2020 2251   Age  2 Filed at: 09/16/2020 2251 Risk Factors  1 Filed at: 09/16/2020 2251   Troponin  0 Filed at: 09/16/2020 2251   HEART Score  5 Filed at: 09/16/2020 2251              PERC Rule for PE      Most Recent Value   PERC Rule for PE   Age >=50  1 Filed at: 09/16/2020 2327   HR >=100  1 Filed at: 09/16/2020 2327   O2 Sat on room air < 95%  --   History of PE or DVT  --   Recent trauma or surgery  --   Hemoptysis  --   Exogenous estrogen  --   Unilateral leg swelling  --   PERC Rule for PE Results  2 Filed at: 09/16/2020 2327          Initial Sepsis Screening     Row Name 09/16/20 2330                Is the patient's history suggestive of a new or worsening infection? (!) Yes (Proceed)  -AO        Suspected source of infection  soft tissue  -AO        Are two or more of the following signs & symptoms of infection both present and new to the patient? (!) Yes (Proceed)  -AO        Indicate SIRS criteria  Tachycardia > 90 bpm;Tachypnea > 20 resp per min  -AO        If the answer is yes to both questions, suspicion of sepsis is present  --        If severe sepsis is present AND tissue hypoperfusion perists in the hour after fluid resuscitation or lactate > 4, the patient meets criteria for SEPTIC SHOCK  --        Are any of the following organ dysfunction criteria present within 6 hours of suspected infection and SIRS criteria that are NOT considered to be chronic conditions?   No  -AO        Organ dysfunction  --        Date of presentation of severe sepsis  --        Time of presentation of severe sepsis  --        Tissue hypoperfusion persists in the hour after crystalloid fluid administration, evidenced, by either:  --        Was hypotension present within one hour of the conclusion of crystalloid fluid administration?  --        Date of presentation of septic shock  --        Time of presentation of septic shock  --          User Key  (r) = Recorded By, (t) = Taken By, (c) = Cosigned By    [address] Name Provider Ana Lainez MD Physician          SBIRT 20yo+      Most Recent Value   SBIRT (24 yo +)   In order to provide better care to our patients, we are screening all of our patients for alcohol and drug use  Would it be okay to ask you these screening questions? Unable to answer at this time Filed at: 09/16/2020 2208          Wells' Criteria for PE      Most Recent Value   Wells' Criteria for PE   Clinical signs and symptoms of DVT  0 Filed at: 09/16/2020 2327   PE is primary diagnosis or equally likely  3 Filed at: 09/16/2020 2327   HR >100  1 5 Filed at: 09/16/2020 2327   Immobilization at least 3 days or Surgery in the previous 4 weeks  1 5 Filed at: 09/16/2020 2327   Previous, objectively diagnosed PE or DVT  0 Filed at: 09/16/2020 2327   Hemoptysis  0 Filed at: 09/16/2020 2327   Malignancy with treatment within 6 months or palliative  0 Filed at: 09/16/2020 2327   Wells' Criteria Total  6 Filed at: 09/16/2020 2327              MDM  Number of Diagnoses or Management Options  Diagnosis management comments: DDX including but not limited to: cellulitis, abscess, angioedema, sepsis, severe sepsis, metabolic abnormality, phimosis, paraphimosis, UTI, CHF, pneumonia, PTX, ACS, MI, PE, cardiac arrhythmia, atrial fibrillation, thyroid disease; doubt kin's gangrene, necrotizing fasciitis, allergic reaction or meningitis          Amount and/or Complexity of Data Reviewed  Clinical lab tests: ordered and reviewed  Tests in the radiology section of CPT®: ordered and reviewed  Decide to obtain previous medical records or to obtain history from someone other than the patient: yes  Obtain history from someone other than the patient: yes  Review and summarize past medical records: yes  Discuss the patient with other providers: yes  Independent visualization of images, tracings, or specimens: yes        Disposition  Final diagnoses:   CHF (congestive heart failure) ([facility] [address] )   Cellulitis of penis   Penile swelling   Rapid atrial fibrillation ([facility] [address] ) Urinary tract infection associated with indwelling urethral catheter, initial encounter ([place] [address] )   Sepsis ([place] [address] )   Paraphimosis     Time reflects when diagnosis was documented in both MDM as applicable and the Disposition within this note     Time User Action Codes Description Comment    9/16/2020 11:31 PM Duana Sales Add [I50 9] CHF (congestive heart failure) ([place] [address] )     9/16/2020 11:31 PM Duana Sales Add [N48 22] Cellulitis of penis     9/16/2020 11:31 PM Duana Sales Add [N48 89] Penile swelling     9/16/2020 11:31 PM Duana Sales Add [I48 91] Rapid atrial fibrillation ([place] [place] [id number] [place]     9/16/2020 11:32 PM Duana Sales Add [[id number],  N39 0] Urinary tract infection associated with indwelling urethral catheter, initial encounter ([place] [id number] )     9/16/2020 11:35 PM Duana Sales Add [A41 9] Sepsis ([place] [id number] )     9/17/2020  1:59 AM Duana Sales Add [N47 2] Paraphimosis       ED Disposition     ED Disposition Condition Date/Time Comment    Admit Stable Thu Sep 17, 2020  2:21 AM Case was discussed with NANCY Benoit  and the patient's admission status was agreed to be Admission Status: inpatient status to the service of Dr Joe Marley   Follow-up Information    None         Patient's Medications   Discharge Prescriptions    No medications on file     No discharge procedures on file      PDMP Review       Value Time User    PDMP Reviewed  Yes 9/16/2020 10:05 PM Rodrigo De La Rosa MD          ED Provider  Electronically Signed by           Rodrigo De La Rosa MD  09/17/20 [id number]

## 2020-09-17 NOTE — PLAN OF CARE
Problem: Potential for Falls  Goal: Patient will remain free of falls  Description: INTERVENTIONS:  - Assess patient frequently for physical needs  -  Identify cognitive and physical deficits and behaviors that affect risk of falls    -  [place] fall precautions as indicated by assessment   - Educate patient/family on patient safety including physical limitations  - Instruct patient to call for assistance with activity based on assessment  - Modify environment to reduce risk of injury  - Consider OT/PT consult to assist with strengthening/mobility  Outcome: Progressing     Problem: Prexisting or High Potential for Compromised Skin Integrity  Goal: Skin integrity is maintained or improved  Description: INTERVENTIONS:  - Identify patients at risk for skin breakdown  - Assess and monitor skin integrity  - Assess and monitor nutrition and hydration status  - Monitor labs   - Assess for incontinence   - Turn and reposition patient  - Assist with mobility/ambulation  - Relieve pressure over bony prominences  - Avoid friction and shearing  - Provide appropriate hygiene as needed including keeping skin clean and dry  - Evaluate need for skin moisturizer/barrier cream  - Collaborate with interdisciplinary team   - Patient/family teaching  - Consider wound care consult   Outcome: Progressing     Problem: PAIN - ADULT  Goal: Verbalizes/displays adequate comfort level or baseline comfort level  Description: Interventions:  - Encourage patient to monitor pain and request assistance  - Assess pain using appropriate pain scale  - Administer analgesics based on type and severity of pain and evaluate response  - Implement non-pharmacological measures as appropriate and evaluate response  - Consider cultural and social influences on pain and pain management  - Notify physician/advanced practitioner if interventions unsuccessful or patient reports new pain  Outcome: Progressing     Problem: INFECTION - ADULT  Goal: Absence or prevention of progression during hospitalization  Description: INTERVENTIONS:  - Assess and monitor for signs and symptoms of infection  - Monitor lab/diagnostic results  - Monitor all insertion sites, i e  indwelling lines, tubes, and drains  - Monitor endotracheal if appropriate and nasal secretions for changes in amount and color  - [place] appropriate cooling/warming therapies per order  - Administer medications as ordered  - Instruct and encourage patient and family to use good hand hygiene technique  - Identify and instruct in appropriate isolation precautions for identified infection/condition  Outcome: Progressing  Goal: Absence of fever/infection during neutropenic period  Description: INTERVENTIONS:  - Monitor WBC    Outcome: Progressing     Problem: SAFETY ADULT  Goal: Patient will remain free of falls  Description: INTERVENTIONS:  - Assess patient frequently for physical needs  -  Identify cognitive and physical deficits and behaviors that affect risk of falls    -  [place] fall precautions as indicated by assessment   - Educate patient/family on patient safety including physical limitations  - Instruct patient to call for assistance with activity based on assessment  - Modify environment to reduce risk of injury  - Consider OT/PT consult to assist with strengthening/mobility  Outcome: Progressing  Goal: Maintain or return to baseline ADL function  Description: INTERVENTIONS:  -  Assess patient's ability to carry out ADLs; assess patient's baseline for ADL function and identify physical deficits which impact ability to perform ADLs (bathing, care of mouth/teeth, toileting, grooming, dressing, etc )  - Assess/evaluate cause of self-care deficits   - Assess range of motion  - Assess patient's mobility; develop plan if impaired  - Assess patient's need for assistive devices and provide as appropriate  - Encourage maximum independence but intervene and supervise when necessary  - Involve family in performance of ADLs  - Assess for home care needs following discharge   - Consider OT consult to assist with ADL evaluation and planning for discharge  - Provide patient education as appropriate  Outcome: Progressing  Goal: Maintain or return mobility status to optimal level  Description: INTERVENTIONS:  - Assess patient's baseline mobility status (ambulation, transfers, stairs, etc )    - Identify cognitive and physical deficits and behaviors that affect mobility  - Identify mobility aids required to assist with transfers and/or ambulation (gait belt, sit-to-stand, lift, walker, cane, etc )  - [place] fall precautions as indicated by assessment  - Record patient progress and toleration of activity level on Mobility SBAR; progress patient to next Phase/Stage  - Instruct patient to call for assistance with activity based on assessment  - Consider rehabilitation consult to assist with strengthening/weightbearing, etc   Outcome: Progressing     Problem: DISCHARGE PLANNING  Goal: Discharge to home or other facility with appropriate resources  Description: INTERVENTIONS:  - Identify barriers to discharge w/patient and caregiver  - Arrange for needed discharge resources and transportation as appropriate  - Identify discharge learning needs (meds, wound care, etc )  - Arrange for interpretive services to assist at discharge as needed  - Refer to Case Management Department for coordinating discharge planning if the patient needs post-hospital services based on physician/advanced practitioner order or complex needs related to functional status, cognitive ability, or social support system  Outcome: Progressing     Problem: Knowledge Deficit  Goal: Patient/family/caregiver demonstrates understanding of disease process, treatment plan, medications, and discharge instructions  Description: Complete learning assessment and assess knowledge base    Interventions:  - Provide teaching at level of understanding  - Provide teaching via preferred learning methods  Outcome: Progressing

## 2020-09-17 NOTE — ASSESSMENT & PLAN NOTE
· UTI as evidenced by 10-20 WBC and innumerable angel  · Started on cefepime and vanco in the ED   · Will decrease abx to rocephin   · Follow urine cultures   · Urology will be following

## 2020-09-17 NOTE — CASE MANAGEMENT
CM received call from Kaiden Gentile in admissions at [facility]  She reports patient is a bed hold at their facility as spouse is paying to hold his bed for the next few days  Chrissie Abdullahi asked that CM send referral through [facility] and forward PT/OT notes once available  Chrissie Abdullahi stated patient will not need a new COVID test prior to discharge as they will test him upon arrival anyway  CM contacted patient's spouse to confirm interest in return to [facility] upon discharge  Acacia reports she is paying to hold a SNF bed at [facility] and would like for her spouse to return upon discharge to continue his rehab  CM Dept to arrange transportation upon discharge and will inform spouse of plans once arranged

## 2020-09-17 NOTE — QUICK NOTE
Spoke to pts wife Veena via telephone  Updated on condition from cardiac standpoint  with new on set atrial fibrillation  AC risk and benefits discussed  He has had a 3-4 falls in past year; suffered compression fracture about 8 months ago but no other significant injuries  No major bleeding events  CHADs-Vasc score 4 with stoke risk of 4 8% per year  HAS-BLED score 2 with 4 1%; moderate bleeding risk  Wife would like him to be on anticoagulation  He is going to be discharged to rehab facility   Will place on Eliquis 5mg BID; consult case management to check cost

## 2020-09-17 NOTE — ASSESSMENT & PLAN NOTE
Significant swelling secondary paraphimosis, reduced bedside with IV administration of 25 of fentanyl  Patient tolerated this well  Maintain Barton catheter gravity drainage given ongoing constipation, weakness, who UTI  Patent for clear yellow urine  Empiric antibiotics, tailoring to culture  A serial examinations with foreskin evaluation now that has been reduced  Barton catheter will need to remain in place until constipation, generalized weakness, convalescence has occurred & his urinary tract infection is treated      Admission to the medical-surgical floor at the discretion of Internal Medicine

## 2020-09-17 NOTE — ASSESSMENT & PLAN NOTE
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

## 2020-09-17 NOTE — PLAN OF CARE
Problem: Potential for Falls  Goal: Patient will remain free of falls  Description: INTERVENTIONS:  - Assess patient frequently for physical needs  -  Identify cognitive and physical deficits and behaviors that affect risk of falls    -  [place] fall precautions as indicated by assessment   - Educate patient/family on patient safety including physical limitations  - Instruct patient to call for assistance with activity based on assessment  - Modify environment to reduce risk of injury  - Consider OT/PT consult to assist with strengthening/mobility  Outcome: Progressing     Problem: Prexisting or High Potential for Compromised Skin Integrity  Goal: Skin integrity is maintained or improved  Description: INTERVENTIONS:  - Identify patients at risk for skin breakdown  - Assess and monitor skin integrity  - Assess and monitor nutrition and hydration status  - Monitor labs   - Assess for incontinence   - Turn and reposition patient  - Assist with mobility/ambulation  - Relieve pressure over bony prominences  - Avoid friction and shearing  - Provide appropriate hygiene as needed including keeping skin clean and dry  - Evaluate need for skin moisturizer/barrier cream  - Collaborate with interdisciplinary team   - Patient/family teaching  - Consider wound care consult   Outcome: Progressing     Problem: PAIN - ADULT  Goal: Verbalizes/displays adequate comfort level or baseline comfort level  Description: Interventions:  - Encourage patient to monitor pain and request assistance  - Assess pain using appropriate pain scale  - Administer analgesics based on type and severity of pain and evaluate response  - Implement non-pharmacological measures as appropriate and evaluate response  - Consider cultural and social influences on pain and pain management  - Notify physician/advanced practitioner if interventions unsuccessful or patient reports new pain  Outcome: Progressing     Problem: INFECTION - ADULT  Goal: Absence or prevention of progression during hospitalization  Description: INTERVENTIONS:  - Assess and monitor for signs and symptoms of infection  - Monitor lab/diagnostic results  - Monitor all insertion sites, i e  indwelling lines, tubes, and drains  - Monitor endotracheal if appropriate and nasal secretions for changes in amount and color  - [place] appropriate cooling/warming therapies per order  - Administer medications as ordered  - Instruct and encourage patient and family to use good hand hygiene technique  - Identify and instruct in appropriate isolation precautions for identified infection/condition  Outcome: Progressing  Goal: Absence of fever/infection during neutropenic period  Description: INTERVENTIONS:  - Monitor WBC    Outcome: Progressing     Problem: SAFETY ADULT  Goal: Patient will remain free of falls  Description: INTERVENTIONS:  - Assess patient frequently for physical needs  -  Identify cognitive and physical deficits and behaviors that affect risk of falls    -  [place] fall precautions as indicated by assessment   - Educate patient/family on patient safety including physical limitations  - Instruct patient to call for assistance with activity based on assessment  - Modify environment to reduce risk of injury  - Consider OT/PT consult to assist with strengthening/mobility  Outcome: Progressing  Goal: Maintain or return to baseline ADL function  Description: INTERVENTIONS:  -  Assess patient's ability to carry out ADLs; assess patient's baseline for ADL function and identify physical deficits which impact ability to perform ADLs (bathing, care of mouth/teeth, toileting, grooming, dressing, etc )  - Assess/evaluate cause of self-care deficits   - Assess range of motion  - Assess patient's mobility; develop plan if impaired  - Assess patient's need for assistive devices and provide as appropriate  - Encourage maximum independence but intervene and supervise when necessary  - Involve family in performance of ADLs  - Assess for home care needs following discharge   - Consider OT consult to assist with ADL evaluation and planning for discharge  - Provide patient education as appropriate  Outcome: Progressing  Goal: Maintain or return mobility status to optimal level  Description: INTERVENTIONS:  - Assess patient's baseline mobility status (ambulation, transfers, stairs, etc )    - Identify cognitive and physical deficits and behaviors that affect mobility  - Identify mobility aids required to assist with transfers and/or ambulation (gait belt, sit-to-stand, lift, walker, cane, etc )  - [place] fall precautions as indicated by assessment  - Record patient progress and toleration of activity level on Mobility SBAR; progress patient to next Phase/Stage  - Instruct patient to call for assistance with activity based on assessment  - Consider rehabilitation consult to assist with strengthening/weightbearing, etc   Outcome: Progressing     Problem: DISCHARGE PLANNING  Goal: Discharge to home or other facility with appropriate resources  Description: INTERVENTIONS:  - Identify barriers to discharge w/patient and caregiver  - Arrange for needed discharge resources and transportation as appropriate  - Identify discharge learning needs (meds, wound care, etc )  - Arrange for interpretive services to assist at discharge as needed  - Refer to Case Management Department for coordinating discharge planning if the patient needs post-hospital services based on physician/advanced practitioner order or complex needs related to functional status, cognitive ability, or social support system  Outcome: Progressing     Problem: Knowledge Deficit  Goal: Patient/family/caregiver demonstrates understanding of disease process, treatment plan, medications, and discharge instructions  Description: Complete learning assessment and assess knowledge base    Interventions:  - Provide teaching at level of understanding  - Provide teaching via preferred learning methods  Outcome: Progressing

## 2020-09-17 NOTE — H&P
H&P- [place] 1934, 80 y o  male MRN: [id number]  Unit/Bed#: S -01 Encounter: [id number]  Primary Care Provider: Giuseppe Harris DO   Date and time admitted to hospital: 9/16/2020  9:58 PM    * Paraphimosis  Assessment & Plan  · Paraphimosis noted on admission to the emergency department  · This was reduced emergently by on-call urology  · Barton was inserted  · Trend intake and output  · Consult urology  · Monitor for increasing swelling  · Currently he has significant pain with even slight manipulation of penis    Sepsis ([facility])  Assessment & Plan  · Secondary to tachycardia, tachypnea  · Suspected source UTI  · Lactic  · Followed blood cultures, and urine cultures  · Trend white blood cell count, fevers  ·  will decreased antibiotics to Rocephin    Atrial fibrillation with RVR ([facility])  Assessment & Plan  · Noted to have a fib with RVR with rates in the 160's on initially presentation  · He required Cardizem drip subsequently decreased rate to the 80s  · AFib RVR likely in the setting of paraphimosis and pain  · Currently appears NSR with PVC's   · Consult cardiology   · His JADA 2 Vasc score is 4   · Start heparin until cardiology can evaluate to better determine [facility]   · He does have a history of falls in the past with head strikes  This will have to be considered if he is to be placed on long term AC   · 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   · Stress Echo on 6/23/2020 shows EF 50%   · Defer echo to cardiology     UTI (urinary tract infection)  Assessment & Plan  · UTI as evidenced by 10-20 WBC and innumerable bac  · Started on cefepime and vanco in the ED   · Will decrease abx to rocephin   · Follow urine cultures   · Urology will be following     Essential hypertension  Assessment & Plan  · Blood pressure reviewed and stable  · Continue home medications including metoprolol, hydrochlorothiazide  · Monitor BP        VTE Prophylaxis: Heparin Drip  / sequential compression device   Code Status: full   POLST: There is no POLST form on file for this patient (pre-hospital)  Discussion with family: patient     Anticipated Length of Stay:  Patient will be admitted on an Inpatient basis with an anticipated length of stay of  > 2 midnights  Justification for Hospital Stay: penis pain     Total Time for Visit, including Counseling / Coordination of Care: 1 hour  Greater than 50% of this total time spent on direct patient counseling and coordination of care  Chief Complaint:   Penis pain     History of Present Illness:    Lucille Lopez is a 80 y o  male who presents with paraphimosis  He has past medical history significant for dementia, hypertension, hyperlipidemia, normal pressure hydrocephalus, status post MI  He presents to the emergency department for significant penile pain  This was evaluated by the on-call urology provider who determined that patient had paraphimosis  Paraphimosis was reduced successfully in the ED and Barton was placed  Patient was also noted to be significantly tachycardic with rates exceeding 170 beats per minute  It was determined that patient was in AFib with RVR  He was started on a Cardizem drip and subsequently converted to normal sinus rhythm  He does not have a history of AFib that he carries with him  He is currently not on any anticoagulation, however he does suffer frequent falls on review of his prior records  Patient is unable to tell me any of his history as he is unable to tell me why he is here, or even where he is  He does know his name, but is unsure of the year as well  Patient is a resident of a nursing home was brought in for significant redness, and swelling of penis      Review of Systems:    Review of Systems   Unable to perform ROS: Dementia       Past Medical and Surgical History:     Past Medical History:   Diagnosis Date    [facility]     Cognitive communication deficit     Dementia [facility]     [facility] lumbar [facility]     [facility] of first lumbar vertebra [facility]     Fracture of fourth lumbar vertebra [facility]     Fracture of second lumbar vertebra ([facility]     Fracture of T11 vertebra with routine healing     Fracture of T12 vertebra with routine healing     Fracture of third thoracic vertebra (HCC)     Gait abnormality     Hyperlipidemia     Hypertension     Macular degeneration     [facility]        History reviewed  No pertinent surgical history  Meds/Allergies:    Prior to Admission medications    Medication Sig Start Date End Date Taking?  Authorizing Provider   acetaminophen (TYLENOL) 325 mg tablet Take 3 tablets (975 mg total) by mouth every 8 (eight) hours 8/13/20  Yes Hernán Moreno MD   aspirin 81 MG tablet Take 81 mg by mouth every other day 10/3/12  Yes Historical Provider, MD   B Complex Vitamins (VITAMIN B COMPLEX PO) Take 482 2 mg by mouth daily 10/3/12  Yes Historical Provider, MD   cholecalciferol (VITAMIN D3) 1,000 units tablet Take 2,000 Units by mouth daily  10/3/12  Yes Historical Provider, MD   famotidine (PEPCID) 20 mg tablet Take 1 tablet by mouth daily   Yes Historical Provider, MD   ferrous gluconate (FERGON) 324 mg tablet Take 1 tablet (324 mg total) by mouth 2 (two) times a day before meals 9/16/20  Yes Salbador Mace MD   gabapentin (NEURONTIN) 100 mg capsule Take 1 capsule (100 mg total) by mouth daily at bedtime 8/13/20 9/16/20 Yes Hernán Moreno MD   hydrochlorothiazide (MICROZIDE) 12 5 mg capsule Take 12 5 mg by mouth daily   Yes Historical Provider, MD   hydrocortisone 1 % cream Apply topically 4 (four) times a day as needed for irritation for up to 14 days 8/7/19 9/16/20 Yes Edmund William MD   metoprolol tartrate (LOPRESSOR) 25 mg tablet Take 12 5 mg by mouth daily 8/12/13  Yes Historical Provider, MD   Multiple Vitamins-Minerals (PRESERVISION AREDS 2 PO) Take 24 mg by mouth 2 (two) times a day   Yes Historical Provider, MD   Holstein-3 1000 MG CAPS Take 1 tablet by mouth daily 10/3/12  Yes Historical Provider, MD   polyethylene glycol (MIRALAX) 17 g packet Take 17 g by mouth daily 8/13/20  Yes Aaron Ferguson MD   simvastatin (ZOCOR) 20 mg tablet Take 20 mg by mouth daily    Yes Historical Provider, MD   tamsulosin (FLOMAX) 0 4 mg Take 1 capsule (0 4 mg total) by mouth daily with dinner 9/16/20  Yes Sal Silva MD     I have reveiwed home medications using records provided by [facility]  Allergies: No Known Allergies    Social History:     Marital Status: /Civil Union   Occupation: retired   Patient Pre-hospital Living Situation: nursing facility   Patient Pre-hospital Level of Mobility: difficulty with ambulation   Patient Pre-hospital Diet Restrictions: cardiac   Substance Use History:   Social History     Substance and Sexual Activity   Alcohol Use Never    Frequency: Never    Binge frequency: Never     Social History     Tobacco Use   Smoking Status Never Smoker   Smokeless Tobacco Never Used     Social History     Substance and Sexual Activity   Drug Use Never       Family History:    History reviewed  No pertinent family history  Physical Exam:     Vitals:   Blood Pressure: 127/63 (09/17/20 0306)  Pulse: 71 (09/17/20 0306)  Temperature: 97 8 °F (36 6 °C) (09/17/20 0306)  Temp Source: Oral (09/17/20 0306)  Respirations: 20 (09/17/20 0306)  Height: 5' (152 4 cm) (09/17/20 0306)  Weight - Scale: 63 1 kg (139 lb 1 8 oz) (09/17/20 0306)  SpO2: 98 % (09/17/20 0306)    Physical Exam  Constitutional:       General: He is not in acute distress  HENT:      Head: Normocephalic and atraumatic  Mouth/Throat:      Mouth: Mucous membranes are moist       Pharynx: Oropharynx is clear  No oropharyngeal exudate  Eyes:      General:         Right eye: No discharge  Left eye: No discharge  Conjunctiva/sclera: Conjunctivae normal       Pupils: Pupils are equal, round, and reactive to light  Neck:      Musculoskeletal: Neck supple   No muscular tenderness  Cardiovascular:      Rate and Rhythm: Normal rate and regular rhythm  Pulses: Normal pulses  Heart sounds: Normal heart sounds  No murmur  Pulmonary:      Effort: Pulmonary effort is normal  No respiratory distress  Breath sounds: Rales present  No wheezing  Comments: Slight bibasilar rales  He does not have any increased work of breathing  He does not have any wheezing, intercostal retractions, or accessory muscle usage  He is currently not tachypneic  He is currently on 2 L nasal cannula satting well  Abdominal:      General: Abdomen is flat  There is no distension  Palpations: Abdomen is soft  Tenderness: There is no abdominal tenderness  Genitourinary:     Penis: Swelling present  No erythema or discharge  Comments: Mild swelling noted  Barton catheter inserted  Patient does have significant tenderness even to the lightest of palpation of penis  Musculoskeletal: Normal range of motion  Right lower leg: No edema  Left lower leg: No edema  Comments: No lower extremity edema, Homans sign negative bilaterally  Skin:     General: Skin is warm and dry  Capillary Refill: Capillary refill takes less than 2 seconds  Coloration: Skin is not jaundiced  Neurological:      General: No focal deficit present  Mental Status: He is alert  He is disoriented  Cranial Nerves: No cranial nerve deficit  Psychiatric:         Mood and Affect: Mood normal          Cognition and Memory: Cognition is impaired  Comments: Pleasantly confused           Additional Data:     Lab Results: I have personally reviewed pertinent reports        Results from last 7 days   Lab Units 09/16/20  2220   WBC Thousand/uL 6 04   HEMOGLOBIN g/dL 10 9*   HEMATOCRIT % 32 9*   PLATELETS Thousands/uL 222   NEUTROS PCT % 66   LYMPHS PCT % 23   MONOS PCT % 10   EOS PCT % 0     Results from last 7 days   Lab Units 09/16/20  2220   SODIUM mmol/L 137 POTASSIUM mmol/L 3 6   CHLORIDE mmol/L 101   CO2 mmol/L 25   BUN mg/dL 19   CREATININE mg/dL 1 10   ANION GAP mmol/L 11   CALCIUM mg/dL 8 5   ALBUMIN g/dL 2 7*   TOTAL BILIRUBIN mg/dL 0 88   ALK PHOS U/L 71   ALT U/L 22   AST U/L 23   GLUCOSE RANDOM mg/dL 122     Results from last 7 days   Lab Units 09/16/20  2220   INR  1 19     Results from last 7 days   Lab Units 09/16/20  2230   POC GLUCOSE mg/dl 125         Results from last 7 days   Lab Units 09/16/20  2220   LACTIC ACID mmol/L 1 8       Imaging: I have personally reviewed pertinent reports  CTA ED chest PE study   ED Interpretation by Kamryn Aguilar MD (09/17 0059)   FINDINGS:     PULMONARY ARTERIAL TREE:  No pulmonary embolus is seen  LUNGS:  Patchy dependent opacities in the lung bases consistent with atelectasis   Subsegmental atelectasis versus linear scarring in the anterior right upper lobe   Mild interlobular septal thickening is noted   Subsegmental atelectasis versus linear   scarring   There is no tracheal or endobronchial lesion  PLEURA:  Small left and trace right pleural effusions  HEART/GREAT VESSELS:  Atherosclerotic changes are noted in thoracic aorta and coronary arteries  MEDIASTINUM AND STUART:  Small hiatal hernia  CHEST WALL AND LOWER NECK:   Unremarkable  VISUALIZED STRUCTURES IN THE UPPER ABDOMEN:  Unremarkable  OSSEOUS STRUCTURES:  No acute fracture or destructive osseous lesion  Impression:       1   No evidence of pulmonary embolism  2   Mild interlobular septal thickening suspicious for edema  3   Small left and trace right pleural effusions  4   Small hiatal hernia  Workst   ation performed: [id number]           Final Result by Kenneth Kaur MD (09/17 0057)      1  No evidence of pulmonary embolism  2   Mild interlobular septal thickening suspicious for edema  3   Small left and trace right pleural effusions  4   Small hiatal hernia  Workstation performed: [id number]         XR chest 1 view portable   ED Interpretation by Gonzalo Cevallos MD (09/16 2247)   Cardiomegaly, tortuous thoracic aorta, increased interstitial markings read by me  EKG, Pathology, and Other Studies Reviewed on Admission:   · EKG: a fib with RVR   · CXR: cardiomegaly with increased interstitial markings     Allscripts / Epic Records Reviewed: Yes     ** Please Note: This note has been constructed using a voice recognition system   **

## 2020-09-17 NOTE — TELEPHONE ENCOUNTER
Morgan from [facility]#[phone] regarding Pt: Curry Catherine : 34 Reason: Medication verification for new admit

## 2020-09-17 NOTE — CONSULTS
Consult - Urology   Pop [place] 1934, 80 y o  male MRN: [id number]    Unit/Bed#: ED 11 Encounter: [id number]    Paraphimosis  Assessment & Plan  Significant swelling secondary paraphimosis, reduced bedside with IV administration of 25 of fentanyl  Patient tolerated this well  Maintain Barton catheter gravity drainage given ongoing constipation, weakness, who UTI  Patent for clear yellow urine  Empiric antibiotics, tailoring to culture  A serial examinations with foreskin evaluation now that has been reduced  Barton catheter will need to remain in place until constipation, generalized weakness, convalescence has occurred & his urinary tract infection is treated  Admission to the medical-surgical floor at the discretion of Internal Medicine      Bedside rounds performed with Ozzy Serrano RN  Discussed with Dr Jeanie Hills, Dr Pari Rashid of the ED  Subjective/Objective     Subjective:   CC: "It just hurts so bad, my penis "  HPI:  59-year-old male with past medical history of macular degeneration, hyperlipidemia, hypertension, generalized weakness and ambulatory dysfunction with a recent hospitalization with retention placement of a Barton catheter he presents to the ER complaining of penile pain  59-year-old male history of not the greatest historian who presents to the ER complaining penile pain  He was recently admitted with urinary retention and placement of urethral Barton  He was discharged to rehabilitation  Barton catheter has remained intact, draining in pain for clear yellow urine  He returns here today complaining of penile pain and swelling  Years concerned about of paraphimosis  His circumcision status is unknown  Secondary to concern for significant penile edema urologic consultation is requested to statin tonight for bedside evaluation and possible reduction of paraphimosis  This time the patient is significantly tachycardic to the 150s and some transient hypertension    He is able to tell me that he knows he is in the ER knees here for penile pain but has some difficulties with the year and other mental status questions  He does endorse penile pain at the tip of his penis  His catheter isn't draining clear yellow urine  He reports he is not sure when this 1st happened, but says it could of been days to weeks, despite the fact the catheter was placed last several days  Time of discharge from hospital he did not have a urinary tract infection  However, on urinalysis here in the ER, he does have innumerable bacteria  CT chest PE study negative for any pulmonary embolism  His largely unsure about his cardiac and surgical history  ROS:  Review of Systems   Constitutional: Negative for activity change and appetite change  HENT: Negative for congestion and ear pain  Eyes: Negative for pain  Respiratory: Negative for cough and shortness of breath  Cardiovascular: Negative for chest pain and palpitations  Gastrointestinal: Negative for abdominal distention, abdominal pain, blood in stool, constipation, diarrhea and nausea  Genitourinary: Positive for penile swelling (Consistent with paraphimosis)  Negative for difficulty urinating, dysuria, flank pain and hematuria  Musculoskeletal: Negative for arthralgias and myalgias  Skin: Negative for rash  Allergic/Immunologic: Negative for immunocompromised state  Neurological: Negative for dizziness and headaches  Hematological: Negative for adenopathy  Does not bruise/bleed easily  Psychiatric/Behavioral: Negative for agitation  The patient is not nervous/anxious  Objective:  Vitals: Blood pressure 123/64, pulse (S) 86, temperature 97 5 °F (36 4 °C), temperature source Oral, resp  rate 22, height 5' (1 524 m), weight 64 5 kg (142 lb 3 2 oz), SpO2 97 %  ,Body mass index is 27 77 kg/m²        Intake/Output Summary (Last 24 hours) at 9/17/2020 0218  Last data filed at 9/16/2020 2350  Gross per 24 hour   Intake 250 ml Output 550 ml   Net -300 ml       Invasive Devices     Peripheral Intravenous Line            Peripheral IV 09/16/20 Left;Upper Forearm less than 1 day    Peripheral IV 09/16/20 Right Antecubital less than 1 day    Peripheral IV 09/17/20 Right Wrist less than 1 day          Drain            Urethral Catheter Latex 16 Fr  2 days                Physical Exam  Vitals signs and nursing note reviewed  Exam conducted with a chaperone present (Vera Dela Cruz)  Constitutional:       General: He is not in acute distress  Appearance: He is well-developed  He is not ill-appearing or diaphoretic  Comments: 80-year-old male, slightly uncomfortable in stretcher in the emergency department however able to provide his own history  HENT:      Head: Normocephalic and atraumatic  Eyes:      Conjunctiva/sclera: Conjunctivae normal    Neck:      Musculoskeletal: Normal range of motion and neck supple  Trachea: No tracheal deviation  Cardiovascular:      Rate and Rhythm: Normal rate and regular rhythm  Heart sounds: Normal heart sounds  No murmur  Pulmonary:      Effort: Pulmonary effort is normal  No respiratory distress  Breath sounds: Normal breath sounds  No wheezing  Abdominal:      General: Bowel sounds are normal  There is no distension  Palpations: Abdomen is soft  There is no mass  Tenderness: There is no abdominal tenderness  Comments: Abdomen obese, rotund with 2 fingerbreadth reducible umbilical hernia  No suprapubic fullness rigidity rebound or guarding  Genitourinary:     Penis: Uncircumcised  Paraphimosis (Reduced), erythema, tenderness and swelling present  Scrotum/Testes:         Right: Mass, tenderness or swelling not present  Left: Mass, tenderness or swelling not present  Epididymis:      Right: Not inflamed or enlarged  Left: Not inflamed or enlarged  Musculoskeletal: Normal range of motion  Skin:     General: Skin is warm and dry  Coloration: Skin is not pale  Findings: No erythema or rash  Neurological:      Mental Status: He is alert and oriented to person, place, and time  Psychiatric:         Behavior: Behavior normal  Behavior is cooperative  Thought Content: Thought content normal          Judgment: Judgment normal          History:    Past Medical History:   Diagnosis Date    [facility]     Cognitive communication deficit     [facility]     [facility] of [facility]     [facility] of first lumbar vertebra [facility]     [facility] of fourth lumbar vertebra [facility]     [facility] second lumbar vertebra [facility]     Fracture of T11 vertebra with routine healing     Fracture of T12 vertebra with routine healing     Fracture of third thoracic vertebra ([facility]     Gait abnormality     Hyperlipidemia     Hypertension     Macular degeneration     Normal pressure hydrocephalus ([facility]      History reviewed  No pertinent surgical history  History reviewed  No pertinent family history    Social History     Socioeconomic History    Marital status: /Civil Union     Spouse name: None    Number of children: None    Years of education: None    Highest education level: None   Occupational History    None   Social Needs    Financial resource strain: None    Food insecurity     Worry: None     Inability: None    Transportation needs     Medical: None     Non-medical: None   Tobacco Use    Smoking status: Never Smoker    Smokeless tobacco: Never Used   Substance and Sexual Activity    Alcohol use: Never     Frequency: Never     Binge frequency: Never    Drug use: Never    Sexual activity: Not Currently   Lifestyle    Physical activity     Days per week: None     Minutes per session: None    Stress: None   Relationships    Social connections     Talks on phone: None     Gets together: None     Attends [employer] service: None     Active member of club or organization: None Attends meetings of clubs or organizations: None     Relationship status: None    Intimate partner violence     Fear of current or ex partner: None     Emotionally abused: None     Physically abused: None     Forced sexual activity: None   Other Topics Concern    None   Social History Narrative    None       Imaging:  CT negative for PE  Imaging reviewed - both report and images personally reviewed  Lab Results:  I have personally reviewed pertinent labs    Results from last 7 days   Lab Units 09/16/20  2220 09/16/20  0447 09/15/20  1346 09/15/20  0447   WBC Thousand/uL 6 04 5 41  --  5 95   HEMOGLOBIN g/dL 10 9* 9 2* 9 7* 8 8*   PLATELETS Thousands/uL 222 192  --  171     Results from last 7 days   Lab Units 09/16/20  2220 09/16/20  0447 09/15/20  0447 09/14/20  0749   SODIUM mmol/L 137 138 136 132*   POTASSIUM mmol/L 3 6 3 8 3 5 3 5   CHLORIDE mmol/L 101 104 103 97*   CO2 mmol/L 25 22 24 26   BUN mg/dL 19 22 23 23   CREATININE mg/dL 1 10 0 94 1 22 1 10   EGFR ml/min/1 73sq m 60 73 53 60   CALCIUM mg/dL 8 5 8 0* 7 7* 8 4   AST U/L 23  --  16 21   ALT U/L 22  --  18 22   ALK PHOS U/L 71  --  57 74               Wisam Jenkins PA-C  Date: 9/17/2020 Time: 2:18 AM

## 2020-09-17 NOTE — CONSULTS
Consultation - Cardiology Team One  Radha Villa 80 y o  male MRN: [id number]  Unit/Bed#: S -01 Encounter: [id number]    Inpatient consult to Cardiology  Consult performed by: Claria Ganser, CRNP  Consult ordered by: Alf Wallace PA-C        Physician Requesting Consult: Shirley Magallon MD     Reason for Consult / Principal Problem: atrial fibrillation    Assessment/ Plan:    1  New onset atrial fibrillation:   Pt now back in sinus rhythm with PACs and PVCs; s/p IV cardizem  Will increase BB to metoprolol tartrate 25mg BID; if he has recurrent atrial fibrillation would plan on using amiodarone  His CHADs-Vasc score is 4 but there is also concern about falling; pt is unreliable; will have to call and discuss with wife  Can stop IV heparin for now until we decide on [facility]  If his family does not want AC then using amiodarone to try to keep him in SR may be considered  Check echo; no s/s volume overload of CHF at this time  2  Hx of CAD: prior MI 2012 unknown details  No anginal symptoms continue ASA, statin, BB  3  Electrolyte disturbances: K 3 0 Mag 1 6; give PO K and Mag replacement; repeat in AM  4  Paraphimosis: management per urology      History of Present Illness      HPI: Radha Villa is a 80y o  year old male who has HTN, HLD, CAD with prior MI 2012 unclear details, dementia, normal pressure hydrocephalus  He follows with cardiologist Dr Monalisa Gardner at [facility],    He resides at a nursing facility  She presented to ED from nursing facility with complaints of penile pain; evaulated by urology and found to have paraphimosis; reduced by urology in ED with placement of duarte cath  Was also noted to be tachycardic in ED with HRs as high as 150-170s  Presenting EKG showed sinus tachycardia with PACs and PVCs with HR 114bpm  Repeat EKG showed atrial fibrillation with RVR  He was given IV cardizem 10mg x 2 doses and 2 5mg IV metoprolol and started on Cardizem infusion     He reportedly converted to SR prior to coming up to floor last night; no telemetry strips are available to review from ED  Cardiology was asked to evaluate today regarding new onset afib  At time of my exam pt reports feeling fine  He has dementia and is unable to tell me why he is here in hospital  He does recall when prompted feeling like his heart was racing yesterday  He denies any associated chest pain or pressure  Does carry a hx of CAD followed by Dr Tani Michele; there is documentation of MI in 2012 but unknown details  Most recent ischemic eval was stress echo 6/2020 with poor exercise tolerance; + ectopy with stress but no ST/twave changes; appropriate LV augmentation with stress as well as decreased camber size; EF reported 50%  No documentation of atrial fibrillation or arrhythmias  Here on telemetry he is in sinus rhythm with PVCs and PACs  Exercise Stress echo 6/2020: report via care everywhere from [facility]:   LVFE 50%; very poor exercise tolerance; exercised for 3min; got HR to 145bpm; he has frequent PVCs with stress  No ST/Twave changes  LV function improved and chamber size decreased with exercise  Telemetry reviewed personally:  Sinus rhythm with PACs and PVCs    Review of Systems   Constitution: Negative for decreased appetite and fever  Cardiovascular: Negative for chest pain, dyspnea on exertion, leg swelling, near-syncope, orthopnea, palpitations and syncope  Respiratory: Negative for cough, shortness of breath and wheezing  Gastrointestinal: Negative for abdominal pain, nausea and vomiting  Genitourinary: Negative for dysuria  Neurological: Negative for dizziness and light-headedness  Psychiatric/Behavioral: Negative for altered mental status  All other systems reviewed and are negative       Historical Information   Past Medical History:   Diagnosis Date    OLLIE [facility]acute [facility]) [facility] [address] [facility]     Cognitive communication deficit     Dementia ([facility] [address] )     [facility]racture of fifth lumbar vertebra [facility] [address] [facility]     Fracture of first lumbar vertebra [facility] [address] [facility]     Fracture of fourth lumbar vertebra ([facility] [address] [facility]     Fracture of second lumbar vertebra ([facility] [address] [facility]     Fracture of T11 vertebra with routine healing     Fracture of T12 vertebra with routine healing     Fracture of third thoracic vertebra (HCC)     Gait abnormality     Hyperlipidemia     Hypertension     Macular degeneration     Normal pressure hydrocephalus ([facility] [address] [facility]      History reviewed  No pertinent surgical history  Social History     Substance and Sexual Activity   Alcohol Use Never    Frequency: Never    Binge frequency: Never     Social History     Substance and Sexual Activity   Drug Use Never     Social History     Tobacco Use   Smoking Status Never Smoker   Smokeless Tobacco Never Used     Family History: History reviewed  No pertinent family history  Meds/Allergies   all current active meds have been reviewed  heparin (porcine), 3-20 Units/kg/hr (Order-Specific), Last Rate: 9 Units/kg/hr (09/17/20 0655)      No Known Allergies    Objective   Vitals: Blood pressure 130/65, pulse 75, temperature 98 2 °F (36 8 °C), temperature source Oral, resp  rate 19, height 5' (1 524 m), weight 63 1 kg (139 lb 1 8 oz), SpO2 98 %  ,     Body mass index is 27 17 kg/m²  ,     Systolic (63CLG), REU:069 , Min:97 , BAN:146     Diastolic (44KBB), PYR:50, Min:55, Max:116      Intake/Output Summary (Last 24 hours) at 9/17/2020 1026  Last data filed at 9/17/2020 0600  Gross per 24 hour   Intake 250 ml   Output 1900 ml   Net -1650 ml     Weight (last 2 days)     Date/Time   Weight    09/17/20 0600   63 1 (139 11)    Weight: pt unable to stand at 09/17/20 0600    09/17/20 0306   63 1 (139 11)    09/16/20 2205   64 5 (142 2)            Invasive Devices     Peripheral Intravenous Line            Peripheral IV 09/16/20 Left;Upper Forearm less than 1 day    Peripheral IV 09/17/20 Left Forearm less than 1 day    Peripheral IV 09/17/20 Right Wrist less than 1 day          Drain Urethral Catheter Latex 16 Fr  3 days              Physical Exam  Vitals signs reviewed  Constitutional:       Appearance: He is not ill-appearing or diaphoretic  Comments: Pt laying in bed in NAD; alert and cooperative   HENT:      Head: Normocephalic  Cardiovascular:      Rate and Rhythm: Normal rate and regular rhythm  Heart sounds: S1 normal and S2 normal  No murmur  No friction rub  Pulmonary:      Effort: Pulmonary effort is normal       Breath sounds: Normal breath sounds  No wheezing or rales  Comments: BS CTA on RA; no cough or resp distress  Abdominal:      Palpations: Abdomen is soft  Musculoskeletal:      Right lower leg: No edema  Left lower leg: No edema  Skin:     General: Skin is warm and dry  Neurological:      Mental Status: He is alert        Comments: Pt alert and cooperative; poor historian; unable to tell me why he is in hospital  Following commands   Psychiatric:         Mood and Affect: Mood normal        LABORATORY RESULTS:  Results from last 7 days   Lab Units 09/16/20 2220 09/14/20  1739 09/14/20  0749   TROPONIN I ng/mL <0 02 <0 02 <0 02     CBC with diff:   Results from last 7 days   Lab Units 09/17/20  0422 09/16/20  2220 09/16/20  0447 09/15/20  1346 09/15/20  0447 09/14/20  0749   WBC Thousand/uL 6 81 6 04 5 41  --  5 95 6 47   HEMOGLOBIN g/dL 9 6* 10 9* 9 2* 9 7* 8 8* 10 8*   HEMATOCRIT % 28 6* 32 9* 27 8*  --  27 8* 32 4*   MCV fL 89 91 92  --  93 91   PLATELETS Thousands/uL 230 222 192  --  171 182   MCH pg 30 0 30 2 30 4  --  29 5 30 4   MCHC g/dL 33 6 33 1 33 1  --  31 7 33 3   RDW % 14 8 14 8 15 1  --  15 5* 15 1   MPV fL 10 2 9 9 10 3  --  10 1 10 5   NRBC AUTO /100 WBCs  --  0 0  --  0 0     CMP:  Results from last 7 days   Lab Units 09/17/20  0430 09/16/20 2220 09/16/20 0447 09/15/20  0447 09/14/20  0749   POTASSIUM mmol/L 3 0* 3 6 3 8 3 5 3 5   CHLORIDE mmol/L 100 101 104 103 97*   CO2 mmol/L 25 25 22 24 26   BUN mg/dL 16 19 22 23 23   CREATININE mg/dL 1 06 1 10 0 94 1 22 1 10   CALCIUM mg/dL 8 0* 8 5 8 0* 7 7* 8 4   AST U/L  --  23  --  16 21   ALT U/L  --  22  --  18 22   ALK PHOS U/L  --  71  --  57 74   EGFR ml/min/1 73sq m 63 60 73 53 60     BMP:  Results from last 7 days   Lab Units 09/17/20  0430 09/16/20  2220 09/16/20  0447 09/15/20  0447 09/14/20  0749   POTASSIUM mmol/L 3 0* 3 6 3 8 3 5 3 5   CHLORIDE mmol/L 100 101 104 103 97*   CO2 mmol/L 25 25 22 24 26   BUN mg/dL 16 19 22 23 23   CREATININE mg/dL 1 06 1 10 0 94 1 22 1 10   CALCIUM mg/dL 8 0* 8 5 8 0* 7 7* 8 4     Lab Results   Component Value Date    NTBNP 5,668 (H) 09/16/2020    NTBNP 1,138 (H) 08/06/2019     Results from last 7 days   Lab Units 09/17/20  0430   MAGNESIUM mg/dL 1 6     Results from last 7 days   Lab Units 09/16/20  2220   TSH 3RD GENERATON uIU/mL 2 303     Results from last 7 days   Lab Units 09/17/20  0422 09/16/20  2220   INR  1 37* 1 19     Imaging: I have personally reviewed pertinent reports  Xr Chest 1 View Portable    Result Date: 9/17/2020  Narrative: CHEST INDICATION:   sepsis  COMPARISON:  9/14/2020 chest x-ray EXAM PERFORMED/VIEWS:  XR CHEST PORTABLE Single portable view FINDINGS: Mild vascular congestion Small bilateral pleural effusions Cardiomediastinal silhouette appears unremarkable  No pneumothorax  Osseous structures appear within normal limits for patient age  Impression: Persistent mild vascular congestion and small bilateral pleural effusions, essentially unchanged Workstation performed: [id number]     Xr Chest Pa & Lateral    Result Date: 9/15/2020  Narrative: CHEST INDICATION:   Expiratory wheezing, shortness of breath  COMPARISON:  8/6/2019 EXAM PERFORMED/VIEWS:  XR CHEST PA & LATERAL FINDINGS: Mild enlargement of cardiac silhouette with tortuous thoracic aorta noted similar to previous study There is a shallow depth of inspiration with trace bilateral effusions  Interstitial markings appear somewhat prominent    There is no pneumothorax  No focal airspace consolidation  Compression deformity of lumbar spine again noted     Impression: Shallow depth of inspiration with somewhat prominent interstitial markings and trace bilateral effusions  Correlate for CHF/fluid overload  No pneumothorax or focal airspace consolidation  Workstation performed: [id number]     Xr Spine Lumbar 2 Or 3 Views Injury    Result Date: 9/3/2020  Narrative: LUMBAR SPINE INDICATION:   S32 000A: Wedge compression fracture of unspecified lumbar vertebra, initial encounter for closed fracture  COMPARISON:  Lumbar spine radiograph 8/12/2020 VIEWS:  XR SPINE LUMBAR 2 OR 3 VIEWS INJURY FINDINGS: There are 5 non rib bearing lumbar vertebral bodies  Stable compression deformities at L4 and L5  Progressive loss of vertebral body height at known L1 and L2 compression deformities with increased sclerosis at the L2 vertebral body    There is no evidence of acute fracture or destructive osseous lesion  Moderate scoliotic deformity is noted  Alignment is otherwise unremarkable  Age-appropriate lumbar degenerative changes are seen  The pedicles appear intact  There are atherosclerotic calcifications  Soft tissues are otherwise unremarkable  Impression: Progressive loss of vertebral body height at known L1 and L2 compression deformities    Stable compression deformities at L4 and L5  The study was marked in EPIC for significant notification  Workstation performed: [id number]     Cta Ed Chest Pe Study    Result Date: 9/17/2020  Narrative: CTA - CHEST WITH IV CONTRAST - PULMONARY ANGIOGRAM INDICATION:   tachypnea, elevated D-dimer, recent hospitalization  COMPARISON: 8/9/2020  TECHNIQUE: CTA examination of the chest was performed using angiographic technique according to a protocol specifically tailored to evaluate for pulmonary embolism  Axial, sagittal, and coronal 2D reformatted images were created from the source data and  submitted for interpretation    In addition, coronal 3D MIP postprocessing was performed on the acquisition scanner  Radiation dose length product (DLP) for this visit:  394 mGy-cm   This examination, like all CT scans performed in the [facility], was performed utilizing techniques to minimize radiation dose exposure, including the use of iterative reconstruction and automated exposure control  IV Contrast:  85 mL of iohexol (OMNIPAQUE)  FINDINGS: PULMONARY ARTERIAL TREE:  No pulmonary embolus is seen  LUNGS:  Patchy dependent opacities in the lung bases consistent with atelectasis  Subsegmental atelectasis versus linear scarring in the anterior right upper lobe  Mild interlobular septal thickening is noted  Subsegmental atelectasis versus linear scarring  There is no tracheal or endobronchial lesion  PLEURA:  Small left and trace right pleural effusions  HEART/GREAT VESSELS:  Atherosclerotic changes are noted in thoracic aorta and coronary arteries  MEDIASTINUM AND STUART:  Small hiatal hernia  CHEST WALL AND LOWER NECK:   Unremarkable  VISUALIZED STRUCTURES IN THE UPPER ABDOMEN:  Unremarkable  OSSEOUS STRUCTURES:  No acute fracture or destructive osseous lesion  Impression: 1  No evidence of pulmonary embolism  2   Mild interlobular septal thickening suspicious for edema  3   Small left and trace right pleural effusions  4   Small hiatal hernia  Workstation performed: [id number]     Ct Abdomen Pelvis With Contrast    Result Date: 9/14/2020  Narrative: CT ABDOMEN AND PELVIS WITH IV CONTRAST INDICATION:   Weakness and difficulty urinating  COMPARISON:  8/9/2020 TECHNIQUE:  CT examination of the abdomen and pelvis was performed  Axial, sagittal, and coronal 2D reformatted images were created from the source data and submitted for interpretation  Radiation dose length product (DLP) for this visit:  587 mGy-cm     This examination, like all CT scans performed in the [facility], was performed utilizing techniques to minimize radiation dose exposure, including the use of iterative reconstruction and automated exposure control  IV Contrast:  100 mL of iohexol (OMNIPAQUE) Enteric Contrast:  Enteric contrast was not administered  FINDINGS: ABDOMEN LOWER CHEST:  Bibasilar subsegmental atelectasis and/or scarring, similar to the previous study  Coronary artery calcification  LIVER/BILIARY TREE:  Unremarkable  GALLBLADDER:  There are gallstone(s) within the gallbladder, without pericholecystic inflammatory changes  SPLEEN:  Unremarkable  PANCREAS:  Unremarkable  ADRENAL GLANDS:  Unremarkable  KIDNEYS/URETERS:  Unremarkable  No hydronephrosis  STOMACH AND BOWEL:  Sigmoid diverticulosis  No evidence of acute diverticulitis or bowel obstruction APPENDIX:  No findings to suggest appendicitis  ABDOMINOPELVIC CAVITY:  No ascites  No pneumoperitoneum  No lymphadenopathy  VESSELS:  Diffuse atherosclerotic changes of the intra-abdominal arteries  Ectasia of abdominal aorta  Maximal diameter 2 7 cm PELVIS REPRODUCTIVE ORGANS:  Mild prostatomegaly URINARY BLADDER:  Urinary bladder is decompressed with a Barton catheter in place  ABDOMINAL WALL/INGUINAL REGIONS:  Unremarkable  OSSEOUS STRUCTURES:  Multiple compression fractures of the lumbar spine similar to x-ray 8/28/2020     Impression: 1  Advanced sigmoid diverticulosis without evidence of bowel obstruction or diverticulitis 2  Multiple lumbar compression fractures similar to 8/28/2020 3  Urinary bladder decompressed with Barton catheter  No hydronephrosis  4  Cholelithiasis  Workstation performed: [id number]     Assessment  Principal Problem:    Paraphimosis  Active Problems:    Essential hypertension    Anemia    Atrial fibrillation with RVR (HCC)    Sepsis ([address] )    UTI (urinary tract infection)    Hypokalemia    Thank you for allowing us to participate in this patient's care  This pt will follow up with Dr Bahman Love once discharged       Counseling / Coordination of Care  Total floor / unit time spent today 45 minutes  Greater than 50% of total time was spent with the patient and / or family counseling and / or coordination of care  A description of the counseling / coordination of care: Review of history, current assessment, development of a plan  Code Status: Level 1 - Full Code    ** Please Note: Dragon 360 Dictation voice to text software may have been used in the creation of this document   **

## 2020-09-18 ENCOUNTER — APPOINTMENT (INPATIENT)
Dept: NON INVASIVE DIAGNOSTICS | Facility: HOSPITAL | Age: 85
DRG: 725 | End: 2020-09-18
Payer: MEDICARE

## 2020-09-18 PROBLEM — R78.81 POSITIVE BLOOD CULTURE: Status: ACTIVE | Noted: 2020-09-18

## 2020-09-18 PROBLEM — E87.6 HYPOKALEMIA: Status: RESOLVED | Noted: 2020-09-17 | Resolved: 2020-09-18

## 2020-09-18 PROBLEM — I50.9 CHF (CONGESTIVE HEART FAILURE) (HCC): Status: ACTIVE | Noted: 2020-09-18

## 2020-09-18 LAB
ANION GAP SERPL CALCULATED.3IONS-SCNC: 7 MMOL/L (ref 4–13)
BASOPHILS # BLD AUTO: 0.03 THOUSANDS/ΜL (ref 0–0.1)
BASOPHILS NFR BLD AUTO: 1 % (ref 0–1)
BUN SERPL-MCNC: 12 MG/DL (ref 5–25)
CALCIUM SERPL-MCNC: 8.3 MG/DL (ref 8.3–10.1)
CHLORIDE SERPL-SCNC: 104 MMOL/L (ref 100–108)
CO2 SERPL-SCNC: 26 MMOL/L (ref 21–32)
CREAT SERPL-MCNC: 0.97 MG/DL (ref 0.6–1.3)
EOSINOPHIL # BLD AUTO: 0.03 THOUSAND/ΜL (ref 0–0.61)
EOSINOPHIL NFR BLD AUTO: 1 % (ref 0–6)
ERYTHROCYTE [DISTWIDTH] IN BLOOD BY AUTOMATED COUNT: 15 % (ref 11.6–15.1)
GFR SERPL CREATININE-BSD FRML MDRD: 70 ML/MIN/1.73SQ M
GLUCOSE SERPL-MCNC: 100 MG/DL (ref 65–140)
HCT VFR BLD AUTO: 29.1 % (ref 36.5–49.3)
HGB BLD-MCNC: 9.6 G/DL (ref 12–17)
IMM GRANULOCYTES # BLD AUTO: 0.12 THOUSAND/UL (ref 0–0.2)
IMM GRANULOCYTES NFR BLD AUTO: 2 % (ref 0–2)
LYMPHOCYTES # BLD AUTO: 1.39 THOUSANDS/ΜL (ref 0.6–4.47)
LYMPHOCYTES NFR BLD AUTO: 27 % (ref 14–44)
MAGNESIUM SERPL-MCNC: 2 MG/DL (ref 1.6–2.6)
MCH RBC QN AUTO: 30 PG (ref 26.8–34.3)
MCHC RBC AUTO-ENTMCNC: 33 G/DL (ref 31.4–37.4)
MCV RBC AUTO: 91 FL (ref 82–98)
MONOCYTES # BLD AUTO: 0.52 THOUSAND/ΜL (ref 0.17–1.22)
MONOCYTES NFR BLD AUTO: 10 % (ref 4–12)
NEUTROPHILS # BLD AUTO: 3.02 THOUSANDS/ΜL (ref 1.85–7.62)
NEUTS SEG NFR BLD AUTO: 59 % (ref 43–75)
NRBC BLD AUTO-RTO: 0 /100 WBCS
PLATELET # BLD AUTO: 226 THOUSANDS/UL (ref 149–390)
PMV BLD AUTO: 9.7 FL (ref 8.9–12.7)
POTASSIUM SERPL-SCNC: 3.7 MMOL/L (ref 3.5–5.3)
PROCALCITONIN SERPL-MCNC: <0.05 NG/ML
RBC # BLD AUTO: 3.2 MILLION/UL (ref 3.88–5.62)
SODIUM SERPL-SCNC: 137 MMOL/L (ref 136–145)
WBC # BLD AUTO: 5.11 THOUSAND/UL (ref 4.31–10.16)

## 2020-09-18 PROCEDURE — 83735 ASSAY OF MAGNESIUM: CPT | Performed by: NURSE PRACTITIONER

## 2020-09-18 PROCEDURE — 93306 TTE W/DOPPLER COMPLETE: CPT | Performed by: INTERNAL MEDICINE

## 2020-09-18 PROCEDURE — 85025 COMPLETE CBC W/AUTO DIFF WBC: CPT | Performed by: INTERNAL MEDICINE

## 2020-09-18 PROCEDURE — 99232 SBSQ HOSP IP/OBS MODERATE 35: CPT | Performed by: INTERNAL MEDICINE

## 2020-09-18 PROCEDURE — 84145 PROCALCITONIN (PCT): CPT | Performed by: PHYSICIAN ASSISTANT

## 2020-09-18 PROCEDURE — 93306 TTE W/DOPPLER COMPLETE: CPT

## 2020-09-18 PROCEDURE — 80048 BASIC METABOLIC PNL TOTAL CA: CPT | Performed by: NURSE PRACTITIONER

## 2020-09-18 RX ADMIN — HYDROCHLOROTHIAZIDE 12.5 MG: 12.5 TABLET ORAL at 08:01

## 2020-09-18 RX ADMIN — TAMSULOSIN HYDROCHLORIDE 0.4 MG: 0.4 CAPSULE ORAL at 17:06

## 2020-09-18 RX ADMIN — METOPROLOL TARTRATE 25 MG: 25 TABLET, FILM COATED ORAL at 21:14

## 2020-09-18 RX ADMIN — PRAVASTATIN SODIUM 40 MG: 40 TABLET ORAL at 17:06

## 2020-09-18 RX ADMIN — APIXABAN 5 MG: 5 TABLET, FILM COATED ORAL at 17:06

## 2020-09-18 RX ADMIN — GABAPENTIN 100 MG: 100 CAPSULE ORAL at 21:14

## 2020-09-18 RX ADMIN — METOPROLOL TARTRATE 25 MG: 25 TABLET, FILM COATED ORAL at 08:01

## 2020-09-18 RX ADMIN — FERROUS GLUCONATE 324 MG: 324 TABLET ORAL at 05:40

## 2020-09-18 RX ADMIN — VANCOMYCIN HYDROCHLORIDE 1250 MG: 5 INJECTION, POWDER, LYOPHILIZED, FOR SOLUTION INTRAVENOUS at 09:06

## 2020-09-18 RX ADMIN — FERROUS GLUCONATE 324 MG: 324 TABLET ORAL at 17:07

## 2020-09-18 RX ADMIN — CEFTRIAXONE SODIUM 1000 MG: 10 INJECTION, POWDER, FOR SOLUTION INTRAVENOUS at 15:19

## 2020-09-18 RX ADMIN — APIXABAN 5 MG: 5 TABLET, FILM COATED ORAL at 08:01

## 2020-09-18 RX ADMIN — POLYETHYLENE GLYCOL 3350 17 G: 17 POWDER, FOR SOLUTION ORAL at 08:01

## 2020-09-18 NOTE — ASSESSMENT & PLAN NOTE
· Gram + cocci in clusters noted on BC x 1   · Culture showing Staph coagulase negative, likely contaminant  · Will discontinue vancomycin

## 2020-09-18 NOTE — ASSESSMENT & PLAN NOTE
· Blood pressure reviewed and stable  · Continue home medications including metoprolol, hydrochlorothiazide  · Monitor BP

## 2020-09-18 NOTE — CASE MANAGEMENT
LOS 1 day, not a bundle, patient is a readmission  Readmission: Patient directed back to hospital via EMS from facility after staff noted during their assessment patient had red, swollen penis with pain  Patient was also recently in hospital 8/9/20-8/13/20 and discharged at that time to [facility]  Per conversation with [id number] Liaison for [facility] and chart review from previous CM's note patient's spouse is holding patient's bed so at DC patient will return to S  CM met with patient and spouse at bedside to introduce self, explain role, complete CM assessment, and discuss DC planning  Wife and patient both confirm the below baseline information from this CM's assessment during last admission is still the same: "Patient lives with his wife in a 2 floor home with 3 SOCORRO with handrails  Patient and wife's bedroom is on 2nd floor with 13 steps to get up to  Patient was ambulating with a walker at home  Wife reports they also have cane, patient has TLSO brace, and shower chair  Patient's wife reports recently he has not been showering and she is sponge bathing him  Since home from rehab patient was doing about 25% with wife assisting about 75% (about max assistance) with ADLs  Patient was utilizing [facility] for home therapy after his recent DC from [facility]  Patient has LW and POA, reported POA is wife, CM requested copy of documents  No Hx MH or substance use  Patient is retired  PCP is Dr Aysha Rouse, patient has prescription coverage, and prefers using [facility]  "    CM discussed transportation at DC as [facility] does not allow family to transport and wife reports she would like CM to arrange [address] when patient is medically stable for DC and is aware of cost associated as CM discussed this last admission  IMM reviewed with patient and spouse at [facility], signed by CM, and placed in medical records bin  Copy provided to patient for his records  CM will continue to follow

## 2020-09-18 NOTE — ASSESSMENT & PLAN NOTE
· Currently normal sinus rhythm  · CHADSVASC 4 -- Cardiology discussed with patient's wife, preference was for patient to start on Eliquis despite known fall risk  · 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   · Stress Echo on 6/23/2020 shows EF 50%   · [place] showed EF 45%, mild reduced systolic function, grade 1 diastolic dysfunction, mild tricuspid regurgitation and trace mitral regurgitation

## 2020-09-18 NOTE — PROGRESS NOTES
General Cardiology   Progress Note -  Team One   [employer] 80 y o  male MRN: [id number]    Unit/Bed#: S -01 Encounter: [id number]    Assessment/ Plan:    1  New onset atrial fibrillation: He is maintaining SR with some PVCs and PACs; we increased his BB 2 metoprolol tartrate 25 mg b i d  For discussion with his wife yesterday we started him on systemic anticoagulation with Eliquis 5 mg b i d ; primary team evaluating cost    Echocardiogram pending read; no signs or symptoms of acute heart failure  If no significant findings an echocardiogram patient to be discharged home from a cardiac standpoint follow-up with his primary cardiologist Dr Raynell Cockayne at [facility]    2  Hx of CAD: prior MI 2012 unknown details  No anginal symptoms continue ASA, statin, BB  3  Electrolyte disturbances: K 3 7 Mag 2 0 today s/p replacement;replacement; repeat in   4  Paraphimosis: management per urology      Subjective:  Patient seen for follow-up  No events overnight  He reports feeling well today  Denies any chest pain/pressure/tightness, no lower extremity edema or shortness of breath  Review of Systems   Constitution: Negative for decreased appetite and fever  Cardiovascular: Negative for chest pain, dyspnea on exertion, leg swelling, orthopnea, palpitations and syncope  Respiratory: Negative for cough and shortness of breath  Gastrointestinal: Negative for abdominal pain, nausea and vomiting  Genitourinary: Negative for dysuria  Neurological: Negative for dizziness and light-headedness  Psychiatric/Behavioral: Negative for altered mental status  Objective:   Vitals: Blood pressure 136/64, pulse 89, temperature 98 °F (36 7 °C), temperature source Oral, resp  rate 18, height 5' (1 524 m), weight 63 5 kg (139 lb 15 9 oz), SpO2 91 % ,     Body mass index is 27 34 kg/m²  ,     Systolic (10FHU), XJM:261 , Min:119 , AKO:823     Diastolic (90NTY), RAQ:92, Min:57, Max:66      Intake/Output Summary (Last 24 hours) at 9/18/2020 1127  Last data filed at 9/18/2020 0900  Gross per 24 hour   Intake 480 ml   Output 1100 ml   Net -620 ml     Weight (last 2 days)     Date/Time   Weight    09/18/20 0600   63 5 (139 99)    09/17/20 0600   63 1 (139 11)    Weight: pt unable to stand at 09/17/20 0600    09/17/20 0306   63 1 (139 11)    09/16/20 2205   64 5 (142 2)            Telemetry Review:   Sinus rhythm with occasional PVCs    Physical Exam  Vitals signs reviewed  Constitutional:       General: He is not in acute distress  Appearance: He is not ill-appearing  Comments: Patient lying in bed in NAD alert and cooperative   HENT:      Head: Normocephalic and atraumatic  Cardiovascular:      Rate and Rhythm: Normal rate and regular rhythm  Heart sounds: S1 normal and S2 normal  No murmur  Pulmonary:      Effort: Pulmonary effort is normal       Breath sounds: Normal breath sounds  No wheezing or rales  Comments: Lung sounds clear to auscultation, on room air  Abdominal:      General: Abdomen is flat  Musculoskeletal:      Right lower leg: No edema  Left lower leg: No edema  Skin:     General: Skin is warm and dry  Neurological:      General: No focal deficit present  Mental Status: He is alert     Psychiatric:         Mood and Affect: Mood normal        LABORATORY RESULTS  Results from last 7 days   Lab Units 09/16/20  2220 09/14/20  1739 09/14/20  0749   TROPONIN I ng/mL <0 02 <0 02 <0 02     CBC with diff:   Results from last 7 days   Lab Units 09/18/20  0447 09/17/20  0422 09/16/20  2220 09/16/20  0447 09/15/20  1346 09/15/20  0447 09/14/20  0749   WBC Thousand/uL 5 11 6 81 6 04 5 41  --  5 95 6 47   HEMOGLOBIN g/dL 9 6* 9 6* 10 9* 9 2* 9 7* 8 8* 10 8*   HEMATOCRIT % 29 1* 28 6* 32 9* 27 8*  --  27 8* 32 4*   MCV fL 91 89 91 92  --  93 91   PLATELETS Thousands/uL 226 230 222 192  --  171 182   MCH pg 30 0 30 0 30 2 30 4  --  29 5 30 4   MCHC g/dL 33 0 33 6 33 1 33 1  --  31 7 33 3   RDW % 15 0 14 8 14 8 15 1  --  15 5* 15 1   MPV fL 9 7 10 2 9 9 10 3  --  10 1 10 5   NRBC AUTO /100 WBCs 0  --  0 0  --  0 0     CMP:  Results from last 7 days   Lab Units 09/18/20 0447 09/17/20 0430 09/16/20  2220 09/16/20  0447 09/15/20  0447 09/14/20  0749   POTASSIUM mmol/L 3 7 3 0* 3 6 3 8 3 5 3 5   CHLORIDE mmol/L 104 100 101 104 103 97*   CO2 mmol/L 26 25 25 22 24 26   BUN mg/dL 12 16 19 22 23 23   CREATININE mg/dL 0 97 1 06 1 10 0 94 1 22 1 10   CALCIUM mg/dL 8 3 8 0* 8 5 8 0* 7 7* 8 4   AST U/L  --   --  23  --  16 21   ALT U/L  --   --  22  --  18 22   ALK PHOS U/L  --   --  71  --  57 74   EGFR ml/min/1 73sq m 70 63 60 73 53 60     BMP:  Results from last 7 days   Lab Units 09/18/20 0447 09/17/20 0430 09/16/20  2220 09/16/20  0447 09/15/20  0447 09/14/20  0749   POTASSIUM mmol/L 3 7 3 0* 3 6 3 8 3 5 3 5   CHLORIDE mmol/L 104 100 101 104 103 97*   CO2 mmol/L 26 25 25 22 24 26   BUN mg/dL 12 16 19 22 23 23   CREATININE mg/dL 0 97 1 06 1 10 0 94 1 22 1 10   CALCIUM mg/dL 8 3 8 0* 8 5 8 0* 7 7* 8 4     Lab Results   Component Value Date    NTBNP 5,668 (H) 09/16/2020    NTBNP 1,138 (H) 08/06/2019     Results from last 7 days   Lab Units 09/18/20 0447 09/17/20  0430   MAGNESIUM mg/dL 2 0 1 6     Results from last 7 days   Lab Units 09/16/20  2220   TSH 3RD GENERATON uIU/mL 2 303     Results from last 7 days   Lab Units 09/17/20 0422 09/16/20  2220   INR  1 37* 1 19     Meds/Allergies   current meds:   Current Facility-Administered Medications   Medication Dose Route Frequency    apixaban (ELIQUIS) tablet 5 mg  5 mg Oral BID    cefTRIAXone (ROCEPHIN) 1,000 mg in dextrose 5 % 50 mL IVPB  1,000 mg Intravenous Q24H    ferrous gluconate (FERGON) tablet 324 mg  324 mg Oral BID AC    gabapentin (NEURONTIN) capsule 100 mg  100 mg Oral HS    hydrochlorothiazide (HYDRODIURIL) tablet 12 5 mg  12 5 mg Oral Daily    metoprolol tartrate (LOPRESSOR) tablet 25 mg  25 mg Oral Q12H [address]    polyethylene glycol (MIRALAX) packet 17 g  17 g Oral Daily    pravastatin (PRAVACHOL) tablet 40 mg  40 mg Oral Daily With Dinner    tamsulosin (FLOMAX) capsule 0 4 mg  0 4 mg Oral Daily With Dinner    vancomycin (VANCOCIN) 1,250 mg in sodium chloride 0 9 % 250 mL IVPB  20 mg/kg Intravenous Q24H     Medications Prior to Admission   Medication    acetaminophen (TYLENOL) 325 mg tablet    aspirin 81 MG tablet    B Complex Vitamins (VITAMIN B COMPLEX PO)    cholecalciferol (VITAMIN D3) 1,000 units tablet    famotidine (PEPCID) 20 mg tablet    ferrous gluconate (FERGON) 324 mg tablet    gabapentin (NEURONTIN) 100 mg capsule    hydrochlorothiazide (MICROZIDE) 12 5 mg capsule    hydrocortisone 1 % cream    metoprolol tartrate (LOPRESSOR) 25 mg tablet    Multiple Vitamins-Minerals (PRESERVISION AREDS 2 PO)    Omega-3 1000 MG CAPS    polyethylene glycol (MIRALAX) 17 g packet    simvastatin (ZOCOR) 20 mg tablet    tamsulosin (FLOMAX) 0 4 mg     Assessment:  Principal Problem:    Paraphimosis  Active Problems:    Essential hypertension    Anemia    Atrial fibrillation with RVR (HCC)    Sepsis (HCC)    UTI (urinary tract infection)    Hypokalemia    Positive blood culture    Counseling / Coordination of Care  Total floor / unit time spent today 20 minutes  Greater than 50% of total time was spent with the patient and / or family counseling and / or coordination of care  ** Please Note: Dragon 360 Dictation voice to text software may have been used in the creation of this document   **

## 2020-09-18 NOTE — ASSESSMENT & PLAN NOTE
· Baseline Hgb around 14  · Hgb stable at 9 4  · With reported epistaxis currently now resolved   Will hold Eliquis for now in the setting of bleeding  · Monitor CBC

## 2020-09-18 NOTE — ASSESSMENT & PLAN NOTE
· Paraphimosis noted on admission to the emergency department    · This was reduced emergently by on-call urology on admission   · Maintain duarte catheter on discharge  · Urology input appreciated

## 2020-09-18 NOTE — ASSESSMENT & PLAN NOTE
· Gram + cocci in clusters noted on BC x 1   · Most likely contaminant but will wait for final cultures  · In the meantime, started on IV vancomycin  · Please follow up culture results and adjust/dc vancomycin as appropriate

## 2020-09-18 NOTE — PROGRESS NOTES
Vancomycin Assessment    Mike Ji is a 80 y o  male who is currently receiving vancomycin 15mg/kg/dose for bacteremia     Relevant clinical data and objective history reviewed:  Creatinine   Date Value Ref Range Status   09/18/2020 0 97 0 60 - 1 30 mg/dL Final     Comment:     Standardized to IDMS reference method   09/17/2020 1 06 0 60 - 1 30 mg/dL Final     Comment:     Standardized to IDMS reference method   09/16/2020 1 10 0 60 - 1 30 mg/dL Final     Comment:     Standardized to IDMS reference method     /64 (BP Location: Left arm)   Pulse 89   Temp 98 °F (36 7 °C) (Oral)   Resp 18   Ht 5' (1 524 m)   Wt 63 5 kg (139 lb 15 9 oz)   SpO2 91%   BMI 27 34 kg/m²   I/O last 3 completed shifts: In: 730 [P O :480; IV Piggyback:250]  Out: 3600 [Urine:3600]  Lab Results   Component Value Date/Time    BUN 12 09/18/2020 04:47 AM    WBC 5 11 09/18/2020 04:47 AM    HGB 9 6 (L) 09/18/2020 04:47 AM    HCT 29 1 (L) 09/18/2020 04:47 AM    MCV 91 09/18/2020 04:47 AM     09/18/2020 04:47 AM     Temp Readings from Last 3 Encounters:   09/18/20 98 °F (36 7 °C) (Oral)   09/16/20 97 7 °F (36 5 °C) (Oral)   08/28/20 97 6 °F (36 4 °C)     Vancomycin Days of Therapy: 1    Assessment/Plan  The patient is currently on vancomycin utilizing scheduled dosing based on actual body weight  Baseline risks associated with therapy include: pre-existing renal impairment, concomitant nephrotoxic medications, and advanced age  The patient is currently receiving 15mg/kg/dose and after clinical evaluation will be changed to 20mg/kg/dose  Pharmacy will also follow closely for s/sx of nephrotoxicity, infusion reactions, and appropriateness of therapy  BMP and CBC will be ordered per protocol  Plan for trough as patient approaches steady state, prior to the 4th  dose at approximately 0830 on 09/21  Due to infection severity, will target a trough of 15-20 (appropriate for most indications)     Pharmacy will continue to follow the patients culture results and clinical progress daily      Brandon Hammer, Pharmacist

## 2020-09-18 NOTE — ASSESSMENT & PLAN NOTE
Wt Readings from Last 3 Encounters:   09/18/20 63 5 kg (139 lb 15 9 oz)   09/14/20 63 5 kg (140 lb)   08/28/20 66 2 kg (146 lb)     · No signs of volume overload at this time  · Echo findings this admission as outlined above  · Continue HCTZ

## 2020-09-18 NOTE — ASSESSMENT & PLAN NOTE
· Baseline Hgb around 14  · No evidence of active bleeding  · Hgb today at 9 6  · Monitor -- CBC martina AM

## 2020-09-18 NOTE — ASSESSMENT & PLAN NOTE
· Paraphimosis noted on admission to the emergency department    · This was reduced emergently by on-call urology on admission   · Maintain duarte catheter  · Urology input appreciated

## 2020-09-18 NOTE — OCCUPATIONAL THERAPY NOTE
OT CANCEL NOTE:    Orders for OT evaluation received  Pt recently dc'd from acute care, now readmitted  Per NS note from 8/28/20 pt is to continue to wear his TLSO brace when OOB  TLSO brace not presently here, thus unable to see patient for OT evaluation this AM  Will continue to follow and evaluate as appropriate

## 2020-09-18 NOTE — ASSESSMENT & PLAN NOTE
· Tachycardia and tachypnea on admission  · Source of infection UTI  · Urine culture showed >100,000/hpf pansensitive E coli  · Continue IV Ceftriaxone for now, consider shifting to Antibiotics if continues to be stable to complete 7-14 day course (abx D3)  · Blood culture x 1 showed staph coag-negative, likely contaminant, will discontinue vancomycin

## 2020-09-18 NOTE — PLAN OF CARE
Problem: Potential for Falls  Goal: Patient will remain free of falls  Description: INTERVENTIONS:  - Assess patient frequently for physical needs  -  Identify cognitive and physical deficits and behaviors that affect risk of falls    -  [place] fall precautions as indicated by assessment   - Educate patient/family on patient safety including physical limitations  - Instruct patient to call for assistance with activity based on assessment  - Modify environment to reduce risk of injury  - Consider OT/PT consult to assist with strengthening/mobility  Outcome: Progressing     Problem: Prexisting or High Potential for Compromised Skin Integrity  Goal: Skin integrity is maintained or improved  Description: INTERVENTIONS:  - Identify patients at risk for skin breakdown  - Assess and monitor skin integrity  - Assess and monitor nutrition and hydration status  - Monitor labs   - Assess for incontinence   - Turn and reposition patient  - Assist with mobility/ambulation  - Relieve pressure over bony prominences  - Avoid friction and shearing  - Provide appropriate hygiene as needed including keeping skin clean and dry  - Evaluate need for skin moisturizer/barrier cream  - Collaborate with interdisciplinary team   - Patient/family teaching  - Consider wound care consult   Outcome: Progressing     Problem: PAIN - ADULT  Goal: Verbalizes/displays adequate comfort level or baseline comfort level  Description: Interventions:  - Encourage patient to monitor pain and request assistance  - Assess pain using appropriate pain scale  - Administer analgesics based on type and severity of pain and evaluate response  - Implement non-pharmacological measures as appropriate and evaluate response  - Consider cultural and social influences on pain and pain management  - Notify physician/advanced practitioner if interventions unsuccessful or patient reports new pain  Outcome: Progressing     Problem: INFECTION - ADULT  Goal: Absence or prevention of progression during hospitalization  Description: INTERVENTIONS:  - Assess and monitor for signs and symptoms of infection  - Monitor lab/diagnostic results  - Monitor all insertion sites, i e  indwelling lines, tubes, and drains  - Monitor endotracheal if appropriate and nasal secretions for changes in amount and color  - [place] appropriate cooling/warming therapies per order  - Administer medications as ordered  - Instruct and encourage patient and family to use good hand hygiene technique  - Identify and instruct in appropriate isolation precautions for identified infection/condition  Outcome: Progressing  Goal: Absence of fever/infection during neutropenic period  Description: INTERVENTIONS:  - Monitor WBC    Outcome: Progressing     Problem: SAFETY ADULT  Goal: Patient will remain free of falls  Description: INTERVENTIONS:  - Assess patient frequently for physical needs  -  Identify cognitive and physical deficits and behaviors that affect risk of falls    -  [place] fall precautions as indicated by assessment   - Educate patient/family on patient safety including physical limitations  - Instruct patient to call for assistance with activity based on assessment  - Modify environment to reduce risk of injury  - Consider OT/PT consult to assist with strengthening/mobility  Outcome: Progressing  Goal: Maintain or return to baseline ADL function  Description: INTERVENTIONS:  -  Assess patient's ability to carry out ADLs; assess patient's baseline for ADL function and identify physical deficits which impact ability to perform ADLs (bathing, care of mouth/teeth, toileting, grooming, dressing, etc )  - Assess/evaluate cause of self-care deficits   - Assess range of motion  - Assess patient's mobility; develop plan if impaired  - Assess patient's need for assistive devices and provide as appropriate  - Encourage maximum independence but intervene and supervise when necessary  - Involve family in performance of ADLs  - Assess for home care needs following discharge   - Consider OT consult to assist with ADL evaluation and planning for discharge  - Provide patient education as appropriate  Outcome: Progressing  Goal: Maintain or return mobility status to optimal level  Description: INTERVENTIONS:  - Assess patient's baseline mobility status (ambulation, transfers, stairs, etc )    - Identify cognitive and physical deficits and behaviors that affect mobility  - Identify mobility aids required to assist with transfers and/or ambulation (gait belt, sit-to-stand, lift, walker, cane, etc )  - [place] fall precautions as indicated by assessment  - Record patient progress and toleration of activity level on Mobility SBAR; progress patient to next Phase/Stage  - Instruct patient to call for assistance with activity based on assessment  - Consider rehabilitation consult to assist with strengthening/weightbearing, etc   Outcome: Progressing     Problem: DISCHARGE PLANNING  Goal: Discharge to home or other facility with appropriate resources  Description: INTERVENTIONS:  - Identify barriers to discharge w/patient and caregiver  - Arrange for needed discharge resources and transportation as appropriate  - Identify discharge learning needs (meds, wound care, etc )  - Arrange for interpretive services to assist at discharge as needed  - Refer to Case Management Department for coordinating discharge planning if the patient needs post-hospital services based on physician/advanced practitioner order or complex needs related to functional status, cognitive ability, or social support system  Outcome: Progressing     Problem: Knowledge Deficit  Goal: Patient/family/caregiver demonstrates understanding of disease process, treatment plan, medications, and discharge instructions  Description: Complete learning assessment and assess knowledge base    Interventions:  - Provide teaching at level of understanding  - Provide teaching via preferred learning methods  Outcome: Progressing

## 2020-09-18 NOTE — ASSESSMENT & PLAN NOTE
· Currently normal sinus rhythm  · CHADSVASC 4 -- Cardiology discussed with patient's wife, preference was for patient to start on Eliquis despite known fall risk  · 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   · Stress Echo on 6/23/2020 shows EF 50%   · Echo this admission showed EF 45%, mild reduced systolic function, grade 1 diastolic dysfunction, mild tricuspid regurgitation and trace mitral regurgitation  · Given episode of epistaxis, holding Eliquis for now  Patient and wife who was present at bedside expressed concerns about being on anticoagulation long-term, they would like to re-evaluate whether they want the patient to be continued on it

## 2020-09-18 NOTE — PHYSICAL THERAPY NOTE
PHYSICAL THERAPY CANCELLATION NOTE    Patient Name: Shayla Boothe  OAOQP'A Date: 9/18/2020     PT orders received, chart review performed  Spoke to [employer]  Pt readmitted 8 hours after DC from this campus from [address] [place] [address]  Per most recent outpatient NS from 8/28/2020 which stated pt is to continue to wear TLSO when OOB and HOB is > 45 degrees  Presently there is no TLSO in patient's room  Unable to perform full PT evaluation without TLSO at this time until it is present  RN Bahman Salinas states she will call wife to ask her to bring it in       Kira Wilkins, PT, DPT

## 2020-09-18 NOTE — ASSESSMENT & PLAN NOTE
· Tachycardia and tachypnea on admission  · Source of infection UTI  · Urine culture showed >100,000/hpf E   Coli -- will follow up sensitivity results  · Continue Ceftriaxone (today is day 1 on Rocephin; day 2 on abx)  · Blood culture x 1 showed gram + cocci in clusters

## 2020-09-18 NOTE — PROGRESS NOTES
Progress Note - Paola Mcfadden 1934, 80 y o  male MRN: [id number]    Unit/Bed#: S -01 Encounter: [id number]    Primary Care Provider: Noni Sewell DO   Date and time admitted to hospital: 9/16/2020  9:58 PM        * Sepsis ([facility] )  Assessment & Plan  · Tachycardia and tachypnea on admission  · Source of infection UTI  · Urine culture showed >100,000/hpf E  Coli -- will follow up sensitivity results  · Continue Ceftriaxone (today is day 1 on Rocephin; day 2 on abx)  · Blood culture x 1 showed gram + cocci in clusters    Paraphimosis  Assessment & Plan  · Paraphimosis noted on admission to the emergency department  · This was reduced emergently by on-call urology on admission   · Maintain duarte catheter  · Urology input appreciated      UTI (urinary tract infection)  Assessment & Plan  · Treatment as outlined above    Atrial fibrillation with RVR ([facility] )  Assessment & Plan  · Currently normal sinus rhythm  · CHADSVASC 4 -- Cardiology discussed with patient's wife, preference was for patient to start on Eliquis despite known fall risk  · 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   · Stress Echo on 6/23/2020 shows EF 50%   · Echo this admission showed EF 45%, mild reduced systolic function, grade 1 diastolic dysfunction, mild tricuspid regurgitation and trace mitral regurgitation  Positive blood culture  Assessment & Plan  · Gram + cocci in clusters noted on BC x 1   · Most likely contaminant but will wait for final cultures  · In the meantime, started on IV vancomycin  · Please follow up culture results and adjust/dc vancomycin as appropriate    Essential hypertension  Assessment & Plan  · Blood pressure reviewed and stable  · Continue home medications including metoprolol, hydrochlorothiazide  · Monitor BP      Anemia  Assessment & Plan  · Baseline Hgb around 14  · No evidence of active bleeding  · Hgb today at 9 6  · Monitor -- CBC martina AM      VTE Pharmacologic Prophylaxis:   Pharmacologic: Apixaban (Eliquis)  Mechanical VTE Prophylaxis in Place: Yes    Discussions with Specialists or Other Care Team Provider: Reviewed note by Cardiology; nursing; CM    Education and Discussions with Family / Patient: patient; I called both patient's wife and patient's daughter again today but no answer; I tried work and home phones, no answer  Current Length of Stay: 1 day(s)    Current Patient Status: Inpatient     Discharge Plan / Estimated Discharge Date: to be determined    Code Status: Level 1 - Full Code      Subjective:   Patient has no acute complaints at the time of my encounter  Denies any pain in genital area  Objective:     Vitals:   Temp (24hrs), Av °F (36 7 °C), Min:97 9 °F (36 6 °C), Max:98 1 °F (36 7 °C)    Temp:  [97 9 °F (36 6 °C)-98 1 °F (36 7 °C)] 98 °F (36 7 °C)  HR:  [86-98] 89  Resp:  [18] 18  BP: (119-137)/(57-66) 136/64  SpO2:  [90 %-96 %] 91 %  Body mass index is 27 34 kg/m²  Input and Output Summary (last 24 hours): Intake/Output Summary (Last 24 hours) at 2020 1438  Last data filed at 2020 1230  Gross per 24 hour   Intake 360 ml   Output 650 ml   Net -290 ml       Physical Exam:     Physical Exam  Vitals signs reviewed  Constitutional:       General: He is not in acute distress  Appearance: He is not ill-appearing  HENT:      Nose: No congestion  Eyes:      General: No scleral icterus  Cardiovascular:      Rate and Rhythm: Normal rate  Pulmonary:      Effort: Pulmonary effort is normal       Breath sounds: No wheezing or rales  Abdominal:      General: There is no distension  Tenderness: There is no abdominal tenderness  Musculoskeletal:         General: No swelling  Skin:     Capillary Refill: Capillary refill takes less than 2 seconds  Coloration: Skin is not jaundiced  Neurological:      Mental Status: He is alert        Comments: Oriented to person only           Additional Data:     Labs:    Results from last 7 days   Lab Units 09/18/20  0447   WBC Thousand/uL 5 11   HEMOGLOBIN g/dL 9 6*   HEMATOCRIT % 29 1*   PLATELETS Thousands/uL 226   NEUTROS PCT % 59   LYMPHS PCT % 27   MONOS PCT % 10   EOS PCT % 1     Results from last 7 days   Lab Units 09/18/20  0447  09/16/20  2220   POTASSIUM mmol/L 3 7   < > 3 6   CHLORIDE mmol/L 104   < > 101   CO2 mmol/L 26   < > 25   BUN mg/dL 12   < > 19   CREATININE mg/dL 0 97   < > 1 10   CALCIUM mg/dL 8 3   < > 8 5   ALK PHOS U/L  --   --  71   ALT U/L  --   --  22   AST U/L  --   --  23    < > = values in this interval not displayed  Results from last 7 days   Lab Units 09/17/20  0422   INR  1 37*       * I Have Reviewed All Lab Data Listed Above  * Additional Pertinent Lab Tests Reviewed:  All Labs Within Last 24 Hours Reviewed    Imaging:    Imaging Reports Reviewed Today Include: none  Imaging Personally Reviewed by Myself Includes:  none    Recent Cultures (last 7 days):     Results from last 7 days   Lab Units 09/16/20  2225 09/16/20  2220 09/16/20  2215   BLOOD CULTURE  No Growth at 24 hrs   --   --    Smita Lipps STAIN RESULT   --  Gram positive cocci in clusters*  --    URINE CULTURE   --   --  >100,000 cfu/ml Escherichia coli*       Last 24 Hours Medication List:   Current Facility-Administered Medications   Medication Dose Route Frequency Provider Last Rate    apixaban  5 mg Oral BID CLOVER Robles      cefTRIAXone  1,000 mg Intravenous Q24H Roni Lanier PA-C      ferrous gluconate  324 mg Oral BID AC Roni Lanier PA-C      gabapentin  100 mg Oral HS Roni Lanier PA-C      hydrochlorothiazide  12 5 mg Oral Daily Roni Lanier PA-C      metoprolol tartrate  25 mg Oral Q12H [address] CLOVER Robles      polyethylene glycol  17 g Oral Daily Roni Lanier PA-C      pravastatin  40 mg Oral Daily With bookjamCHACHO      tamsulosin  0 4 mg Oral Daily With bookjam, CHACHO      vancomycin  20 mg/kg Intravenous Q24H Mila Cintron MD 1,250 mg (09/18/20 3732)        Today, Patient Was Seen By: Lilli Elizabeth MD    ** Please Note: This note has been constructed using a voice recognition system   **

## 2020-09-19 LAB
ANION GAP SERPL CALCULATED.3IONS-SCNC: 9 MMOL/L (ref 4–13)
BACTERIA BLD CULT: ABNORMAL
BACTERIA UR CULT: ABNORMAL
BUN SERPL-MCNC: 11 MG/DL (ref 5–25)
CALCIUM SERPL-MCNC: 8.5 MG/DL (ref 8.3–10.1)
CHLORIDE SERPL-SCNC: 105 MMOL/L (ref 100–108)
CO2 SERPL-SCNC: 24 MMOL/L (ref 21–32)
CREAT SERPL-MCNC: 0.87 MG/DL (ref 0.6–1.3)
ERYTHROCYTE [DISTWIDTH] IN BLOOD BY AUTOMATED COUNT: 14.9 % (ref 11.6–15.1)
GFR SERPL CREATININE-BSD FRML MDRD: 78 ML/MIN/1.73SQ M
GLUCOSE SERPL-MCNC: 100 MG/DL (ref 65–140)
GRAM STN SPEC: ABNORMAL
HCT VFR BLD AUTO: 28.8 % (ref 36.5–49.3)
HGB BLD-MCNC: 9.4 G/DL (ref 12–17)
MCH RBC QN AUTO: 30.7 PG (ref 26.8–34.3)
MCHC RBC AUTO-ENTMCNC: 32.6 G/DL (ref 31.4–37.4)
MCV RBC AUTO: 94 FL (ref 82–98)
PLATELET # BLD AUTO: 219 THOUSANDS/UL (ref 149–390)
PMV BLD AUTO: 9.7 FL (ref 8.9–12.7)
POTASSIUM SERPL-SCNC: 3.8 MMOL/L (ref 3.5–5.3)
RBC # BLD AUTO: 3.06 MILLION/UL (ref 3.88–5.62)
SODIUM SERPL-SCNC: 138 MMOL/L (ref 136–145)
WBC # BLD AUTO: 5.45 THOUSAND/UL (ref 4.31–10.16)

## 2020-09-19 PROCEDURE — 85027 COMPLETE CBC AUTOMATED: CPT | Performed by: INTERNAL MEDICINE

## 2020-09-19 PROCEDURE — 80048 BASIC METABOLIC PNL TOTAL CA: CPT | Performed by: INTERNAL MEDICINE

## 2020-09-19 PROCEDURE — 99232 SBSQ HOSP IP/OBS MODERATE 35: CPT | Performed by: INTERNAL MEDICINE

## 2020-09-19 RX ORDER — ECHINACEA PURPUREA EXTRACT 125 MG
1 TABLET ORAL
Status: DISCONTINUED | OUTPATIENT
Start: 2020-09-19 | End: 2020-09-20 | Stop reason: HOSPADM

## 2020-09-19 RX ADMIN — FERROUS GLUCONATE 324 MG: 324 TABLET ORAL at 09:24

## 2020-09-19 RX ADMIN — APIXABAN 5 MG: 5 TABLET, FILM COATED ORAL at 09:24

## 2020-09-19 RX ADMIN — GABAPENTIN 100 MG: 100 CAPSULE ORAL at 21:13

## 2020-09-19 RX ADMIN — TAMSULOSIN HYDROCHLORIDE 0.4 MG: 0.4 CAPSULE ORAL at 17:19

## 2020-09-19 RX ADMIN — FERROUS GLUCONATE 324 MG: 324 TABLET ORAL at 17:19

## 2020-09-19 RX ADMIN — VANCOMYCIN HYDROCHLORIDE 1250 MG: 5 INJECTION, POWDER, LYOPHILIZED, FOR SOLUTION INTRAVENOUS at 09:24

## 2020-09-19 RX ADMIN — POLYETHYLENE GLYCOL 3350 17 G: 17 POWDER, FOR SOLUTION ORAL at 09:24

## 2020-09-19 RX ADMIN — CEFTRIAXONE SODIUM 1000 MG: 10 INJECTION, POWDER, FOR SOLUTION INTRAVENOUS at 14:46

## 2020-09-19 RX ADMIN — METOPROLOL TARTRATE 25 MG: 25 TABLET, FILM COATED ORAL at 09:24

## 2020-09-19 RX ADMIN — HYDROCHLOROTHIAZIDE 12.5 MG: 12.5 TABLET ORAL at 09:24

## 2020-09-19 RX ADMIN — PRAVASTATIN SODIUM 40 MG: 40 TABLET ORAL at 17:19

## 2020-09-19 RX ADMIN — METOPROLOL TARTRATE 25 MG: 25 TABLET, FILM COATED ORAL at 21:13

## 2020-09-19 NOTE — PLAN OF CARE
Problem: Potential for Falls  Goal: Patient will remain free of falls  Description: INTERVENTIONS:  - Assess patient frequently for physical needs  -  Identify cognitive and physical deficits and behaviors that affect risk of falls  -  [place] fall precautions as indicated by assessment   - Educate patient/family on patient safety including physical limitations  - Instruct patient to call for assistance with activity based on assessment  - Modify environment to reduce risk of injury  - Consider OT/PT consult to assist with strengthening/mobility  Outcome: Progressing     Problem: INFECTION - ADULT  Goal: Absence or prevention of progression during hospitalization  Description: INTERVENTIONS:  - Assess and monitor for signs and symptoms of infection  - Monitor lab/diagnostic results  - Monitor all insertion sites, i e  indwelling lines, tubes, and drains  - Monitor endotracheal if appropriate and nasal secretions for changes in amount and color  - [place] appropriate cooling/warming therapies per order  - Administer medications as ordered  - Instruct and encourage patient and family to use good hand hygiene technique  - Identify and instruct in appropriate isolation precautions for identified infection/condition  Outcome: Progressing  Goal: Absence of fever/infection during neutropenic period  Description: INTERVENTIONS:  - Monitor WBC    Outcome: Progressing     Problem: SAFETY ADULT  Goal: Patient will remain free of falls  Description: INTERVENTIONS:  - Assess patient frequently for physical needs  -  Identify cognitive and physical deficits and behaviors that affect risk of falls    -  [place] fall precautions as indicated by assessment   - Educate patient/family on patient safety including physical limitations  - Instruct patient to call for assistance with activity based on assessment  - Modify environment to reduce risk of injury  - Consider OT/PT consult to assist with strengthening/mobility  Outcome: Progressing  Goal: Maintain or return to baseline ADL function  Description: INTERVENTIONS:  -  Assess patient's ability to carry out ADLs; assess patient's baseline for ADL function and identify physical deficits which impact ability to perform ADLs (bathing, care of mouth/teeth, toileting, grooming, dressing, etc )  - Assess/evaluate cause of self-care deficits   - Assess range of motion  - Assess patient's mobility; develop plan if impaired  - Assess patient's need for assistive devices and provide as appropriate  - Encourage maximum independence but intervene and supervise when necessary  - Involve family in performance of ADLs  - Assess for home care needs following discharge   - Consider OT consult to assist with ADL evaluation and planning for discharge  - Provide patient education as appropriate  Outcome: Progressing  Goal: Maintain or return mobility status to optimal level  Description: INTERVENTIONS:  - Assess patient's baseline mobility status (ambulation, transfers, stairs, etc )    - Identify cognitive and physical deficits and behaviors that affect mobility  - Identify mobility aids required to assist with transfers and/or ambulation (gait belt, sit-to-stand, lift, walker, cane, etc )  - [place] fall precautions as indicated by assessment  - Record patient progress and toleration of activity level on Mobility SBAR; progress patient to next Phase/Stage  - Instruct patient to call for assistance with activity based on assessment  - Consider rehabilitation consult to assist with strengthening/weightbearing, etc   Outcome: Progressing

## 2020-09-19 NOTE — PROGRESS NOTES
Vancomycin IV Pharmacy-to-Dose Consultation    Bijan Mott is a 80 y o  male who is currently receiving Vancomycin IV with management by the Pharmacy Consult service  Assessment/Plan:  The patient was reviewed  Renal function is stable and no signs or symptoms of nephrotoxicity and/or infusion reactions were documented in the chart  Based on todays assessment, continue current vancomycin (day # 2) dosing of 1250 mg every 24 hours, with a plan for trough to be drawn at 0830 on 9/21  We will continue to follow the patients culture results and clinical progress daily      Isaak Marc

## 2020-09-19 NOTE — PROGRESS NOTES
Progress Note - Sung Núeñz 1934, 80 y o  male MRN: [id number]    Unit/Bed#: S -01 Encounter: [id number]    Primary Care Provider: Brock Cedillo DO   Date and time admitted to hospital: 9/16/2020  9:58 PM    * Sepsis ([address] )  Assessment & Plan  · Tachycardia and tachypnea on admission  · Source of infection UTI  · Urine culture showed >100,000/hpf pansensitive E coli  · Continue IV Ceftriaxone for now, consider shifting to Antibiotics if continues to be stable to complete 7-14 day course (abx D3)  · Blood culture x 1 showed staph coag-negative, likely contaminant, will discontinue vancomycin    UTI (urinary tract infection)  Assessment & Plan  · Treatment as outlined above    CHF (congestive heart failure) ([facility])  Assessment & Plan  Wt Readings from Last 3 Encounters:   09/18/20 63 5 kg (139 lb 15 9 oz)   09/14/20 63 5 kg (140 lb)   08/28/20 66 2 kg (146 lb)     · No signs of volume overload at this time  · Echo this admission showed: EF 45%, mild reduced systolic function, grade 1 diastolic dysfunction, mild tricuspid regurgitation and trace mitral regurgitation  · Continue HCTZ    Positive blood culture  Assessment & Plan  · Gram + cocci in clusters noted on BC x 1   · Culture showing Staph coagulase negative, likely contaminant  · Will discontinue vancomycin    Atrial fibrillation with RVR ([facility])  Assessment & Plan  · Currently normal sinus rhythm  · CHADSVASC 4 -- Cardiology discussed with patient's wife, preference was for patient to start on Eliquis despite known fall risk  · 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   · Stress Echo on 6/23/2020 shows EF 50%   · Echo this admission showed EF 45%, mild reduced systolic function, grade 1 diastolic dysfunction, mild tricuspid regurgitation and trace mitral regurgitation  · Given episode of epistaxis, holding Eliquis for now    Patient and wife who was present at bedside expressed concerns about being on anticoagulation long-term, they would like to re-evaluate whether they want the patient to be continued on it  Paraphimosis  Assessment & Plan  · Paraphimosis noted on admission to the emergency department  · This was reduced emergently by on-call urology on admission   · Maintain duarte catheter on discharge  · Urology input appreciated    Anemia  Assessment & Plan  · Baseline Hgb around 14  · Hgb stable at 9 4  · With reported epistaxis currently now resolved  Will hold Eliquis for now in the setting of bleeding  · Monitor CBC    Essential hypertension  Assessment & Plan  · Blood pressure reviewed and stable  · Continue home medications including metoprolol, hydrochlorothiazide  · Monitor BP  VTE Pharmacologic Prophylaxis:   Pharmacologic: Other Medication: Hold for now due to episode of bleeding  Mechanical VTE Prophylaxis in Place: Yes    Discussions with Specialists or Other Care Team Provider:  Nursing, attending    Education and Discussions with Family / Patient:  Discussed with patient and with wife Joanna Bueno who was present at bedside  They expressed concerns regarding anticoagulation  Patient with noted epistaxis this morning  Risks/benefits explained to them prior by Cardiology and again explained today by our team  They would like to re-evaluate whether they would consider being on long term anticoagulation, for now [facility] on hold due to bleed  Current Length of Stay: 2 day(s)    Current Patient Status: Inpatient     Discharge Plan / Estimated Discharge Date:  TBD based on clinical course, likely within 24-48 hours    Code Status: Level 1 - Full Code      Subjective:   Patient seen and examined this morning  Per reports, had episode of epistaxis  Was given his morning dose of Eliquis  Patient also complaining of minimal shortness of breath, saturating well on 2 L NC right now  Denies any chest pain  Denies abdominal pain  No febrile episodes overnight, no other complaints endorsed    Patient is concerned regarding anticoagulation and wants to re-evaluate whether or not he would be on it long term  Objective:     Vitals:   Temp (24hrs), Av 5 °F (36 9 °C), Min:98 4 °F (36 9 °C), Max:98 6 °F (37 °C)    Temp:  [98 4 °F (36 9 °C)-98 6 °F (37 °C)] 98 6 °F (37 °C)  HR:  [81-95] 84  Resp:  [17-24] 22  BP: (113-143)/(59-72) 129/60  SpO2:  [92 %-97 %] 97 %  Body mass index is 27 13 kg/m²  Input and Output Summary (last 24 hours): Intake/Output Summary (Last 24 hours) at 2020 1228  Last data filed at 2020 0900  Gross per 24 hour   Intake 770 ml   Output 1050 ml   Net -280 ml       Physical Exam:     Physical Exam  Vitals signs and nursing note reviewed  Constitutional:       General: He is not in acute distress  Appearance: He is not ill-appearing or toxic-appearing  Comments: Frail-looking elderly pleasant male   HENT:      Head: Normocephalic and atraumatic  Nose: Nose normal       Comments: During rounds, with epistaxis that was resolving     Mouth/Throat:      Mouth: Mucous membranes are moist       Pharynx: Oropharynx is clear  No oropharyngeal exudate  Eyes:      General: No scleral icterus  Conjunctiva/sclera: Conjunctivae normal       Pupils: Pupils are equal, round, and reactive to light  Neck:      Musculoskeletal: Normal range of motion  No neck rigidity  Cardiovascular:      Rate and Rhythm: Normal rate  Pulses: Normal pulses  Heart sounds: Normal heart sounds  Pulmonary:      Effort: Pulmonary effort is normal  No respiratory distress  Breath sounds: Normal breath sounds  Comments: On 2L NC, good saturations  Abdominal:      General: Bowel sounds are normal       Tenderness: There is no abdominal tenderness  There is no guarding  Musculoskeletal: Normal range of motion  General: No swelling or tenderness  Lymphadenopathy:      Cervical: No cervical adenopathy  Skin:     General: Skin is warm and dry  Coloration: Skin is not jaundiced     Neurological: General: No focal deficit present  Mental Status: He is alert  Mental status is at baseline  Comments: Oriented to person, able to identify wife at bedside   Psychiatric:         Mood and Affect: Mood normal          Thought Content: Thought content normal        Additional Data:     Labs:    Results from last 7 days   Lab Units 09/19/20  0452 09/18/20  0447   WBC Thousand/uL 5 45 5 11   HEMOGLOBIN g/dL 9 4* 9 6*   HEMATOCRIT % 28 8* 29 1*   PLATELETS Thousands/uL 219 226   NEUTROS PCT %  --  59   LYMPHS PCT %  --  27   MONOS PCT %  --  10   EOS PCT %  --  1     Results from last 7 days   Lab Units 09/19/20  0452  09/16/20  2220   POTASSIUM mmol/L 3 8   < > 3 6   CHLORIDE mmol/L 105   < > 101   CO2 mmol/L 24   < > 25   BUN mg/dL 11   < > 19   CREATININE mg/dL 0 87   < > 1 10   CALCIUM mg/dL 8 5   < > 8 5   ALK PHOS U/L  --   --  71   ALT U/L  --   --  22   AST U/L  --   --  23    < > = values in this interval not displayed  Results from last 7 days   Lab Units 09/17/20  0422   INR  1 37*       * I Have Reviewed All Lab Data Listed Above  * Additional Pertinent Lab Tests Reviewed: [address] [facility] Admission Reviewed    Imaging:    Imaging Reports Reviewed Today Include: NA  Imaging Personally Reviewed by Myself Includes:  NA    Recent Cultures (last 7 days):     Results from last 7 days   Lab Units 09/16/20  2225 09/16/20  2220 09/16/20  2215   BLOOD CULTURE  No Growth at 48 hrs   Staphylococcus coagulase negative*  --    GRAM STAIN RESULT   --  Gram positive cocci in clusters*  --    URINE CULTURE   --   --  >100,000 cfu/ml Escherichia coli*       Last 24 Hours Medication List:   Current Facility-Administered Medications   Medication Dose Route Frequency Provider Last Rate    cefTRIAXone  1,000 mg Intravenous Q24H Roni Lanier PA-C 1,000 mg (09/18/20 1519)    ferrous gluconate  324 mg Oral BID AC Roni Lanier PA-C      gabapentin  100 mg Oral HS Roni Lanier PA-C      hydrochlorothiazide  12 5 mg Oral Daily Roni Lanier PA-C      metoprolol tartrate  25 mg Oral Q12H [facility] Yajaira Gonzalez, [address]      polyethylene glycol  17 g Oral Daily Roni Lanier PA-C      pravastatin  40 mg Oral Daily [facility]      sodium chloride  1 spray Each Nare Q1H PRN Nabila Ferrara MD      tamsulosin  0 4 mg Oral Daily With Dinner Arcadio Jameson PA-C          Today, Patient Was Seen By: Nabila Ferrara MD    ** Please Note: This note has been constructed using a voice recognition system   **

## 2020-09-19 NOTE — ASSESSMENT & PLAN NOTE
Wt Readings from Last 3 Encounters:   09/18/20 63 5 kg (139 lb 15 9 oz)   09/14/20 63 5 kg (140 lb)   08/28/20 66 2 kg (146 lb)     · No signs of volume overload at this time  · Echo this admission showed: EF 45%, mild reduced systolic function, grade 1 diastolic dysfunction, mild tricuspid regurgitation and trace mitral regurgitation    · Continue HCTZ

## 2020-09-20 ENCOUNTER — TELEPHONE (OUTPATIENT)
Dept: OTHER | Facility: OTHER | Age: 85
End: 2020-09-20

## 2020-09-20 VITALS
BODY MASS INDEX: 26.45 KG/M2 | TEMPERATURE: 98.2 F | RESPIRATION RATE: 19 BRPM | HEIGHT: 60 IN | SYSTOLIC BLOOD PRESSURE: 126 MMHG | WEIGHT: 134.7 LBS | OXYGEN SATURATION: 94 % | DIASTOLIC BLOOD PRESSURE: 74 MMHG | HEART RATE: 85 BPM

## 2020-09-20 PROBLEM — I50.32 CHRONIC DIASTOLIC CONGESTIVE HEART FAILURE (HCC): Status: ACTIVE | Noted: 2020-09-18

## 2020-09-20 LAB
ERYTHROCYTE [DISTWIDTH] IN BLOOD BY AUTOMATED COUNT: 15.2 % (ref 11.6–15.1)
HCT VFR BLD AUTO: 28.6 % (ref 36.5–49.3)
HGB BLD-MCNC: 9.5 G/DL (ref 12–17)
MCH RBC QN AUTO: 31.6 PG (ref 26.8–34.3)
MCHC RBC AUTO-ENTMCNC: 33.2 G/DL (ref 31.4–37.4)
MCV RBC AUTO: 95 FL (ref 82–98)
PLATELET # BLD AUTO: 234 THOUSANDS/UL (ref 149–390)
PMV BLD AUTO: 9.7 FL (ref 8.9–12.7)
RBC # BLD AUTO: 3.01 MILLION/UL (ref 3.88–5.62)
WBC # BLD AUTO: 5.75 THOUSAND/UL (ref 4.31–10.16)

## 2020-09-20 PROCEDURE — 99239 HOSP IP/OBS DSCHRG MGMT >30: CPT | Performed by: INTERNAL MEDICINE

## 2020-09-20 PROCEDURE — 97163 PT EVAL HIGH COMPLEX 45 MIN: CPT

## 2020-09-20 PROCEDURE — 85027 COMPLETE CBC AUTOMATED: CPT | Performed by: INTERNAL MEDICINE

## 2020-09-20 RX ORDER — ASPIRIN 81 MG/1
81 TABLET ORAL DAILY
Refills: 0
Start: 2020-09-20 | End: 2021-07-21 | Stop reason: SDUPTHER

## 2020-09-20 RX ORDER — ACETAMINOPHEN 325 MG/1
650 TABLET ORAL EVERY 6 HOURS PRN
Status: DISCONTINUED | OUTPATIENT
Start: 2020-09-20 | End: 2020-09-20 | Stop reason: HOSPADM

## 2020-09-20 RX ORDER — ACETAMINOPHEN 325 MG/1
650 TABLET ORAL EVERY 6 HOURS PRN
Refills: 0
Start: 2020-09-20

## 2020-09-20 RX ORDER — ECHINACEA PURPUREA EXTRACT 125 MG
1 TABLET ORAL AS NEEDED
Refills: 0
Start: 2020-09-20 | End: 2021-07-21 | Stop reason: ALTCHOICE

## 2020-09-20 RX ORDER — CEPHALEXIN 500 MG/1
500 CAPSULE ORAL EVERY 6 HOURS SCHEDULED
Qty: 28 CAPSULE | Refills: 0 | Status: SHIPPED | OUTPATIENT
Start: 2020-09-20 | End: 2020-09-27

## 2020-09-20 RX ADMIN — ACETAMINOPHEN 650 MG: 325 TABLET, FILM COATED ORAL at 14:35

## 2020-09-20 RX ADMIN — HYDROCHLOROTHIAZIDE 12.5 MG: 12.5 TABLET ORAL at 08:25

## 2020-09-20 RX ADMIN — POLYETHYLENE GLYCOL 3350 17 G: 17 POWDER, FOR SOLUTION ORAL at 08:25

## 2020-09-20 RX ADMIN — METOPROLOL TARTRATE 25 MG: 25 TABLET, FILM COATED ORAL at 08:25

## 2020-09-20 RX ADMIN — FERROUS GLUCONATE 324 MG: 324 TABLET ORAL at 05:08

## 2020-09-20 NOTE — PLAN OF CARE
Problem: PHYSICAL THERAPY ADULT  Goal: Performs mobility at highest level of function for planned discharge setting  See evaluation for individualized goals  Description: Treatment/Interventions: Functional transfer training, LE strengthening/ROM, Therapeutic exercise, Endurance training, Cognitive reorientation, Patient/family training, Equipment eval/education, Bed mobility, Gait training  Equipment Recommended: Mynor William       See flowsheet documentation for full assessment, interventions and recommendations  Note: Prognosis: Fair  Problem List: Decreased strength, Decreased endurance, Impaired balance, Decreased mobility, Decreased cognition, Impaired hearing  Assessment: Pt is a 80 y o  male seen for PT evaluation s/p admit to [facility] on 9/16/2020 w/ Sepsis ([facility] )  Order placed for PT  Comorbidities affecting pt's physical performance at time of assessment listed above  Personal factors affecting pt at time of IE include: steps to enter environment, multi-level environment, limited home support, advanced age, inability to perform IADLs, inability to perform ADLs, inability to ambulate household distances and limited insight into impairments  Prior to admission, pt was was independent w/ all functional mobility w/ RW, ambulated household distances, lived in multi-level home, had 4 SOCORRO (2) railing and lived with wife  Upon evaluation: Pt requires min A for rolling, mod A for supine to sit, mod A for sit to stand, and mod A for ambulation with RW  (Please find full objective findings from PT assessment regarding body systems outlined above)  Impairments and limitations also listed above, especially due to  weakness, impaired balance, decreased endurance, impaired coordination, gait deviations, decreased activity tolerance, decreased safety awareness, fall risk, SOB upon exertion and decreased cognition  The following objective measures performed on IE also reveal limitations: Barthel Index 35/100  Pt's clinical presentation is currently unstable/unpredictable seen in pt's presentation of decreased safety awareness, fall risk, and decreased insight into deficits  Pt to benefit from continued skilled PT tx while in hospital and upon DC to address deficits as defined above and maximize level of functional mobility  From PT/mobility standpoint, recommendation at time of d/c would be inpatient rehab pending progress in order to maximize pt's functional independence and consistency w/ mobility in order to facilitate return to PLOF  Recommend progression of ambulation and initiation of HEP as appropriate  PT Discharge Recommendation: Post-Acute Rehabilitation Services          See flowsheet documentation for full assessment

## 2020-09-20 NOTE — PHYSICAL THERAPY NOTE
PHYSICAL THERAPY EVALUATION  NAME: Kusum Borden  AGE:   80 y o  MRN:  [id number]  ADMIT DX: Penile swelling [N48 89]  Paraphimosis [N47 2]  Cellulitis of penis [N48 22]  CHF (congestive heart failure) ([facility]) [I50 9]  Swelling of penis [N48 89]  Rapid atrial fibrillation ([facility]) [I48 91]  Sepsis ([facility] ) [A41 9]  Urinary tract infection associated with indwelling urethral catheter, initial encounter ([facility] ) [P78 250B, N39 0]    PMH:   Past Medical History:   Diagnosis Date    OLLIE (acute kidney injury) ([facility] )     Cognitive communication deficit     Dementia ([facility] )     Fracture of fifth lumbar vertebra ([facility] )     Fracture of first lumbar vertebra ([facility] )     Fracture of fourth lumbar vertebra ([facility] )     Fracture of second lumbar vertebra ([facility] )     Fracture of T11 vertebra with routine healing     Fracture of T12 vertebra with routine healing     Fracture of third thoracic vertebra ([address]     Gait abnormality     Hyperlipidemia     Hypertension     Macular degeneration     Normal pressure hydrocephalus ([facility] )      LENGTH OF STAY: 3       09/20/20 0930   PT Last Visit   PT Visit Date 09/20/20   Pain Assessment   Pain Assessment Tool Pain Assessment not indicated - pt denies pain   Home Living   Type of [address] [place] [address] Two level;1/2 bath on main level; Able to live on main level with bedroom/bathroom;Stairs to enter with rails  (4 SOCORRO with bilateral rails)   Bathroom Shower/Tub Tub/shower unit   Home Equipment Walker;Quad cane   Additional Comments Pt admitted from [facility], however was previously living at home and ambulating household distances with a RW  Prior Function   Level of [place] Needs assistance with IADLs   Lives With Spouse; Other (Comment)  (works in AM)   [address] Needs assistance   IADLs Needs assistance   Falls in the last 6 months 1 to 4   Vocational Retired   Restrictions/Precautions   [employer] Bearing Precautions Per Order No   Braces or Orthoses TLSO;Other (Comment)  (backpack; wife brought from home)   Other Precautions Cognitive; Chair Alarm; Bed Alarm; Impulsive;Hard of hearing; Fall Risk   General   Family/Caregiver Present No   Cognition   Overall Cognitive Status Impaired   Arousal/Participation Responsive   Orientation Level Oriented to person;Oriented to place; Disoriented to time;Disoriented to situation   Memory Decreased short term memory;Decreased recall of recent events;Decreased recall of precautions   Following Commands Follows one step commands with increased time or repetition   Comments Pt identified by name and   RLE Assessment   RLE Assessment X   Strength RLE   RLE Overall Strength 3+/5  (grossly)   LLE Assessment   LLE Assessment X   Strength LLE   LLE Overall Strength 3+/5  (grossly)   Coordination   Coordination and Movement Description bradykinetic   Bed Mobility   Rolling R 4  Minimal assistance   Additional items Assist x 1;Bedrails; Increased time required;Verbal cues;LE management;Comment   Supine to Sit 3  Moderate assistance   Additional items Assist x 1;HOB elevated; Bedrails; Increased time required;Verbal cues;LE management;Comment  (log rolling technique)   Sit to Supine Unable to assess  (left OOB in chair post session with alarm intact)   Transfers   Sit to Stand 3  Moderate assistance   Additional items Assist x 1; Armrests; Increased time required;Verbal cues   Stand to Sit 3  Moderate assistance   Additional items Assist x 1; Increased time required;Verbal cues; Impulsive   Ambulation/Elevation   Gait pattern Forward Flexion;Decreased foot clearance; Short stride; Excessively slow; Shuffling   Gait Assistance 3  Moderate assist   Additional items Assist x 1;Verbal cues   Assistive Device Rolling walker   Distance 20` x1 and 25` x1   Balance   Static Sitting Fair   Dynamic Sitting Fair -   Static Standing Poor +   Dynamic Standing Poor +   Ambulatory Poor   Endurance Deficit   Endurance Deficit Yes   Endurance Deficit Description limited ambulation distance, fatigue   Activity Tolerance   Activity Tolerance Patient limited by fatigue   Nurse Made Aware Per RN, pt appropriate to evaluate   Assessment   Prognosis Fair   Problem List Decreased strength;Decreased endurance; Impaired balance;Decreased mobility; Decreased cognition; Impaired hearing   Goals   Patient Goals to go home   STG Expiration Date 09/29/20   Short Term Goal #1 Pt will be able to: (1) perform bed mobility with min A to promote upright posture and OOB mobility (2) perform sit to stand with min A to decrease burden of care (3) ambulate at least 100` with min A and RW to increase activity tolerance (4) increase standing balance by 1 grade to decrease risk of falls    PT Treatment Day 0   Plan   Treatment/Interventions Functional transfer training;LE strengthening/ROM; Therapeutic exercise; Endurance training;Cognitive reorientation;Patient/family training;Equipment eval/education; Bed mobility;Gait training   PT Frequency   (3-5x/week)   Recommendation   PT Discharge Recommendation Post-Acute Rehabilitation Services   Equipment Recommended Walker   Barthel Index   Feeding 5   Bathing 0   Grooming Score 0   Dressing Score 5   Bladder Score 0   Bowels Score 10   Toilet Use Score 5   Transfers (Bed/Chair) Score 10   Mobility (Level Surface) Score 0   Stairs Score 0   Barthel Index Score 35       Assessment: Pt is a 80 y o  male seen for PT evaluation s/p admit to [facility] on 9/16/2020 w/ Sepsis ([facility] )  Order placed for PT  Comorbidities affecting pt's physical performance at time of assessment listed above  Personal factors affecting pt at time of IE include: steps to enter environment, multi-level environment, limited home support, advanced age, inability to perform IADLs, inability to perform ADLs, inability to ambulate household distances and limited insight into impairments   Prior to admission, pt was was independent w/ all functional mobility w/ RW, ambulated household distances, lived in multi-level home, had 4 SOCORRO (2) railing and lived with wife  Upon evaluation: Pt requires min A for rolling, mod A for supine to sit, mod A for sit to stand, and mod A for ambulation with RW  (Please find full objective findings from PT assessment regarding body systems outlined above)  Impairments and limitations also listed above, especially due to  weakness, impaired balance, decreased endurance, impaired coordination, gait deviations, decreased activity tolerance, decreased safety awareness, fall risk, SOB upon exertion and decreased cognition  The following objective measures performed on IE also reveal limitations: Barthel Index 35/100  Pt's clinical presentation is currently unstable/unpredictable seen in pt's presentation of decreased safety awareness, fall risk, and decreased insight into deficits  Pt to benefit from continued skilled PT tx while in hospital and upon DC to address deficits as defined above and maximize level of functional mobility  From PT/mobility standpoint, recommendation at time of d/c would be inpatient rehab pending progress in order to maximize pt's functional independence and consistency w/ mobility in order to facilitate return to PLOF  Recommend progression of ambulation and initiation of HEP as appropriate        Irina Trujillo, PT,DPT

## 2020-09-20 NOTE — ASSESSMENT & PLAN NOTE
· Urine culture grew E coli, pansensitive  · Status post IV ceftriaxone  · On discharge, patient will be on oral Keflex for 7 more days to complete 10 days of antibiotic treatment  · Outpatient follow-up with primary care physician

## 2020-09-20 NOTE — ASSESSMENT & PLAN NOTE
· Baseline Hgb around 14  · Hgb stable at 9 5  · Had epistaxis yesterday, had resolved  Patient's previous Eliquis may have contributed  · As per discussion with the patient and patient's wife, after discussing the pros and cons of Eliquis, they have decided not to continue with the Eliquis  · Monitor CBC in the rehab facility/outpatient  Primary care physician and/or doctor in the facility should facilitate this  · Outpatient follow-up with primary care physician

## 2020-09-20 NOTE — SOCIAL WORK
CM received a call back from Pt's daughter Nessa Santiago, reporting she received the message regarding the Pt's d/c today back to [facility] which meets with her approval

## 2020-09-20 NOTE — TELEPHONE ENCOUNTER
Herlinda Aase from [facility] request call back for PT new admissions need to go over medication order

## 2020-09-20 NOTE — DISCHARGE INSTRUCTIONS
Duarte Cath Care instructions---Maintain duarte catheter to straight drainage  May use leg bag and shower  May flush TID prn using Wendy syringe and 120 ml NSS  May use more saline ad ivania to prevent/treat cath obstruction  Remove catheter on 8th day rehab by 0600 hrs  Bladder scan if no void or less than 200ml in 6hrs  Straight cath for bladder scan >350ml and repeat process  If patient requires straight cath x3 , re-insert duarte and call for Urologist follow-up  Information above  Duarte can remain in place for up to 4 weeks at a time  Duarte placed at [address] [place] [address] on  09/14/2020    Continue with your back brace as previously prescribed

## 2020-09-20 NOTE — ASSESSMENT & PLAN NOTE
Wt Readings from Last 3 Encounters:   09/20/20 61 1 kg (134 lb 11 2 oz)   09/14/20 63 5 kg (140 lb)   08/28/20 66 2 kg (146 lb)     · No signs of volume overload at this time  · Echo this admission showed: EF 45%, mild reduced systolic function, grade 1 diastolic dysfunction, mild tricuspid regurgitation and trace mitral regurgitation    · Continue HCTZ  · Outpatient follow-up with cardiologist

## 2020-09-20 NOTE — ASSESSMENT & PLAN NOTE
· Blood pressure reviewed and stable  · Continue home medications including metoprolol, hydrochlorothiazide  · Monitor BP  · Outpatient follow-up with primary care physician

## 2020-09-20 NOTE — ASSESSMENT & PLAN NOTE
· Currently normal sinus rhythm  · CHADSVASC 4 -- Cardiology discussed with patient's wife, preference was for patient to start on Eliquis despite known fall risk  · 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   · Stress Echo on 6/23/2020 shows EF 50%   · Echo this admission showed EF 45%, mild reduced systolic function, grade 1 diastolic dysfunction, mild tricuspid regurgitation and trace mitral regurgitation  · Given episode of epistaxis yesterday, Eliquis was put on hold  · Pros and cons of anticoagulation were discussed with the patient and patient's wife  From my discussion with them, they the want to continue with the Eliquis  Aspirin treatment was discussed then, which could prevent a stroke, however, not superior with Eliquis in preventing a stroke in patients with atrial fibrillation, but likely has lesser side effect of bleeding  They were okay with having aspirin 81 mg daily for stroke prevention    · Outpatient follow-up with cardiologist

## 2020-09-20 NOTE — SOCIAL WORK
Pt for d/c back to [facility] today, to be transported by [facility] via wheelchair [employer]  Pt's family were made aware of the Pt's financial responsibility for the transport  CM completed facility transfer form  CM notified Pt, Pt's spouse Vee Garcia [phone]([phone] left as there were no answer), daughter Wildercie Riddles [phone]([phone] left as there was no answer), DEBORAH Mederos and facility Delcie Riddles of d/c arrangements

## 2020-09-20 NOTE — ASSESSMENT & PLAN NOTE
· Tachycardia and tachypnea on admission; all of these had resolved  · Source of infection UTI  · Urine culture showed >100,000/hpf pansensitive E coli  · Was on IV Ceftriaxone here  · Blood culture x 1 showed staph coag-negative, likely contaminant, IV vancomycin was discontinued  · On discharge, patient will be on oral Keflex for 7 more days  Thus patient will have a total of 10 days of antibiotic treatment  · Outpatient follow-up with primary care physician

## 2020-09-20 NOTE — ASSESSMENT & PLAN NOTE
· Paraphimosis noted on admission to the emergency department  · This was reduced emergently by on-call urology on admission   · Maintain duarte catheter on discharge  · Urology input appreciated; patient will follow up with them in the office

## 2020-09-20 NOTE — ASSESSMENT & PLAN NOTE
· Gram + cocci in clusters noted on BC x 1   · Culture showing Staph coagulase negative, likely contaminant  · Vancomycin was discontinued  · Outpatient follow-up with primary care physician

## 2020-09-20 NOTE — DISCHARGE SUMMARY
Discharge- Haile Harris 1934, 80 y o  male MRN: [id number]    Unit/Bed#: S -01 Encounter: [id number]    Primary Care Provider: Tree Alcantar DO   Date and time admitted to hospital: 9/16/2020  9:58 PM        * Sepsis [facility])  Assessment & Plan  · Tachycardia and tachypnea on admission; all of these had resolved  · Source of infection UTI  · Urine culture showed >100,000/hpf pansensitive E coli  · Was on IV Ceftriaxone here  · Blood culture x 1 showed staph coag-negative, likely contaminant, IV vancomycin was discontinued  · On discharge, patient will be on oral Keflex for 7 more days  Thus patient will have a total of 10 days of antibiotic treatment  · Outpatient follow-up with primary care physician  Atrial fibrillation with RVR ([facility])  Assessment & Plan  · Currently normal sinus rhythm  · CHADSVASC 4 -- Cardiology discussed with patient's wife, preference was for patient to start on Eliquis despite known fall risk  · 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   · Stress Echo on 6/23/2020 shows EF 50%   · Echo this admission showed EF 45%, mild reduced systolic function, grade 1 diastolic dysfunction, mild tricuspid regurgitation and trace mitral regurgitation  · Given episode of epistaxis yesterday, Eliquis was put on hold  · Pros and cons of anticoagulation were discussed with the patient and patient's wife  From my discussion with them, they the want to continue with the Eliquis  Aspirin treatment was discussed then, which could prevent a stroke, however, not superior with Eliquis in preventing a stroke in patients with atrial fibrillation, but likely has lesser side effect of bleeding  They were okay with having aspirin 81 mg daily for stroke prevention  · Outpatient follow-up with cardiologist     [address] [place] [address]  · Paraphimosis noted on admission to the emergency department    · This was reduced emergently by on-call urology on admission   · Maintain duarte catheter on discharge  · Urology input appreciated; patient will follow up with them in the office  UTI (urinary tract infection)  Assessment & Plan  · Urine culture grew E coli, pansensitive  · Status post IV ceftriaxone  · On discharge, patient will be on oral Keflex for 7 more days to complete 10 days of antibiotic treatment  · Outpatient follow-up with primary care physician  Chronic diastolic congestive heart failure (HCC)  Assessment & Plan  Wt Readings from Last 3 Encounters:   09/20/20 61 1 kg (134 lb 11 2 oz)   09/14/20 63 5 kg (140 lb)   08/28/20 66 2 kg (146 lb)     · No signs of volume overload at this time  · Echo this admission showed: EF 45%, mild reduced systolic function, grade 1 diastolic dysfunction, mild tricuspid regurgitation and trace mitral regurgitation  · Continue HCTZ  · Outpatient follow-up with cardiologist     Positive blood culture  Assessment & Plan  · Gram + cocci in clusters noted on BC x 1   · Culture showing Staph coagulase negative, likely contaminant  · Vancomycin was discontinued  · Outpatient follow-up with primary care physician  Anemia  Assessment & Plan  · Baseline Hgb around 14  · Hgb stable at 9 5  · Had epistaxis yesterday, had resolved  Patient's previous Eliquis may have contributed  · As per discussion with the patient and patient's wife, after discussing the pros and cons of Eliquis, they have decided not to continue with the Eliquis  · Monitor CBC in the rehab facility/outpatient  Primary care physician and/or doctor in the facility should facilitate this  · Outpatient follow-up with primary care physician  Essential hypertension  Assessment & Plan  · Blood pressure reviewed and stable  · Continue home medications including metoprolol, hydrochlorothiazide  · Monitor BP  · Outpatient follow-up with primary care physician          Discharging Physician / Practitioner: Edson Paredes MD  PCP: Gen Esquivel DO  Admission Date:   Admission Orders (From admission, onward)     Ordered        09/17/20 0221  Inpatient Admission  Once                   Discharge Date: 09/20/20    Resolved Problems  Date Reviewed: 9/20/2020          Resolved    Hypokalemia 9/18/2020     Resolved by  Sarah Kern MD          Consultations During Hospital Stay:  · Urologist   · Cardiologist    Procedures Performed:     · Please see diagnosis and notes above  Significant Findings / Test Results:     · Please see diagnosis and notes above  Incidental Findings:   · None  Test Results Pending at Discharge (will require follow up):   · Succeeding and final results of the blood cultures  Primary care physician and/or doctor in the facility should follow up the results  Outpatient Tests Requested:  · None  However I am recommending a repeat CBC and BMP in 1 to 2 weeks  Patient's primary care physician and/or doctor in the facility may facilitate patient having these tests  Complications:  Had some nose bleeding likely from the Eliquis  Reason for Admission:  Penile pain  Hospital Course:     Shayla Boothe is a 80 y o  male patient who originally presented to the hospital on 9/16/2020 due to penile pain  Patient was found to have paraphimosis  Thus, patient was seen by urology emergently and this was reduced  A Barton catheter was placed  As per Urology, continue Barton catheter on discharge  Patient will follow up with them in the office  On this admission, patient was found to have sepsis secondary to urinary tract infection  Patient was on IV ceftriaxone  Urine culture grew E coli  Basal sensitivity, patient was discharged on oral Keflex for 7 more days  On this admission, patient was found to have new onset atrial fibrillation with rapid ventricular response  Cardiologist was on board  Patient initially was on Eliquis  However, patient developed nose bleeding  Thus Eliquis was put on hold  Bleeding had stopped    Pros and cons of anticoagulation were discussed with the patient and patient's wife  The opted to discontinue the Eliquis  From my discussion with them, they are okay with maintenance of aspirin every day  Patient's condition improved  Today, patient is doing fine and did not have any complaints  For details about patient's diagnosis, workups, management, hospital course and discharge plans, please see above list of diagnoses and related notes  Condition at Discharge: stable     Discharge Day Visit / Exam:     Subjective:      Patient is doing fine and feels a lot better  In fact, patient denies any complaints or any symptoms  Patient denies any penile pains anymore or any other pains  No fever or chills  No more active bleeding  Patient is okay with his discharge to the facility today  Vitals: Blood Pressure: 126/74 (09/20/20 0700)  Pulse: 85 (09/20/20 0700)  Temperature: 98 2 °F (36 8 °C) (09/20/20 0700)  Temp Source: Oral (09/20/20 0700)  Respirations: 19 (09/20/20 0700)  Height: 5' (152 4 cm) (09/17/20 0306)  Weight - Scale: 61 1 kg (134 lb 11 2 oz) (09/20/20 0531)  SpO2: 94 % (09/20/20 0700)  Exam:   Physical Exam  Constitutional:       General: He is not in acute distress  Appearance: He is not ill-appearing, toxic-appearing or diaphoretic  HENT:      Head: Normocephalic and atraumatic  Mouth/Throat:      Pharynx: No posterior oropharyngeal erythema  Eyes:      General: No scleral icterus  Right eye: No discharge  Left eye: No discharge  Cardiovascular:      Rate and Rhythm: Normal rate and regular rhythm  Heart sounds: Normal heart sounds  No murmur  No friction rub  No gallop  Pulmonary:      Effort: Pulmonary effort is normal  No respiratory distress  Breath sounds: Normal breath sounds  No stridor  No wheezing, rhonchi or rales  Abdominal:      General: Bowel sounds are normal  There is no distension  Tenderness: There is no abdominal tenderness   There is no guarding or rebound  Genitourinary:     Comments: Barton catheter in place with clear yellow urine in the catheter and urine bag  No penile tenderness  Musculoskeletal:      Right lower leg: No edema  Left lower leg: No edema  Skin:     Coloration: Skin is not pale  Findings: No erythema or rash  Neurological:      General: No focal deficit present  Mental Status: He is alert  Comments: Hard of hearing  Psychiatric:         Mood and Affect: Mood normal          Behavior: Behavior normal          Thought Content: Thought content normal       Comments: Pleasant  Discussion with Family:  I spoke to the patient's wife and discussed with her our findings, management and plans  I answered questions and concerns  She is okay with the management and discharge plans  She is okay with the discharge of the patient to the facility today  Discharge instructions/Information to patient and family:   See after visit summary for information provided to patient and family  Provisions for Follow-Up Care:  See after visit summary for information related to follow-up care and any pertinent home health orders  Disposition:     Patricia [phone] (see below)    For Discharges to [facility]:   · [facility] Texted [facility] Physician    Planned Readmission:  None  Discharge Statement:  I spent 45 minutes discharging the patient  This time was spent on the day of discharge  I had direct contact with the patient on the day of discharge  Greater than 50% of the total time was spent examining patient, answering all patient questions, arranging and discussing plan of care with patient as well as directly providing post-discharge instructions  Additional time then spent on discharge activities  Discharge Medications:  See after visit summary for reconciled discharge medications provided to patient and family        ** Please Note: This note has been constructed using a voice recognition system **

## 2020-09-21 ENCOUNTER — TELEPHONE (OUTPATIENT)
Dept: UROLOGY | Facility: MEDICAL CENTER | Age: 85
End: 2020-09-21

## 2020-09-21 ENCOUNTER — NURSING HOME VISIT (OUTPATIENT)
Dept: GERIATRICS | Facility: OTHER | Age: 85
End: 2020-09-21
Payer: MEDICARE

## 2020-09-21 DIAGNOSIS — N39.0 URINARY TRACT INFECTION WITHOUT HEMATURIA, SITE UNSPECIFIED: ICD-10-CM

## 2020-09-21 DIAGNOSIS — I50.32 CHRONIC DIASTOLIC CONGESTIVE HEART FAILURE (HCC): ICD-10-CM

## 2020-09-21 DIAGNOSIS — E78.2 MIXED HYPERLIPIDEMIA: ICD-10-CM

## 2020-09-21 DIAGNOSIS — G91.2 NPH (NORMAL PRESSURE HYDROCEPHALUS) (HCC): ICD-10-CM

## 2020-09-21 DIAGNOSIS — D50.8 OTHER IRON DEFICIENCY ANEMIA: ICD-10-CM

## 2020-09-21 DIAGNOSIS — N47.2 PARAPHIMOSIS: ICD-10-CM

## 2020-09-21 DIAGNOSIS — S22.080D CLOSED COMPRESSION FRACTURE OF THORACOLUMBAR VERTEBRA WITH ROUTINE HEALING, SUBSEQUENT ENCOUNTER: ICD-10-CM

## 2020-09-21 DIAGNOSIS — R10.13 DYSPEPSIA: ICD-10-CM

## 2020-09-21 DIAGNOSIS — H35.30 MACULAR DEGENERATION, UNSPECIFIED LATERALITY, UNSPECIFIED TYPE: ICD-10-CM

## 2020-09-21 DIAGNOSIS — R26.2 AMBULATORY DYSFUNCTION: ICD-10-CM

## 2020-09-21 DIAGNOSIS — A41.9 SEPSIS WITHOUT ACUTE ORGAN DYSFUNCTION, DUE TO UNSPECIFIED ORGANISM (HCC): ICD-10-CM

## 2020-09-21 DIAGNOSIS — I48.91 ATRIAL FIBRILLATION WITH RVR (HCC): Primary | ICD-10-CM

## 2020-09-21 DIAGNOSIS — K59.00 CONSTIPATION, UNSPECIFIED CONSTIPATION TYPE: ICD-10-CM

## 2020-09-21 DIAGNOSIS — I10 ESSENTIAL HYPERTENSION: ICD-10-CM

## 2020-09-21 DIAGNOSIS — R53.1 GENERALIZED WEAKNESS: ICD-10-CM

## 2020-09-21 DIAGNOSIS — R33.9 URINARY RETENTION: ICD-10-CM

## 2020-09-21 DIAGNOSIS — S32.010D CLOSED COMPRESSION FRACTURE OF THORACOLUMBAR VERTEBRA WITH ROUTINE HEALING, SUBSEQUENT ENCOUNTER: ICD-10-CM

## 2020-09-21 PROBLEM — N17.9 AKI (ACUTE KIDNEY INJURY) (HCC): Status: RESOLVED | Noted: 2019-08-06 | Resolved: 2020-09-21

## 2020-09-21 PROCEDURE — 99306 1ST NF CARE HIGH MDM 50: CPT | Performed by: FAMILY MEDICINE

## 2020-09-21 NOTE — ASSESSMENT & PLAN NOTE
· Pain is currently well controlled  · He has age-indeterminate superior compression deformities of L1, L2, L4, L5 vertebral bodies, age indeterminate compression deformities of T3 and T11 vertebral bodies as well  · Continue TLSO while upright and head of the bed elevated 45° or greater  · PT/OT to evaluate and treat as appropriate  · Continue Tylenol for pain control  · Follow-up with Neurosurgery

## 2020-09-21 NOTE — ASSESSMENT & PLAN NOTE
· Patient met sepsis criteria on admission to the hospital, found to be tachycardic and tachypneic on admission  · Surgeon believe to be urinary tract infection as urine culture grew E coli  · Blood culture 1/2 positive for Staph coagulase negative which was felt to be contaminant  · Patient initially treated with IV ceftriaxone in the hospital, eventually transitioned to oral Keflex to complete 10 day course  · Monitor for hemodynamic instability  · Check CBC to monitor WB C   · Monitor for fever

## 2020-09-21 NOTE — TELEPHONE ENCOUNTER
Patient was d/c from ER and is needing a follow up appointment  Please advise on time frame for when patient should be scheduled and with MD or PA  Thank you! Paraphimosis  Assessment & Plan  Significant swelling secondary paraphimosis, reduced bedside with IV administration of 25 of fentanyl  Patient tolerated this well      Maintain Barton catheter gravity drainage given ongoing constipation, weakness, who UTI  Patent for clear yellow urine      Empiric antibiotics, tailoring to culture    A serial examinations with foreskin evaluation now that has been reduced      Barton catheter will need to remain in place until constipation, generalized weakness, convalescence has occurred & his urinary tract infection is treated      Admission to the medical-surgical floor at the discretion of Internal Medicine

## 2020-09-21 NOTE — ASSESSMENT & PLAN NOTE
Wt Readings from Last 3 Encounters:   09/20/20 61 1 kg (134 lb 11 2 oz)   09/14/20 63 5 kg (140 lb)   08/28/20 66 2 kg (146 lb)     · Patient is euvolemic on exam   · Echocardiogram during the latest admission showed ejection fraction of 45% with mildly reduced systolic function, grade 1 diastolic dysfunction, mild tricuspid regurgitation and trace mitral regurgitation  · Continue metoprolol and hydrochlorothiazide  · Follow up with Cardiology

## 2020-09-21 NOTE — ASSESSMENT & PLAN NOTE
· Patient readmitted with paraphimosis which required emergent reduction by on-call urologist on admission  · Recommendation was made to maintain Barton catheter  · Will continue to monitor for edema  · Follow up with Urology

## 2020-09-21 NOTE — ASSESSMENT & PLAN NOTE
· Noted to have drop in baseline hemoglobin during previous admission  · Hemoglobin was monitored during this last hospitalization and remained stable  · Iron level and iron saturation levels were low in the hospital, he was started on ferrous gluconate  · Will monitor H and H   · If hemoglobin continues to drop, will consult GI for evaluation to rule out other sources

## 2020-09-21 NOTE — ASSESSMENT & PLAN NOTE
· Worsening ambulatory dysfunction for several months now  · Patient has had recurrent falls  · He recently completed a comprehensive course of therapy at [facility] and was discharged back home about 3 weeks ago  · PT/OT to evaluate and treat as appropriate  · Fall precautions are recommended

## 2020-09-21 NOTE — ASSESSMENT & PLAN NOTE
· New onset atrial fibrillation with RVR on hospital admission  · Patient was loaded with Cardizem IV, evaluated by Cardiology who recommended anticoagulation with Eliquis  However, after long discussion with patient and his wife, they declined to proceed with anticoagulation  · He was transitioned to metoprolol, dose was up titrated for rate control  · Continue baby aspirin for stroke prevention  · He is to follow up with Cardiology as outpatient

## 2020-09-21 NOTE — ASSESSMENT & PLAN NOTE
· Urine culture positive for E coli, received IV ceftriaxone in the hospital   · Eventually was transitioned to oral Keflex, to complete a 7 day course  · Monitor for urinary symptoms

## 2020-09-21 NOTE — ASSESSMENT & PLAN NOTE
----- Message from Carol Joy MD sent at 1/29/2019  6:03 PM CST -----  Will review at appt.   · Patient has history of worsening ambulatory dysfunction over the past couple of months, associated with recurrent falls and mild urinary complaints as per wife  · CT of the head on initial hospitalization concerning for disproportionate enlargement of the lateral and 3rd ventricles relative to peripheral cortical sulcal enlargement, suspicious for NPH  · Patient was referred to Dr Vj Valverde of NPH clinic for follow up

## 2020-09-21 NOTE — ASSESSMENT & PLAN NOTE
· Blood pressure currently at goal   · Continue metoprolol, hydrochlorothiazide  · Continue to monitor closely  · Monitor renal function electrolytes, will check BMP

## 2020-09-21 NOTE — ASSESSMENT & PLAN NOTE
· Likely multifactorial in the setting of underlying BPH with possible obstructive uropathy, deconditioning and decreased mobility  · Given recent episode of paraphimosis requiring reduction in the ED, duarte catheter to remain in place  · Patient felt to be suboptimal for voiding trial at this time    · Will allow for mor rehab and once patient gains his strength back, I will discuss with Urology office to proceed with voiding trial

## 2020-09-21 NOTE — PROGRESS NOTES
[address]  [address] [facility]  [phone]    [facility]  HISTORY & PHYSICAL        NAME: Katiuska Brown  AGE: 80 y o  SEX: male  : 1934  DATE OF ENCOUNTER: 20  POS: 31 (SNF)  PCP: Glen Alexander DO    Chief Complaint     Seen and examined today for admission to short term rehabilitation unit at [facility]  History of Present Illness     80year-old gentleman with essential hypertension, hyperlipidemia, CHF, ambulatory dysfunction, constipation, originally admitted to [facility] 2020-2020 with complaints of back pain after multiple falls at home  CT H was negative for acute intracranial pathology but showed changes concerning for possible NPH  He was also found to have age-indeterminate superior endplate compression deformities of L1, L2, L4, and L5 vertebral bodies  Age indeterminate mild superior endplate compression deformity of T3 and T11 vertebral bodies  Patient was and evaluated by Neurosurgery who recommended conservative measures for thoracolumbar fractures with LSO brace, pain control, activity as tolerated and outpatient follow-up with NPH clinic for further evaluation  Mr Shweta Curiel was admitted to [facility] following hospitalization for subacute therapy services  He completed a comprehensive therapy course and on  he was discharged back home with his wife  At that point patient was ambulating independently in his room and using walker  His wife was to provide supervision and setup for ADLs  On 2020 he followed up with Neurosurgery as outpatient  Follow-up upright lumbar spine x-rays in brace which demonstrated stable multiple lumbar spine compression fractures in anatomical alignment  No new symptoms reported at that point  Patient was instructed to continue wearing TLSO brace when upright and at 45° head of the bed elevation    Recommended to continue Tylenol for pain, fall precautions/avoiding stress activities, excessive bending or twisting and follow-up in 4 weeks  On 09/14/2020, patient presented again to the ED complaining of worsening weakness and urinary retention for 1 day  A Barton catheter was placed in the ED, patient was evaluated by urology who recommended to keep the Barton in place until patient gains his strength back and his mobility improves  Benadryl was discontinued due to urinary retention  He was also noted to have worsening anemia, his serum iron and iron saturation were noted to be low and he was started on iron supplements  No overt source of bleeding was noted and his hemoglobin remained stable thereafter  Consideration for GI evaluation to rule out other causes was recommended  On 09/16/2020 he was discharged back to [facility] for another course of short-term rehab  On the same day of admission to [facility], patient was complaining of penile pain  His penis was noted to be extremely edematous per nursing staff, he was tachycardic and complaining of pain  He was then sent out back to the hospital for further evaluation  Upon initial evaluation in the ED he was noted to be tachypneic, tachycardic, found to have paraphimosis reduced emergently by Urology  Barton catheter was placed  UA was abnormal, eventually UCx grew E  Coli  His blood culture on admission was positive for Staph coagulase negative which was felt to be a contaminant  He was treated with antibiotics for UTI  Mr Bobbi Lu was also noted to have new onset Atrial Fibrillation with RVR, loaded with Cardizem in the ED, evaluated by Cardiology  He was eventually switched to metoprolol and Eliquis was started  His hemoglobin was monitored and noted to be stable during the last hospital stay   Per discharge summary reviewed, a discussion with patient and his wife was held regarding pros and cons of anticoagulation in the setting of atrial fibrillation, they declined continuing Eliquis therapy  Patient was seen and examined today with nursing staff for admission to [address] [place] [address]  He is sitting in chair, seems comfortable  Reports feeling well  Denies any pain  Denies any fever/chills, chest pain/shortness of breath, abdominal discomfort, nausea/vomiting, diarrhea/constipation or dysuria  Review of Systems     Review of Systems   Constitutional: Negative for activity change, appetite change, fatigue and unexpected weight change  HENT: Negative for congestion, dental problem, hearing loss, mouth sores, trouble swallowing and voice change  Eyes: Negative for visual disturbance  Respiratory: Negative for cough, chest tightness and shortness of breath  Cardiovascular: Negative for chest pain, palpitations and leg swelling  Gastrointestinal: Negative for abdominal distention, abdominal pain, constipation, diarrhea, nausea and vomiting  Genitourinary: Positive for difficulty urinating and dysuria  Musculoskeletal: Positive for gait problem  Negative for arthralgias and back pain  Skin: Negative for color change, pallor, rash and wound  Neurological: Negative for weakness and light-headedness  All other systems reviewed and are negative  History   No Known Allergies    Past Medical History:   Diagnosis Date    OLLIE (acute kidney injury) ([address] )     Cognitive communication deficit     Dementia [facility] )     Fracture of fifth lumbar vertebra ([facility] )     Fracture of first lumbar vertebra ([address] )     Fracture of fourth lumbar vertebra ([address] )     Fracture of second lumbar vertebra ([address] )     Fracture of T11 vertebra with routine healing     Fracture of T12 vertebra with routine healing     Fracture of third thoracic vertebra ([address] )     Gait abnormality     Hyperlipidemia     Hypertension     Macular degeneration     Normal pressure hydrocephalus (HCC)      No past surgical history on file      Family History: non-contributory  Social History     Tobacco Use    Smoking status: Never Smoker    Smokeless tobacco: Never Used   Substance Use Topics    Alcohol use: Never     Frequency: Never     Binge frequency: Never    Drug use: Never         He lives with his wife in a 2 story home with 3 steps to enter  Prior to original  admission patient was independent with ADLs and ambulating without assistive device  Objective   Vital Signs   BP:  142/52    HR:  90    T:  97 4°    RR:  21    O2Sat:  94% on RA     W:  137 5 lb    Physical Exam  Vitals signs reviewed  Constitutional:       General: He is not in acute distress  Appearance: He is well-developed  He is not diaphoretic  HENT:      Head: Normocephalic and atraumatic  Right Ear: External ear normal       Left Ear: External ear normal       Mouth/Throat:      Mouth: Mucous membranes are moist       Pharynx: Oropharynx is clear  No oropharyngeal exudate  Eyes:      Conjunctiva/sclera: Conjunctivae normal       Pupils: Pupils are equal, round, and reactive to light  Neck:      Musculoskeletal: Normal range of motion and neck supple  Thyroid: No thyromegaly  Vascular: No JVD  Cardiovascular:      Rate and Rhythm: Normal rate and regular rhythm  Heart sounds: Normal heart sounds  No murmur  Pulmonary:      Effort: Pulmonary effort is normal  No respiratory distress  Breath sounds: Normal breath sounds  Abdominal:      General: Bowel sounds are normal  There is no distension  Palpations: Abdomen is soft  Tenderness: There is no abdominal tenderness  Genitourinary:     Comments: Very mild penile edema noted  Barton catheter in place draining dark yellow urine  Musculoskeletal:         General: No tenderness or deformity  Comments: TLSO brace in place  He is able to sit up and stand with assistance  Lymphadenopathy:      Cervical: No cervical adenopathy  Skin:     General: Skin is warm and dry  Coloration: Skin is not pale        Findings: No erythema or rash  Neurological:      General: No focal deficit present  Mental Status: He is alert and oriented to person, place, and time  Cranial Nerves: No cranial nerve deficit  Motor: No abnormal muscle tone  Deep Tendon Reflexes: Reflexes are normal and symmetric  Psychiatric:         Behavior: Behavior normal          Thought Content: Thought content normal            Pertinent Laboratory/Diagnostic Studies:     Lab Results   Component Value Date    WBC 5 75 09/20/2020    HGB 9 5 (L) 09/20/2020    HCT 28 6 (L) 09/20/2020    MCV 95 09/20/2020     09/20/2020     Lab Results   Component Value Date    CALCIUM 8 5 09/19/2020    K 3 8 09/19/2020    CO2 24 09/19/2020     09/19/2020    BUN 11 09/19/2020    CREATININE 0 87 09/19/2020     Lab Results   Component Value Date    CDJ5UOPXGTDD 2 303 09/16/2020     No results found for: HGBA1C      Current Medications     All medications reviewed and updated in S EMR/Chart  Assessment and Plan     Sepsis [facility])  · Patient met sepsis criteria on admission to the hospital, found to be tachycardic and tachypneic on admission  · Surgeon believe to be urinary tract infection as urine culture grew E coli  · Blood culture 1/2 positive for Staph coagulase negative which was felt to be contaminant  · Patient initially treated with IV ceftriaxone in the hospital, eventually transitioned to oral Keflex to complete 10 day course  · Monitor for hemodynamic instability  · Check CBC to monitor WB C   · Monitor for fever  Atrial fibrillation with RVR ([address] )  · New onset atrial fibrillation with RVR on hospital admission  · Patient was loaded with Cardizem IV, evaluated by Cardiology who recommended anticoagulation with Eliquis  However, after long discussion with patient and his wife, they declined to proceed with anticoagulation  · He was transitioned to metoprolol, dose was up titrated for rate control    · Continue baby aspirin for stroke prevention  · He is to follow up with Cardiology as outpatient  Paraphimosis  · Patient readmitted with paraphimosis which required emergent reduction by on-call urologist on admission  · Recommendation was made to maintain Barton catheter  · Will continue to monitor for edema  · Follow up with Urology  UTI (urinary tract infection)  · Urine culture positive for E coli, received IV ceftriaxone in the hospital   · Eventually was transitioned to oral Keflex, to complete a 7 day course  · Monitor for urinary symptoms  Anemia  · Noted to have drop in baseline hemoglobin during previous admission  · Hemoglobin was monitored during this last hospitalization and remained stable  · Iron level and iron saturation levels were low in the hospital, he was started on ferrous gluconate  · Will monitor H and H   · If hemoglobin continues to drop, will consult GI for evaluation to rule out other sources  Essential hypertension  · Blood pressure currently at goal   · Continue metoprolol, hydrochlorothiazide  · Continue to monitor closely  · Monitor renal function electrolytes, will check BMP  Chronic diastolic congestive heart failure (HCC)  Wt Readings from Last 3 Encounters:   09/20/20 61 1 kg (134 lb 11 2 oz)   09/14/20 63 5 kg (140 lb)   08/28/20 66 2 kg (146 lb)     · Patient is euvolemic on exam   · Echocardiogram during the latest admission showed ejection fraction of 45% with mildly reduced systolic function, grade 1 diastolic dysfunction, mild tricuspid regurgitation and trace mitral regurgitation  · Continue metoprolol and hydrochlorothiazide  · Follow up with Cardiology  NPH (normal pressure hydrocephalus) (HCC)  · Patient has history of worsening ambulatory dysfunction over the past couple of months, associated with recurrent falls and mild urinary complaints as per wife    · CT of the head on initial hospitalization concerning for disproportionate enlargement of the lateral and 3rd ventricles relative to peripheral cortical sulcal enlargement, suspicious for NPH  · Patient was referred to Dr Faiza Huffman of NPH clinic for follow up  Closed compression fracture of thoracolumbar vertebra (HCC)  · Pain is currently well controlled  · He has age-indeterminate superior compression deformities of L1, L2, L4, L5 vertebral bodies, age indeterminate compression deformities of T3 and T11 vertebral bodies as well  · Continue TLSO while upright and head of the bed elevated 45° or greater  · PT/OT to evaluate and treat as appropriate  · Continue Tylenol for pain control  · Follow-up with Neurosurgery  Hyperlipidemia  · Continue pravastatin  Ambulatory dysfunction  · Worsening ambulatory dysfunction for several months now  · Patient has had recurrent falls  · He recently completed a comprehensive course of therapy at [facility] and was discharged back home about 3 weeks ago  · PT/OT to evaluate and treat as appropriate  · Fall precautions are recommended  Constipation  · Currently stable  · Continue p r n  Bowel regimen  Dyspepsia  · Symptoms are currently stable  · Continue Tums and Pepcid  · During previous visit patient had FU manic episodes that he had reported as chronic and intermittent  · Continue to monitor  Macular degeneration  · Follow-up with Ophthalmology  Generalized weakness  · PT/OT to evaluate and treat as appropriate  Urinary retention  · Likely multifactorial in the setting of underlying BPH with possible obstructive uropathy, deconditioning and decreased mobility  · Given recent episode of paraphimosis requiring reduction in the ED, duarte catheter to remain in place  · Patient felt to be suboptimal for voiding trial at this time  · Will allow for mor rehab and once patient gains his strength back, I will discuss with Urology office to proceed with voiding trial         Discussed with patient's wife, Rafa Matthews over the phone       [place] [facility]  09/21/20    Please note:  Voice-recognition software may have been used in the preparation of this document  Occasional wrong word or "sound-alike" substitutions may have occurred due to the inherent limitations of voice recognition software  Interpretation should be guided by context

## 2020-09-21 NOTE — TELEPHONE ENCOUNTER
Please Triage -   New Patient- Kemi Clonts    What is the reason for the patients appointment? ER follow up - UTI        Imaging/Lab Results:      Do we accept the patient's insurance or is the patient Self-Pay?   Provider:  Medic[employer]  Plan:   Member ID#: [id number]  Provider: [address] [place] [address]  Member ID# [id number]     Has the patient had any previous urologist(s)? no       Have patient records been requested? epic       Has the patient had any outside testing done? epic      Does the patient have a personal history of cancer? no      Patient can be reached at : wife- [phone]

## 2020-09-22 LAB — BACTERIA BLD CULT: NORMAL

## 2020-09-22 NOTE — TELEPHONE ENCOUNTER
Appointments for duarte removal, follow up and void trial scheduled for 10/15/20 at 830am with Gracie Hernandez RN and then 2pm with Patricia FLOR  (If patient has visiting nurses we can provide them with duarte removal orders and cancel AM appointment )    Please confirm with patient

## 2020-09-22 NOTE — TELEPHONE ENCOUNTER
I spoke to patient's wife and confirmed appointments  Pt is currently in [facility]  His wife seemed to think he would be home in 1-2 weeks  If he has a visiting nurse at the point, she will call the office and let us know

## 2020-09-22 NOTE — TELEPHONE ENCOUNTER
Per ER notes from 9/14/20:  Barton placed due to urinary retention  Urology was consulted and recommended that patient should keep the Barton catheter for sometime while the patient gets stronger as he has poor mobility may be a contributing factor to his urinary retention

## 2020-09-23 ENCOUNTER — TELEPHONE (OUTPATIENT)
Dept: NEUROSURGERY | Facility: CLINIC | Age: 85
End: 2020-09-23

## 2020-09-23 NOTE — TELEPHONE ENCOUNTER
Message received requesting a refill of Gabapentin  It appears this was prescribed by ds/c physician at the hospital  Message left suggesting they request this from his PCP since we did not prescribe, and we do not manage meds unless post-op    Encouraged a call back with any questions

## 2020-09-29 ENCOUNTER — NURSING HOME VISIT (OUTPATIENT)
Dept: GERIATRICS | Facility: OTHER | Age: 85
End: 2020-09-29
Payer: MEDICARE

## 2020-09-29 DIAGNOSIS — R26.2 AMBULATORY DYSFUNCTION: ICD-10-CM

## 2020-09-29 DIAGNOSIS — I50.32 CHRONIC DIASTOLIC CONGESTIVE HEART FAILURE (HCC): ICD-10-CM

## 2020-09-29 DIAGNOSIS — I10 ESSENTIAL HYPERTENSION: ICD-10-CM

## 2020-09-29 DIAGNOSIS — I48.91 ATRIAL FIBRILLATION WITH RVR (HCC): ICD-10-CM

## 2020-09-29 DIAGNOSIS — A41.9 SEPSIS WITHOUT ACUTE ORGAN DYSFUNCTION, DUE TO UNSPECIFIED ORGANISM (HCC): Primary | ICD-10-CM

## 2020-09-29 PROCEDURE — 99309 SBSQ NF CARE MODERATE MDM 30: CPT | Performed by: NURSE PRACTITIONER

## 2020-09-29 NOTE — ASSESSMENT & PLAN NOTE
Wt Readings from Last 3 Encounters:   09/20/20 61 1 kg (134 lb 11 2 oz)   09/14/20 63 5 kg (140 lb)   08/28/20 66 2 kg (146 lb)     - Patient euvolemic on examination, weights stable  - Echo reveals EF 45%  - Continue metoprolol and HCTZ  - Follow-up with Cardiology

## 2020-09-29 NOTE — PROGRESS NOTES
[facility]  [address] [place] [address]  [facility]  [phone]  [facility]   Progress Note  POS 31      NAME: Radha Villa  AGE: 80 y o  SEX: male  :  1934  DATE OF ENCOUNTER: 2020    Chief Complaint   Patient seen and examined for follow up on chronic conditions  History of Present Illness     80 year old male patient of [facility] short term rehab with multiple co-morbidities including  closed compression fracture of thoralumbar vertera, hypertension, chronic diastolic congestive heart failure, atrial fibrillation, NPH, ambulatory dysfunction, constipation seen and examined today to follow-up on acute and chronic medical conditions  Patient is sitting in the chair, calm, cooperative and in no acute distress  Denies chest pain, sob, abdominal pain, nausea, vomiting, diarrhea, constipation, myalgia, arthralgia, fever, chills, headache, dizziness or visual disturbance  He is progressing with therapy in short term rehab  He is ambulating 150 feet with his roller walker with supervision  He is able to walk up 4 steps with 2 hand rails with contact guard, curb step with roller walker and contact guard  His TUG score is 72 seconds  He is a min assist with upper body ADL's, a mod assist with lower body ADL's, a min assist with TLSO and toileting  The following portions of the patient's history were reviewed and updated as appropriate: allergies, current medications, past family history, past medical history, past social history, past surgical history and problem list     Review of Systems     A review of systems was performed  All negative, except as per HPI      History     Past Medical History:   Diagnosis Date    OLLIE (acute kidney injury) ([address] )     Cognitive communication deficit     Dementia ([address] )     Fracture of fifth lumbar vertebra ([address] )     Fracture of first lumbar vertebra ([address] )     Fracture of fourth lumbar vertebra ([address] )     Fracture of second lumbar vertebra ([address] )     Fracture of T11 vertebra with routine healing     Fracture of T12 vertebra with routine healing     Fracture of third thoracic vertebra (HCC)     Gait abnormality     Hyperlipidemia     Hypertension     Macular degeneration     Normal pressure hydrocephalus (HCC)      No past surgical history on file  No family history on file    Social History     Socioeconomic History    Marital status: /Civil Union     Spouse name: Not on file    Number of children: Not on file    Years of education: Not on file    Highest education level: Not on file   Occupational History    Not on file   Social Needs    Financial resource strain: Not on file    Food insecurity     Worry: Not on file     Inability: Not on file   [employer] needs     Medical: Not on file     Non-medical: Not on file   Tobacco Use    Smoking status: Never Smoker    Smokeless tobacco: Never Used   Substance and Sexual Activity    Alcohol use: Never     Frequency: Never     Binge frequency: Never    Drug use: Never    Sexual activity: Not Currently   Lifestyle    Physical activity     Days per week: Not on file     Minutes per session: Not on file    Stress: Not on file   Relationships    Social connections     Talks on phone: Not on file     Gets together: Not on file     Attends Je[employer]'s witness service: Not on file     Active member of club or organization: Not on file     Attends meetings of clubs or organizations: Not on file     Relationship status: Not on file    Intimate partner violence     Fear of current or ex partner: Not on file     Emotionally abused: Not on file     Physically abused: Not on file     Forced sexual activity: Not on file   Other Topics Concern    Not on file   Social History Narrative    Not on file     No Known Allergies    Objective     Vital Signs:   BP: 129/62     HR: 81 T: 98 0     RR: 17  O2Sat: 97% RA W: 134 6 lbs  General: NAD, Non-toxic appearing, frail and deconditioned  Oral: Oropharynx Moist and Clear  Neck: Supple, +ROM  CV: S1, S2, normal rate, regular rhythm, no murmur appreciated  Pulmonary: Lung sounds clear to air, no wheezing, rhonchi, rales  Abdominal:BS + x4 in all quadrants, soft, no mass, no tenderness  Extremities: No edema, +ROM, +Strength  Skin: Warm, Dry, no lesions, no rash, no erythema present, no ecchymosis present  Neurological: CN 2-12 intact, PERRLA  Psych: Awake and alert,  no mood, no affect     Pertinent Laboratory/Diagnostic Studies:  09/29/2020: Glucose 84, BUN 12, Creatinine 0/80, Chloride 107, CO2 17, Sodium 141, Potassium 3 7, eGFR 81  09/26/2020: Hemoglobin 9 8, Hematocrit 27 3, Platelet Count 769, WBC 5 9      Current Medications     Current Medications Reviewed and updated in Nursing Home EMR      Assessment and Plan     Sepsis ([facility] )  - No signs or symptoms of sepsis noted, patient afebrile, WBC 5 9  - Continue Cephalexin to complete a 10 day course  - Monitor for signs and symptoms of infection    Essential hypertension  - BP currently controlled and at goal  - Continue metoprolol and hydrochlorothiazide  - BMP reviewed and is unremarkable     Atrial fibrillation with RVR ([facility] )  - Rate currently controlled  - Continue Metoprolol  - Eliquis recommended by Cardiology however wife is refusing at this time  - Continue low dose ASA  - Follow-up with Cardiology    Chronic diastolic congestive heart failure ([facility] )  Wt Readings from Last 3 Encounters:   09/20/20 61 1 kg (134 lb 11 2 oz)   09/14/20 63 5 kg (140 lb)   08/28/20 66 2 kg (146 lb)     - Patient euvolemic on examination, weights stable  - Echo reveals EF 45%  - Continue metoprolol and HCTZ  - Follow-up with Cardiology        Ambulatory dysfunction  - Patient progressing with PT/OT  - Continue therapy services  - Fall precautions advised      [address] [facility]  9/29/2020

## 2020-09-29 NOTE — ASSESSMENT & PLAN NOTE
- Rate currently controlled  - Continue Metoprolol  - Eliquis recommended by Cardiology however wife is refusing at this time  - Continue low dose ASA  - Follow-up with Cardiology

## 2020-09-29 NOTE — ASSESSMENT & PLAN NOTE
- No signs or symptoms of sepsis noted, patient afebrile, WBC 5 9  - Continue Cephalexin to complete a 10 day course  - Monitor for signs and symptoms of infection

## 2020-09-29 NOTE — ASSESSMENT & PLAN NOTE
- BP currently controlled and at goal  - Continue metoprolol and hydrochlorothiazide  - BMP reviewed and is unremarkable

## 2020-10-02 ENCOUNTER — NURSING HOME VISIT (OUTPATIENT)
Dept: GERIATRICS | Facility: OTHER | Age: 85
End: 2020-10-02
Payer: MEDICARE

## 2020-10-02 DIAGNOSIS — R26.2 AMBULATORY DYSFUNCTION: ICD-10-CM

## 2020-10-02 DIAGNOSIS — N39.0 URINARY TRACT INFECTION WITHOUT HEMATURIA, SITE UNSPECIFIED: ICD-10-CM

## 2020-10-02 DIAGNOSIS — S22.080D CLOSED COMPRESSION FRACTURE OF THORACOLUMBAR VERTEBRA WITH ROUTINE HEALING, SUBSEQUENT ENCOUNTER: ICD-10-CM

## 2020-10-02 DIAGNOSIS — I10 ESSENTIAL HYPERTENSION: ICD-10-CM

## 2020-10-02 DIAGNOSIS — E78.2 MIXED HYPERLIPIDEMIA: ICD-10-CM

## 2020-10-02 DIAGNOSIS — I48.91 ATRIAL FIBRILLATION WITH RAPID VENTRICULAR RESPONSE (HCC): ICD-10-CM

## 2020-10-02 DIAGNOSIS — S22.080D CLOSED COMPRESSION FRACTURE OF THORACOLUMBAR VERTEBRA WITH ROUTINE HEALING, SUBSEQUENT ENCOUNTER: Primary | ICD-10-CM

## 2020-10-02 DIAGNOSIS — R10.13 DYSPEPSIA: ICD-10-CM

## 2020-10-02 DIAGNOSIS — G91.2 NPH (NORMAL PRESSURE HYDROCEPHALUS) (HCC): ICD-10-CM

## 2020-10-02 DIAGNOSIS — I50.32 CHRONIC DIASTOLIC CONGESTIVE HEART FAILURE (HCC): ICD-10-CM

## 2020-10-02 DIAGNOSIS — R33.9 URINARY RETENTION: ICD-10-CM

## 2020-10-02 DIAGNOSIS — K59.00 CONSTIPATION, UNSPECIFIED CONSTIPATION TYPE: ICD-10-CM

## 2020-10-02 DIAGNOSIS — R53.1 GENERALIZED WEAKNESS: ICD-10-CM

## 2020-10-02 DIAGNOSIS — D50.8 OTHER IRON DEFICIENCY ANEMIA: ICD-10-CM

## 2020-10-02 DIAGNOSIS — R65.10 SIRS (SYSTEMIC INFLAMMATORY RESPONSE SYNDROME) (HCC): Primary | ICD-10-CM

## 2020-10-02 DIAGNOSIS — S32.010D CLOSED COMPRESSION FRACTURE OF THORACOLUMBAR VERTEBRA WITH ROUTINE HEALING, SUBSEQUENT ENCOUNTER: ICD-10-CM

## 2020-10-02 DIAGNOSIS — H35.30 MACULAR DEGENERATION, UNSPECIFIED LATERALITY, UNSPECIFIED TYPE: ICD-10-CM

## 2020-10-02 DIAGNOSIS — S32.010D CLOSED COMPRESSION FRACTURE OF THORACOLUMBAR VERTEBRA WITH ROUTINE HEALING, SUBSEQUENT ENCOUNTER: Primary | ICD-10-CM

## 2020-10-02 DIAGNOSIS — N47.2 PARAPHIMOSIS: ICD-10-CM

## 2020-10-02 PROCEDURE — 99316 NF DSCHRG MGMT 30 MIN+: CPT | Performed by: NURSE PRACTITIONER

## 2020-10-03 ENCOUNTER — APPOINTMENT (EMERGENCY)
Dept: RADIOLOGY | Facility: HOSPITAL | Age: 85
DRG: 698 | End: 2020-10-03
Payer: MEDICARE

## 2020-10-03 ENCOUNTER — APPOINTMENT (EMERGENCY)
Dept: CT IMAGING | Facility: HOSPITAL | Age: 85
DRG: 698 | End: 2020-10-03
Payer: MEDICARE

## 2020-10-03 ENCOUNTER — HOSPITAL ENCOUNTER (INPATIENT)
Facility: HOSPITAL | Age: 85
LOS: 5 days | Discharge: NURSING, REHAB OR LONG-TERM CARE | DRG: 698 | End: 2020-10-08
Attending: EMERGENCY MEDICINE | Admitting: INTERNAL MEDICINE
Payer: MEDICARE

## 2020-10-03 DIAGNOSIS — I10 ESSENTIAL HYPERTENSION: ICD-10-CM

## 2020-10-03 DIAGNOSIS — N47.2 PARAPHIMOSIS: ICD-10-CM

## 2020-10-03 DIAGNOSIS — R50.9 FEVER: ICD-10-CM

## 2020-10-03 DIAGNOSIS — N39.0 URINARY TRACT INFECTION ASSOCIATED WITH INDWELLING URETHRAL CATHETER, INITIAL ENCOUNTER (HCC): ICD-10-CM

## 2020-10-03 DIAGNOSIS — D50.8 OTHER IRON DEFICIENCY ANEMIA: ICD-10-CM

## 2020-10-03 DIAGNOSIS — N39.0 URINARY TRACT INFECTION WITHOUT HEMATURIA, SITE UNSPECIFIED: ICD-10-CM

## 2020-10-03 DIAGNOSIS — R65.10 SIRS (SYSTEMIC INFLAMMATORY RESPONSE SYNDROME) (HCC): Primary | ICD-10-CM

## 2020-10-03 DIAGNOSIS — R33.9 URINARY RETENTION: ICD-10-CM

## 2020-10-03 DIAGNOSIS — T83.511A URINARY TRACT INFECTION ASSOCIATED WITH INDWELLING URETHRAL CATHETER, INITIAL ENCOUNTER (HCC): ICD-10-CM

## 2020-10-03 LAB
ALBUMIN SERPL BCP-MCNC: 3 G/DL (ref 3.5–5)
ALP SERPL-CCNC: 75 U/L (ref 46–116)
ALT SERPL W P-5'-P-CCNC: 15 U/L (ref 12–78)
ANION GAP SERPL CALCULATED.3IONS-SCNC: 9 MMOL/L (ref 4–13)
AST SERPL W P-5'-P-CCNC: 16 U/L (ref 5–45)
BACTERIA UR QL AUTO: ABNORMAL /HPF
BASOPHILS # BLD AUTO: 0.03 THOUSANDS/ΜL (ref 0–0.1)
BASOPHILS NFR BLD AUTO: 0 % (ref 0–1)
BILIRUB SERPL-MCNC: 1.38 MG/DL (ref 0.2–1)
BILIRUB UR QL STRIP: NEGATIVE
BUN SERPL-MCNC: 14 MG/DL (ref 5–25)
CALCIUM ALBUM COR SERPL-MCNC: 9.4 MG/DL (ref 8.3–10.1)
CALCIUM SERPL-MCNC: 8.6 MG/DL (ref 8.3–10.1)
CHLORIDE SERPL-SCNC: 103 MMOL/L (ref 100–108)
CLARITY UR: ABNORMAL
CO2 SERPL-SCNC: 25 MMOL/L (ref 21–32)
COLOR UR: YELLOW
CREAT SERPL-MCNC: 1.1 MG/DL (ref 0.6–1.3)
EOSINOPHIL # BLD AUTO: 0 THOUSAND/ΜL (ref 0–0.61)
EOSINOPHIL NFR BLD AUTO: 0 % (ref 0–6)
ERYTHROCYTE [DISTWIDTH] IN BLOOD BY AUTOMATED COUNT: 17 % (ref 11.6–15.1)
GFR SERPL CREATININE-BSD FRML MDRD: 60 ML/MIN/1.73SQ M
GLUCOSE SERPL-MCNC: 123 MG/DL (ref 65–140)
GLUCOSE UR STRIP-MCNC: NEGATIVE MG/DL
HCT VFR BLD AUTO: 32.5 % (ref 36.5–49.3)
HGB BLD-MCNC: 10.5 G/DL (ref 12–17)
HGB UR QL STRIP.AUTO: ABNORMAL
HOLD SPECIMEN: NORMAL
IMM GRANULOCYTES # BLD AUTO: 0.06 THOUSAND/UL (ref 0–0.2)
IMM GRANULOCYTES NFR BLD AUTO: 0 % (ref 0–2)
KETONES UR STRIP-MCNC: NEGATIVE MG/DL
LACTATE SERPL-SCNC: 1.3 MMOL/L (ref 0.5–2)
LEUKOCYTE ESTERASE UR QL STRIP: ABNORMAL
LYMPHOCYTES # BLD AUTO: 1.22 THOUSANDS/ΜL (ref 0.6–4.47)
LYMPHOCYTES NFR BLD AUTO: 9 % (ref 14–44)
MCH RBC QN AUTO: 30.6 PG (ref 26.8–34.3)
MCHC RBC AUTO-ENTMCNC: 32.3 G/DL (ref 31.4–37.4)
MCV RBC AUTO: 95 FL (ref 82–98)
MONOCYTES # BLD AUTO: 1.06 THOUSAND/ΜL (ref 0.17–1.22)
MONOCYTES NFR BLD AUTO: 7 % (ref 4–12)
NEUTROPHILS # BLD AUTO: 12.02 THOUSANDS/ΜL (ref 1.85–7.62)
NEUTS SEG NFR BLD AUTO: 84 % (ref 43–75)
NITRITE UR QL STRIP: POSITIVE
NON-SQ EPI CELLS URNS QL MICRO: ABNORMAL /HPF
NRBC BLD AUTO-RTO: 0 /100 WBCS
PH UR STRIP.AUTO: 6 [PH]
PLATELET # BLD AUTO: 144 THOUSANDS/UL (ref 149–390)
PMV BLD AUTO: 10.5 FL (ref 8.9–12.7)
POTASSIUM SERPL-SCNC: 3.5 MMOL/L (ref 3.5–5.3)
PROT SERPL-MCNC: 6.5 G/DL (ref 6.4–8.2)
PROT UR STRIP-MCNC: ABNORMAL MG/DL
RBC # BLD AUTO: 3.43 MILLION/UL (ref 3.88–5.62)
RBC #/AREA URNS AUTO: ABNORMAL /HPF
SARS-COV-2 RNA RESP QL NAA+PROBE: NEGATIVE
SODIUM SERPL-SCNC: 137 MMOL/L (ref 136–145)
SP GR UR STRIP.AUTO: >=1.03 (ref 1–1.03)
TROPONIN I SERPL-MCNC: <0.02 NG/ML
UROBILINOGEN UR QL STRIP.AUTO: 0.2 E.U./DL
WBC # BLD AUTO: 14.39 THOUSAND/UL (ref 4.31–10.16)
WBC #/AREA URNS AUTO: ABNORMAL /HPF

## 2020-10-03 PROCEDURE — 99223 1ST HOSP IP/OBS HIGH 75: CPT | Performed by: NURSE PRACTITIONER

## 2020-10-03 PROCEDURE — 85025 COMPLETE CBC W/AUTO DIFF WBC: CPT | Performed by: EMERGENCY MEDICINE

## 2020-10-03 PROCEDURE — 71045 X-RAY EXAM CHEST 1 VIEW: CPT

## 2020-10-03 PROCEDURE — 87077 CULTURE AEROBIC IDENTIFY: CPT | Performed by: FAMILY MEDICINE

## 2020-10-03 PROCEDURE — 96361 HYDRATE IV INFUSION ADD-ON: CPT

## 2020-10-03 PROCEDURE — G1004 CDSM NDSC: HCPCS

## 2020-10-03 PROCEDURE — 96366 THER/PROPH/DIAG IV INF ADDON: CPT

## 2020-10-03 PROCEDURE — 96365 THER/PROPH/DIAG IV INF INIT: CPT

## 2020-10-03 PROCEDURE — 82728 ASSAY OF FERRITIN: CPT | Performed by: NURSE PRACTITIONER

## 2020-10-03 PROCEDURE — 82248 BILIRUBIN DIRECT: CPT | Performed by: NURSE PRACTITIONER

## 2020-10-03 PROCEDURE — 87040 BLOOD CULTURE FOR BACTERIA: CPT | Performed by: EMERGENCY MEDICINE

## 2020-10-03 PROCEDURE — 99285 EMERGENCY DEPT VISIT HI MDM: CPT | Performed by: EMERGENCY MEDICINE

## 2020-10-03 PROCEDURE — 83735 ASSAY OF MAGNESIUM: CPT | Performed by: NURSE PRACTITIONER

## 2020-10-03 PROCEDURE — 81001 URINALYSIS AUTO W/SCOPE: CPT | Performed by: FAMILY MEDICINE

## 2020-10-03 PROCEDURE — 74176 CT ABD & PELVIS W/O CONTRAST: CPT

## 2020-10-03 PROCEDURE — 83540 ASSAY OF IRON: CPT | Performed by: NURSE PRACTITIONER

## 2020-10-03 PROCEDURE — 83605 ASSAY OF LACTIC ACID: CPT | Performed by: EMERGENCY MEDICINE

## 2020-10-03 PROCEDURE — 99285 EMERGENCY DEPT VISIT HI MDM: CPT

## 2020-10-03 PROCEDURE — 87186 SC STD MICRODIL/AGAR DIL: CPT | Performed by: FAMILY MEDICINE

## 2020-10-03 PROCEDURE — 93005 ELECTROCARDIOGRAM TRACING: CPT

## 2020-10-03 PROCEDURE — 87635 SARS-COV-2 COVID-19 AMP PRB: CPT | Performed by: EMERGENCY MEDICINE

## 2020-10-03 PROCEDURE — 36415 COLL VENOUS BLD VENIPUNCTURE: CPT | Performed by: EMERGENCY MEDICINE

## 2020-10-03 PROCEDURE — 83550 IRON BINDING TEST: CPT | Performed by: NURSE PRACTITIONER

## 2020-10-03 PROCEDURE — 87086 URINE CULTURE/COLONY COUNT: CPT | Performed by: FAMILY MEDICINE

## 2020-10-03 PROCEDURE — 80053 COMPREHEN METABOLIC PANEL: CPT | Performed by: EMERGENCY MEDICINE

## 2020-10-03 PROCEDURE — 84484 ASSAY OF TROPONIN QUANT: CPT | Performed by: EMERGENCY MEDICINE

## 2020-10-03 RX ORDER — DILTIAZEM HYDROCHLORIDE 5 MG/ML
10 INJECTION INTRAVENOUS ONCE
Status: DISCONTINUED | OUTPATIENT
Start: 2020-10-03 | End: 2020-10-03

## 2020-10-03 RX ORDER — MELATONIN
2000 DAILY
Status: DISCONTINUED | OUTPATIENT
Start: 2020-10-04 | End: 2020-10-08 | Stop reason: HOSPADM

## 2020-10-03 RX ORDER — DOXYCYCLINE HYCLATE 50 MG/1
324 CAPSULE, GELATIN COATED ORAL
Status: DISCONTINUED | OUTPATIENT
Start: 2020-10-04 | End: 2020-10-08 | Stop reason: HOSPADM

## 2020-10-03 RX ORDER — POTASSIUM CHLORIDE 20 MEQ/1
40 TABLET, EXTENDED RELEASE ORAL ONCE
Status: COMPLETED | OUTPATIENT
Start: 2020-10-03 | End: 2020-10-03

## 2020-10-03 RX ORDER — POLYETHYLENE GLYCOL 3350 17 G/17G
17 POWDER, FOR SOLUTION ORAL DAILY
Status: DISCONTINUED | OUTPATIENT
Start: 2020-10-04 | End: 2020-10-08 | Stop reason: HOSPADM

## 2020-10-03 RX ORDER — GABAPENTIN 100 MG/1
100 CAPSULE ORAL
Status: DISCONTINUED | OUTPATIENT
Start: 2020-10-03 | End: 2020-10-08 | Stop reason: HOSPADM

## 2020-10-03 RX ORDER — METOPROLOL TARTRATE 5 MG/5ML
5 INJECTION INTRAVENOUS ONCE
Status: COMPLETED | OUTPATIENT
Start: 2020-10-03 | End: 2020-10-03

## 2020-10-03 RX ORDER — TAMSULOSIN HYDROCHLORIDE 0.4 MG/1
0.4 CAPSULE ORAL
Status: DISCONTINUED | OUTPATIENT
Start: 2020-10-04 | End: 2020-10-08 | Stop reason: HOSPADM

## 2020-10-03 RX ORDER — HYDROCHLOROTHIAZIDE 12.5 MG/1
12.5 TABLET ORAL DAILY
Status: DISCONTINUED | OUTPATIENT
Start: 2020-10-04 | End: 2020-10-08 | Stop reason: HOSPADM

## 2020-10-03 RX ORDER — ASCORBIC ACID 500 MG
500 TABLET ORAL 2 TIMES DAILY
Status: DISCONTINUED | OUTPATIENT
Start: 2020-10-04 | End: 2020-10-08 | Stop reason: HOSPADM

## 2020-10-03 RX ORDER — ACETAMINOPHEN 325 MG/1
650 TABLET ORAL ONCE
Status: COMPLETED | OUTPATIENT
Start: 2020-10-03 | End: 2020-10-03

## 2020-10-03 RX ORDER — ASPIRIN 81 MG/1
81 TABLET ORAL DAILY
Status: DISCONTINUED | OUTPATIENT
Start: 2020-10-04 | End: 2020-10-08 | Stop reason: HOSPADM

## 2020-10-03 RX ORDER — SODIUM CHLORIDE, SODIUM LACTATE, POTASSIUM CHLORIDE, CALCIUM CHLORIDE 600; 310; 30; 20 MG/100ML; MG/100ML; MG/100ML; MG/100ML
1000 INJECTION, SOLUTION INTRAVENOUS CONTINUOUS
Status: DISCONTINUED | OUTPATIENT
Start: 2020-10-03 | End: 2020-10-03

## 2020-10-03 RX ORDER — FAMOTIDINE 20 MG/1
20 TABLET, FILM COATED ORAL DAILY
Status: DISCONTINUED | OUTPATIENT
Start: 2020-10-04 | End: 2020-10-08 | Stop reason: HOSPADM

## 2020-10-03 RX ORDER — ACETAMINOPHEN 325 MG/1
650 TABLET ORAL EVERY 6 HOURS PRN
Status: DISCONTINUED | OUTPATIENT
Start: 2020-10-03 | End: 2020-10-08 | Stop reason: HOSPADM

## 2020-10-03 RX ORDER — CHLORAL HYDRATE 500 MG
1000 CAPSULE ORAL DAILY
Status: DISCONTINUED | OUTPATIENT
Start: 2020-10-04 | End: 2020-10-08 | Stop reason: HOSPADM

## 2020-10-03 RX ORDER — PRAVASTATIN SODIUM 40 MG
40 TABLET ORAL
Status: DISCONTINUED | OUTPATIENT
Start: 2020-10-04 | End: 2020-10-08 | Stop reason: HOSPADM

## 2020-10-03 RX ORDER — DOCUSATE SODIUM 100 MG/1
100 CAPSULE, LIQUID FILLED ORAL 2 TIMES DAILY PRN
Status: DISCONTINUED | OUTPATIENT
Start: 2020-10-03 | End: 2020-10-08 | Stop reason: HOSPADM

## 2020-10-03 RX ORDER — HEPARIN SODIUM 5000 [USP'U]/ML
5000 INJECTION, SOLUTION INTRAVENOUS; SUBCUTANEOUS EVERY 8 HOURS SCHEDULED
Status: DISCONTINUED | OUTPATIENT
Start: 2020-10-03 | End: 2020-10-08 | Stop reason: HOSPADM

## 2020-10-03 RX ORDER — ONDANSETRON 2 MG/ML
4 INJECTION INTRAMUSCULAR; INTRAVENOUS EVERY 6 HOURS PRN
Status: DISCONTINUED | OUTPATIENT
Start: 2020-10-03 | End: 2020-10-08 | Stop reason: HOSPADM

## 2020-10-03 RX ADMIN — POTASSIUM CHLORIDE 40 MEQ: 1500 TABLET, EXTENDED RELEASE ORAL at 22:57

## 2020-10-03 RX ADMIN — IOHEXOL 50 ML: 240 INJECTION, SOLUTION INTRATHECAL; INTRAVASCULAR; INTRAVENOUS; ORAL at 17:39

## 2020-10-03 RX ADMIN — HEPARIN SODIUM 5000 UNITS: 5000 INJECTION INTRAVENOUS; SUBCUTANEOUS at 23:03

## 2020-10-03 RX ADMIN — GABAPENTIN 100 MG: 100 CAPSULE ORAL at 22:57

## 2020-10-03 RX ADMIN — ACETAMINOPHEN 650 MG: 325 TABLET, FILM COATED ORAL at 17:07

## 2020-10-03 RX ADMIN — METOPROLOL TARTRATE 5 MG: 1 INJECTION, SOLUTION INTRAVENOUS at 22:47

## 2020-10-03 RX ADMIN — CEFEPIME HYDROCHLORIDE 2000 MG: 2 INJECTION, POWDER, FOR SOLUTION INTRAVENOUS at 17:14

## 2020-10-03 RX ADMIN — SODIUM CHLORIDE, SODIUM LACTATE, POTASSIUM CHLORIDE, AND CALCIUM CHLORIDE 1000 ML: .6; .31; .03; .02 INJECTION, SOLUTION INTRAVENOUS at 17:07

## 2020-10-04 LAB
ALBUMIN SERPL BCP-MCNC: 2.4 G/DL (ref 3.5–5)
ALP SERPL-CCNC: 67 U/L (ref 46–116)
ALT SERPL W P-5'-P-CCNC: 12 U/L (ref 12–78)
ANION GAP SERPL CALCULATED.3IONS-SCNC: 9 MMOL/L (ref 4–13)
AST SERPL W P-5'-P-CCNC: 13 U/L (ref 5–45)
BASOPHILS # BLD AUTO: 0.05 THOUSANDS/ΜL (ref 0–0.1)
BASOPHILS NFR BLD AUTO: 0 % (ref 0–1)
BILIRUB DIRECT SERPL-MCNC: 0.36 MG/DL (ref 0–0.2)
BILIRUB SERPL-MCNC: 1.96 MG/DL (ref 0.2–1)
BUN SERPL-MCNC: 15 MG/DL (ref 5–25)
CALCIUM ALBUM COR SERPL-MCNC: 9.5 MG/DL (ref 8.3–10.1)
CALCIUM SERPL-MCNC: 8.2 MG/DL (ref 8.3–10.1)
CHLORIDE SERPL-SCNC: 99 MMOL/L (ref 100–108)
CO2 SERPL-SCNC: 23 MMOL/L (ref 21–32)
CREAT SERPL-MCNC: 0.87 MG/DL (ref 0.6–1.3)
EOSINOPHIL # BLD AUTO: 0.01 THOUSAND/ΜL (ref 0–0.61)
EOSINOPHIL NFR BLD AUTO: 0 % (ref 0–6)
ERYTHROCYTE [DISTWIDTH] IN BLOOD BY AUTOMATED COUNT: 17 % (ref 11.6–15.1)
FERRITIN SERPL-MCNC: 478 NG/ML (ref 8–388)
GFR SERPL CREATININE-BSD FRML MDRD: 78 ML/MIN/1.73SQ M
GLUCOSE SERPL-MCNC: 95 MG/DL (ref 65–140)
HCT VFR BLD AUTO: 28.8 % (ref 36.5–49.3)
HGB BLD-MCNC: 9.3 G/DL (ref 12–17)
IMM GRANULOCYTES # BLD AUTO: 0.09 THOUSAND/UL (ref 0–0.2)
IMM GRANULOCYTES NFR BLD AUTO: 1 % (ref 0–2)
IRON SATN MFR SERPL: 9 %
IRON SERPL-MCNC: 22 UG/DL (ref 65–175)
LYMPHOCYTES # BLD AUTO: 1.52 THOUSANDS/ΜL (ref 0.6–4.47)
LYMPHOCYTES NFR BLD AUTO: 10 % (ref 14–44)
MAGNESIUM SERPL-MCNC: 1.7 MG/DL (ref 1.6–2.6)
MCH RBC QN AUTO: 30.7 PG (ref 26.8–34.3)
MCHC RBC AUTO-ENTMCNC: 32.3 G/DL (ref 31.4–37.4)
MCV RBC AUTO: 95 FL (ref 82–98)
MONOCYTES # BLD AUTO: 1.09 THOUSAND/ΜL (ref 0.17–1.22)
MONOCYTES NFR BLD AUTO: 7 % (ref 4–12)
NEUTROPHILS # BLD AUTO: 12.27 THOUSANDS/ΜL (ref 1.85–7.62)
NEUTS SEG NFR BLD AUTO: 82 % (ref 43–75)
NRBC BLD AUTO-RTO: 0 /100 WBCS
PLATELET # BLD AUTO: 128 THOUSANDS/UL (ref 149–390)
PMV BLD AUTO: 10.6 FL (ref 8.9–12.7)
POTASSIUM SERPL-SCNC: 3.9 MMOL/L (ref 3.5–5.3)
PROT SERPL-MCNC: 5.8 G/DL (ref 6.4–8.2)
RBC # BLD AUTO: 3.03 MILLION/UL (ref 3.88–5.62)
SODIUM SERPL-SCNC: 131 MMOL/L (ref 136–145)
TIBC SERPL-MCNC: 232 UG/DL (ref 250–450)
WBC # BLD AUTO: 15.03 THOUSAND/UL (ref 4.31–10.16)

## 2020-10-04 PROCEDURE — 85025 COMPLETE CBC W/AUTO DIFF WBC: CPT | Performed by: NURSE PRACTITIONER

## 2020-10-04 PROCEDURE — 99232 SBSQ HOSP IP/OBS MODERATE 35: CPT | Performed by: INTERNAL MEDICINE

## 2020-10-04 PROCEDURE — 80053 COMPREHEN METABOLIC PANEL: CPT | Performed by: NURSE PRACTITIONER

## 2020-10-04 RX ADMIN — HYDROCHLOROTHIAZIDE 12.5 MG: 12.5 TABLET ORAL at 09:23

## 2020-10-04 RX ADMIN — Medication 2000 UNITS: at 09:23

## 2020-10-04 RX ADMIN — FERROUS GLUCONATE 324 MG: 324 TABLET ORAL at 09:24

## 2020-10-04 RX ADMIN — Medication 1000 MG: at 09:23

## 2020-10-04 RX ADMIN — OXYCODONE HYDROCHLORIDE AND ACETAMINOPHEN 500 MG: 500 TABLET ORAL at 17:33

## 2020-10-04 RX ADMIN — CEFTRIAXONE SODIUM 1000 MG: 10 INJECTION, POWDER, FOR SOLUTION INTRAVENOUS at 05:31

## 2020-10-04 RX ADMIN — FAMOTIDINE 20 MG: 20 TABLET, FILM COATED ORAL at 09:23

## 2020-10-04 RX ADMIN — METOPROLOL TARTRATE 25 MG: 25 TABLET, FILM COATED ORAL at 09:23

## 2020-10-04 RX ADMIN — HEPARIN SODIUM 5000 UNITS: 5000 INJECTION INTRAVENOUS; SUBCUTANEOUS at 21:06

## 2020-10-04 RX ADMIN — ACETAMINOPHEN 650 MG: 325 TABLET, FILM COATED ORAL at 17:32

## 2020-10-04 RX ADMIN — GABAPENTIN 100 MG: 100 CAPSULE ORAL at 21:05

## 2020-10-04 RX ADMIN — FERROUS GLUCONATE 324 MG: 324 TABLET ORAL at 17:33

## 2020-10-04 RX ADMIN — OXYCODONE HYDROCHLORIDE AND ACETAMINOPHEN 500 MG: 500 TABLET ORAL at 06:10

## 2020-10-04 RX ADMIN — POLYETHYLENE GLYCOL 3350 17 G: 17 POWDER, FOR SOLUTION ORAL at 09:23

## 2020-10-04 RX ADMIN — ASPIRIN 81 MG: 81 TABLET, COATED ORAL at 09:23

## 2020-10-04 RX ADMIN — HEPARIN SODIUM 5000 UNITS: 5000 INJECTION INTRAVENOUS; SUBCUTANEOUS at 05:31

## 2020-10-04 RX ADMIN — PRAVASTATIN SODIUM 40 MG: 40 TABLET ORAL at 17:32

## 2020-10-04 RX ADMIN — TAMSULOSIN HYDROCHLORIDE 0.4 MG: 0.4 CAPSULE ORAL at 17:32

## 2020-10-04 RX ADMIN — HEPARIN SODIUM 5000 UNITS: 5000 INJECTION INTRAVENOUS; SUBCUTANEOUS at 14:23

## 2020-10-05 LAB
ALBUMIN SERPL BCP-MCNC: 2.5 G/DL (ref 3.5–5)
ALP SERPL-CCNC: 60 U/L (ref 46–116)
ALT SERPL W P-5'-P-CCNC: 12 U/L (ref 12–78)
ANION GAP SERPL CALCULATED.3IONS-SCNC: 8 MMOL/L (ref 4–13)
AST SERPL W P-5'-P-CCNC: 13 U/L (ref 5–45)
BILIRUB SERPL-MCNC: 0.89 MG/DL (ref 0.2–1)
BUN SERPL-MCNC: 17 MG/DL (ref 5–25)
CALCIUM ALBUM COR SERPL-MCNC: 9.7 MG/DL (ref 8.3–10.1)
CALCIUM SERPL-MCNC: 8.5 MG/DL (ref 8.3–10.1)
CHLORIDE SERPL-SCNC: 104 MMOL/L (ref 100–108)
CO2 SERPL-SCNC: 26 MMOL/L (ref 21–32)
CREAT SERPL-MCNC: 1.07 MG/DL (ref 0.6–1.3)
ERYTHROCYTE [DISTWIDTH] IN BLOOD BY AUTOMATED COUNT: 17.1 % (ref 11.6–15.1)
GFR SERPL CREATININE-BSD FRML MDRD: 63 ML/MIN/1.73SQ M
GLUCOSE SERPL-MCNC: 87 MG/DL (ref 65–140)
HCT VFR BLD AUTO: 29.3 % (ref 36.5–49.3)
HGB BLD-MCNC: 9.2 G/DL (ref 12–17)
MCH RBC QN AUTO: 30.5 PG (ref 26.8–34.3)
MCHC RBC AUTO-ENTMCNC: 31.4 G/DL (ref 31.4–37.4)
MCV RBC AUTO: 97 FL (ref 82–98)
PLATELET # BLD AUTO: 115 THOUSANDS/UL (ref 149–390)
PMV BLD AUTO: 10.2 FL (ref 8.9–12.7)
POTASSIUM SERPL-SCNC: 3.9 MMOL/L (ref 3.5–5.3)
PROT SERPL-MCNC: 5.7 G/DL (ref 6.4–8.2)
RBC # BLD AUTO: 3.02 MILLION/UL (ref 3.88–5.62)
SODIUM SERPL-SCNC: 138 MMOL/L (ref 136–145)
WBC # BLD AUTO: 8.9 THOUSAND/UL (ref 4.31–10.16)

## 2020-10-05 PROCEDURE — 99232 SBSQ HOSP IP/OBS MODERATE 35: CPT | Performed by: INTERNAL MEDICINE

## 2020-10-05 PROCEDURE — 80053 COMPREHEN METABOLIC PANEL: CPT | Performed by: INTERNAL MEDICINE

## 2020-10-05 PROCEDURE — 85027 COMPLETE CBC AUTOMATED: CPT | Performed by: INTERNAL MEDICINE

## 2020-10-05 RX ORDER — NYSTATIN 100000 [USP'U]/G
POWDER TOPICAL 2 TIMES DAILY
Status: DISCONTINUED | OUTPATIENT
Start: 2020-10-05 | End: 2020-10-05

## 2020-10-05 RX ADMIN — OXYCODONE HYDROCHLORIDE AND ACETAMINOPHEN 500 MG: 500 TABLET ORAL at 08:17

## 2020-10-05 RX ADMIN — PRAVASTATIN SODIUM 40 MG: 40 TABLET ORAL at 17:06

## 2020-10-05 RX ADMIN — GABAPENTIN 100 MG: 100 CAPSULE ORAL at 21:01

## 2020-10-05 RX ADMIN — TAMSULOSIN HYDROCHLORIDE 0.4 MG: 0.4 CAPSULE ORAL at 17:05

## 2020-10-05 RX ADMIN — Medication 2000 UNITS: at 08:20

## 2020-10-05 RX ADMIN — HEPARIN SODIUM 5000 UNITS: 5000 INJECTION INTRAVENOUS; SUBCUTANEOUS at 13:35

## 2020-10-05 RX ADMIN — METOPROLOL TARTRATE 25 MG: 25 TABLET, FILM COATED ORAL at 20:59

## 2020-10-05 RX ADMIN — ASPIRIN 81 MG: 81 TABLET, COATED ORAL at 08:19

## 2020-10-05 RX ADMIN — FERROUS GLUCONATE 324 MG: 324 TABLET ORAL at 17:06

## 2020-10-05 RX ADMIN — CEFTRIAXONE SODIUM 1000 MG: 10 INJECTION, POWDER, FOR SOLUTION INTRAVENOUS at 05:26

## 2020-10-05 RX ADMIN — FERROUS GLUCONATE 324 MG: 324 TABLET ORAL at 08:17

## 2020-10-05 RX ADMIN — METOPROLOL TARTRATE 25 MG: 25 TABLET, FILM COATED ORAL at 08:22

## 2020-10-05 RX ADMIN — FAMOTIDINE 20 MG: 20 TABLET, FILM COATED ORAL at 08:20

## 2020-10-05 RX ADMIN — HYDROCHLOROTHIAZIDE 12.5 MG: 12.5 TABLET ORAL at 08:21

## 2020-10-05 RX ADMIN — Medication 1000 MG: at 08:21

## 2020-10-05 RX ADMIN — HEPARIN SODIUM 5000 UNITS: 5000 INJECTION INTRAVENOUS; SUBCUTANEOUS at 05:28

## 2020-10-05 RX ADMIN — OXYCODONE HYDROCHLORIDE AND ACETAMINOPHEN 500 MG: 500 TABLET ORAL at 17:05

## 2020-10-06 LAB
ATRIAL RATE: 258 BPM
ATRIAL RATE: 77 BPM
ATRIAL RATE: 85 BPM
BACTERIA UR CULT: ABNORMAL
BACTERIA UR CULT: ABNORMAL
ERYTHROCYTE [DISTWIDTH] IN BLOOD BY AUTOMATED COUNT: 16.8 % (ref 11.6–15.1)
HCT VFR BLD AUTO: 29.5 % (ref 36.5–49.3)
HGB BLD-MCNC: 9.4 G/DL (ref 12–17)
MCH RBC QN AUTO: 30.2 PG (ref 26.8–34.3)
MCHC RBC AUTO-ENTMCNC: 31.9 G/DL (ref 31.4–37.4)
MCV RBC AUTO: 95 FL (ref 82–98)
P AXIS: 29 DEGREES
P AXIS: 43 DEGREES
PLATELET # BLD AUTO: 128 THOUSANDS/UL (ref 149–390)
PMV BLD AUTO: 11.1 FL (ref 8.9–12.7)
PR INTERVAL: 226 MS
PR INTERVAL: 240 MS
QRS AXIS: -9 DEGREES
QRS AXIS: 16 DEGREES
QRS AXIS: 8 DEGREES
QRSD INTERVAL: 80 MS
QRSD INTERVAL: 82 MS
QRSD INTERVAL: 82 MS
QT INTERVAL: 292 MS
QT INTERVAL: 344 MS
QT INTERVAL: 350 MS
QTC INTERVAL: 396 MS
QTC INTERVAL: 409 MS
QTC INTERVAL: 419 MS
RBC # BLD AUTO: 3.11 MILLION/UL (ref 3.88–5.62)
T WAVE AXIS: 191 DEGREES
T WAVE AXIS: 212 DEGREES
T WAVE AXIS: 62 DEGREES
VENTRICULAR RATE: 124 BPM
VENTRICULAR RATE: 77 BPM
VENTRICULAR RATE: 85 BPM
WBC # BLD AUTO: 6.07 THOUSAND/UL (ref 4.31–10.16)

## 2020-10-06 PROCEDURE — 97535 SELF CARE MNGMENT TRAINING: CPT

## 2020-10-06 PROCEDURE — 99232 SBSQ HOSP IP/OBS MODERATE 35: CPT | Performed by: INTERNAL MEDICINE

## 2020-10-06 PROCEDURE — 85027 COMPLETE CBC AUTOMATED: CPT | Performed by: INTERNAL MEDICINE

## 2020-10-06 PROCEDURE — 97163 PT EVAL HIGH COMPLEX 45 MIN: CPT

## 2020-10-06 PROCEDURE — 99223 1ST HOSP IP/OBS HIGH 75: CPT | Performed by: NURSE PRACTITIONER

## 2020-10-06 PROCEDURE — 97167 OT EVAL HIGH COMPLEX 60 MIN: CPT

## 2020-10-06 PROCEDURE — 97116 GAIT TRAINING THERAPY: CPT

## 2020-10-06 PROCEDURE — 93010 ELECTROCARDIOGRAM REPORT: CPT | Performed by: INTERNAL MEDICINE

## 2020-10-06 RX ORDER — CEPHALEXIN 500 MG/1
500 CAPSULE ORAL EVERY 6 HOURS SCHEDULED
Status: DISCONTINUED | OUTPATIENT
Start: 2020-10-06 | End: 2020-10-08 | Stop reason: HOSPADM

## 2020-10-06 RX ADMIN — Medication 2000 UNITS: at 08:28

## 2020-10-06 RX ADMIN — Medication 1000 MG: at 08:28

## 2020-10-06 RX ADMIN — TAMSULOSIN HYDROCHLORIDE 0.4 MG: 0.4 CAPSULE ORAL at 17:36

## 2020-10-06 RX ADMIN — HEPARIN SODIUM 5000 UNITS: 5000 INJECTION INTRAVENOUS; SUBCUTANEOUS at 14:04

## 2020-10-06 RX ADMIN — OXYCODONE HYDROCHLORIDE AND ACETAMINOPHEN 500 MG: 500 TABLET ORAL at 08:27

## 2020-10-06 RX ADMIN — CEPHALEXIN 500 MG: 500 CAPSULE ORAL at 17:36

## 2020-10-06 RX ADMIN — METOPROLOL TARTRATE 25 MG: 25 TABLET, FILM COATED ORAL at 08:27

## 2020-10-06 RX ADMIN — GABAPENTIN 100 MG: 100 CAPSULE ORAL at 21:12

## 2020-10-06 RX ADMIN — OXYCODONE HYDROCHLORIDE AND ACETAMINOPHEN 500 MG: 500 TABLET ORAL at 17:36

## 2020-10-06 RX ADMIN — CEFEPIME HYDROCHLORIDE 2000 MG: 2 INJECTION, POWDER, FOR SOLUTION INTRAVENOUS at 12:26

## 2020-10-06 RX ADMIN — FAMOTIDINE 20 MG: 20 TABLET, FILM COATED ORAL at 08:27

## 2020-10-06 RX ADMIN — CEPHALEXIN 500 MG: 500 CAPSULE ORAL at 23:04

## 2020-10-06 RX ADMIN — FERROUS GLUCONATE 324 MG: 324 TABLET ORAL at 08:28

## 2020-10-06 RX ADMIN — PRAVASTATIN SODIUM 40 MG: 40 TABLET ORAL at 17:36

## 2020-10-06 RX ADMIN — METOPROLOL TARTRATE 25 MG: 25 TABLET, FILM COATED ORAL at 21:12

## 2020-10-06 RX ADMIN — FERROUS GLUCONATE 324 MG: 324 TABLET ORAL at 17:36

## 2020-10-06 RX ADMIN — POLYETHYLENE GLYCOL 3350 17 G: 17 POWDER, FOR SOLUTION ORAL at 08:28

## 2020-10-06 RX ADMIN — CEFTRIAXONE SODIUM 1000 MG: 10 INJECTION, POWDER, FOR SOLUTION INTRAVENOUS at 05:06

## 2020-10-06 RX ADMIN — HEPARIN SODIUM 5000 UNITS: 5000 INJECTION INTRAVENOUS; SUBCUTANEOUS at 21:12

## 2020-10-06 RX ADMIN — HYDROCHLOROTHIAZIDE 12.5 MG: 12.5 TABLET ORAL at 08:27

## 2020-10-06 RX ADMIN — ASPIRIN 81 MG: 81 TABLET, COATED ORAL at 08:27

## 2020-10-06 NOTE — TELEPHONE ENCOUNTER
This is a new patient that was scheduled on 10/15/20 in [place] office on 10/15/20  Patient is in the [employer]  I did ask the wife to ask for a nurse in the hospital to ask for urology that are rounding in the hospital   She said she did and no one showed up  She is asking about her husbands recurring bladder infection and another UTI  Please call her or relay a message to PA in the hospital to call her back at [phone]

## 2020-10-06 NOTE — TELEPHONE ENCOUNTER
Patient was seen in consult this afternoon by Mae Brooks  While he is inpatient he is being managed  Will follow up outpatient as scheduled

## 2020-10-07 LAB
ANION GAP SERPL CALCULATED.3IONS-SCNC: 9 MMOL/L (ref 4–13)
BUN SERPL-MCNC: 13 MG/DL (ref 5–25)
CALCIUM SERPL-MCNC: 8.8 MG/DL (ref 8.3–10.1)
CHLORIDE SERPL-SCNC: 104 MMOL/L (ref 100–108)
CO2 SERPL-SCNC: 27 MMOL/L (ref 21–32)
CREAT SERPL-MCNC: 1.02 MG/DL (ref 0.6–1.3)
ERYTHROCYTE [DISTWIDTH] IN BLOOD BY AUTOMATED COUNT: 16.4 % (ref 11.6–15.1)
GFR SERPL CREATININE-BSD FRML MDRD: 66 ML/MIN/1.73SQ M
GLUCOSE SERPL-MCNC: 93 MG/DL (ref 65–140)
HCT VFR BLD AUTO: 33.7 % (ref 36.5–49.3)
HGB BLD-MCNC: 10.3 G/DL (ref 12–17)
MCH RBC QN AUTO: 29.4 PG (ref 26.8–34.3)
MCHC RBC AUTO-ENTMCNC: 30.6 G/DL (ref 31.4–37.4)
MCV RBC AUTO: 96 FL (ref 82–98)
PLATELET # BLD AUTO: 147 THOUSANDS/UL (ref 149–390)
PMV BLD AUTO: 10.7 FL (ref 8.9–12.7)
POTASSIUM SERPL-SCNC: 3.8 MMOL/L (ref 3.5–5.3)
RBC # BLD AUTO: 3.5 MILLION/UL (ref 3.88–5.62)
SODIUM SERPL-SCNC: 140 MMOL/L (ref 136–145)
WBC # BLD AUTO: 5.13 THOUSAND/UL (ref 4.31–10.16)

## 2020-10-07 PROCEDURE — 99232 SBSQ HOSP IP/OBS MODERATE 35: CPT | Performed by: INTERNAL MEDICINE

## 2020-10-07 PROCEDURE — 85027 COMPLETE CBC AUTOMATED: CPT | Performed by: INTERNAL MEDICINE

## 2020-10-07 PROCEDURE — 80048 BASIC METABOLIC PNL TOTAL CA: CPT | Performed by: INTERNAL MEDICINE

## 2020-10-07 RX ORDER — PRAVASTATIN SODIUM 40 MG
40 TABLET ORAL
Qty: 30 TABLET | Refills: 0 | Status: CANCELLED | OUTPATIENT
Start: 2020-10-07 | End: 2020-11-06

## 2020-10-07 RX ADMIN — CEPHALEXIN 500 MG: 500 CAPSULE ORAL at 17:15

## 2020-10-07 RX ADMIN — OXYCODONE HYDROCHLORIDE AND ACETAMINOPHEN 500 MG: 500 TABLET ORAL at 06:17

## 2020-10-07 RX ADMIN — HEPARIN SODIUM 5000 UNITS: 5000 INJECTION INTRAVENOUS; SUBCUTANEOUS at 17:16

## 2020-10-07 RX ADMIN — HEPARIN SODIUM 5000 UNITS: 5000 INJECTION INTRAVENOUS; SUBCUTANEOUS at 06:17

## 2020-10-07 RX ADMIN — TAMSULOSIN HYDROCHLORIDE 0.4 MG: 0.4 CAPSULE ORAL at 17:15

## 2020-10-07 RX ADMIN — FERROUS GLUCONATE 324 MG: 324 TABLET ORAL at 06:17

## 2020-10-07 RX ADMIN — GABAPENTIN 100 MG: 100 CAPSULE ORAL at 22:00

## 2020-10-07 RX ADMIN — METOPROLOL TARTRATE 25 MG: 25 TABLET, FILM COATED ORAL at 08:48

## 2020-10-07 RX ADMIN — FAMOTIDINE 20 MG: 20 TABLET, FILM COATED ORAL at 08:47

## 2020-10-07 RX ADMIN — FERROUS GLUCONATE 324 MG: 324 TABLET ORAL at 17:16

## 2020-10-07 RX ADMIN — CEPHALEXIN 500 MG: 500 CAPSULE ORAL at 06:16

## 2020-10-07 RX ADMIN — PRAVASTATIN SODIUM 40 MG: 40 TABLET ORAL at 17:15

## 2020-10-07 RX ADMIN — HYDROCHLOROTHIAZIDE 12.5 MG: 12.5 TABLET ORAL at 08:48

## 2020-10-07 RX ADMIN — ASPIRIN 81 MG: 81 TABLET, COATED ORAL at 08:48

## 2020-10-07 RX ADMIN — OXYCODONE HYDROCHLORIDE AND ACETAMINOPHEN 500 MG: 500 TABLET ORAL at 17:16

## 2020-10-07 RX ADMIN — Medication 2000 UNITS: at 08:47

## 2020-10-07 RX ADMIN — HEPARIN SODIUM 5000 UNITS: 5000 INJECTION INTRAVENOUS; SUBCUTANEOUS at 21:38

## 2020-10-07 RX ADMIN — Medication 1000 MG: at 08:48

## 2020-10-07 RX ADMIN — CEPHALEXIN 500 MG: 500 CAPSULE ORAL at 23:44

## 2020-10-07 RX ADMIN — METOPROLOL TARTRATE 25 MG: 25 TABLET, FILM COATED ORAL at 21:38

## 2020-10-08 ENCOUNTER — TELEPHONE (OUTPATIENT)
Dept: OTHER | Facility: OTHER | Age: 85
End: 2020-10-08

## 2020-10-08 VITALS
RESPIRATION RATE: 18 BRPM | HEART RATE: 63 BPM | HEIGHT: 60 IN | SYSTOLIC BLOOD PRESSURE: 122 MMHG | WEIGHT: 128.09 LBS | TEMPERATURE: 98 F | BODY MASS INDEX: 25.15 KG/M2 | OXYGEN SATURATION: 91 % | DIASTOLIC BLOOD PRESSURE: 59 MMHG

## 2020-10-08 LAB
ANION GAP SERPL CALCULATED.3IONS-SCNC: 9 MMOL/L (ref 4–13)
BASOPHILS # BLD AUTO: 0.04 THOUSANDS/ΜL (ref 0–0.1)
BASOPHILS NFR BLD AUTO: 1 % (ref 0–1)
BUN SERPL-MCNC: 14 MG/DL (ref 5–25)
CALCIUM SERPL-MCNC: 8.6 MG/DL (ref 8.3–10.1)
CHLORIDE SERPL-SCNC: 104 MMOL/L (ref 100–108)
CO2 SERPL-SCNC: 26 MMOL/L (ref 21–32)
CREAT SERPL-MCNC: 1.1 MG/DL (ref 0.6–1.3)
EOSINOPHIL # BLD AUTO: 0.15 THOUSAND/ΜL (ref 0–0.61)
EOSINOPHIL NFR BLD AUTO: 3 % (ref 0–6)
ERYTHROCYTE [DISTWIDTH] IN BLOOD BY AUTOMATED COUNT: 16 % (ref 11.6–15.1)
GFR SERPL CREATININE-BSD FRML MDRD: 60 ML/MIN/1.73SQ M
GLUCOSE SERPL-MCNC: 98 MG/DL (ref 65–140)
HCT VFR BLD AUTO: 30.7 % (ref 36.5–49.3)
HGB BLD-MCNC: 9.9 G/DL (ref 12–17)
IMM GRANULOCYTES # BLD AUTO: 0.05 THOUSAND/UL (ref 0–0.2)
IMM GRANULOCYTES NFR BLD AUTO: 1 % (ref 0–2)
LYMPHOCYTES # BLD AUTO: 2.23 THOUSANDS/ΜL (ref 0.6–4.47)
LYMPHOCYTES NFR BLD AUTO: 38 % (ref 14–44)
MCH RBC QN AUTO: 30.8 PG (ref 26.8–34.3)
MCHC RBC AUTO-ENTMCNC: 32.2 G/DL (ref 31.4–37.4)
MCV RBC AUTO: 96 FL (ref 82–98)
MONOCYTES # BLD AUTO: 0.52 THOUSAND/ΜL (ref 0.17–1.22)
MONOCYTES NFR BLD AUTO: 9 % (ref 4–12)
NEUTROPHILS # BLD AUTO: 2.9 THOUSANDS/ΜL (ref 1.85–7.62)
NEUTS SEG NFR BLD AUTO: 48 % (ref 43–75)
NRBC BLD AUTO-RTO: 0 /100 WBCS
PLATELET # BLD AUTO: 156 THOUSANDS/UL (ref 149–390)
PMV BLD AUTO: 10.4 FL (ref 8.9–12.7)
POTASSIUM SERPL-SCNC: 3.7 MMOL/L (ref 3.5–5.3)
RBC # BLD AUTO: 3.21 MILLION/UL (ref 3.88–5.62)
SODIUM SERPL-SCNC: 139 MMOL/L (ref 136–145)
WBC # BLD AUTO: 5.89 THOUSAND/UL (ref 4.31–10.16)

## 2020-10-08 PROCEDURE — 99239 HOSP IP/OBS DSCHRG MGMT >30: CPT | Performed by: INTERNAL MEDICINE

## 2020-10-08 PROCEDURE — 80048 BASIC METABOLIC PNL TOTAL CA: CPT | Performed by: INTERNAL MEDICINE

## 2020-10-08 PROCEDURE — 85025 COMPLETE CBC W/AUTO DIFF WBC: CPT | Performed by: INTERNAL MEDICINE

## 2020-10-08 RX ORDER — ASCORBIC ACID 500 MG
500 TABLET ORAL 2 TIMES DAILY
Qty: 60 EACH | Refills: 0 | Status: SHIPPED | OUTPATIENT
Start: 2020-10-08 | End: 2021-07-21 | Stop reason: ALTCHOICE

## 2020-10-08 RX ORDER — FINASTERIDE 5 MG/1
5 TABLET, FILM COATED ORAL DAILY
Qty: 30 TABLET | Refills: 0 | Status: SHIPPED | OUTPATIENT
Start: 2020-10-08 | End: 2020-10-22 | Stop reason: SDUPTHER

## 2020-10-08 RX ORDER — CEPHALEXIN 500 MG/1
500 CAPSULE ORAL EVERY 6 HOURS SCHEDULED
Qty: 10 CAPSULE | Refills: 0 | Status: SHIPPED | OUTPATIENT
Start: 2020-10-08 | End: 2020-10-11

## 2020-10-08 RX ADMIN — HEPARIN SODIUM 5000 UNITS: 5000 INJECTION INTRAVENOUS; SUBCUTANEOUS at 06:11

## 2020-10-08 RX ADMIN — OXYCODONE HYDROCHLORIDE AND ACETAMINOPHEN 500 MG: 500 TABLET ORAL at 17:05

## 2020-10-08 RX ADMIN — FAMOTIDINE 20 MG: 20 TABLET, FILM COATED ORAL at 08:22

## 2020-10-08 RX ADMIN — PRAVASTATIN SODIUM 40 MG: 40 TABLET ORAL at 17:05

## 2020-10-08 RX ADMIN — ASPIRIN 81 MG: 81 TABLET, COATED ORAL at 08:22

## 2020-10-08 RX ADMIN — Medication 2000 UNITS: at 08:22

## 2020-10-08 RX ADMIN — CEPHALEXIN 500 MG: 500 CAPSULE ORAL at 12:07

## 2020-10-08 RX ADMIN — POLYETHYLENE GLYCOL 3350 17 G: 17 POWDER, FOR SOLUTION ORAL at 10:00

## 2020-10-08 RX ADMIN — CEPHALEXIN 500 MG: 500 CAPSULE ORAL at 17:05

## 2020-10-08 RX ADMIN — Medication 1000 MG: at 08:22

## 2020-10-08 RX ADMIN — CEPHALEXIN 500 MG: 500 CAPSULE ORAL at 06:11

## 2020-10-08 RX ADMIN — FERROUS GLUCONATE 324 MG: 324 TABLET ORAL at 06:11

## 2020-10-08 RX ADMIN — FERROUS GLUCONATE 324 MG: 324 TABLET ORAL at 17:07

## 2020-10-08 RX ADMIN — METOPROLOL TARTRATE 25 MG: 25 TABLET, FILM COATED ORAL at 08:22

## 2020-10-08 RX ADMIN — HYDROCHLOROTHIAZIDE 12.5 MG: 12.5 TABLET ORAL at 08:22

## 2020-10-08 RX ADMIN — OXYCODONE HYDROCHLORIDE AND ACETAMINOPHEN 500 MG: 500 TABLET ORAL at 06:11

## 2020-10-08 RX ADMIN — HEPARIN SODIUM 5000 UNITS: 5000 INJECTION INTRAVENOUS; SUBCUTANEOUS at 13:54

## 2020-10-08 RX ADMIN — TAMSULOSIN HYDROCHLORIDE 0.4 MG: 0.4 CAPSULE ORAL at 17:05

## 2020-10-09 ENCOUNTER — NURSING HOME VISIT (OUTPATIENT)
Dept: GERIATRICS | Facility: OTHER | Age: 85
End: 2020-10-09
Payer: MEDICARE

## 2020-10-09 DIAGNOSIS — D50.8 OTHER IRON DEFICIENCY ANEMIA: ICD-10-CM

## 2020-10-09 DIAGNOSIS — R33.9 URINARY RETENTION: ICD-10-CM

## 2020-10-09 DIAGNOSIS — I10 ESSENTIAL HYPERTENSION: ICD-10-CM

## 2020-10-09 DIAGNOSIS — R26.2 AMBULATORY DYSFUNCTION: Primary | ICD-10-CM

## 2020-10-09 DIAGNOSIS — N39.0 URINARY TRACT INFECTION WITHOUT HEMATURIA, SITE UNSPECIFIED: ICD-10-CM

## 2020-10-09 LAB
BACTERIA BLD CULT: NORMAL
BACTERIA BLD CULT: NORMAL

## 2020-10-09 PROCEDURE — 99306 1ST NF CARE HIGH MDM 50: CPT | Performed by: FAMILY MEDICINE

## 2020-10-12 NOTE — TELEPHONE ENCOUNTER
Returned call to Gemini Arora at [facility]  LM that orders to be faxed and appointment for 10/15/20 at 8:00 cancelled  Will keep appointment for 2:00 pm the same day with [facility] for hospital follow up and PVR  Written orders faxed to remove duarte catheter on 10/15/20 at 8:00  Provided office number for Gemini Arora to return call if fax not received   Orders faxed to [phone]

## 2020-10-12 NOTE — TELEPHONE ENCOUNTER
Harry Mcgarry at [facility] requesting order to remove duarte on 10/15/20 for patient    Due to transportation it will be easier if they just bought patient in at 2 pm for Er fu and void trial   Please fax order to [phone]

## 2020-10-13 ENCOUNTER — NURSING HOME VISIT (OUTPATIENT)
Dept: GERIATRICS | Facility: OTHER | Age: 85
End: 2020-10-13
Payer: MEDICARE

## 2020-10-13 DIAGNOSIS — N39.0 URINARY TRACT INFECTION WITHOUT HEMATURIA, SITE UNSPECIFIED: ICD-10-CM

## 2020-10-13 DIAGNOSIS — R26.2 AMBULATORY DYSFUNCTION: ICD-10-CM

## 2020-10-13 DIAGNOSIS — D50.8 OTHER IRON DEFICIENCY ANEMIA: ICD-10-CM

## 2020-10-13 DIAGNOSIS — R33.9 URINARY RETENTION: ICD-10-CM

## 2020-10-13 DIAGNOSIS — R53.1 GENERALIZED WEAKNESS: ICD-10-CM

## 2020-10-13 DIAGNOSIS — I48.91 ATRIAL FIBRILLATION WITH RAPID VENTRICULAR RESPONSE (HCC): Primary | ICD-10-CM

## 2020-10-13 PROCEDURE — 99309 SBSQ NF CARE MODERATE MDM 30: CPT | Performed by: NURSE PRACTITIONER

## 2020-10-15 ENCOUNTER — NURSING HOME VISIT (OUTPATIENT)
Dept: GERIATRICS | Facility: OTHER | Age: 85
End: 2020-10-15
Payer: MEDICARE

## 2020-10-15 ENCOUNTER — OFFICE VISIT (OUTPATIENT)
Dept: UROLOGY | Facility: CLINIC | Age: 85
End: 2020-10-15
Payer: MEDICARE

## 2020-10-15 VITALS
HEIGHT: 60 IN | SYSTOLIC BLOOD PRESSURE: 110 MMHG | DIASTOLIC BLOOD PRESSURE: 62 MMHG | TEMPERATURE: 98.4 F | HEART RATE: 143 BPM | BODY MASS INDEX: 25.02 KG/M2

## 2020-10-15 DIAGNOSIS — D50.8 OTHER IRON DEFICIENCY ANEMIA: ICD-10-CM

## 2020-10-15 DIAGNOSIS — R33.9 URINARY RETENTION: Primary | ICD-10-CM

## 2020-10-15 DIAGNOSIS — R33.9 URINARY RETENTION: ICD-10-CM

## 2020-10-15 DIAGNOSIS — R26.2 AMBULATORY DYSFUNCTION: ICD-10-CM

## 2020-10-15 DIAGNOSIS — I48.91 ATRIAL FIBRILLATION WITH RAPID VENTRICULAR RESPONSE (HCC): Primary | ICD-10-CM

## 2020-10-15 DIAGNOSIS — N47.2 PARAPHIMOSIS: ICD-10-CM

## 2020-10-15 DIAGNOSIS — R53.1 GENERALIZED WEAKNESS: ICD-10-CM

## 2020-10-15 PROCEDURE — 99213 OFFICE O/P EST LOW 20 MIN: CPT | Performed by: PHYSICIAN ASSISTANT

## 2020-10-15 PROCEDURE — 99309 SBSQ NF CARE MODERATE MDM 30: CPT | Performed by: NURSE PRACTITIONER

## 2020-10-22 ENCOUNTER — NURSING HOME VISIT (OUTPATIENT)
Dept: GERIATRICS | Facility: OTHER | Age: 85
End: 2020-10-22
Payer: MEDICARE

## 2020-10-22 VITALS
HEART RATE: 72 BPM | SYSTOLIC BLOOD PRESSURE: 136 MMHG | RESPIRATION RATE: 18 BRPM | OXYGEN SATURATION: 96 % | WEIGHT: 129.3 LBS | TEMPERATURE: 97.2 F | DIASTOLIC BLOOD PRESSURE: 64 MMHG | BODY MASS INDEX: 25.25 KG/M2

## 2020-10-22 DIAGNOSIS — N39.0 URINARY TRACT INFECTION WITHOUT HEMATURIA, SITE UNSPECIFIED: ICD-10-CM

## 2020-10-22 DIAGNOSIS — E78.5 DYSLIPIDEMIA: ICD-10-CM

## 2020-10-22 DIAGNOSIS — I50.32 CHRONIC DIASTOLIC CONGESTIVE HEART FAILURE (HCC): ICD-10-CM

## 2020-10-22 DIAGNOSIS — R33.9 URINARY RETENTION: ICD-10-CM

## 2020-10-22 DIAGNOSIS — G62.9 NEUROPATHY: ICD-10-CM

## 2020-10-22 DIAGNOSIS — S32.000S COMPRESSION FX, LUMBAR SPINE, SEQUELA: ICD-10-CM

## 2020-10-22 DIAGNOSIS — D50.8 OTHER IRON DEFICIENCY ANEMIA: ICD-10-CM

## 2020-10-22 DIAGNOSIS — I48.91 ATRIAL FIBRILLATION WITH RAPID VENTRICULAR RESPONSE (HCC): ICD-10-CM

## 2020-10-22 DIAGNOSIS — R26.2 AMBULATORY DYSFUNCTION: ICD-10-CM

## 2020-10-22 DIAGNOSIS — I10 ESSENTIAL HYPERTENSION: Primary | ICD-10-CM

## 2020-10-22 PROCEDURE — 99316 NF DSCHRG MGMT 30 MIN+: CPT | Performed by: PHYSICIAN ASSISTANT

## 2020-10-22 RX ORDER — GABAPENTIN 100 MG/1
100 CAPSULE ORAL
Qty: 30 CAPSULE | Refills: 0 | Status: SHIPPED | OUTPATIENT
Start: 2020-10-22 | End: 2022-03-04 | Stop reason: HOSPADM

## 2020-10-22 RX ORDER — FINASTERIDE 5 MG/1
5 TABLET, FILM COATED ORAL DAILY
Qty: 30 TABLET | Refills: 0 | Status: SHIPPED | OUTPATIENT
Start: 2020-10-22 | End: 2021-05-14 | Stop reason: SDUPTHER

## 2020-10-22 RX ORDER — SIMVASTATIN 20 MG
20 TABLET ORAL DAILY
Qty: 30 TABLET | Refills: 0 | Status: SHIPPED | OUTPATIENT
Start: 2020-10-22 | End: 2021-07-21 | Stop reason: SDUPTHER

## 2020-10-22 RX ORDER — DOXYCYCLINE HYCLATE 50 MG/1
324 CAPSULE, GELATIN COATED ORAL
Qty: 60 TABLET | Refills: 0 | Status: SHIPPED | OUTPATIENT
Start: 2020-10-22 | End: 2022-03-04 | Stop reason: HOSPADM

## 2020-10-22 RX ORDER — HYDROCHLOROTHIAZIDE 12.5 MG/1
12.5 CAPSULE, GELATIN COATED ORAL DAILY
Qty: 30 CAPSULE | Refills: 0 | Status: SHIPPED | OUTPATIENT
Start: 2020-10-22 | End: 2021-07-21 | Stop reason: SDUPTHER

## 2020-10-30 ENCOUNTER — APPOINTMENT (OUTPATIENT)
Dept: RADIOLOGY | Facility: MEDICAL CENTER | Age: 85
End: 2020-10-30
Payer: MEDICARE

## 2020-10-30 DIAGNOSIS — S32.000D COMPRESSION FRACTURE OF LUMBAR VERTEBRA WITH ROUTINE HEALING, UNSPECIFIED LUMBAR VERTEBRAL LEVEL, SUBSEQUENT ENCOUNTER: ICD-10-CM

## 2020-10-30 PROCEDURE — 72100 X-RAY EXAM L-S SPINE 2/3 VWS: CPT

## 2020-11-05 ENCOUNTER — OFFICE VISIT (OUTPATIENT)
Dept: NEUROSURGERY | Facility: CLINIC | Age: 85
End: 2020-11-05
Payer: MEDICARE

## 2020-11-05 VITALS
HEIGHT: 60 IN | TEMPERATURE: 97.1 F | DIASTOLIC BLOOD PRESSURE: 41 MMHG | HEART RATE: 54 BPM | BODY MASS INDEX: 25.25 KG/M2 | RESPIRATION RATE: 16 BRPM | SYSTOLIC BLOOD PRESSURE: 112 MMHG

## 2020-11-05 DIAGNOSIS — S32.000D COMPRESSION FRACTURE OF LUMBAR VERTEBRA WITH ROUTINE HEALING, UNSPECIFIED LUMBAR VERTEBRAL LEVEL, SUBSEQUENT ENCOUNTER: Primary | ICD-10-CM

## 2020-11-05 PROCEDURE — 99213 OFFICE O/P EST LOW 20 MIN: CPT | Performed by: PHYSICIAN ASSISTANT

## 2020-11-12 ENCOUNTER — APPOINTMENT (OUTPATIENT)
Dept: RADIOLOGY | Facility: MEDICAL CENTER | Age: 85
End: 2020-11-12
Payer: MEDICARE

## 2020-11-12 DIAGNOSIS — S32.000D COMPRESSION FRACTURE OF LUMBAR VERTEBRA WITH ROUTINE HEALING, UNSPECIFIED LUMBAR VERTEBRAL LEVEL, SUBSEQUENT ENCOUNTER: ICD-10-CM

## 2020-11-12 PROCEDURE — 72110 X-RAY EXAM L-2 SPINE 4/>VWS: CPT

## 2020-11-13 ENCOUNTER — OFFICE VISIT (OUTPATIENT)
Dept: UROLOGY | Facility: CLINIC | Age: 85
End: 2020-11-13
Payer: MEDICARE

## 2020-11-13 VITALS
BODY MASS INDEX: 27.48 KG/M2 | HEART RATE: 83 BPM | HEIGHT: 60 IN | DIASTOLIC BLOOD PRESSURE: 70 MMHG | SYSTOLIC BLOOD PRESSURE: 100 MMHG | WEIGHT: 140 LBS

## 2020-11-13 DIAGNOSIS — N47.1 PHIMOSIS: ICD-10-CM

## 2020-11-13 DIAGNOSIS — R33.9 URINARY RETENTION: Primary | ICD-10-CM

## 2020-11-13 LAB
POST-VOID RESIDUAL VOLUME, ML POC: 0 ML
SL AMB  POCT GLUCOSE, UA: NORMAL
SL AMB LEUKOCYTE ESTERASE,UA: NORMAL
SL AMB POCT BILIRUBIN,UA: NORMAL
SL AMB POCT BLOOD,UA: NORMAL
SL AMB POCT CLARITY,UA: CLEAR
SL AMB POCT COLOR,UA: YELLOW
SL AMB POCT KETONES,UA: NORMAL
SL AMB POCT NITRITE,UA: NORMAL
SL AMB POCT PH,UA: 5
SL AMB POCT SPECIFIC GRAVITY,UA: 1.03
SL AMB POCT URINE PROTEIN: NORMAL
SL AMB POCT UROBILINOGEN: 0.2

## 2020-11-13 PROCEDURE — 51798 US URINE CAPACITY MEASURE: CPT | Performed by: PHYSICIAN ASSISTANT

## 2020-11-13 PROCEDURE — 99213 OFFICE O/P EST LOW 20 MIN: CPT | Performed by: PHYSICIAN ASSISTANT

## 2020-11-13 PROCEDURE — 81002 URINALYSIS NONAUTO W/O SCOPE: CPT | Performed by: PHYSICIAN ASSISTANT

## 2020-11-13 RX ORDER — CLOTRIMAZOLE AND BETAMETHASONE DIPROPIONATE 10; .64 MG/G; MG/G
CREAM TOPICAL DAILY
Qty: 30 G | Refills: 4 | Status: SHIPPED | OUTPATIENT
Start: 2020-11-13 | End: 2021-05-14 | Stop reason: SDUPTHER

## 2020-11-19 ENCOUNTER — TELEPHONE (OUTPATIENT)
Dept: NEUROSURGERY | Facility: CLINIC | Age: 85
End: 2020-11-19

## 2020-11-20 ENCOUNTER — OFFICE VISIT (OUTPATIENT)
Dept: NEUROSURGERY | Facility: CLINIC | Age: 85
End: 2020-11-20
Payer: MEDICARE

## 2020-11-20 VITALS — HEIGHT: 60 IN | WEIGHT: 140 LBS | TEMPERATURE: 98.3 F | RESPIRATION RATE: 16 BRPM | BODY MASS INDEX: 27.48 KG/M2

## 2020-11-20 DIAGNOSIS — S32.000D COMPRESSION FRACTURE OF LUMBAR VERTEBRA WITH ROUTINE HEALING, UNSPECIFIED LUMBAR VERTEBRAL LEVEL, SUBSEQUENT ENCOUNTER: Primary | ICD-10-CM

## 2020-11-20 PROCEDURE — 99213 OFFICE O/P EST LOW 20 MIN: CPT | Performed by: PHYSICIAN ASSISTANT

## 2021-05-14 ENCOUNTER — OFFICE VISIT (OUTPATIENT)
Dept: UROLOGY | Facility: CLINIC | Age: 86
End: 2021-05-14
Payer: MEDICARE

## 2021-05-14 VITALS
BODY MASS INDEX: 28.66 KG/M2 | DIASTOLIC BLOOD PRESSURE: 62 MMHG | HEIGHT: 60 IN | SYSTOLIC BLOOD PRESSURE: 112 MMHG | WEIGHT: 146 LBS

## 2021-05-14 DIAGNOSIS — N47.1 PHIMOSIS: ICD-10-CM

## 2021-05-14 DIAGNOSIS — R33.9 URINARY RETENTION: Primary | ICD-10-CM

## 2021-05-14 LAB
POST-VOID RESIDUAL VOLUME, ML POC: 27 ML
SL AMB  POCT GLUCOSE, UA: ABNORMAL
SL AMB LEUKOCYTE ESTERASE,UA: ABNORMAL
SL AMB POCT BILIRUBIN,UA: ABNORMAL
SL AMB POCT BLOOD,UA: ABNORMAL
SL AMB POCT CLARITY,UA: CLEAR
SL AMB POCT COLOR,UA: YELLOW
SL AMB POCT KETONES,UA: ABNORMAL
SL AMB POCT NITRITE,UA: ABNORMAL
SL AMB POCT PH,UA: 8
SL AMB POCT SPECIFIC GRAVITY,UA: 1.02
SL AMB POCT URINE PROTEIN: ABNORMAL
SL AMB POCT UROBILINOGEN: 0.2

## 2021-05-14 PROCEDURE — 81002 URINALYSIS NONAUTO W/O SCOPE: CPT | Performed by: PHYSICIAN ASSISTANT

## 2021-05-14 PROCEDURE — 51798 US URINE CAPACITY MEASURE: CPT | Performed by: PHYSICIAN ASSISTANT

## 2021-05-14 PROCEDURE — 87077 CULTURE AEROBIC IDENTIFY: CPT | Performed by: PHYSICIAN ASSISTANT

## 2021-05-14 PROCEDURE — 87086 URINE CULTURE/COLONY COUNT: CPT | Performed by: PHYSICIAN ASSISTANT

## 2021-05-14 PROCEDURE — 87186 SC STD MICRODIL/AGAR DIL: CPT | Performed by: PHYSICIAN ASSISTANT

## 2021-05-14 PROCEDURE — 99213 OFFICE O/P EST LOW 20 MIN: CPT | Performed by: PHYSICIAN ASSISTANT

## 2021-05-14 RX ORDER — FINASTERIDE 5 MG/1
5 TABLET, FILM COATED ORAL DAILY
Qty: 90 TABLET | Refills: 3 | Status: SHIPPED | OUTPATIENT
Start: 2021-05-14 | End: 2022-02-27

## 2021-05-14 RX ORDER — CLOTRIMAZOLE AND BETAMETHASONE DIPROPIONATE 10; .64 MG/G; MG/G
CREAM TOPICAL DAILY
Qty: 30 G | Refills: 4 | Status: SHIPPED | OUTPATIENT
Start: 2021-05-14 | End: 2021-07-21 | Stop reason: ALTCHOICE

## 2021-05-14 RX ORDER — FINASTERIDE 5 MG/1
5 TABLET, FILM COATED ORAL DAILY
Qty: 90 TABLET | Refills: 3 | Status: SHIPPED | OUTPATIENT
Start: 2021-05-14 | End: 2021-05-14 | Stop reason: SDUPTHER

## 2021-05-14 NOTE — PROGRESS NOTES
1  Urinary retention  POCT urine dip    POCT Measure PVR    finasteride (PROSCAR) 5 mg tablet    Urine culture    DISCONTINUED: finasteride (PROSCAR) 5 mg tablet   2  Phimosis  clotrimazole-betamethasone (LOTRISONE) 1-0 05 % cream    Urine culture       Assessment and plan:       1  BPH with resolved urinary retention  - demonstrating excellent bladder emptying on finasteride monotherapy  - avoid alpha blockade due to baseline low blood pressure  - f/u 1 year for PVR and symptom reassessment  - encouraged to contact us sooner with worsening urianry symptoms  2  Phimosis  - start Lotrisone cream  -reviewed proper hygiene        Amelia Wu PA-C      Chief Complaint     Urinary retention follow-up    History of Present Illness     Ruth Templeton is a 80 y o  male presenting for follow up of LUTS  Previously seen for urinary retention  returning for hospital follow-up after being seen on 3 occasions as an inpatient for urinary retention  He was initially seen in September after a fall with vertebral fractures  He was discharged with a Barton catheter, on Flomax, and plan to return for void trial   He re-presented 2 days later with paraphimosis, rapid atrial fibrillation which required chemical conversion, and pansensitive E coli infection  Patient underwent a successful voiding trial in the office  He has been catheter free since  Was on tamsulosin and finasteride for period of time  At his last visit initially the tamsulosin was discontinued due to some hypotension  He has been on finasteride monotherapy over the past 6 months  He is overall happy with his lower urinary tract symptoms  Denies any dysuria, gross hematuria, suprapubic pressure, flank pain, fevers, or chills  Urine dip shows small leukocytes, nitrite negative, tract blood     PVR 27mL    Laboratory     Lab Results   Component Value Date    CREATININE 1 10 10/08/2020       Review of Systems     Review of Systems Constitutional: Negative for chills and fever  HENT: Negative  Eyes: Negative  Respiratory: Negative for cough and shortness of breath  Cardiovascular: Negative for chest pain  Gastrointestinal: Negative for constipation, diarrhea, nausea and vomiting  Endocrine: Negative  Genitourinary: Negative for difficulty urinating, dysuria, enuresis, flank pain, frequency, hematuria and urgency  Musculoskeletal: Negative  Skin: Negative  Allergies     No Known Allergies    Physical Exam     Physical Exam  Vitals signs and nursing note reviewed  Constitutional:       General: He is not in acute distress  Appearance: Normal appearance  He is well-developed  He is not ill-appearing, toxic-appearing or diaphoretic  HENT:      Head: Normocephalic and atraumatic  Right Ear: External ear normal       Left Ear: External ear normal       Ears:      Comments: Hard of hearing  Eyes:      General: No scleral icterus  Right eye: No discharge  Left eye: No discharge  Cardiovascular:      Rate and Rhythm: Normal rate  Pulmonary:      Effort: Pulmonary effort is normal    Abdominal:      Tenderness: There is no right CVA tenderness or left CVA tenderness  Genitourinary:     Comments: Phimosis present  Unable to expose glans  Musculoskeletal:      Comments: Slow gait  Skin:     General: Skin is warm and dry  Neurological:      Mental Status: He is alert and oriented to person, place, and time  Psychiatric:         Mood and Affect: Mood normal          Behavior: Behavior normal          Thought Content:  Thought content normal          Judgment: Judgment normal            Vital Signs     Vitals:    05/14/21 1330   BP: 112/62   BP Location: Left arm   Patient Position: Sitting   Cuff Size: Adult   Weight: 66 2 kg (146 lb)   Height: 5' (1 524 m)         Current Medications       Current Outpatient Medications:     acetaminophen (TYLENOL) 325 mg tablet, Take 2 tablets (650 mg total) by mouth every 6 (six) hours as needed for mild pain, headaches or fever, Disp: , Rfl: 0    aspirin (ECOTRIN LOW STRENGTH) 81 mg EC tablet, Take 1 tablet (81 mg total) by mouth daily, Disp:  , Rfl: 0    B Complex Vitamins (VITAMIN B COMPLEX PO), Take 482 2 mg by mouth daily, Disp: , Rfl:     cholecalciferol (VITAMIN D3) 1,000 units tablet, Take 2,000 Units by mouth daily , Disp: , Rfl:     clotrimazole-betamethasone (LOTRISONE) 1-0 05 % cream, Apply topically daily, Disp: 30 g, Rfl: 4    finasteride (PROSCAR) 5 mg tablet, Take 1 tablet (5 mg total) by mouth daily, Disp: 90 tablet, Rfl: 3    hydrochlorothiazide (MICROZIDE) 12 5 mg capsule, Take 1 capsule (12 5 mg total) by mouth daily, Disp: 30 capsule, Rfl: 0    metoprolol tartrate (LOPRESSOR) 25 mg tablet, Take 1 tablet (25 mg total) by mouth every 12 (twelve) hours, Disp: 60 tablet, Rfl: 0    Omega-3 1000 MG CAPS, Take 1 tablet by mouth daily, Disp: , Rfl:     simvastatin (ZOCOR) 20 mg tablet, Take 1 tablet (20 mg total) by mouth daily, Disp: 30 tablet, Rfl: 0    sodium chloride (OCEAN) 0 65 % nasal spray, 1 spray into each nostril as needed for congestion (Or nasal dryness; or if with nasal bleeding ), Disp: , Rfl: 0    ascorbic acid (VITAMIN C) 500 MG tablet, Take 1 tablet (500 mg total) by mouth 2 (two) times a day (Patient not taking: Reported on 5/14/2021), Disp: 60 each, Rfl: 0    famotidine (PEPCID) 20 mg tablet, Take 1 tablet by mouth daily, Disp: , Rfl:     ferrous gluconate (FERGON) 324 mg tablet, Take 1 tablet (324 mg total) by mouth 2 (two) times a day before meals (Patient not taking: Reported on 5/14/2021), Disp: 60 tablet, Rfl: 0    gabapentin (NEURONTIN) 100 mg capsule, Take 1 capsule (100 mg total) by mouth daily at bedtime, Disp: 30 capsule, Rfl: 0    hydrocortisone 1 % cream, Apply topically 4 (four) times a day as needed for irritation for up to 14 days, Disp: 30 g, Rfl: 0    Multiple Vitamins-Minerals (PRESERVISION AREDS 2 PO), Take 24 mg by mouth 2 (two) times a day, Disp: , Rfl:     polyethylene glycol (MIRALAX) 17 g packet, Take 17 g by mouth daily (Patient not taking: Reported on 5/14/2021), Disp: 14 each, Rfl: 0      Active Problems     Patient Active Problem List   Diagnosis    Essential hypertension    Dyslipidemia    Hyperlipidemia    Closed compression fracture of thoracolumbar vertebra (HCC)    Ambulatory dysfunction    Constipation    Irregular heartbeat    NPH (normal pressure hydrocephalus) (HCC)    Dyspepsia    Macular degeneration    Generalized weakness    Urinary retention    Anemia    Paraphimosis    Atrial fibrillation with rapid ventricular response (HCC)    Sepsis (HCC)    UTI (urinary tract infection)    Positive blood culture    Chronic diastolic congestive heart failure (HCC)    Neuropathy    SIRS (systemic inflammatory response syndrome) ([facility]    Fever         Past Medical History     Past Medical History:   Diagnosis Date    OLLIE (acute kidney injury) ([address] )     Cognitive communication deficit     Dementia ([address] )     Fracture of fifth lumbar vertebra ([address] )     Fracture of first lumbar vertebra ([address] )     Fracture of fourth lumbar vertebra ([address] )     Fracture of second lumbar vertebra ([address] )     Fracture of T11 vertebra with routine healing     Fracture of T12 vertebra with routine healing     Fracture of third thoracic vertebra ([address] )     Gait abnormality     Hyperlipidemia     Hypertension     Macular degeneration     Normal pressure hydrocephalus ([address] )          Surgical History     History reviewed  No pertinent surgical history  Family History     No family history on file        Social History     Social History       Radiology

## 2021-05-17 LAB
BACTERIA UR CULT: ABNORMAL
BACTERIA UR CULT: ABNORMAL

## 2021-05-18 ENCOUNTER — TELEPHONE (OUTPATIENT)
Dept: UROLOGY | Facility: CLINIC | Age: 86
End: 2021-05-18

## 2021-05-18 DIAGNOSIS — N30.00 ACUTE CYSTITIS WITHOUT HEMATURIA: Primary | ICD-10-CM

## 2021-05-18 RX ORDER — CEPHALEXIN 250 MG/1
250 CAPSULE ORAL EVERY 6 HOURS SCHEDULED
Qty: 20 CAPSULE | Refills: 0 | Status: SHIPPED | OUTPATIENT
Start: 2021-05-18 | End: 2021-05-23

## 2021-05-18 NOTE — TELEPHONE ENCOUNTER
Patient's family member called back, and I relayed the providers message  Patient's family member is aware of medicine that was called in

## 2021-05-18 NOTE — TELEPHONE ENCOUNTER
Attempted to call patient at this time  No answer, left message with office number for call back     When patient or patient family returns call please advise of provider message  Thank you         ----- Message from Uzma Young PA-C sent at 5/18/2021  9:46 AM EDT -----  Please let the patient/ family know that his urine specimen was positive for bacteria  Will cover with a 5 day course of Keflex  Prescription sent to pharmacy  Thank you

## 2021-07-12 ENCOUNTER — TELEPHONE (OUTPATIENT)
Dept: OTHER | Facility: OTHER | Age: 86
End: 2021-07-12

## 2021-07-12 ENCOUNTER — APPOINTMENT (EMERGENCY)
Dept: RADIOLOGY | Facility: HOSPITAL | Age: 86
End: 2021-07-12
Payer: MEDICARE

## 2021-07-12 ENCOUNTER — HOSPITAL ENCOUNTER (EMERGENCY)
Facility: HOSPITAL | Age: 86
Discharge: HOME | End: 2021-07-12
Attending: EMERGENCY MEDICINE
Payer: MEDICARE

## 2021-07-12 ENCOUNTER — APPOINTMENT (EMERGENCY)
Dept: CT IMAGING | Facility: HOSPITAL | Age: 86
End: 2021-07-12
Payer: MEDICARE

## 2021-07-12 VITALS
RESPIRATION RATE: 22 BRPM | SYSTOLIC BLOOD PRESSURE: 146 MMHG | OXYGEN SATURATION: 96 % | TEMPERATURE: 98.9 F | DIASTOLIC BLOOD PRESSURE: 70 MMHG | HEART RATE: 62 BPM

## 2021-07-12 DIAGNOSIS — M54.50 LOW BACK PAIN: Primary | ICD-10-CM

## 2021-07-12 DIAGNOSIS — R26.2 AMBULATORY DYSFUNCTION: ICD-10-CM

## 2021-07-12 LAB
ALBUMIN SERPL BCP-MCNC: 3.8 G/DL (ref 3.5–5)
ALP SERPL-CCNC: 65 U/L (ref 46–116)
ALT SERPL W P-5'-P-CCNC: 18 U/L (ref 12–78)
ANION GAP SERPL CALCULATED.3IONS-SCNC: 8 MMOL/L (ref 4–13)
APTT PPP: 29 SECONDS (ref 23–37)
AST SERPL W P-5'-P-CCNC: 15 U/L (ref 5–45)
BACTERIA UR QL AUTO: ABNORMAL /HPF
BASOPHILS # BLD AUTO: 0.03 THOUSANDS/ΜL (ref 0–0.1)
BASOPHILS NFR BLD AUTO: 0 % (ref 0–1)
BILIRUB SERPL-MCNC: 1.03 MG/DL (ref 0.2–1)
BILIRUB UR QL STRIP: NEGATIVE
BILIRUB UR QL STRIP: NEGATIVE
BUN SERPL-MCNC: 25 MG/DL (ref 5–25)
CALCIUM SERPL-MCNC: 8.9 MG/DL (ref 8.3–10.1)
CHLORIDE SERPL-SCNC: 103 MMOL/L (ref 100–108)
CLARITY UR: CLEAR
CLARITY UR: CLEAR
CO2 SERPL-SCNC: 28 MMOL/L (ref 21–32)
COLOR UR: YELLOW
COLOR UR: YELLOW
CREAT SERPL-MCNC: 1.15 MG/DL (ref 0.6–1.3)
EOSINOPHIL # BLD AUTO: 0.05 THOUSAND/ΜL (ref 0–0.61)
EOSINOPHIL NFR BLD AUTO: 1 % (ref 0–6)
ERYTHROCYTE [DISTWIDTH] IN BLOOD BY AUTOMATED COUNT: 14.1 % (ref 11.6–15.1)
GFR SERPL CREATININE-BSD FRML MDRD: 57 ML/MIN/1.73SQ M
GLUCOSE SERPL-MCNC: 120 MG/DL (ref 65–140)
GLUCOSE UR STRIP-MCNC: NEGATIVE MG/DL
GLUCOSE UR STRIP-MCNC: NEGATIVE MG/DL
HCT VFR BLD AUTO: 44.8 % (ref 36.5–49.3)
HGB BLD-MCNC: 14.9 G/DL (ref 12–17)
HGB UR QL STRIP.AUTO: NEGATIVE
HGB UR QL STRIP.AUTO: NEGATIVE
IMM GRANULOCYTES # BLD AUTO: 0.04 THOUSAND/UL (ref 0–0.2)
IMM GRANULOCYTES NFR BLD AUTO: 1 % (ref 0–2)
INR PPP: 1.09 (ref 0.84–1.19)
KETONES UR STRIP-MCNC: NEGATIVE MG/DL
KETONES UR STRIP-MCNC: NEGATIVE MG/DL
LACTATE SERPL-SCNC: 2 MMOL/L (ref 0.5–2)
LEUKOCYTE ESTERASE UR QL STRIP: ABNORMAL
LEUKOCYTE ESTERASE UR QL STRIP: NEGATIVE
LYMPHOCYTES # BLD AUTO: 1.71 THOUSANDS/ΜL (ref 0.6–4.47)
LYMPHOCYTES NFR BLD AUTO: 20 % (ref 14–44)
MCH RBC QN AUTO: 30.3 PG (ref 26.8–34.3)
MCHC RBC AUTO-ENTMCNC: 33.3 G/DL (ref 31.4–37.4)
MCV RBC AUTO: 91 FL (ref 82–98)
MONOCYTES # BLD AUTO: 0.67 THOUSAND/ΜL (ref 0.17–1.22)
MONOCYTES NFR BLD AUTO: 8 % (ref 4–12)
NEUTROPHILS # BLD AUTO: 5.91 THOUSANDS/ΜL (ref 1.85–7.62)
NEUTS SEG NFR BLD AUTO: 70 % (ref 43–75)
NITRITE UR QL STRIP: NEGATIVE
NITRITE UR QL STRIP: NEGATIVE
NON-SQ EPI CELLS URNS QL MICRO: ABNORMAL /HPF
NRBC BLD AUTO-RTO: 0 /100 WBCS
PH UR STRIP.AUTO: 6.5 [PH]
PH UR STRIP.AUTO: 7 [PH] (ref 4.5–8)
PLATELET # BLD AUTO: 144 THOUSANDS/UL (ref 149–390)
PMV BLD AUTO: 10.8 FL (ref 8.9–12.7)
POTASSIUM SERPL-SCNC: 3.4 MMOL/L (ref 3.5–5.3)
PROCALCITONIN SERPL-MCNC: <0.05 NG/ML
PROT SERPL-MCNC: 7.3 G/DL (ref 6.4–8.2)
PROT UR STRIP-MCNC: NEGATIVE MG/DL
PROT UR STRIP-MCNC: NEGATIVE MG/DL
PROTHROMBIN TIME: 14.2 SECONDS (ref 11.6–14.5)
RBC # BLD AUTO: 4.91 MILLION/UL (ref 3.88–5.62)
RBC #/AREA URNS AUTO: ABNORMAL /HPF
SODIUM SERPL-SCNC: 139 MMOL/L (ref 136–145)
SP GR UR STRIP.AUTO: 1.01 (ref 1–1.03)
SP GR UR STRIP.AUTO: 1.01 (ref 1–1.03)
TROPONIN I SERPL-MCNC: <0.02 NG/ML
UROBILINOGEN UR QL STRIP.AUTO: 0.2 E.U./DL
UROBILINOGEN UR QL STRIP.AUTO: 0.2 E.U./DL
WBC # BLD AUTO: 8.41 THOUSAND/UL (ref 4.31–10.16)
WBC #/AREA URNS AUTO: ABNORMAL /HPF

## 2021-07-12 PROCEDURE — 70450 CT HEAD/BRAIN W/O DYE: CPT

## 2021-07-12 PROCEDURE — 81003 URINALYSIS AUTO W/O SCOPE: CPT

## 2021-07-12 PROCEDURE — 97163 PT EVAL HIGH COMPLEX 45 MIN: CPT

## 2021-07-12 PROCEDURE — 83605 ASSAY OF LACTIC ACID: CPT | Performed by: EMERGENCY MEDICINE

## 2021-07-12 PROCEDURE — 87040 BLOOD CULTURE FOR BACTERIA: CPT | Performed by: EMERGENCY MEDICINE

## 2021-07-12 PROCEDURE — 84484 ASSAY OF TROPONIN QUANT: CPT | Performed by: EMERGENCY MEDICINE

## 2021-07-12 PROCEDURE — 71046 X-RAY EXAM CHEST 2 VIEWS: CPT

## 2021-07-12 PROCEDURE — 72100 X-RAY EXAM L-S SPINE 2/3 VWS: CPT

## 2021-07-12 PROCEDURE — 84145 PROCALCITONIN (PCT): CPT | Performed by: EMERGENCY MEDICINE

## 2021-07-12 PROCEDURE — 99285 EMERGENCY DEPT VISIT HI MDM: CPT | Performed by: EMERGENCY MEDICINE

## 2021-07-12 PROCEDURE — G1004 CDSM NDSC: HCPCS

## 2021-07-12 PROCEDURE — 85730 THROMBOPLASTIN TIME PARTIAL: CPT | Performed by: EMERGENCY MEDICINE

## 2021-07-12 PROCEDURE — 85025 COMPLETE CBC W/AUTO DIFF WBC: CPT | Performed by: EMERGENCY MEDICINE

## 2021-07-12 PROCEDURE — 93005 ELECTROCARDIOGRAM TRACING: CPT

## 2021-07-12 PROCEDURE — 80053 COMPREHEN METABOLIC PANEL: CPT | Performed by: EMERGENCY MEDICINE

## 2021-07-12 PROCEDURE — 85610 PROTHROMBIN TIME: CPT | Performed by: EMERGENCY MEDICINE

## 2021-07-12 PROCEDURE — 99285 EMERGENCY DEPT VISIT HI MDM: CPT

## 2021-07-12 PROCEDURE — 81001 URINALYSIS AUTO W/SCOPE: CPT | Performed by: EMERGENCY MEDICINE

## 2021-07-12 PROCEDURE — 36415 COLL VENOUS BLD VENIPUNCTURE: CPT | Performed by: EMERGENCY MEDICINE

## 2021-07-12 PROCEDURE — 97167 OT EVAL HIGH COMPLEX 60 MIN: CPT

## 2021-07-12 RX ORDER — ACETAMINOPHEN 325 MG/1
975 TABLET ORAL ONCE
Status: COMPLETED | OUTPATIENT
Start: 2021-07-12 | End: 2021-07-12

## 2021-07-12 RX ORDER — LIDOCAINE 50 MG/G
1 PATCH TOPICAL ONCE
Status: DISCONTINUED | OUTPATIENT
Start: 2021-07-12 | End: 2021-07-12 | Stop reason: HOSPADM

## 2021-07-12 RX ADMIN — LIDOCAINE 1 PATCH: 50 PATCH CUTANEOUS at 12:05

## 2021-07-12 RX ADMIN — ACETAMINOPHEN 975 MG: 325 TABLET, FILM COATED ORAL at 12:05

## 2021-07-12 NOTE — OCCUPATIONAL THERAPY NOTE
Occupational Therapy Evaluation     Patient Name: Elizabeth Roy  XWLVE'E Date: 7/12/2021  Problem List  Active Problems:    * No active hospital problems  *    Past Medical History  Past Medical History:   Diagnosis Date    [facility]) [facility]     Cognitive communication deficit     Dementia ([facility]     Fracture of fifth lumbar vertebra ([facility]     Fracture of first lumbar vertebra ([facility]     Fracture of fourth lumbar vertebra ([facility]     Fracture of second lumbar vertebra ([facility]     Fracture of T11 vertebra with routine healing     Fracture of T12 vertebra with routine healing     Fracture of third thoracic vertebra ([facility]     Gait abnormality     Hyperlipidemia     Hypertension     Macular degeneration     Normal pressure hydrocephalus ([facility]      Past Surgical History  No past surgical history on file           07/12/21 1414   OT Last Visit   OT Visit Date 07/12/21   Note Type   Note type Evaluation   Restrictions/Precautions   Weight Bearing Precautions Per Order No   Other Precautions Cognitive; Bed Alarm; Chair Alarm; Fall Risk;Pain   Pain Assessment   Pain Assessment Tool Pain Assessment not indicated - pt denies pain   Home Living   Type of [address] [place] [address] Two level;1/2 bath on main level; Able to live on main level with bedroom/bathroom;Stairs to enter with rails  (4 [address], 13 steps to 2nd floor)   Bathroom Shower/Tub Tub/shower unit   Bathroom Toilet Standard   Bathroom Equipment Grab bars in shower; Shower chair   [address] Walker;Quad cane   Additional Comments use of RW at baseline   Prior Function   Level of [place] Needs assistance with ADLs and functional mobility; Needs assistance with IADLs   Lives With Spouse   Receives Help From Family; Outpatient therapy  (just d/c from OP PT 1 wk ago)   ADL Assistance Needs assistance  ((A) with showers and LB dressing)   IADLs Needs assistance  (pt does complete some light dusting at home) Falls in the last 6 months 0   Vocational Retired  ( and [employer] Vet)   Comments (-)drives   Lifestyle   Autonomy PTA pt living with wife in [facility], pt requiring (A) with ADLs and IADLs, use of RW at baseline, (-)falls, (-)drives   Reciprocal Relationships supportive wife   Service to Others retired army vet and    [address] enjoys "just walking around on my deck"   Subjective   Subjective "I like to explore the back deck"   ADL   Eating Assistance 5  [address] 3  Moderate Assistance   [address] 2  Maximal Assistance   LB Dressing Deficit Increased time to complete;Supervision/safety;Verbal cueing; Thread RLE into underwear; Thread LLE into underwear; Thread LLE into pants; Thread RLE into pants; Don/doff R shoe;Don/doff L shoe  (Able to pull over hips with posterior support on bed)   Toileting Assistance  2  Maximal Assistance   Bed Mobility   Supine to Sit 2  Maximal assistance   Additional items Assist x 1; Increased time required;Verbal cues;LE management   Transfers   Sit to Stand 3  Moderate assistance   Additional items Assist x 1; Increased time required;Verbal cues   Stand to Sit 3  Moderate assistance   Additional items Assist x 1; Increased time required;Verbal cues   Additional Comments progressing to min A x1   Functional Mobility   Functional Mobility 4  Minimal assistance   Additional Comments Ax1, limited distance with RW   Additional items Rolling walker   Balance   Static Sitting Fair +   Dynamic Sitting Fair   Static Standing Poor +   Dynamic Standing Poor +   Ambulatory Poor +   Activity Tolerance   Activity Tolerance Patient tolerated treatment well   Medical Staff Made Aware PT DEBORAH Romero Assessment   RUE Assessment WFL   LUE Assessment   LUE Assessment WFL   Hand Function   Gross Motor Coordination Functional   Fine Motor Coordination Functional   Sensation   Light Touch No apparent deficits   Sharp/Dull No apparent deficits   Cognition   Overall Cognitive Status Impaired   Arousal/Participation Alert; Cooperative   Attention Attends with cues to redirect   Orientation Level Oriented to person;Oriented to place; Disoriented to time;Disoriented to situation   Memory Decreased recall of precautions;Decreased recall of recent events;Decreased short term memory   Following Commands Follows one step commands with increased time or repetition   Comments Pleasant and cooperative, some STM deficits, limited insights into deficits   Assessment   Limitation Decreased ADL status; Decreased UE strength;Decreased Safe judgement during ADL;Decreased cognition;Decreased endurance;Decreased self-care trans;Decreased high-level ADLs   Prognosis Good   Assessment Patient is a 80 y o  male admitted to [address] [place] [address] on 7/12/2021 due to complaints of dizziness  Comorbidities affecting pt's physical performance at time of assessment include generalized weakness, macular degeneration, hx of closed compression fx of thoraculumbar vertebra, dementia  Patient has active OT orders  PTA pt living with wife in [facility], pt requiring (A) with ADLs and IADLs, use of RW at baseline, (-)falls, (-)drives  Personal factors affecting pt at time of IE include:steps to enter environment, limited home support, difficulty performing ADLS, difficulty performing IADLS , limited insight into deficits and flat affect  At the time of evaluation patient currently requires (S)-min A for UB ADLs, mod-max A for LB ADLs, min A for functional transfers, and min A for functional mobility   The following deficits affected patient's occupational performance weakness, decreased functional strength, decreased functional balance, decreased activity tolerance, decreased safety awareness, impaired memory, impaired sequencing, impaired problem solving, impulsivity, increased pain, impaired interpersonal skills and decreased coping skills  Patient would benefit from skilled OT services while in the hospital to address above deficits  Occupational performance areas to be addressed include ADL retraining, bed mobility, functional transfer training, endurance training, cognitive reorientation, patient/family training, equipment evaluation/education, compensatory technique education, activity engagement and activity tolerance in order to maximize patient's level of function  The patient's raw score on the AM-[facility] Daily Activity inpatient short form is 15, standardized score is 34 69, less than 39 4  Patients at this level are likely to benefit from discharge to post-acute rehabilitation services  Recommend d/c to PAR when medically cleared  Will continue to follow 3-5x/wk to address goals listed below  Goals   Patient Goals to eat lunch   LTG Time Frame 10-14   Long Term Goal see goals listed below   Plan   Treatment Interventions ADL retraining;Functional transfer training;UE strengthening/ROM; Endurance training;Cognitive reorientation;Patient/family training;Equipment evaluation/education; Compensatory technique education; Energy conservation; Activityengagement   Goal Expiration Date 07/22/21   OT Treatment Day 0   OT Frequency 3-5x/wk   Recommendation   OT Discharge Recommendation Post acute rehabilitation services   [facility] Daily Activity Inpatient   Lower Body Dressing 2   Bathing 2   Toileting 2   Upper Body Dressing 3   Grooming 3   Eating 3   Daily Activity Raw Score 15   Daily Activity Standardized Score (Calc for Raw Score >=11) 34 69   [facility] Applied Cognition Inpatient   Following a Speech/Presentation 2   Understanding Ordinary Conversation 2   Taking Medications 2   Remembering Where Things Are Placed or Put Away 2   Remembering List of 4-5 Errands 1   Taking Care of Complicated Tasks 1   Applied Cognition Raw Score 10   Applied Cognition Standardized Score 24 98   Barthel Index   Feeding 5   Bathing 0   Grooming Score 5   Dressing Score 5   Bladder Score 5   Bowels Score 10   Toilet Use Score 5   Transfers (Bed/Chair) Score 10   Mobility (Level Surface) Score 0   Stairs Score 0   Barthel Index Score 45              Goals    -Patient will complete UB ADLs w/ mod I using AE and AD as needed    -Patient will complete LB ADLs w/ (S) using AE and AD as needed    -Patient will complete toileting w/ mod I w/ G hygiene/thoroughness    -Patient will complete bed mobility with (S) without use of bed rails    -Patient will tolerate therapeutic activities for greater than 15 min, in order to increase tolerance for functional activities      -Patient will perform functional transfers with Mod I to/from all surfaces using DME as needed    -Patient will complete functional mobility during ADL/IADL/leisure tasks with Mod I     -Patient will follow multistep instructions with no cuing to increase cognitive function and independence with tasks     -Patient will demonstrate 100% carryover of energy conservation techniques t/o functional I/ADL/leisure tasks w/o cues s/p skilled education to increase endurance during functional tasks    -Patient will increase dynamic standing balance to Good in order to complete functional mobility and ADL tasks safely            At the end of the session, all needs met and pt sitting in chair, with wife present  RN aware       Billie Villeda, OTR/L

## 2021-07-12 NOTE — PLAN OF CARE
Problem: OCCUPATIONAL THERAPY ADULT  Goal: Performs self-care activities at highest level of function for planned discharge setting  See evaluation for individualized goals  Description: Treatment Interventions: ADL retraining, Functional transfer training, UE strengthening/ROM, Endurance training, Cognitive reorientation, Patient/family training, Equipment evaluation/education, Compensatory technique education, Energy conservation, Activityengagement          See flowsheet documentation for full assessment, interventions and recommendations  Note: Limitation: Decreased ADL status, Decreased UE strength, Decreased Safe judgement during ADL, Decreased cognition, Decreased endurance, Decreased self-care trans, Decreased high-level ADLs  Prognosis: Good  Assessment: Patient is a 80 y o  male admitted to [address] [place] [address] on 7/12/2021 due to complaints of dizziness  Comorbidities affecting pt's physical performance at time of assessment include generalized weakness, macular degeneration, hx of closed compression fx of thoraculumbar vertebra, dementia  Patient has active OT orders  PTA pt living with wife in [facility], pt requiring (A) with ADLs and IADLs, use of RW at baseline, (-)falls, (-)drives  Personal factors affecting pt at time of IE include:steps to enter environment, limited home support, difficulty performing ADLS, difficulty performing IADLS , limited insight into deficits and flat affect  At the time of evaluation patient currently requires (S)-min A for UB ADLs, mod-max A for LB ADLs, min A for functional transfers, and min A for functional mobility  The following deficits affected patient's occupational performance weakness, decreased functional strength, decreased functional balance, decreased activity tolerance, decreased safety awareness, impaired memory, impaired sequencing, impaired problem solving, impulsivity, increased pain, impaired interpersonal skills and decreased coping skills   Patient would benefit from skilled OT services while in the hospital to address above deficits  Occupational performance areas to be addressed include ADL retraining, bed mobility, functional transfer training, endurance training, cognitive reorientation, patient/family training, equipment evaluation/education, compensatory technique education, activity engagement and activity tolerance in order to maximize patient's level of function  The patient's raw score on the AM-PAC Daily Activity inpatient short form is 15, standardized score is 34 69, less than 39 4  Patients at this level are likely to benefit from discharge to post-acute rehabilitation services  Recommend d/c to PAR when medically cleared  Will continue to follow 3-5x/wk to address goals listed below        OT Discharge Recommendation: Post acute rehabilitation services

## 2021-07-12 NOTE — ED PROVIDER NOTES
History  Chief Complaint   Patient presents with    Dizziness     Pt brought to ER via EMS from home with c/o dizziness and frequent falls  Pt confused-unable to stated last fall  Per EMS, pt's wife called EMS for increased weakness  Pt stopped physical therapy 1 week ago     Patient is an 63-year-old male with a history of dementia, hypertension, hyperlipidemia who presents with lightheadedness  Patient reports lightheadedness intermittently for the past 1-2 weeks  However patient states that this has resolved  His family called EMS this morning because they were concerned with his gait instability, worsening back pain and fall risk  According to wife and EMS, patient stopped physical therapy about 1 week ago  Patient is unable to tell me when his last fall was  He denies any pain at rest but admits to back pain with movement  He  denies any chest or abdominal pain  He denies shortness of breath  He reports ambulating with a cane and walker  Family states that he has had more difficulty ambulating over the last week due to chronic back pain  He was previously diagnosed with lumbar compression fractures  Wife states that his mental status is at baseline and denies any confusion  She was concerned that he has to ambulate up and down steps and fears he is going to fall  History provided by:  Patient and EMS personnel  History limited by:  Dementia  Dizziness  Quality:  Lightheadedness  Progression:  Unchanged  Chronicity:  New  Associated symptoms: weakness (generalized)    Associated symptoms: no chest pain, no diarrhea, no headaches, no nausea, no palpitations, no shortness of breath and no vomiting        Prior to Admission Medications   Prescriptions Last Dose Informant Patient Reported? Taking?    B Complex Vitamins (VITAMIN B COMPLEX PO) 7/12/2021 at 0900 Outside Facility (Specify) Yes Yes   Sig: Take 482 2 mg by mouth daily   Multiple Vitamins-Minerals (PRESERVISION AREDS 2 PO) 7/12/2021 at 0900 Outside Facility ([address] [place] [address]) Yes Yes   Sig: Take 24 mg by mouth 2 (two) times a day   [place]-3 1000 MG CAPS 7/12/2021 at 0900 Outside Facility ([address] [place] [address]) Yes Yes   Sig: Take 1 tablet by mouth daily   acetaminophen (TYLENOL) 325 mg tablet  Outside Facility (Specify) No No   Sig: Take 2 tablets (650 mg total) by mouth every 6 (six) hours as needed for mild pain, headaches or fever   ascorbic acid (VITAMIN C) 500 MG tablet  Outside Facility (Specify) No No   Sig: Take 1 tablet (500 mg total) by mouth 2 (two) times a day   Patient not taking: Reported on 5/14/2021   aspirin (ECOTRIN LOW STRENGTH) 81 mg EC tablet 7/12/2021 at 0900 Outside Facility (Specify) No Yes   Sig: Take 1 tablet (81 mg total) by mouth daily   cholecalciferol (VITAMIN D3) 1,000 units tablet 7/12/2021 at 0900 Outside Facility (Specify) Yes Yes   Sig: Take 1,000 Units by mouth 2 (two) times a day    clotrimazole-betamethasone (LOTRISONE) 1-0 05 % cream   No No   Sig: Apply topically daily   famotidine (PEPCID) 20 mg tablet Not Taking at Unknown time Outside Facility (Specify) Yes No   Sig: Take 1 tablet by mouth daily   Patient not taking: Reported on 7/12/2021   ferrous gluconate (FERGON) 324 mg tablet Not Taking at Unknown time Outside Facility (Specify) No No   Sig: Take 1 tablet (324 mg total) by mouth 2 (two) times a day before meals   Patient not taking: Reported on 5/14/2021   finasteride (PROSCAR) 5 mg tablet 7/12/2021 at 0900  No Yes   Sig: Take 1 tablet (5 mg total) by mouth daily   gabapentin (NEURONTIN) 100 mg capsule  Outside Facility (Specify) No No   Sig: Take 1 capsule (100 mg total) by mouth daily at bedtime   hydrochlorothiazide (MICROZIDE) 12 5 mg capsule 7/12/2021 at 0900 Outside Facility (Specify) No Yes   Sig: Take 1 capsule (12 5 mg total) by mouth daily   hydrocortisone 1 % cream   No No   Sig: Apply topically 4 (four) times a day as needed for irritation for up to 14 days   metoprolol tartrate (LOPRESSOR) 25 mg tablet 7/12/2021 at 0900 Outside Facility (Specify) No Yes   Sig: Take 1 tablet (25 mg total) by mouth every 12 (twelve) hours   Patient taking differently: Take 12 5 mg by mouth every 12 (twelve) hours    polyethylene glycol (MIRALAX) 17 g packet Not Taking at Unknown time Outside Facility (Specify) No No   Sig: Take 17 g by mouth daily   Patient not taking: Reported on 2021   simvastatin (ZOCOR) 20 mg tablet 2021 at 0900 Outside Facility (Specify) No Yes   Sig: Take 1 tablet (20 mg total) by mouth daily   Patient taking differently: Take 40 mg by mouth daily    sodium chloride (OCEAN) 0 65 % nasal spray Not Taking at Unknown time Outside Facility (Specify) No No   Si spray into each nostril as needed for congestion (Or nasal dryness; or if with nasal bleeding )   Patient not taking: Reported on 2021      Facility-Administered Medications: None       Past Medical History:   Diagnosis Date    [facility]) [facility]     Cognitive communication deficit     Dementia ([facility] )     Fracture of fifth lumbar vertebra ([facility]     Fracture of first lumbar vertebra ([facility]     Fracture of fourth lumbar vertebra ([facility]     Fracture of second lumbar vertebra ([facility]     Fracture of T11 vertebra with routine healing     Fracture of T12 vertebra with routine healing     Fracture of third thoracic vertebra ([facility]     Gait abnormality     Hyperlipidemia     Hypertension     Macular degeneration     Normal pressure hydrocephalus (HCC)        No past surgical history on file  No family history on file  I have reviewed and agree with the history as documented      E-Cigarette/Vaping    E-Cigarette Use Never User      E-Cigarette/Vaping Substances     Social History     Tobacco Use    Smoking status: Never Smoker    Smokeless tobacco: Never Used   Vaping Use    Vaping Use: Never used   Substance Use Topics    Alcohol use: Never    Drug use: Never       Review of Systems   Constitutional: Negative for chills, diaphoresis and fever  HENT: Negative for nosebleeds, sore throat and trouble swallowing  Eyes: Negative for photophobia, pain and visual disturbance  Respiratory: Negative for cough, chest tightness and shortness of breath  Cardiovascular: Negative for chest pain, palpitations and leg swelling  Gastrointestinal: Negative for abdominal pain, constipation, diarrhea, nausea and vomiting  Endocrine: Negative for polydipsia and polyuria  Genitourinary: Negative for difficulty urinating, dysuria and hematuria  Musculoskeletal: Negative for back pain, neck pain and neck stiffness  Skin: Negative for pallor and rash  Neurological: Positive for weakness (generalized) and light-headedness  Negative for dizziness, syncope and headaches  All other systems reviewed and are negative  Physical Exam  Physical Exam  Vitals and nursing note reviewed  Constitutional:       General: He is not in acute distress  Appearance: He is well-developed  HENT:      Head: Normocephalic and atraumatic  Eyes:      Pupils: Pupils are equal, round, and reactive to light  Cardiovascular:      Rate and Rhythm: Normal rate and regular rhythm  Pulses: Normal pulses  Heart sounds: Normal heart sounds  Pulmonary:      Effort: Pulmonary effort is normal  No respiratory distress  Breath sounds: Normal breath sounds  Abdominal:      General: There is no distension  Palpations: Abdomen is soft  Abdomen is not rigid  Tenderness: There is no abdominal tenderness  There is no guarding or rebound  Musculoskeletal:         General: No tenderness  Normal range of motion  Cervical back: Normal range of motion and neck supple  Lymphadenopathy:      Cervical: No cervical adenopathy  Skin:     General: Skin is warm and dry  Capillary Refill: Capillary refill takes less than 2 seconds  Neurological:      Mental Status: He is alert  He is disoriented  GCS: GCS eye subscore is 4  GCS verbal subscore is 4  GCS motor subscore is 6  Cranial Nerves: No cranial nerve deficit  Sensory: No sensory deficit  Comments: Disoriented to time  Speech fluent  Visual fields intact  Cerebellar exam normal          Vital Signs  ED Triage Vitals   Temperature Pulse Respirations Blood Pressure SpO2   07/12/21 1000 07/12/21 1000 07/12/21 1000 07/12/21 1000 07/12/21 1000   98 9 °F (37 2 °C) 70 20 (!) 174/81 97 %      Temp Source Heart Rate Source Patient Position - Orthostatic VS BP Location FiO2 (%)   07/12/21 1000 07/12/21 1000 07/12/21 1000 07/12/21 1000 --   Oral Monitor Lying Right arm       Pain Score       07/12/21 1413       No Pain           Vitals:    07/12/21 1000 07/12/21 1300   BP: (!) 174/81 146/70   Pulse: 70 62   Patient Position - Orthostatic VS: Lying Lying         Visual Acuity  Visual Acuity      Most Recent Value   L Pupil Size (mm)  3   R Pupil Size (mm)  3          ED Medications  Medications   acetaminophen (TYLENOL) tablet 975 mg (975 mg Oral Given 7/12/21 1205)       Diagnostic Studies  Results Reviewed     Procedure Component Value Units Date/Time    Blood culture #2 [[id number]]  (Abnormal) Collected: 07/12/21 1030    Lab Status: Preliminary result Specimen: Blood from Arm, Right Updated: 07/13/21 1025     Gram Stain Result Gram positive cocci in clusters    Blood culture #1 [[id number]] Collected: 07/12/21 1025    Lab Status: Preliminary result Specimen: Blood from Arm, Left Updated: 07/12/21 1701     Blood Culture Received in Microbiology Lab  Culture in Progress      Procalcitonin with AM Reflex [[id number]]  (Normal) Collected: 07/12/21 1025    Lab Status: Final result Specimen: Blood from Arm, Left Updated: 07/12/21 1516     Procalcitonin <0 05 ng/ml     UA w Reflex to Microscopic w Reflex to Culture [[id number]]  (Abnormal) Collected: 07/12/21 1030    Lab Status: Final result Specimen: Urine Updated: 07/12/21 1129     Color, UA Yellow     Clarity, UA Clear Specific Gravity, UA 1 015     pH, UA 6 5     Leukocytes, UA Moderate     Nitrite, UA Negative     Protein, UA Negative mg/dl      Glucose, UA Negative mg/dl      Ketones, UA Negative mg/dl      Urobilinogen, UA 0 2 E U /dl      Bilirubin, UA Negative     Blood, UA Negative    Urine Microscopic [[id number]]  (Abnormal) Collected: 07/12/21 1030    Lab Status: Final result Specimen: Urine Updated: 07/12/21 1129     RBC, UA 0-1 /hpf      WBC, UA 4-10 /hpf      Epithelial Cells None Seen /hpf      Bacteria, UA Occasional /hpf     Troponin I [[id number]]  (Normal) Collected: 07/12/21 1025    Lab Status: Final result Specimen: Blood from Arm, Left Updated: 07/12/21 1112     Troponin I <0 02 ng/mL     Comprehensive metabolic panel [[id number]]  (Abnormal) Collected: 07/12/21 1025    Lab Status: Final result Specimen: Blood from Arm, Left Updated: 07/12/21 1109     Sodium 139 mmol/L      Potassium 3 4 mmol/L      Chloride 103 mmol/L      CO2 28 mmol/L      ANION GAP 8 mmol/L      BUN 25 mg/dL      Creatinine 1 15 mg/dL      Glucose 120 mg/dL      Calcium 8 9 mg/dL      AST 15 U/L      ALT 18 U/L      Alkaline Phosphatase 65 U/L      Total Protein 7 3 g/dL      Albumin 3 8 g/dL      Total Bilirubin 1 03 mg/dL      eGFR 57 ml/min/1 73sq m     Narrative:      [facility] guidelines for Chronic Kidney Disease (CKD):     Stage 1 with normal or high GFR (GFR > 90 mL/min/1 73 square meters)    Stage 2 Mild CKD (GFR = 60-89 mL/min/1 73 square meters)    Stage 3A Moderate CKD (GFR = 45-59 mL/min/1 73 square meters)    Stage 3B Moderate CKD (GFR = 30-44 mL/min/1 73 square meters)    Stage 4 Severe CKD (GFR = 15-29 mL/min/1 73 square meters)    Stage 5 End Stage CKD (GFR <15 mL/min/1 73 square meters)  Note: GFR calculation is accurate only with a steady state creatinine    Lactic acid [[id number]]  (Normal) Collected: 07/12/21 1025    Lab Status: Final result Specimen: Blood from Arm, Left Updated: 07/12/21 1108     LACTIC ACID 2 0 mmol/L     Narrative:      Result may be elevated if tourniquet was used during collection  Urine Macroscopic, POC [[id number]] Collected: 07/12/21 1059    Lab Status: Final result Specimen: Urine Updated: 07/12/21 1101     Color, UA Yellow     Clarity, UA Clear     pH, UA 7 0     Leukocytes, UA Negative     Nitrite, UA Negative     Protein, UA Negative mg/dl      Glucose, UA Negative mg/dl      Ketones, UA Negative mg/dl      Urobilinogen, UA 0 2 E U /dl      Bilirubin, UA Negative     Blood, UA Negative     Specific Gravity, UA 1 015    Narrative:      CLINITEK RESULT    Protime-INR [[id number]]  (Normal) Collected: 07/12/21 1025    Lab Status: Final result Specimen: Blood from Arm, Left Updated: 07/12/21 1100     Protime 14 2 seconds      INR 1 09    APTT [[id number]]  (Normal) Collected: 07/12/21 1025    Lab Status: Final result Specimen: Blood from Arm, Left Updated: 07/12/21 1100     PTT 29 seconds     CBC and differential [[id number]]  (Abnormal) Collected: 07/12/21 1025    Lab Status: Final result Specimen: Blood from Arm, Left Updated: 07/12/21 1058     WBC 8 41 Thousand/uL      RBC 4 91 Million/uL      Hemoglobin 14 9 g/dL      Hematocrit 44 8 %      MCV 91 fL      MCH 30 3 pg      MCHC 33 3 g/dL      RDW 14 1 %      MPV 10 8 fL      Platelets 459 Thousands/uL      nRBC 0 /100 WBCs      Neutrophils Relative 70 %      Immat GRANS % 1 %      Lymphocytes Relative 20 %      Monocytes Relative 8 %      Eosinophils Relative 1 %      Basophils Relative 0 %      Neutrophils Absolute 5 91 Thousands/µL      Immature Grans Absolute 0 04 Thousand/uL      Lymphocytes Absolute 1 71 Thousands/µL      Monocytes Absolute 0 67 Thousand/µL      Eosinophils Absolute 0 05 Thousand/µL      Basophils Absolute 0 03 Thousands/µL                  XR spine lumbar 2 or 3 views injury   ED Interpretation by Lizandro Zeng DO (07/12 1221)   Multiple lumbar compression deformities  Atherosclerosis        Final Result by Flex Terrazas MD (07/12 1501)         1  Numerous, chronic compression abnormalities redemonstrated, likely reflective of osteoporosis  2   Cholelithiasis redemonstrated  Workstation performed: [id number]         XR chest pa & lateral   ED Interpretation by John Villa DO (07/12 1201)   No infiltrates  No pneumothorax  Final Result by Sruthi Moore MD (07/12 1422)   No acute cardiopulmonary disease  Findings are stable            Workstation performed: [id number]         CT head without contrast   Final Result by Darin Bassett MD (07/12 1136)      No acute intracranial abnormality  Workstation performed: [id number]                    Procedures  ECG 12 Lead Documentation Only    Date/Time: 7/12/2021 11:04 AM  Performed by: John Villa DO  Authorized by: John Villa DO     ECG reviewed by me, the ED Provider: yes    Patient location:  ED  Previous ECG:     Previous ECG:  Compared to current    Similarity:  No change    Comparison to cardiac monitor: Yes    Comments:      Normal sinus rhythm at a rate of 63 beats per minute  Normal axis  Incomplete right bundle branch block  First degree AV block  Nonspecific ST T wave abnormalities  PVCs and PACs resolved from previous EKG on 10/03/2020  ED Course  ED Course as of Jul 13 1350   Mon Jul 12, 2021   1426 PT/OT eval complete  Awaiting case management                                              MDM  Number of Diagnoses or Management Options  Ambulatory dysfunction: new and requires workup  Low back pain: established and worsening  Diagnosis management comments: Patient presents with ambulatory dysfunction  His mental status is at baseline according to wife  He does complain of intermittent dizziness over the past 1-2 weeks  He states that eating typically improves his symptoms   EKG does not reveal bradycardia, AV blocks, WPW, prolonged QTc, brugada syndrome or other dysrhythmias  No infectious or metabolic causes of symptoms  No recent trauma and XR reveals chronic compression deformities  Patient requires PT/OT and short term rehab  Case management was able to arrange for placement in a rehab facility  Patient does not require hospitalization and is stable for transfer to rehab facility  Wife to transport by private vehicle           Amount and/or Complexity of Data Reviewed  Clinical lab tests: ordered and reviewed  Tests in the radiology section of CPT®: reviewed and ordered  Tests in the medicine section of CPT®: ordered and reviewed  Review and summarize past medical records: yes  Discuss the patient with other providers: yes  Independent visualization of images, tracings, or specimens: yes    Risk of Complications, Morbidity, and/or Mortality  Presenting problems: high  Diagnostic procedures: high  Management options: moderate    Patient Progress  Patient progress: stable      Disposition  Final diagnoses:   Low back pain   Ambulatory dysfunction     Time reflects when diagnosis was documented in both MDM as applicable and the Disposition within this note     Time User Action Codes Description Comment    7/12/2021  1:01 PM Zygmunt Doctor LEEANN Add [M54 5] Low back pain     7/12/2021  1:01 PM Carlin Lainez Add [R26 2] Ambulatory dysfunction       ED Disposition     ED Disposition Condition Date/Time Comment    Discharge Stable Mon Jul 12, 2021  5:02 PM Patient will be discharged to short term rehab facility Juliette Luis MD Documentation      [facility] [address] Name, [address]      RN Documentation      [facility] [address] Name, [address]      Follow-up Information     Follow up With Specialties Details Why Contact Claudia Horne DO Family Medicine Schedule an appointment as soon as possible for a visit  Return to ED sooner if symptoms worsen or persist  [address]  [place] [id number] [phone]            Discharge Medication List as of 7/12/2021  5:15 PM      CONTINUE these medications which have NOT CHANGED    Details   aspirin (ECOTRIN LOW STRENGTH) 81 mg EC tablet Take 1 tablet (81 mg total) by mouth daily, Starting Sun 9/20/2020, No Print      B Complex Vitamins (VITAMIN B COMPLEX PO) Take 482 2 mg by mouth daily, Starting Wed 10/3/2012, Historical Med      cholecalciferol (VITAMIN D3) 1,000 units tablet Take 1,000 Units by mouth 2 (two) times a day , Starting Wed 10/3/2012, Historical Med      finasteride (PROSCAR) 5 mg tablet Take 1 tablet (5 mg total) by mouth daily, Starting Fri 5/14/2021, Until Mon 7/12/2021, Print      hydrochlorothiazide (MICROZIDE) 12 5 mg capsule Take 1 capsule (12 5 mg total) by mouth daily, Starting Thu 10/22/2020, Normal      metoprolol tartrate (LOPRESSOR) 25 mg tablet Take 1 tablet (25 mg total) by mouth every 12 (twelve) hours, Starting Thu 10/22/2020, Until Mon 7/12/2021, Normal      Multiple Vitamins-Minerals (PRESERVISION AREDS 2 PO) Take 24 mg by mouth 2 (two) times a day, Historical Med      Omega-3 1000 MG CAPS Take 1 tablet by mouth daily, Starting Wed 10/3/2012, Historical Med      simvastatin (ZOCOR) 20 mg tablet Take 1 tablet (20 mg total) by mouth daily, Starting Thu 10/22/2020, Normal      acetaminophen (TYLENOL) 325 mg tablet Take 2 tablets (650 mg total) by mouth every 6 (six) hours as needed for mild pain, headaches or fever, Starting Sun 9/20/2020, No Print      ascorbic acid (VITAMIN C) 500 MG tablet Take 1 tablet (500 mg total) by mouth 2 (two) times a day, Starting Th 10/8/2020, Until Fri 5/14/2021, Normal      clotrimazole-betamethasone (LOTRISONE) 1-0 05 % cream Apply topically daily, Starting Fri 5/14/2021, Print      famotidine (PEPCID) 20 mg tablet Take 1 tablet by mouth daily, Historical Med      ferrous gluconate (FERGON) 324 mg tablet Take 1 tablet (324 mg total) by mouth 2 (two) times a day before meals, Starting Thu 10/22/2020, Normal      gabapentin (NEURONTIN) 100 mg capsule Take 1 capsule (100 mg total) by mouth daily at bedtime, Starting Thu 10/22/2020, Until Sat 11/21/2020, Normal      hydrocortisone 1 % cream Apply topically 4 (four) times a day as needed for irritation for up to 14 days, Starting Wed 8/7/2019, Until Sun 10/4/2020, Print      polyethylene glycol (MIRALAX) 17 g packet Take 17 g by mouth daily, Starting Thu 8/13/2020, Normal      sodium chloride (OCEAN) 0 65 % nasal spray 1 spray into each nostril as needed for congestion (Or nasal dryness; or if with nasal bleeding ), Starting Sun 9/20/2020, No Print           No discharge procedures on file      PDMP Review       Value Time User    PDMP Reviewed  Yes 9/16/2020 10:05 PM Bouchra Fontana MD          ED Provider  Electronically Signed by           Shae Rowell DO  07/13/21 [id number]

## 2021-07-12 NOTE — PLAN OF CARE
Problem: PHYSICAL THERAPY ADULT  Goal: Performs mobility at highest level of function for planned discharge setting  See evaluation for individualized goals  Description: Treatment/Interventions: Functional transfer training, LE strengthening/ROM, Elevations, Therapeutic exercise, Endurance training, Cognitive reorientation, Patient/family training, Equipment eval/education, Bed mobility, Gait training          See flowsheet documentation for full assessment, interventions and recommendations  Note: Prognosis: Fair  Problem List: Decreased strength, Decreased endurance, Impaired balance, Decreased mobility, Decreased cognition, Decreased safety awareness, Impaired hearing  Assessment: Chanelle Chavez is a 80 y o  Male who [facility] on 7/15/2021 from home w/ c/o dizziness, weakness, LBP and diagnosis of dizziness  Orders for PT eval and treat received, fall precautions  This patient presents w/ comorbidities of HTN, Afib, CHS, hx of fx of multiple lumbar vertebra, macular degeneration, NPH and has personal factors of advanced age, inaccessible home environment, living in 2 story house, mobilizing w/ assistive device, stair(s) to enter home, inability to navigate level surfaces w/o external assistance, positive fall history, inability to perform IADLs and inability to perform ADLs  At baseline, pt mobilizes independently w/ RW vs cane, and reports 0 falls in the last 6 months but (+) fall history  Upon evaluation, pt presents w/ the following deficits: weakness, impaired balance, impaired hearing, decreased endurance and gait deviations  Pt requires max A x 1 for bed mobility, mod --> min A x 1 for transfers, and min A x 1 for gait  Pt's clinical presentation is unstable/unpredictable due to need for assist w/ all phases of mobility when usually mobilizing independently, need for input for mobility technique/safety and recent drastic decline in mobility compared to baseline   Pt is at an increased risk of falls due to physical, cognitive  Given the above findings, discharge recommendation is for Post-acute inpatient rehabilitation  During this admission, pt would benefit from skilled acute inpatient PT in order to address the abovementioned deficits to maximize function and mobility before DC from acute care  PT Discharge Recommendation: Post acute rehabilitation services          See flowsheet documentation for full assessment

## 2021-07-12 NOTE — CASE MANAGEMENT
CM received notification that patient has been accepted for admission to [address] [place] [address]  SHANA reviewed with Fam Alicia at [facility] intake of when bed would be available and she states bed is ready for him now  CM s/w patient's and wife in ED and notified Dr Rayne Webb of same  Wife to transport  Nurse Heather abarca

## 2021-07-12 NOTE — CASE MANAGEMENT
CM met with patient and wife in ED to discuss rehab recommendation and admission to SNF directly from the ED today  This is the patient's wife's preference  CM discuss STR options and first choice is [facility] for proximity and 2nd is [address]  Referrals were placed  CM called [facility] admission to expedite review and determination  Left message for Halie requesting call back  CM dept will continue to follow

## 2021-07-12 NOTE — CASE MANAGEMENT
SHANA s/w Halie at [facility] who states she has no bed today and likely not until Wednesday  SHANA s/w Ellie Gallegos at [facility] intake to inquire about expedited referral review  Referral received and being reviewed now per franklin SALDANA dept will continue to follow

## 2021-07-12 NOTE — Clinical Note
Case was discussed with NANCY and the patient's admission status was agreed to be Admission Status: inpatient status to the service of Dr Lore Roca

## 2021-07-12 NOTE — PHYSICAL THERAPY NOTE
PHYSICAL THERAPY EVALUATION NOTE    Patient Name: Shawna TOMLINSON Date: 2021     AGE:   80 y o  Mrn:   [id number]  ADMIT DX:  No admission diagnoses are documented for this encounter  Past Medical History:   Diagnosis Date    [facility]IE [facility]) [facility]     Cognitive communication deficit     Dementia ([facility]     Fracture of fifth lumbar vertebra ([facility]     Fracture of first lumbar vertebra ([facility]     Fracture of fourth lumbar vertebra ([facility]     Fracture of second lumbar vertebra ([facility]     Fracture of T11 vertebra with routine healing     Fracture of T12 vertebra with routine healing     Fracture of third thoracic vertebra ([facility]     Gait abnormality     Hyperlipidemia     Hypertension     Macular degeneration     [facility]      Length Of Stay: 0  PHYSICAL THERAPY EVALUATION :   Patient's identity confirmed via 2 patient identifiers (full name and ) at start of session       21 1413   PT Last Visit   PT Visit Date 21   Note Type   Note type Evaluation   Pain Assessment   Pain Assessment Tool 0-10   Pain Score No Pain   Home Living   Type of Home House   Home Layout Two level; Able to live on main level with bedroom/bathroom;Stairs to enter with rails  ([address])   Bathroom Shower/Tub Tub/shower unit   Home Equipment Walker;Quad cane  (TLSO)   Additional Comments RW vs QC at baseline   Prior Function   Level of [place] Needs assistance with IADLs   Lives With Spouse   Receives Help From Family; Outpatient therapy  (just [address] from [facility] 1 week ago)   ADL Assistance Needs assistance   IADLs Needs assistance  (- drives)   Falls in the last 6 months 0  (+ fall hx)   Vocational Retired  ( and [employer] Vet)   Restrictions/Precautions   Wells Tad Bearing Precautions Per Order No   Braces or Orthoses   ("weaned off" TLSO since 2021 per NS note)   Other Precautions Cognitive; Fall Risk;Pain;Hard of hearing   General   Family/Caregiver Present Yes  (wife)   Cognition   Overall Cognitive Status Impaired   Arousal/Participation Alert   Orientation Level Oriented to person;Oriented to place; Disoriented to time;Disoriented to situation   Memory Decreased short term memory;Decreased recall of recent events;Decreased recall of precautions   Following Commands Follows one step commands with increased time or repetition   Comments Pleasantly confused, majority of history and PLOF obtained per wife   RLE Assessment   RLE Assessment WFL   Strength RLE   RLE Overall Strength 3+/5   LLE Assessment   LLE Assessment WFL   Strength LLE   LLE Overall Strength 3+/5   Bed Mobility   Supine to Sit 2  Maximal assistance   Additional items Assist x 1; Increased time required;Verbal cues;LE management  (from [facility])   Sit to Supine Unable to assess   Additional Comments Pt seated OOB in recliner chair at end of eval  Notified DEBORAH Schafer   Transfers   Sit to Stand 3  Moderate assistance   Additional items Assist x 1; Increased time required;Verbal cues   Stand to Sit 3  Moderate assistance   Additional items Assist x 1; Increased time required;Verbal cues   Additional Comments Pt performed STS from EOB x 2  Initially requiring mod A x 1, progressed to min A x 1 for second trial  However pt initially retropulsive w/ both trials   Ambulation/Elevation   Gait pattern Decreased foot clearance; Inconsistent kenia; Short stride   Gait Assistance 4  Minimal assist   Additional items Assist x 1;Verbal cues   Assistive Device Rolling walker   Distance 40 ft, 10 ft  (seated rest break between)   Balance   Static Sitting Fair +   Static Standing Poor +   Ambulatory Poor +  (w/ RW)   Activity Tolerance   Activity Tolerance Patient tolerated treatment well   Medical Staff Made Aware [facility]   Nurse Made Aware DEBORAH Schafer   Assessment   Prognosis Fair   Problem List Decreased strength;Decreased endurance; Impaired balance;Decreased mobility; Decreased cognition;Decreased safety awareness; Impaired hearing   Assessment Jojo Trujillo is a 80 y o  Male who [facility] ED on 7/15/2021 from home w/ c/o dizziness, weakness, LBP and diagnosis of dizziness  Orders for PT eval and treat received, fall precautions  This patient presents w/ comorbidities of HTN, Afib, CHS, hx of fx of multiple lumbar vertebra, macular degeneration, NPH and has personal factors of advanced age, inaccessible home environment, living in 2 story house, mobilizing w/ assistive device, stair(s) to enter home, inability to navigate level surfaces w/o external assistance, positive fall history, inability to perform IADLs and inability to perform ADLs  At baseline, pt mobilizes independently w/ RW vs cane, and reports 0 falls in the last 6 months but (+) fall history  Upon evaluation, pt presents w/ the following deficits: weakness, impaired balance, impaired hearing, decreased endurance and gait deviations  Pt requires max A x 1 for bed mobility, mod --> min A x 1 for transfers, and min A x 1 for gait  Pt's clinical presentation is unstable/unpredictable due to need for assist w/ all phases of mobility when usually mobilizing independently, need for input for mobility technique/safety and recent drastic decline in mobility compared to baseline  Pt is at an increased risk of falls due to physical, cognitive  Given the above findings, discharge recommendation is for Post-acute inpatient rehabilitation  During this admission, pt would benefit from skilled acute inpatient PT in order to address the abovementioned deficits to maximize function and mobility before DC from acute care  Goals   Patient Goals to eat lunch   STG Expiration Date 07/22/21   Short Term Goal #1 Patient will:  Increase bilateral LE strength 1/2 grade to facilitate independent mobility, Perform all bed mobility tasks w/ supervision to decrease fall risk factors, Perform all transfers w/ supervision to improve independence, Ambulate at least 100 ft  with least restrictive assistive device w/ supervision w/o LOB, Navigate 4 stairs w/ supervision with unilateral handrail to facilitate return to previous living environment, Increase all balance 1/2 grade to decrease risk for falls, Complete exercise program independently and Tolerate 3 hr OOB to faciliate upright tolerance   PT Treatment Day 0   Plan   Treatment/Interventions Functional transfer training;LE strengthening/ROM; Elevations; Therapeutic exercise; Endurance training;Cognitive reorientation;Patient/family training;Equipment eval/education; Bed mobility;Gait training   PT Frequency Other (Comment)  (3-5x/wk)   Recommendation   PT Discharge Recommendation Post acute rehabilitation services   Additional Comments Recommend use of RW for functional mobility at this time   Sudeep 8 in Bed Without Bedrails 2   Lying on Back to Sitting on Edge of Flat Bed 2   Moving Bed to Chair 3   Standing Up From Chair 3   Walk in Room 3   Climb 3-5 Stairs 2   Basic Mobility Inpatient Raw Score 15   Basic Mobility Standardized Score 36 97       The patient's AM-PAC Basic Mobility Inpatient Short Form Raw Score is 15, Standardized Score is 36 97  A standardized score less than 42 9 suggests the patient may benefit from discharge to post-acute rehabilitation services which may not  coincide with above PT recommendations  However please refer to therapist recommendation for discharge planning given other factors that may influence destination  Adapted from Saundra Henriquez Use of 6-clicks to Provide Decision Support in the [facility] [place]  [address] [facility]  [facility] [address] [facility]       Pt would benefit from skilled inpatient PT during this admission in order to facilitate progress towards goals to maximize functional independence    Lauren Ingram, PT, DPT

## 2021-07-13 ENCOUNTER — NURSING HOME VISIT (OUTPATIENT)
Dept: GERIATRICS | Facility: CLINIC | Age: 86
End: 2021-07-13
Payer: MEDICARE

## 2021-07-13 DIAGNOSIS — I10 ESSENTIAL HYPERTENSION: ICD-10-CM

## 2021-07-13 DIAGNOSIS — R26.2 AMBULATORY DYSFUNCTION: ICD-10-CM

## 2021-07-13 DIAGNOSIS — R53.1 GENERALIZED WEAKNESS: Primary | ICD-10-CM

## 2021-07-13 DIAGNOSIS — R33.9 URINARY RETENTION: ICD-10-CM

## 2021-07-13 DIAGNOSIS — D50.0 IRON DEFICIENCY ANEMIA DUE TO CHRONIC BLOOD LOSS: ICD-10-CM

## 2021-07-13 PROBLEM — R42 LIGHTHEADEDNESS: Status: ACTIVE | Noted: 2021-07-13

## 2021-07-13 PROCEDURE — 99306 1ST NF CARE HIGH MDM 50: CPT | Performed by: FAMILY MEDICINE

## 2021-07-13 NOTE — ED NOTES
Recd phone call from Kelly Lynn Husbands in the lab who reported a critical lab value on blood cultures  "gram positive cocci and clusters"  RN made CHACHO Cohen aware       April Janis Wei RN  07/13/21 3541

## 2021-07-13 NOTE — PROGRESS NOTES
[address]  [address]   [facility]  History and Physical    NAME: Chanelle Chavez  AGE: 80 y o  SEX: male [id number]    DATE OF ENCOUNTER: 7/13/2021     Code status:  Full         Assessment and Plan     1  Generalized weakness      Monitor for exacerbation of chronic medical conditions  OT/PT treatment  2  Essential hypertension      Hemodynamically stable  Continue antihypertensive  3  Ambulatory dysfunction      Fall prevention and precautions  Physical therapy  4  Urinary retention      Monitor urine output  Bladder scan as needed  5  Iron deficiency anemia due to chronic blood loss      Continue iron supplementation orally, vitamin C and bowel regimen to prevent constipation    All medications and routine orders were reviewed and updated as needed  Plan discussed with: Patient    Chief Complaint     Seen for admission at [address] [place] [address]    History of Present Illness     Patient is an 81 y/o with history of Dementia, Hypertension, Hyperlipidemia who was seen in the ED for lightheadedness intermittently for past 1-2 weeks  Family is concerned about his safety and requested ED evaluation due to his gait instability aggravated by his worsening back pain with ambulation and frequent falls  He has to ambulate up and down steps and his wife fears he is going to fall  Uses a cane and walker to assist in ambulation  He was previously diagnosed with lumbar compression fractures  Mental status at baseline- dementia        HISTORY:  Past Medical History:   Diagnosis Date    OLLIE (acute kidney injury) ([address] )     Cognitive communication deficit     Dementia ([address]     Fracture of fifth lumbar vertebra ([address] )     Fracture of first lumbar vertebra ([address]     Fracture of fourth lumbar vertebra ([address]     Fracture of second lumbar vertebra ([address]     Fracture of T11 vertebra with routine healing     Fracture of T12 vertebra with routine healing  Fracture of third thoracic vertebra (HCC)     Gait abnormality     Hyperlipidemia     Hypertension     Macular degeneration     Normal pressure hydrocephalus (HCC)      No family history on file  Social History     Socioeconomic History    Marital status: /Civil Union     Spouse name: Not on file    Number of children: Not on file    Years of education: Not on file    Highest education level: Not on file   Occupational History    Not on file   Tobacco Use    Smoking status: Never Smoker    Smokeless tobacco: Never Used   Vaping Use    Vaping Use: Never used   Substance and Sexual Activity    Alcohol use: Never    Drug use: Never    Sexual activity: Not Currently   Other Topics Concern    Not on file   Social History Narrative    Not on file     Social Determinants of Health     Financial Resource Strain:     Difficulty of Paying Living Expenses:    Food Insecurity:     Worried About Running Out of Food in the Last Year:     [address] in the Last Year:    Transportation Needs:     Lack of Transportation (Medical):  Lack of Transportation (Non-Medical):    Physical Activity:     Days of Exercise per Week:     Minutes of Exercise per Session:    Stress:     Feeling of Stress :    Social Connections:     Frequency of Communication with Friends and Family:     Frequency of Social Gatherings with Friends and Family:     Attends Caoda[employer] Services:     Active Member of Clubs or Organizations:     Attends Club or Organization Meetings:     Marital Status:    Intimate Partner Violence:     Fear of Current or Ex-Partner:     Emotionally Abused:     Physically Abused:     Sexually Abused:         Allergies:  No Known Allergies     Current Outpatient Medications on File Prior to Visit   Medication Sig Dispense Refill    acetaminophen (TYLENOL) 325 mg tablet Take 2 tablets (650 mg total) by mouth every 6 (six) hours as needed for mild pain, headaches or fever  0    ascorbic acid (VITAMIN C) 500 MG tablet Take 1 tablet (500 mg total) by mouth 2 (two) times a day (Patient not taking: Reported on 5/14/2021) 60 each 0    aspirin (ECOTRIN LOW STRENGTH) 81 mg EC tablet Take 1 tablet (81 mg total) by mouth daily  0    B Complex Vitamins (VITAMIN B COMPLEX PO) Take 482 2 mg by mouth daily      cholecalciferol (VITAMIN D3) 1,000 units tablet Take 1,000 Units by mouth 2 (two) times a day       clotrimazole-betamethasone (LOTRISONE) 1-0 05 % cream Apply topically daily 30 g 4    famotidine (PEPCID) 20 mg tablet Take 1 tablet by mouth daily (Patient not taking: Reported on 7/12/2021)      ferrous gluconate (FERGON) 324 mg tablet Take 1 tablet (324 mg total) by mouth 2 (two) times a day before meals (Patient not taking: Reported on 5/14/2021) 60 tablet 0    finasteride (PROSCAR) 5 mg tablet Take 1 tablet (5 mg total) by mouth daily 90 tablet 3    gabapentin (NEURONTIN) 100 mg capsule Take 1 capsule (100 mg total) by mouth daily at bedtime 30 capsule 0    hydrochlorothiazide (MICROZIDE) 12 5 mg capsule Take 1 capsule (12 5 mg total) by mouth daily 30 capsule 0    hydrocortisone 1 % cream Apply topically 4 (four) times a day as needed for irritation for up to 14 days 30 g 0    metoprolol tartrate (LOPRESSOR) 25 mg tablet Take 1 tablet (25 mg total) by mouth every 12 (twelve) hours (Patient taking differently: Take 12 5 mg by mouth every 12 (twelve) hours ) 60 tablet 0    Multiple Vitamins-Minerals (PRESERVISION AREDS 2 PO) Take 24 mg by mouth 2 (two) times a day      Omega-3 1000 MG CAPS Take 1 tablet by mouth daily      polyethylene glycol (MIRALAX) 17 g packet Take 17 g by mouth daily (Patient not taking: Reported on 5/14/2021) 14 each 0    simvastatin (ZOCOR) 20 mg tablet Take 1 tablet (20 mg total) by mouth daily (Patient taking differently: Take 40 mg by mouth daily ) 30 tablet 0    sodium chloride (OCEAN) 0 65 % nasal spray 1 spray into each nostril as needed for congestion (Or nasal dryness; or if with nasal bleeding ) (Patient not taking: Reported on 7/12/2021)  0     No current facility-administered medications on file prior to visit  Review of Systems     Review of Systems   Constitutional: Negative for chills, fever and unexpected weight change  HENT: Negative for congestion and sore throat  Eyes: Negative for visual disturbance  Respiratory: Negative for shortness of breath  Cardiovascular: Negative for chest pain  Gastrointestinal: Negative for abdominal pain and constipation  Endocrine: Negative for cold intolerance, heat intolerance, polydipsia and polyuria  Genitourinary: Negative for difficulty urinating, dysuria, hematuria and urgency  Musculoskeletal: Positive for back pain and gait problem  Skin: Negative for rash  Neurological: Positive for weakness and light-headedness  Psychiatric/Behavioral: Negative for agitation, behavioral problems and confusion  Mental status: Baseline Dementia  Neurological: Positive for  Frequent falls  Medications and orders     All medications reviewed and updated in Nursing Home EMR  Objective     Vitals: There were no vitals taken for this visit  As of 7/12/2021 Blood pressure 146/70 pulse 60 respiratory rate 18 96% oxygen saturation    Physical Exam  HENT:      Head: Normocephalic and atraumatic  Right Ear: External ear normal       Left Ear: External ear normal       Mouth/Throat:      Mouth: Mucous membranes are dry  Eyes:      Conjunctiva/sclera: Conjunctivae normal       Pupils: Pupils are equal, round, and reactive to light  Cardiovascular:      Rate and Rhythm: Normal rate and regular rhythm  Pulses: Normal pulses  Heart sounds: Normal heart sounds  No murmur heard  Pulmonary:      Effort: Pulmonary effort is normal       Breath sounds: Normal breath sounds  No rhonchi     Abdominal:      General: Bowel sounds are normal       Palpations: Abdomen is soft    Musculoskeletal:         General: Normal range of motion  Cervical back: Neck supple  Skin:     Capillary Refill: Capillary refill takes less than 2 seconds  Findings: No rash  Neurological:      Mental Status: Mental status is at baseline  He is disoriented  Psychiatric:         Mood and Affect: Mood normal          Behavior: Behavior normal        Cooperative with exam    Pertinent Laboratory/Diagnostic Studies: The following labs/studies were reviewed please see chart or hospital paperwork for details      - Counseling Documentation: patient was counseled regarding: importance of compliance with treatment

## 2021-07-15 LAB
BACTERIA BLD CULT: ABNORMAL
GRAM STN SPEC: ABNORMAL

## 2021-07-16 LAB
ATRIAL RATE: 63 BPM
P AXIS: 7 DEGREES
PR INTERVAL: 240 MS
QRS AXIS: 3 DEGREES
QRSD INTERVAL: 92 MS
QT INTERVAL: 460 MS
QTC INTERVAL: 470 MS
T WAVE AXIS: 32 DEGREES
VENTRICULAR RATE: 63 BPM

## 2021-07-16 PROCEDURE — 93010 ELECTROCARDIOGRAM REPORT: CPT | Performed by: INTERNAL MEDICINE

## 2021-07-17 LAB — BACTERIA BLD CULT: NORMAL

## 2021-07-18 NOTE — QUICK NOTE
I called and spoke with [address] personnel  I made them aware that Mr Kandace Martinez shira 1/2 positive blood cultures  Likely contaminant  They said that patient is feeling well with no fevers  Staff also mentioned they they did cultures there that were negative

## 2021-07-19 ENCOUNTER — NURSING HOME VISIT (OUTPATIENT)
Dept: GERIATRICS | Facility: OTHER | Age: 86
End: 2021-07-19
Payer: MEDICARE

## 2021-07-19 DIAGNOSIS — I48.91 ATRIAL FIBRILLATION WITH RAPID VENTRICULAR RESPONSE (HCC): ICD-10-CM

## 2021-07-19 DIAGNOSIS — E80.6 HYPERBILIRUBINEMIA: ICD-10-CM

## 2021-07-19 DIAGNOSIS — I10 ESSENTIAL HYPERTENSION: ICD-10-CM

## 2021-07-19 DIAGNOSIS — R26.2 AMBULATORY DYSFUNCTION: ICD-10-CM

## 2021-07-19 DIAGNOSIS — I50.32 CHRONIC DIASTOLIC CONGESTIVE HEART FAILURE (HCC): ICD-10-CM

## 2021-07-19 DIAGNOSIS — R53.1 GENERALIZED WEAKNESS: Primary | ICD-10-CM

## 2021-07-19 DIAGNOSIS — R78.81 POSITIVE BLOOD CULTURE: ICD-10-CM

## 2021-07-19 DIAGNOSIS — E87.6 HYPOKALEMIA: ICD-10-CM

## 2021-07-19 PROCEDURE — 99309 SBSQ NF CARE MODERATE MDM 30: CPT | Performed by: NURSE PRACTITIONER

## 2021-07-19 NOTE — ASSESSMENT & PLAN NOTE
Wt Readings from Last 3 Encounters:   05/14/21 66 2 kg (146 lb)   11/20/20 63 5 kg (140 lb)   11/13/20 63 5 kg (140 lb)   Current weight: 64 77 kgs (7/14/2021) <= 65 27 kgs (7/12/2021)  Continue CHF protocol  At baseline cardiopulmonary symptoms  No BLE edema  Continue Hctz 12 5mg daily  CMP on 7/22/2021

## 2021-07-19 NOTE — PROGRESS NOTES
[address], [address] [place] [address], [address] [place] [address]  [phone]    NAME: Yamilex Howard  AGE: 80 y o  SEX: male    Progress Note    Location:   POS: 32 (SNF)    Assessment/Plan:    Generalized weakness  Stable  Actively participating in therapy sessions  Fall precaution  No anemia (7/17/2021)    Chronic diastolic congestive heart failure (HCC)  Wt Readings from Last 3 Encounters:   05/14/21 66 2 kg (146 lb)   11/20/20 63 5 kg (140 lb)   11/13/20 63 5 kg (140 lb)   Current weight: 64 77 kgs (7/14/2021) <= 65 27 kgs (7/12/2021)  Continue CHF protocol  At baseline cardiopulmonary symptoms  No BLE edema  Continue Hctz 12 5mg daily  CMP on 7/22/2021    Atrial fibrillation with rapid ventricular response (HCC)  Asymptomatic  Irregular HR  BP range (7/13-19/2021) = 126/74 to 168/81  HR range (7/13-19/2021) = 64 to 92/min  Continue ASA 81mg daily + Metoprolol tartrate 25mg BID  To follow-up with PCP upon discharge    Essential hypertension  Please see management in setting of #3    Ambulatory dysfunction  Continue 24/7 [facility] supportive care and management  Continue PT/OT/ST as scheduled  Fall precaution    Positive blood culture  (+) Blood culture per [facility]  [facility] culture result (7/12/2021)  Repeat Blood culture done at [facility] (7/13/2021): Negative after 5 days  No febrile episode  VSS    Hypokalemia  Per CMP result (7/12/2021)  On daily diuretic in setting of #2  Not on replacement potassium  CMP on 7/22/2021  Hyperbilirubinemia  Mildly elevated Total bilirubin level (7/12/2021)  Asymptomatic  CMP on 7/22/2021      Chief complaint / Reason for visit: Follow-up visit    History of Present Illness: This is an 72-year-old male patient currently admitted at [facility] (7/12/2021 to present) following discharge from acute care hospitalization [facility]  [facility]) with Dx of Generalized weakness, ambulatory dysfunction, low back pain      Patient is seen and examined today to follow up acute and chronic medical condition as mentioned above and HTN, Chronic Diastolic CHF and urinary retention  Patient is out of bed for this visit - alert, cooperative, calm, pleasant and not in distress  Patient ambulatory using walker - verbal with clear coherent speech - noted hard of hearing - oriented to name and birthday only  Patient denies any acute medical concerns for this visit including pain  Per nursing no acute medical concerns for this visit - described as at baseline and stable - no event over the weekend  Review of Systems:  Per history of present illness, all other systems reviewed and negative  HISTORY:  Medical Hx: Reviewed, unchanged  Family Hx: Reviewed, unchanged  Soc Hx: Reviewed,  unchanged    ALLERGY: Reviewed, unchanged  No Known Allergies     PHYSICAL EXAM:  Vital Signs: T97 6F -P67 -R18 BP: 148/77 SpO2: 97% RA  Weight: 142 5 lbs (7/14/2021)    General: NAD  Head: Atraumatic  Normocephalic  Ear: Hard of hearing  Eye Exam: anicteric sclera, no discharge, PERRLA, No injection  Wears glasses  Oral Exam: moist mucous membranes, no buccaloropharyngeal erythema, palatine tonsils WNL  Neck Exam: no anterior cervical lymphadenopathy noted, neck supple  Cardiovascular: regular rate, irregular rhythm, no murmurs, rubs, or gallops  Pulmonary: no wheeze, no rhonchi, no rales  No chest tenderness  Abdominal: soft, non-tender, nondistended, bowel sounds audible x 4 quadrants  : Non distended bladder  Extremities and skin: no edema noted, no rashes  Intact skin  Neurological: alert, cooperative and responsive, Oriented x 2, moving all 4 extremities symmetrically  Ambulatory dysfunction - uses walker    Laboratory results / Imaging reviewed: Hard copy/ies in medical chart:    * Blood culture (7/13/2021) = no growth in 5 days    * CMP (7/12/2021: [facility]) = WNL except:  K: 3 4 (L)  TBili: 1 03 (H)    * CBC with diff (7/12/2021: [facility]) = WNL except:  Platelet: 139 (L)    Current Medications:   All medications reviewed and updated in Nursing Home Chart    Please note:  Voice-recognition software may have been used in the preparation of this document  Occasional wrong word or "sound-alike" substitutions may have occurred due to the inherent limitations of voice recognition software  Interpretation should be guided by context      CLOVER Mina  7/19/2021

## 2021-07-19 NOTE — ASSESSMENT & PLAN NOTE
(+) Blood culture per [facility] KEVIN  [facility] culture result (7/12/2021)  Repeat Blood culture done at [facility] (7/13/2021): Negative after 5 days  No febrile episode   VSS

## 2021-07-19 NOTE — ASSESSMENT & PLAN NOTE
Per CMP result (7/12/2021)  On daily diuretic in setting of #2  Not on replacement potassium  CMP on 7/22/2021

## 2021-07-19 NOTE — ASSESSMENT & PLAN NOTE
Asymptomatic   Irregular HR  BP range (7/13-19/2021) = 126/74 to 168/81  HR range (7/13-19/2021) = 64 to 92/min  Continue ASA 81mg daily + Metoprolol tartrate 25mg BID  To follow-up with PCP upon discharge

## 2021-07-21 ENCOUNTER — NURSING HOME VISIT (OUTPATIENT)
Dept: GERIATRICS | Facility: OTHER | Age: 86
End: 2021-07-21
Payer: MEDICARE

## 2021-07-21 DIAGNOSIS — I50.32 CHRONIC DIASTOLIC CONGESTIVE HEART FAILURE (HCC): ICD-10-CM

## 2021-07-21 DIAGNOSIS — R26.2 AMBULATORY DYSFUNCTION: ICD-10-CM

## 2021-07-21 DIAGNOSIS — F32.9 REACTIVE DEPRESSION: ICD-10-CM

## 2021-07-21 DIAGNOSIS — E78.5 DYSLIPIDEMIA: ICD-10-CM

## 2021-07-21 DIAGNOSIS — I10 ESSENTIAL HYPERTENSION: ICD-10-CM

## 2021-07-21 DIAGNOSIS — I48.91 ATRIAL FIBRILLATION WITH RVR (HCC): ICD-10-CM

## 2021-07-21 DIAGNOSIS — I48.91 ATRIAL FIBRILLATION WITH RAPID VENTRICULAR RESPONSE (HCC): Primary | ICD-10-CM

## 2021-07-21 PROBLEM — R78.81 POSITIVE BLOOD CULTURE: Status: RESOLVED | Noted: 2020-09-18 | Resolved: 2021-07-21

## 2021-07-21 PROBLEM — I49.9 IRREGULAR HEARTBEAT: Status: RESOLVED | Noted: 2020-08-09 | Resolved: 2021-07-21

## 2021-07-21 PROBLEM — R42 LIGHTHEADEDNESS: Status: RESOLVED | Noted: 2021-07-13 | Resolved: 2021-07-21

## 2021-07-21 PROCEDURE — 99316 NF DSCHRG MGMT 30 MIN+: CPT | Performed by: NURSE PRACTITIONER

## 2021-07-21 RX ORDER — SIMVASTATIN 20 MG
20 TABLET ORAL DAILY
Qty: 14 TABLET | Refills: 0 | Status: SHIPPED | OUTPATIENT
Start: 2021-07-21 | End: 2022-03-08 | Stop reason: SDUPTHER

## 2021-07-21 RX ORDER — CHLORAL HYDRATE 500 MG
1000 CAPSULE ORAL DAILY
Qty: 14 CAPSULE | Refills: 0 | Status: CANCELLED | OUTPATIENT
Start: 2021-07-21

## 2021-07-21 RX ORDER — ASPIRIN 81 MG/1
81 TABLET ORAL DAILY
Qty: 14 TABLET | Refills: 0 | Status: SHIPPED | OUTPATIENT
Start: 2021-07-21

## 2021-07-21 RX ORDER — HYDROCHLOROTHIAZIDE 12.5 MG/1
12.5 CAPSULE, GELATIN COATED ORAL DAILY
Qty: 14 CAPSULE | Refills: 0 | Status: SHIPPED | OUTPATIENT
Start: 2021-07-21 | End: 2022-03-04 | Stop reason: HOSPADM

## 2021-07-21 RX ORDER — ACETAMINOPHEN 325 MG/1
650 TABLET ORAL EVERY 6 HOURS PRN
Refills: 0 | Status: CANCELLED
Start: 2021-07-21

## 2021-07-21 NOTE — ASSESSMENT & PLAN NOTE
· Stable   · SBP range 120-140  · HR range 60-90  · Continue beta blocker, HCTZ and ASA  · PCP f/u at d/c from SNF

## 2021-07-21 NOTE — ASSESSMENT & PLAN NOTE
· Patient states on exam today, "I'm depressed"  · Denies SI/HI  · Patient declined starting medication on this visit  · When asked what is making him depressed he states, "I don't know what's going on"   · Recommend PCP follow up within 2 weeks of D/C from SNF - due to cognitive impairment and environment changes patient may benefit from SSRI

## 2021-07-21 NOTE — PROGRESS NOTES
[facility]  Lori Armstrong 79  [phone]  Code 31    NAME: Jojo Trujillo  AGE: 80 y o  SEX: male   CODE STATUS: Full Code    DATE OF ADMISSION: 7/12/2021   DATE OF DISCHARGE: 7/22/2021   DISCHARGE DISPOSITION: Stable for discharge to home with home health PT/OT/SN    Reason for admission: Patient was admitted from [address] [place] [address] for rehabilitation after hospitalization for dizziness, generalized weakness, falls and ambulatory dysfunction  Course of stay:     Mr Carol Barba is an 80year old male with multiple medical co-morbidities including but not limited to Dementia, HTN, HLD, Urinary retention, lumbar compression fx with chronic back pain,  admitted to [address] [place] [address] for rehab after being at [address] [place] [address] on 7/12/2021 for dizziness, generalized weakness and ambulatory dysfunction  Sepsis workup in ED found positive blood cultures x 1 set  Repeat blood cultures done at [facility] showed no growth after 5 days  EKG in ED found NSR with RBBB and 1st degree AV block, no change from previous EKGs  X ray of lumbar spine done on 7/12/2021 revealed multiple, chronic compression deformities, likely due to osteoporosis  Upon exam today, patient found resting in his chair at bedside  Patient is able to provided limited reliable history due to cognitive impairment  When this provider asked patient how he was feeling he replied with , "I'm depressed " Patient denies SI/HI, he states he states he "doesn't know what's going on"  Patient states he has not had depression in the past, he denies taking anything for depression , denies counseling/therapy  Patient declined medication at this time  Recommended to f/u with PCP at d/c from SNF  He is afebrile, he denies chest pain, shortness of breath, N/V/D  He denies lightheadedness, dizziness, headache, vision changes  Per facility record he is eating 2-3 meals per day and consuming % of meals   He is continent of bowel and bladder, LBM 7/21/21  Patient is ambulating with RW independently and toileting himself independently but requires supervision  Therapy recommends 24/7 supervision  ROS:  Review of Systems   Unable to perform ROS: Dementia       PHYSICAL EXAM:  VITALS: Temp 97 7 Pulse 82 RR19 /67 O2 95% room air  Physical Exam  Vitals and nursing note reviewed  Constitutional:       General: He is not in acute distress  Appearance: Normal appearance  HENT:      Head: Normocephalic and atraumatic  Nose: No congestion or rhinorrhea  Mouth/Throat:      Mouth: Mucous membranes are moist    Eyes:      General: No scleral icterus  Extraocular Movements: Extraocular movements intact  Conjunctiva/sclera: Conjunctivae normal       Pupils: Pupils are equal, round, and reactive to light  Cardiovascular:      Rate and Rhythm: Normal rate and regular rhythm  Pulses: Normal pulses  Heart sounds: Normal heart sounds  No murmur heard  Pulmonary:      Effort: Pulmonary effort is normal       Breath sounds: Normal breath sounds  No wheezing, rhonchi or rales  Abdominal:      General: Bowel sounds are normal  There is no distension  Palpations: Abdomen is soft  Tenderness: There is no abdominal tenderness  Musculoskeletal:         General: No swelling or signs of injury  Skin:     General: Skin is warm and dry  Findings: No erythema  Neurological:      Mental Status: He is alert  Mental status is at baseline  He is disoriented  Sensory: No sensory deficit  Motor: Weakness present  Gait: Gait abnormal    Psychiatric:         Attention and Perception: Attention normal          Behavior: Behavior normal          Cognition and Memory: Cognition is impaired  Memory is impaired           Admission Diagnoses:   Problem List Items Addressed This Visit        Cardiovascular and Mediastinum    Essential hypertension     · Stable   · SBP range 120-140  · HR range 60-90  · Continue beta blocker, HCTZ and ASA  · PCP f/u at d/c from [facility]         Relevant Medications    hydrochlorothiazide (MICROZIDE) 12 5 mg capsule    metoprolol tartrate (LOPRESSOR) 25 mg tablet    Atrial fibrillation with rapid ventricular response (HCC) - Primary     · Rate controlled on exam  · Continue beta blocker and aspirin   · PCP f/u at d/c from [facility]         Relevant Medications    aspirin (ECOTRIN LOW STRENGTH) 81 mg EC tablet    metoprolol tartrate (LOPRESSOR) 25 mg tablet    Chronic diastolic congestive heart failure (HCC)     Wt Readings from Last 3 Encounters:   05/14/21 66 2 kg (146 lb)   11/20/20 63 5 kg (140 lb)   11/13/20 63 5 kg (140 lb)     · Euvolemic on exam today  · Current weight at facility 142 5lbs  · Continue HCTZ, beta blocker, asa  · Continue Daily weights and Low Salt Diet  · F/U with CMP scheduled for 7/22/21  · PCP f/u at d/c from [facility]             Relevant Medications    metoprolol tartrate (LOPRESSOR) 25 mg tablet       Other    Dyslipidemia    Relevant Medications    simvastatin (ZOCOR) 20 mg tablet    Ambulatory dysfunction     · Denies pain on exam today  · X ray of lumbar spine done on 7/12/2021 revealed multiple, chronic compression deformities, likely due to osteoporosis   · Recommend f/u with PCP at D/C from [facility]   · [address] at D/C from [facility]  · Continue Tylenol prn    · Fall Precautions  · Ambulates with RW -unsteady gait  · 24/7 support/supervision          Reactive depression     · Patient states on exam today, "I'm depressed"  · Denies SI/HI  · Patient declined starting medication on this visit  · When asked what is making him depressed he states, "I don't know what's going on"   · Recommend PCP follow up within 2 weeks of D/C from [facility] - due to cognitive impairment and environment changes patient may benefit from SSRI           Other Visit Diagnoses     Atrial fibrillation with RVR ([address] )        Relevant Medications    aspirin (ECOTRIN LOW STRENGTH) 81 mg EC tablet    metoprolol tartrate (LOPRESSOR) 25 mg tablet          Follow-up Recommendations:  Outpatient Follow up with PCP 7/23/2021    Labs and testing performed during stay: Reviewed in facility chart    Discharge Medications: See discharge medication list which was reviewed and signed        Current Outpatient Medications:     acetaminophen (TYLENOL) 325 mg tablet, Take 2 tablets (650 mg total) by mouth every 6 (six) hours as needed for mild pain, headaches or fever, Disp: , Rfl: 0    ascorbic acid (VITAMIN C) 500 MG tablet, Take 1 tablet (500 mg total) by mouth 2 (two) times a day, Disp: 60 each, Rfl: 0    aspirin (ECOTRIN LOW STRENGTH) 81 mg EC tablet, Take 1 tablet (81 mg total) by mouth daily, Disp:  , Rfl: 0    B Complex Vitamins (VITAMIN B COMPLEX PO), Take 482 2 mg by mouth daily, Disp: , Rfl:     cholecalciferol (VITAMIN D3) 1,000 units tablet, Take 1,000 Units by mouth 2 (two) times a day , Disp: , Rfl:     clotrimazole-betamethasone (LOTRISONE) 1-0 05 % cream, Apply topically daily, Disp: 30 g, Rfl: 4    famotidine (PEPCID) 20 mg tablet, Take 1 tablet by mouth daily, Disp: , Rfl:     ferrous gluconate (FERGON) 324 mg tablet, Take 1 tablet (324 mg total) by mouth 2 (two) times a day before meals, Disp: 60 tablet, Rfl: 0    finasteride (PROSCAR) 5 mg tablet, Take 1 tablet (5 mg total) by mouth daily, Disp: 90 tablet, Rfl: 3    gabapentin (NEURONTIN) 100 mg capsule, Take 1 capsule (100 mg total) by mouth daily at bedtime, Disp: 30 capsule, Rfl: 0    hydrochlorothiazide (MICROZIDE) 12 5 mg capsule, Take 1 capsule (12 5 mg total) by mouth daily, Disp: 30 capsule, Rfl: 0    hydrocortisone 1 % cream, Apply topically 4 (four) times a day as needed for irritation for up to 14 days, Disp: 30 g, Rfl: 0    metoprolol tartrate (LOPRESSOR) 25 mg tablet, Take 1 tablet (25 mg total) by mouth every 12 (twelve) hours (Patient taking differently: Take 12 5 mg by mouth every 12 (twelve) hours ), Disp: 60 tablet, Rfl: 0    Multiple Vitamins-Minerals (PRESERVISION AREDS 2 PO), Take 24 mg by mouth 2 (two) times a day, Disp: , Rfl:     Omega-3 1000 MG CAPS, Take 1 tablet by mouth daily, Disp: , Rfl:     polyethylene glycol (MIRALAX) 17 g packet, Take 17 g by mouth daily, Disp: 14 each, Rfl: 0    simvastatin (ZOCOR) 20 mg tablet, Take 1 tablet (20 mg total) by mouth daily (Patient taking differently: Take 40 mg by mouth daily ), Disp: 30 tablet, Rfl: 0    sodium chloride (OCEAN) 0 65 % nasal spray, 1 spray into each nostril as needed for congestion (Or nasal dryness; or if with nasal bleeding ), Disp: , Rfl: 0     Discussion with patient/family and further instructions:  -Fall precautions  -Aspiration precautions  -Bleeding precautions  -Monitor for signs/symptoms of infection  -Medication list was reviewed and signed  -DME form was completed    Status at time of discharge: Stable    Plan discussed with Dr Stas Shukla noted agreement with assessment and plan  Billing based on time  Time spent on unit, 40 minutes  Time spent counseling pt on debility/condition, 30 minutes  Please note:  Voice-recognition software may have been used in the preparation of this document  Occasional wrong word or "sound-alike" substitutions may have occurred due to the inherent limitations of voice recognition software  Interpretation should be guided by CLOVER Kramer  7/21/20211:09 PM

## 2021-07-21 NOTE — ASSESSMENT & PLAN NOTE
Wt Readings from Last 3 Encounters:   05/14/21 66 2 kg (146 lb)   11/20/20 63 5 kg (140 lb)   11/13/20 63 5 kg (140 lb)     · Euvolemic on exam today  · Current weight at facility 142 5lbs  · Continue HCTZ, beta blocker, asa  · Continue Daily weights and Low Salt Diet  · F/U with CMP scheduled for 7/22/21  · PCP f/u at d/c from SNF

## 2021-07-21 NOTE — ASSESSMENT & PLAN NOTE
· Denies pain on exam today  · X ray of lumbar spine done on 7/12/2021 revealed multiple, chronic compression deformities, likely due to osteoporosis   · Recommend f/u with PCP at D/C from SNF   · [address] at D/C from SNF  · Continue Tylenol prn    · Fall Precautions  · Ambulates with RW -unsteady gait  · 24/7 support/supervision

## 2022-02-27 ENCOUNTER — HOSPITAL ENCOUNTER (INPATIENT)
Facility: HOSPITAL | Age: 87
LOS: 4 days | Discharge: NURSING, REHAB OR LONG-TERM CARE | DRG: 291 | End: 2022-03-04
Attending: EMERGENCY MEDICINE | Admitting: HOSPITALIST
Payer: MEDICARE

## 2022-02-27 DIAGNOSIS — N18.2 CKD (CHRONIC KIDNEY DISEASE) STAGE 2, GFR 60-89 ML/MIN: Chronic | ICD-10-CM

## 2022-02-27 DIAGNOSIS — R06.03 ACUTE RESPIRATORY DISTRESS: ICD-10-CM

## 2022-02-27 DIAGNOSIS — I50.32 CHRONIC DIASTOLIC CONGESTIVE HEART FAILURE (HCC): ICD-10-CM

## 2022-02-27 DIAGNOSIS — I47.2 NONSUSTAINED VENTRICULAR TACHYCARDIA (HCC): ICD-10-CM

## 2022-02-27 DIAGNOSIS — I48.0 PAF (PAROXYSMAL ATRIAL FIBRILLATION) (HCC): Chronic | ICD-10-CM

## 2022-02-27 DIAGNOSIS — I50.33 ACUTE ON CHRONIC DIASTOLIC CONGESTIVE HEART FAILURE (HCC): Primary | ICD-10-CM

## 2022-02-27 PROBLEM — I50.30 DIASTOLIC DYSFUNCTION WITH HEART FAILURE (HCC): Status: ACTIVE | Noted: 2022-02-27

## 2022-02-27 PROBLEM — N40.0 BPH (BENIGN PROSTATIC HYPERPLASIA): Status: ACTIVE | Noted: 2022-02-27

## 2022-02-27 PROBLEM — I10 ESSENTIAL HYPERTENSION: Chronic | Status: ACTIVE | Noted: 2019-08-06

## 2022-02-27 PROBLEM — R79.89 ELEVATED D-DIMER: Status: ACTIVE | Noted: 2022-02-27

## 2022-02-27 PROBLEM — N40.0 BPH (BENIGN PROSTATIC HYPERPLASIA): Chronic | Status: ACTIVE | Noted: 2022-02-27

## 2022-02-27 LAB
2HR DELTA HS TROPONIN: 0 NG/L
4HR DELTA HS TROPONIN: 2 NG/L
ALBUMIN SERPL BCP-MCNC: 3.9 G/DL (ref 3.5–5)
ALP SERPL-CCNC: 61 U/L (ref 46–116)
ALT SERPL W P-5'-P-CCNC: 17 U/L (ref 12–78)
ANION GAP SERPL CALCULATED.3IONS-SCNC: 9 MMOL/L (ref 4–13)
AST SERPL W P-5'-P-CCNC: 12 U/L (ref 5–45)
BASOPHILS # BLD AUTO: 0.03 THOUSANDS/ΜL (ref 0–0.1)
BASOPHILS NFR BLD AUTO: 0 % (ref 0–1)
BILIRUB SERPL-MCNC: 1.12 MG/DL (ref 0.2–1)
BUN SERPL-MCNC: 14 MG/DL (ref 5–25)
CALCIUM SERPL-MCNC: 9.3 MG/DL (ref 8.3–10.1)
CARDIAC TROPONIN I PNL SERPL HS: 10 NG/L
CARDIAC TROPONIN I PNL SERPL HS: 10 NG/L
CARDIAC TROPONIN I PNL SERPL HS: 12 NG/L
CHLORIDE SERPL-SCNC: 104 MMOL/L (ref 100–108)
CO2 SERPL-SCNC: 28 MMOL/L (ref 21–32)
CREAT SERPL-MCNC: 0.96 MG/DL (ref 0.6–1.3)
D DIMER PPP FEU-MCNC: 0.66 UG/ML FEU
EOSINOPHIL # BLD AUTO: 0.13 THOUSAND/ΜL (ref 0–0.61)
EOSINOPHIL NFR BLD AUTO: 2 % (ref 0–6)
ERYTHROCYTE [DISTWIDTH] IN BLOOD BY AUTOMATED COUNT: 14.8 % (ref 11.6–15.1)
FLUAV RNA RESP QL NAA+PROBE: NEGATIVE
FLUBV RNA RESP QL NAA+PROBE: NEGATIVE
GFR SERPL CREATININE-BSD FRML MDRD: 70 ML/MIN/1.73SQ M
GLUCOSE SERPL-MCNC: 100 MG/DL (ref 65–140)
HCT VFR BLD AUTO: 43.4 % (ref 36.5–49.3)
HGB BLD-MCNC: 14.3 G/DL (ref 12–17)
IMM GRANULOCYTES # BLD AUTO: 0.04 THOUSAND/UL (ref 0–0.2)
IMM GRANULOCYTES NFR BLD AUTO: 1 % (ref 0–2)
LYMPHOCYTES # BLD AUTO: 1.45 THOUSANDS/ΜL (ref 0.6–4.47)
LYMPHOCYTES NFR BLD AUTO: 18 % (ref 14–44)
MCH RBC QN AUTO: 29.7 PG (ref 26.8–34.3)
MCHC RBC AUTO-ENTMCNC: 32.9 G/DL (ref 31.4–37.4)
MCV RBC AUTO: 90 FL (ref 82–98)
MONOCYTES # BLD AUTO: 0.75 THOUSAND/ΜL (ref 0.17–1.22)
MONOCYTES NFR BLD AUTO: 9 % (ref 4–12)
NEUTROPHILS # BLD AUTO: 5.63 THOUSANDS/ΜL (ref 1.85–7.62)
NEUTS SEG NFR BLD AUTO: 70 % (ref 43–75)
NRBC BLD AUTO-RTO: 0 /100 WBCS
NT-PROBNP SERPL-MCNC: 6109 PG/ML
PLATELET # BLD AUTO: 114 THOUSANDS/UL (ref 149–390)
PMV BLD AUTO: 11.4 FL (ref 8.9–12.7)
POTASSIUM SERPL-SCNC: 3.9 MMOL/L (ref 3.5–5.3)
PROT SERPL-MCNC: 7.3 G/DL (ref 6.4–8.2)
RBC # BLD AUTO: 4.82 MILLION/UL (ref 3.88–5.62)
RSV RNA RESP QL NAA+PROBE: NEGATIVE
SARS-COV-2 RNA RESP QL NAA+PROBE: NEGATIVE
SODIUM SERPL-SCNC: 141 MMOL/L (ref 136–145)
WBC # BLD AUTO: 8.03 THOUSAND/UL (ref 4.31–10.16)

## 2022-02-27 PROCEDURE — 99285 EMERGENCY DEPT VISIT HI MDM: CPT

## 2022-02-27 PROCEDURE — 93005 ELECTROCARDIOGRAM TRACING: CPT

## 2022-02-27 PROCEDURE — 84484 ASSAY OF TROPONIN QUANT: CPT | Performed by: EMERGENCY MEDICINE

## 2022-02-27 PROCEDURE — 1123F ACP DISCUSS/DSCN MKR DOCD: CPT | Performed by: EMERGENCY MEDICINE

## 2022-02-27 PROCEDURE — 85379 FIBRIN DEGRADATION QUANT: CPT | Performed by: EMERGENCY MEDICINE

## 2022-02-27 PROCEDURE — 0241U HB NFCT DS VIR RESP RNA 4 TRGT: CPT | Performed by: EMERGENCY MEDICINE

## 2022-02-27 PROCEDURE — 80053 COMPREHEN METABOLIC PANEL: CPT | Performed by: EMERGENCY MEDICINE

## 2022-02-27 PROCEDURE — 85025 COMPLETE CBC W/AUTO DIFF WBC: CPT | Performed by: EMERGENCY MEDICINE

## 2022-02-27 PROCEDURE — 99220 PR INITIAL OBSERVATION CARE/DAY 70 MINUTES: CPT | Performed by: HOSPITALIST

## 2022-02-27 PROCEDURE — 99285 EMERGENCY DEPT VISIT HI MDM: CPT | Performed by: EMERGENCY MEDICINE

## 2022-02-27 PROCEDURE — 36415 COLL VENOUS BLD VENIPUNCTURE: CPT | Performed by: EMERGENCY MEDICINE

## 2022-02-27 PROCEDURE — 83880 ASSAY OF NATRIURETIC PEPTIDE: CPT | Performed by: EMERGENCY MEDICINE

## 2022-02-27 RX ORDER — ASPIRIN 81 MG/1
81 TABLET ORAL DAILY
Status: DISCONTINUED | OUTPATIENT
Start: 2022-02-28 | End: 2022-03-04 | Stop reason: HOSPADM

## 2022-02-27 RX ORDER — FUROSEMIDE 10 MG/ML
40 INJECTION INTRAMUSCULAR; INTRAVENOUS ONCE
Status: COMPLETED | OUTPATIENT
Start: 2022-02-28 | End: 2022-02-28

## 2022-02-27 RX ORDER — DOXYCYCLINE HYCLATE 50 MG/1
324 CAPSULE, GELATIN COATED ORAL
Status: DISCONTINUED | OUTPATIENT
Start: 2022-02-28 | End: 2022-03-04 | Stop reason: HOSPADM

## 2022-02-27 RX ORDER — ACETAMINOPHEN 325 MG/1
650 TABLET ORAL EVERY 6 HOURS PRN
Status: DISCONTINUED | OUTPATIENT
Start: 2022-02-27 | End: 2022-03-04 | Stop reason: HOSPADM

## 2022-02-27 RX ORDER — HYDRALAZINE HYDROCHLORIDE 20 MG/ML
10 INJECTION INTRAMUSCULAR; INTRAVENOUS EVERY 6 HOURS PRN
Status: DISCONTINUED | OUTPATIENT
Start: 2022-02-27 | End: 2022-02-28

## 2022-02-27 RX ORDER — PRAVASTATIN SODIUM 40 MG
40 TABLET ORAL
Status: DISCONTINUED | OUTPATIENT
Start: 2022-02-27 | End: 2022-03-04 | Stop reason: HOSPADM

## 2022-02-27 RX ORDER — FUROSEMIDE 10 MG/ML
40 INJECTION INTRAMUSCULAR; INTRAVENOUS ONCE
Status: COMPLETED | OUTPATIENT
Start: 2022-02-27 | End: 2022-02-27

## 2022-02-27 RX ORDER — FINASTERIDE 5 MG/1
5 TABLET, FILM COATED ORAL DAILY
Status: DISCONTINUED | OUTPATIENT
Start: 2022-02-28 | End: 2022-03-04 | Stop reason: HOSPADM

## 2022-02-27 RX ADMIN — Medication 12.5 MG: at 20:07

## 2022-02-27 RX ADMIN — FUROSEMIDE 40 MG: 10 INJECTION, SOLUTION INTRAMUSCULAR; INTRAVENOUS at 16:02

## 2022-02-27 RX ADMIN — PRAVASTATIN SODIUM 40 MG: 40 TABLET ORAL at 19:39

## 2022-02-27 RX ADMIN — HYDRALAZINE HYDROCHLORIDE 10 MG: 20 INJECTION INTRAMUSCULAR; INTRAVENOUS at 19:38

## 2022-02-27 NOTE — ED PROVIDER NOTES
History  Chief Complaint   Patient presents with    Shortness of Breath     Pt complains of shortness of breath for the last couple of days  HPI     80-year-old male presenting to the emergency department with increasing shortness of breath over the past week and a half  Past medical history significant for CHF, hypertension  Patient has had increased work of breathing with exertion until today when he developed shortness of breath while at rest   Patient was started on Lasix yesterday which seemed to temporarily help some of his symptoms  Patient last saw his cardiologist in January 2022 with whom he performed an echocardiogram and Holter monitor  Patient has not heard from cardiologist since and does not know the results of his testing  Denies fever, chest pain, cough, congestion, nausea, vomiting, abdominal pain, urinary symptoms, changes in stool, leg pain or leg swelling  Denies any sick contacts or recent travel  Is vaccinated for COVID  Prior to Admission Medications   Prescriptions Last Dose Informant Patient Reported? Taking?    B Complex Vitamins (VITAMIN B COMPLEX PO)  Outside Facility (Specify) Yes No   Sig: Take 482 2 mg by mouth daily   Multiple Vitamins-Minerals (PRESERVISION AREDS 2 PO)  Outside Facility (Specify) Yes No   Sig: Take 24 mg by mouth 2 (two) times a day   Omega-3 1000 MG CAPS  Outside Facility (Specify) Yes No   Sig: Take 1 tablet by mouth daily   acetaminophen (TYLENOL) 325 mg tablet  Outside Facility (Specify) No No   Sig: Take 2 tablets (650 mg total) by mouth every 6 (six) hours as needed for mild pain, headaches or fever   aspirin (ECOTRIN LOW STRENGTH) 81 mg EC tablet   No No   Sig: Take 1 tablet (81 mg total) by mouth daily   cholecalciferol (VITAMIN D3) 1,000 units tablet  Outside Facility (Specify) Yes No   Sig: Take 1,000 Units by mouth 2 (two) times a day    ferrous gluconate (FERGON) 324 mg tablet  Outside Facility (Specify) No No   Sig: Take 1 tablet (324 mg total) by mouth 2 (two) times a day before meals   finasteride (PROSCAR) 5 mg tablet   No No   Sig: Take 1 tablet (5 mg total) by mouth daily   gabapentin (NEURONTIN) 100 mg capsule  Outside Facility (Specify) No No   Sig: Take 1 capsule (100 mg total) by mouth daily at bedtime   hydrochlorothiazide (MICROZIDE) 12 5 mg capsule   No No   Sig: Take 1 capsule (12 5 mg total) by mouth daily   metoprolol tartrate (LOPRESSOR) 25 mg tablet   No No   Sig: Take 1 tablet (25 mg total) by mouth every 12 (twelve) hours   polyethylene glycol (MIRALAX) 17 g packet  Outside Facility (Specify) No No   Sig: Take 17 g by mouth daily   simvastatin (ZOCOR) 20 mg tablet   No No   Sig: Take 1 tablet (20 mg total) by mouth daily      Facility-Administered Medications: None       Past Medical History:   Diagnosis Date    OLLIE (acute kidney injury) ([facility] [address] )     Cognitive communication deficit     Dementia ([facility] [address] )     Fracture of fifth lumbar vertebra ([facility] [address] )     Fracture of first lumbar vertebra ([facility] [address] )     Fracture of fourth lumbar vertebra ([facility] [address] )     Fracture of second lumbar vertebra ([facility] [address] )     Fracture of T11 vertebra with routine healing     Fracture of T12 vertebra with routine healing     Fracture of third thoracic vertebra ([facility] [address] )     Gait abnormality     Hyperlipidemia     Hypertension     Irregular heartbeat 8/9/2020    Lightheadedness 7/13/2021    Macular degeneration     Normal pressure hydrocephalus (HCC)     Positive blood culture 9/18/2020       History reviewed  No pertinent surgical history  History reviewed  No pertinent family history  I have reviewed and agree with the history as documented      E-Cigarette/Vaping    E-Cigarette Use Never User      E-Cigarette/Vaping Substances     Social History     Tobacco Use    Smoking status: Never Smoker    Smokeless tobacco: Never Used   Vaping Use    Vaping Use: Never used   Substance Use Topics    Alcohol use: Never    Drug use: Never       Review of Systems Constitutional: Negative for activity change, chills and fever  HENT: Negative for sneezing and sore throat  Eyes: Negative for pain  Respiratory: Positive for shortness of breath  Negative for apnea, cough, choking, chest tightness, wheezing and stridor  Cardiovascular: Negative for chest pain, palpitations and leg swelling  Gastrointestinal: Negative for abdominal distention, abdominal pain, anal bleeding, blood in stool, constipation, diarrhea, nausea, rectal pain and vomiting  Genitourinary: Negative for dysuria and hematuria  Musculoskeletal: Negative for back pain, gait problem, myalgias, neck pain and neck stiffness  Skin: Negative for color change, pallor, rash and wound  Neurological: Negative for dizziness, tremors, seizures, syncope, facial asymmetry, speech difficulty, weakness, light-headedness, numbness and headaches  Physical Exam  Physical Exam  Vitals and nursing note reviewed  Constitutional:       General: He is not in acute distress  Appearance: He is well-developed  He is ill-appearing  He is not diaphoretic  HENT:      Head: Normocephalic and atraumatic  Right Ear: External ear normal       Left Ear: External ear normal       Nose: Nose normal    Eyes:      General:         Right eye: No discharge  Left eye: No discharge  Conjunctiva/sclera: Conjunctivae normal       Pupils: Pupils are equal, round, and reactive to light  Cardiovascular:      Rate and Rhythm: Normal rate and regular rhythm  Heart sounds: Normal heart sounds  No friction rub  No gallop  Pulmonary:      Effort: Tachypnea and accessory muscle usage present  No respiratory distress  Breath sounds: No stridor  Examination of the right-lower field reveals decreased breath sounds  Examination of the left-lower field reveals decreased breath sounds  Decreased breath sounds present  No wheezing or rales  Chest:      Chest wall: No tenderness     Abdominal: General: Bowel sounds are normal       Palpations: Abdomen is soft  Tenderness: There is no abdominal tenderness  Musculoskeletal:         General: No tenderness or deformity  Normal range of motion  Cervical back: Normal range of motion and neck supple  Right lower leg: Edema (+1 pitting) present  Left lower leg: Edema (+1 pitting) present  Skin:     General: Skin is warm and dry  Capillary Refill: Capillary refill takes less than 2 seconds  Neurological:      Mental Status: He is alert and oriented to person, place, and time           Vital Signs  ED Triage Vitals   Temperature Pulse Respirations Blood Pressure SpO2   02/27/22 1329 02/27/22 1327 02/27/22 1327 02/27/22 1327 02/27/22 1327   97 8 °F (36 6 °C) (!) 45 (!) 26 135/64 96 %      Temp Source Heart Rate Source Patient Position - Orthostatic VS BP Location FiO2 (%)   02/27/22 1329 02/27/22 1327 02/27/22 1327 02/27/22 1327 --   Oral Monitor Lying Left arm       Pain Score       --                  Vitals:    02/27/22 1336 02/27/22 1345 02/27/22 1400 02/27/22 1415   BP: 159/75 159/75     Pulse: 76 74 72 76   Patient Position - Orthostatic VS: Lying            Visual Acuity      ED Medications  Medications   furosemide (LASIX) injection 40 mg (has no administration in time range)       Diagnostic Studies  Results Reviewed     Procedure Component Value Units Date/Time    CBC and differential [[id number]]  (Abnormal) Collected: 02/27/22 1416    Lab Status: Final result Specimen: Blood from Arm, Right Updated: 02/27/22 1528     WBC 8 03 Thousand/uL      RBC 4 82 Million/uL      Hemoglobin 14 3 g/dL      Hematocrit 43 4 %      MCV 90 fL      MCH 29 7 pg      MCHC 32 9 g/dL      RDW 14 8 %      MPV 11 4 fL      Platelets 316 Thousands/uL      nRBC 0 /100 WBCs      Neutrophils Relative 70 %      Immat GRANS % 1 %      Lymphocytes Relative 18 %      Monocytes Relative 9 %      Eosinophils Relative 2 %      Basophils Relative 0 % Neutrophils Absolute 5 63 Thousands/µL      Immature Grans Absolute 0 04 Thousand/uL      Lymphocytes Absolute 1 45 Thousands/µL      Monocytes Absolute 0 75 Thousand/µL      Eosinophils Absolute 0 13 Thousand/µL      Basophils Absolute 0 03 Thousands/µL     NT-BNP PRO [[id number]]  (Abnormal) Collected: 02/27/22 1416    Lab Status: Final result Specimen: Blood from Arm, Right Updated: 02/27/22 1458     NT-proBNP 6,109 pg/mL     Comprehensive metabolic panel [[id number]]  (Abnormal) Collected: 02/27/22 1416    Lab Status: Final result Specimen: Blood from Arm, Right Updated: 02/27/22 1457     Sodium 141 mmol/L      Potassium 3 9 mmol/L      Chloride 104 mmol/L      CO2 28 mmol/L      ANION GAP 9 mmol/L      BUN 14 mg/dL      Creatinine 0 96 mg/dL      Glucose 100 mg/dL      Calcium 9 3 mg/dL      AST 12 U/L      ALT 17 U/L      Alkaline Phosphatase 61 U/L      Total Protein 7 3 g/dL      Albumin 3 9 g/dL      Total Bilirubin 1 12 mg/dL      eGFR 70 ml/min/1 73sq m     Narrative:      [facility] guidelines for Chronic Kidney Disease (CKD):     Stage 1 with normal or high GFR (GFR > 90 mL/min/1 73 square meters)    Stage 2 Mild CKD (GFR = 60-89 mL/min/1 73 square meters)    Stage 3A Moderate CKD (GFR = 45-59 mL/min/1 73 square meters)    Stage 3B Moderate CKD (GFR = 30-44 mL/min/1 73 square meters)    Stage 4 Severe CKD (GFR = 15-29 mL/min/1 73 square meters)    Stage 5 End Stage CKD (GFR <15 mL/min/1 73 square meters)  Note: GFR calculation is accurate only with a steady state creatinine    HS Troponin I 2hr [[id number]]     Lab Status: No result Specimen: Blood     HS Troponin 0hr (reflex protocol) [[id number]]  (Normal) Collected: 02/27/22 1416    Lab Status: Final result Specimen: Blood from Arm, Right Updated: 02/27/22 1456     hs TnI 0hr 10 ng/L     D-Dimer [[id number]]  (Abnormal) Collected: 02/27/22 1416    Lab Status: Final result Specimen: Blood from Arm, Right Updated: 02/27/22 1450 D-Dimer, Quant 0 66 ug/ml FEU     Narrative: In the evaluation for possible pulmonary embolism, in the appropriate (Well's Score of 4 or less) patient, the age adjusted d-dimer cutoff for this patient can be calculated as:    Age x 0 01 (in ug/mL) for Age-adjusted D-dimer exclusion threshold for a patient over 50 years  COVID/FLU/RSV - 2 hour TAT [[id number]] Collected: 02/27/22 1416    Lab Status: In process Specimen: Nares from Nose Updated: 02/27/22 1426                 No orders to display              Procedures  Procedures         ED Course  ED Course as of 02/27/22 1542   Sun Feb 27, 2022   154 80year-old male brought in for worsening shortness of breath especially on exertion  Past medical history significant for CHF  Vital signs on arrival notable for tachypnea  SpO2 at bedside is 94%  1405 Pulse: 76   1405 Respirations(!): 26   1405 SpO2: 96 %   1405 Physical exam is remarkable for diminished breath sounds at the bases  Patient appears to have slight labor in breathing, notably tachypneic  Patient speaks quiet short sentences  Is leaning back in stretcher  Bilateral pitting edema noted  No specific other findings on physical exam     Differential diagnosis includes CHF, pleural effusion, pneumonia, COVID, other viral infection, ACS, PE/DVT, anemia, fluid overload from renal dysfunction/liver dysfunction  Lab work ordered  Patient had at chest [facility] done at [facility] prior to arrival in the ED which showed evidence of bilateral pleural effusions and bilateral atelectasis  1419 ECG 12 lead  Sinus rhythm with first-degree AV block and frequent PVCs  Rate 79, P wave 264, QRS 88, , no ST elevation or depression, global T-wave flattening, no terminal R  Overall impression:  No STEMI  Change from July 2021 EKG  1455 D-Dimer, Quant(!): 0 66  Age adjusted, is within normal limits     1455 SpO2: 93 %   1505 NT-proBNP(!): 6,109   1536 On reassessment, patient appears the same as prior, tachypneic, short of breath at rest   SpO2 at bedside is 95 percent  Lasix has been ordered for the patient, pending administration  1540 Case discussed with General Medicine for placement in observation  MDM    Disposition  Final diagnoses:   Acute on chronic diastolic congestive heart failure ([facility] [address] )   Acute respiratory distress     Time reflects when diagnosis was documented in both MDM as applicable and the Disposition within this note     Time User Action Codes Description Comment    2/27/2022  3:41 PM Tiburcio Drew [I50 33] [facility]     2/27/2022  3:41 PM Bridget Carlson [[id number]] Acute respiratory distress       ED Disposition     ED Disposition Condition Date/Time Comment    Admit Stable Sun Feb 27, 2022  3:41 PM Case was discussed with Dr Bita Tobin and the patient's admission status was agreed to be Admission Status: observation status to the service of Dr Bita Tobin   Follow-up Information    None         Patient's Medications   Discharge Prescriptions    No medications on file       No discharge procedures on file      PDMP Review       Value Time User    PDMP Reviewed  Yes 9/16/2020 10:05 PM Keri Ulloa MD          ED Provider  Electronically Signed by           Tabatha Navarrete MD  02/27/22 8154

## 2022-02-27 NOTE — H&P
[facility]  H&P- Johnnie Paz 1934, 80 y o  male MRN: [id number]  Unit/Bed#: S -01 Encounter: [id number]  Primary Care Provider: Susana Turner DO   Date and time admitted to hospital: 2/27/2022  2:76 PM    * Diastolic dysfunction with heart failure [facility])  Assessment & Plan  Wt Readings from Last 3 Encounters:   02/27/22 61 8 kg (136 lb 3 9 oz)   05/14/21 66 2 kg (146 lb)   11/20/20 63 5 kg (140 lb)     POA:  - BNP 6109  - Received IV 40mg Lasix in ED    Echo 1/13/22: LV mildly dilated  Ejection fraction of 20-25%  Grade II   (moderate) diastolic dysfunction  LA moderately dilated  RV cavity & systolic function normal    Plan:  · Cardiology Consult appreciated  · IV Lasix tomorrow & Reassess  · Daily Weights   · I/O's  · Sodium restricted diet with fluid restriction 1500mL    Elevated d-dimer  Assessment & Plan  POA: D-Dimer 0 66    Age adjusted - within normal limits    Plan:  Monitor for symptoms   No PE workup (CTA, V/Q scan) at this time    BPH (benign prostatic hyperplasia)  Assessment & Plan  Home Regimen: Finasteride 5mg QD    Continue home regimen    Hyperlipidemia  Assessment & Plan  Home Regimen: Simvastatin (ZOCOR) 20 mg QD    Plan: Pravastatin 40mg       Essential hypertension  Assessment & Plan  Home medications: Lopressor 12 5mg BID, HCT 12 5mg QD    BP Readings from Last 1 Encounters:   02/27/22 (!) 178/80      Plan:  Continue Lopressor  Hold HCT in setting of Lasix   Hydralazine PRN for BP >180    VTE Pharmacologic Prophylaxis: VTE Score: 7 High Risk (Score >/= 5) - Pharmacological DVT Prophylaxis Ordered: enoxaparin (Lovenox)  Sequential Compression Devices Ordered  Code Status: Level 1 - Full Code   Discussion with family: Updated  (wife) at bedside  Anticipated Length of Stay: Patient will be admitted on an observation basis with an anticipated length of stay of less than 2 midnights secondary to CHF exacerbation       Chief Complaint: Shortness of Breath    History of Present Illness:  Amanda Parry is a 80 y o  male with a PMH of CHF & HTN that presents with increasing shortness of breath over the past week and a half  Patient was started on Lasix yesterday which seemed to temporarily help some of his symptoms  Covid vaccinated & boostered without any known sick contacts/travels  Wife notes that his breathing was worse when he lying flat, however yesterday pt was short of breath in all positions  Review of Systems:  Review of Systems   Constitutional: Positive for activity change  Negative for chills and fever  HENT: Negative for ear pain and sore throat  Eyes: Negative for pain and visual disturbance  Respiratory: Positive for cough and shortness of breath  Cardiovascular: Negative for chest pain and palpitations  Gastrointestinal: Negative for abdominal pain and vomiting  Genitourinary: Negative for dysuria and hematuria  Musculoskeletal: Negative for arthralgias and back pain  Skin: Negative for color change and rash  Neurological: Negative for seizures and syncope  Psychiatric/Behavioral: The patient is nervous/anxious  All other systems reviewed and are negative  Past Medical and Surgical History:   Past Medical History:   Diagnosis Date    OLLIE (acute kidney injury) ([address] )     Cognitive communication deficit     Dementia ([address] )     Fracture of fifth lumbar vertebra ([address] )     Fracture of first lumbar vertebra ([address]     Fracture of fourth lumbar vertebra ([address] )     Fracture of second lumbar vertebra ([address] )     Fracture of T11 vertebra with routine healing     Fracture of T12 vertebra with routine healing     Fracture of third thoracic vertebra ([address] )     Gait abnormality     Hyperlipidemia     Hypertension     Irregular heartbeat 8/9/2020    Lightheadedness 7/13/2021    Macular degeneration     Normal pressure hydrocephalus (HCC)     Positive blood culture 9/18/2020       History reviewed   No pertinent surgical history  Meds/Allergies:  Prior to Admission medications    Medication Sig Start Date End Date Taking?  Authorizing Provider   acetaminophen (TYLENOL) 325 mg tablet Take 2 tablets (650 mg total) by mouth every 6 (six) hours as needed for mild pain, headaches or fever 9/20/20  Yes Lenora Cintron MD   aspirin (ECOTRIN LOW STRENGTH) 81 mg EC tablet Take 1 tablet (81 mg total) by mouth daily 7/21/21  Yes CLOVER Nieves   B Complex Vitamins (VITAMIN B COMPLEX PO) Take 482 2 mg by mouth daily 10/3/12  Yes Historical Provider, MD   cholecalciferol (VITAMIN D3) 1,000 units tablet Take 1,000 Units by mouth 2 (two) times a day  10/3/12  Yes Historical Provider, MD   finasteride (PROSCAR) 5 mg tablet Take 1 tablet (5 mg total) by mouth daily 5/14/21 2/27/22 Yes Pati Kramer PA-C   hydrochlorothiazide (MICROZIDE) 12 5 mg capsule Take 1 capsule (12 5 mg total) by mouth daily 7/21/21  Yes CLOVER Nieves   metoprolol tartrate (LOPRESSOR) 25 mg tablet Take 1 tablet (25 mg total) by mouth every 12 (twelve) hours  Patient taking differently: Take 12 5 mg by mouth every 12 (twelve) hours   7/21/21  Yes CLOVER Nieves   Multiple Vitamins-Minerals (PRESERVISION AREDS 2 PO) Take 24 mg by mouth 2 (two) times a day   Yes Historical Provider, MD   [place]-3 1000 MG CAPS Take 1 tablet by mouth daily 10/3/12  Yes Historical Provider, MD   simvastatin (ZOCOR) 20 mg tablet Take 1 tablet (20 mg total) by mouth daily 7/21/21  Yes CLOVER Nieves   ferrous gluconate (FERGON) 324 mg tablet Take 1 tablet (324 mg total) by mouth 2 (two) times a day before meals  Patient not taking: Reported on 2/27/2022  10/22/20   Mary Silver PA-C   gabapentin (NEURONTIN) 100 mg capsule Take 1 capsule (100 mg total) by mouth daily at bedtime  Patient not taking: Reported on 2/27/2022  10/22/20 7/19/21  Mary Silver PA-C   polyethylene glycol (MIRALAX) 17 g packet Take 17 g by mouth daily  Patient not taking: Reported on 2/27/2022 8/13/20   Harry Cage MD     I have reviewed home medications with patient personally  with wife  Allergies: No Known Allergies    Social History:  Marital Status: /Civil Union   Occupation: Retired  Patient Pre-hospital Living Situation: Home  Patient Pre-hospital Level of Mobility: walks with cane  Patient Pre-hospital Diet Restrictions: None  Substance Use History:   Social History     Substance and Sexual Activity   Alcohol Use Never     Social History     Tobacco Use   Smoking Status Never Smoker   Smokeless Tobacco Never Used     Social History     Substance and Sexual Activity   Drug Use Never       Family History:  None    Physical Exam:     Vitals:   Blood Pressure: (!) 178/80 (02/27/22 1900)  Pulse: 87 (02/27/22 1833)  Temperature: 98 4 °F (36 9 °C) (02/27/22 1833)  Temp Source: Oral (02/27/22 1833)  Respirations: 22 (02/27/22 1833)  Height: 5' (152 4 cm) (02/27/22 1327)  Weight - Scale: 61 8 kg (136 lb 3 9 oz) (02/27/22 1327)  SpO2: 96 % (02/27/22 1833)    Physical Exam  Vitals and nursing note reviewed  Constitutional:       General: He is not in acute distress  Appearance: He is well-developed  He is ill-appearing  HENT:      Head: Normocephalic and atraumatic  Eyes:      Conjunctiva/sclera: Conjunctivae normal    Cardiovascular:      Rate and Rhythm: Normal rate and regular rhythm  Heart sounds: No murmur heard  Pulmonary:      Effort: No respiratory distress  Breath sounds: Normal breath sounds  No wheezing  Comments: Increased effort with decreased breath sounds  Chest:      Chest wall: No tenderness  Abdominal:      Palpations: Abdomen is soft  Tenderness: There is no abdominal tenderness  There is no guarding or rebound  Musculoskeletal:      Cervical back: Neck supple  Right lower leg: Edema present  Left lower leg: Edema present        Comments: Mild LE bilaterally below knee   Skin:     General: Skin is warm and dry  Neurological:      Mental Status: He is alert  Additional Data:     Lab Results:  Results from last 7 days   Lab Units 02/27/22  1416   WBC Thousand/uL 8 03   HEMOGLOBIN g/dL 14 3   HEMATOCRIT % 43 4   PLATELETS Thousands/uL 114*   NEUTROS PCT % 70   LYMPHS PCT % 18   MONOS PCT % 9   EOS PCT % 2     Results from last 7 days   Lab Units 02/27/22  1416   SODIUM mmol/L 141   POTASSIUM mmol/L 3 9   CHLORIDE mmol/L 104   CO2 mmol/L 28   BUN mg/dL 14   CREATININE mg/dL 0 96   ANION GAP mmol/L 9   CALCIUM mg/dL 9 3   ALBUMIN g/dL 3 9   TOTAL BILIRUBIN mg/dL 1 12*   ALK PHOS U/L 61   ALT U/L 17   AST U/L 12   GLUCOSE RANDOM mg/dL 100                       Imaging: No pertinent imaging reviewed  No orders to display       EKG and Other Studies Reviewed on Admission:   · EKG: Sinus rhythm with 1st degree AV block & PVCs - rate 79  ** Please Note: This note has been constructed using a voice recognition system   **

## 2022-02-28 ENCOUNTER — EPISODE CHANGES (OUTPATIENT)
Dept: CASE MANAGEMENT | Facility: OTHER | Age: 87
End: 2022-02-28

## 2022-02-28 PROBLEM — I47.29 NONSUSTAINED VENTRICULAR TACHYCARDIA: Status: ACTIVE | Noted: 2022-02-28

## 2022-02-28 PROBLEM — I48.0 PAF (PAROXYSMAL ATRIAL FIBRILLATION) (HCC): Chronic | Status: ACTIVE | Noted: 2022-02-28

## 2022-02-28 PROBLEM — N18.2 CKD (CHRONIC KIDNEY DISEASE) STAGE 2, GFR 60-89 ML/MIN: Status: ACTIVE | Noted: 2022-02-28

## 2022-02-28 PROBLEM — I48.0 PAF (PAROXYSMAL ATRIAL FIBRILLATION) (HCC): Status: ACTIVE | Noted: 2022-02-28

## 2022-02-28 PROBLEM — I50.43 ACUTE ON CHRONIC HEART FAILURE WITH REDUCED EJECTION FRACTION AND DIASTOLIC DYSFUNCTION (HCC): Status: ACTIVE | Noted: 2022-02-27

## 2022-02-28 PROBLEM — I47.2 NONSUSTAINED VENTRICULAR TACHYCARDIA (HCC): Status: ACTIVE | Noted: 2022-02-28

## 2022-02-28 LAB
ANION GAP SERPL CALCULATED.3IONS-SCNC: 11 MMOL/L (ref 4–13)
ANION GAP SERPL CALCULATED.3IONS-SCNC: 12 MMOL/L (ref 4–13)
ATRIAL RATE: 80 BPM
ATRIAL RATE: 88 BPM
BASOPHILS # BLD AUTO: 0.03 THOUSANDS/ΜL (ref 0–0.1)
BASOPHILS NFR BLD AUTO: 0 % (ref 0–1)
BUN SERPL-MCNC: 16 MG/DL (ref 5–25)
BUN SERPL-MCNC: 19 MG/DL (ref 5–25)
CALCIUM SERPL-MCNC: 9.3 MG/DL (ref 8.3–10.1)
CALCIUM SERPL-MCNC: 9.4 MG/DL (ref 8.3–10.1)
CHLORIDE SERPL-SCNC: 103 MMOL/L (ref 100–108)
CHLORIDE SERPL-SCNC: 105 MMOL/L (ref 100–108)
CO2 SERPL-SCNC: 26 MMOL/L (ref 21–32)
CO2 SERPL-SCNC: 26 MMOL/L (ref 21–32)
CREAT SERPL-MCNC: 1.04 MG/DL (ref 0.6–1.3)
CREAT SERPL-MCNC: 1.16 MG/DL (ref 0.6–1.3)
EOSINOPHIL # BLD AUTO: 0.12 THOUSAND/ΜL (ref 0–0.61)
EOSINOPHIL NFR BLD AUTO: 2 % (ref 0–6)
ERYTHROCYTE [DISTWIDTH] IN BLOOD BY AUTOMATED COUNT: 15.1 % (ref 11.6–15.1)
GFR SERPL CREATININE-BSD FRML MDRD: 55 ML/MIN/1.73SQ M
GFR SERPL CREATININE-BSD FRML MDRD: 63 ML/MIN/1.73SQ M
GLUCOSE SERPL-MCNC: 100 MG/DL (ref 65–140)
GLUCOSE SERPL-MCNC: 120 MG/DL (ref 65–140)
HCT VFR BLD AUTO: 42.7 % (ref 36.5–49.3)
HGB BLD-MCNC: 14.5 G/DL (ref 12–17)
IMM GRANULOCYTES # BLD AUTO: 0.02 THOUSAND/UL (ref 0–0.2)
IMM GRANULOCYTES NFR BLD AUTO: 0 % (ref 0–2)
LYMPHOCYTES # BLD AUTO: 2.49 THOUSANDS/ΜL (ref 0.6–4.47)
LYMPHOCYTES NFR BLD AUTO: 37 % (ref 14–44)
MAGNESIUM SERPL-MCNC: 2.2 MG/DL (ref 1.6–2.6)
MAGNESIUM SERPL-MCNC: 2.9 MG/DL (ref 1.6–2.6)
MCH RBC QN AUTO: 29.9 PG (ref 26.8–34.3)
MCHC RBC AUTO-ENTMCNC: 34 G/DL (ref 31.4–37.4)
MCV RBC AUTO: 88 FL (ref 82–98)
MONOCYTES # BLD AUTO: 0.7 THOUSAND/ΜL (ref 0.17–1.22)
MONOCYTES NFR BLD AUTO: 10 % (ref 4–12)
NEUTROPHILS # BLD AUTO: 3.42 THOUSANDS/ΜL (ref 1.85–7.62)
NEUTS SEG NFR BLD AUTO: 51 % (ref 43–75)
NRBC BLD AUTO-RTO: 0 /100 WBCS
P AXIS: 26 DEGREES
P AXIS: 37 DEGREES
PLATELET # BLD AUTO: 117 THOUSANDS/UL (ref 149–390)
PMV BLD AUTO: 11.4 FL (ref 8.9–12.7)
POTASSIUM SERPL-SCNC: 3.7 MMOL/L (ref 3.5–5.3)
POTASSIUM SERPL-SCNC: 4.5 MMOL/L (ref 3.5–5.3)
PR INTERVAL: 248 MS
PR INTERVAL: 264 MS
QRS AXIS: 31 DEGREES
QRS AXIS: 34 DEGREES
QRSD INTERVAL: 80 MS
QRSD INTERVAL: 88 MS
QT INTERVAL: 384 MS
QT INTERVAL: 418 MS
QTC INTERVAL: 441 MS
QTC INTERVAL: 498 MS
RBC # BLD AUTO: 4.85 MILLION/UL (ref 3.88–5.62)
SODIUM SERPL-SCNC: 140 MMOL/L (ref 136–145)
SODIUM SERPL-SCNC: 143 MMOL/L (ref 136–145)
T WAVE AXIS: 122 DEGREES
T WAVE AXIS: 206 DEGREES
VENTRICULAR RATE: 79 BPM
VENTRICULAR RATE: 85 BPM
WBC # BLD AUTO: 6.78 THOUSAND/UL (ref 4.31–10.16)

## 2022-02-28 PROCEDURE — 80048 BASIC METABOLIC PNL TOTAL CA: CPT | Performed by: NURSE PRACTITIONER

## 2022-02-28 PROCEDURE — 99223 1ST HOSP IP/OBS HIGH 75: CPT | Performed by: INTERNAL MEDICINE

## 2022-02-28 PROCEDURE — 93010 ELECTROCARDIOGRAM REPORT: CPT | Performed by: INTERNAL MEDICINE

## 2022-02-28 PROCEDURE — 99232 SBSQ HOSP IP/OBS MODERATE 35: CPT | Performed by: HOSPITALIST

## 2022-02-28 PROCEDURE — 83735 ASSAY OF MAGNESIUM: CPT | Performed by: NURSE PRACTITIONER

## 2022-02-28 PROCEDURE — 83735 ASSAY OF MAGNESIUM: CPT | Performed by: INTERNAL MEDICINE

## 2022-02-28 PROCEDURE — 80048 BASIC METABOLIC PNL TOTAL CA: CPT

## 2022-02-28 PROCEDURE — 85025 COMPLETE CBC W/AUTO DIFF WBC: CPT

## 2022-02-28 RX ORDER — POTASSIUM CHLORIDE 20 MEQ/1
20 TABLET, EXTENDED RELEASE ORAL ONCE
Status: COMPLETED | OUTPATIENT
Start: 2022-02-28 | End: 2022-02-28

## 2022-02-28 RX ORDER — AMIODARONE HYDROCHLORIDE 200 MG/1
400 TABLET ORAL 2 TIMES DAILY WITH MEALS
Status: DISCONTINUED | OUTPATIENT
Start: 2022-02-28 | End: 2022-02-28

## 2022-02-28 RX ORDER — MAGNESIUM SULFATE HEPTAHYDRATE 40 MG/ML
2 INJECTION, SOLUTION INTRAVENOUS ONCE
Status: COMPLETED | OUTPATIENT
Start: 2022-02-28 | End: 2022-02-28

## 2022-02-28 RX ORDER — FUROSEMIDE 10 MG/ML
40 INJECTION INTRAMUSCULAR; INTRAVENOUS
Status: DISCONTINUED | OUTPATIENT
Start: 2022-02-28 | End: 2022-03-02

## 2022-02-28 RX ORDER — FUROSEMIDE 10 MG/ML
40 INJECTION INTRAMUSCULAR; INTRAVENOUS ONCE
Status: COMPLETED | OUTPATIENT
Start: 2022-02-28 | End: 2022-02-28

## 2022-02-28 RX ORDER — POTASSIUM CHLORIDE 20 MEQ/1
40 TABLET, EXTENDED RELEASE ORAL ONCE
Status: COMPLETED | OUTPATIENT
Start: 2022-02-28 | End: 2022-02-28

## 2022-02-28 RX ORDER — POTASSIUM CHLORIDE 20 MEQ/1
20 TABLET, EXTENDED RELEASE ORAL ONCE
Status: DISCONTINUED | OUTPATIENT
Start: 2022-02-28 | End: 2022-02-28

## 2022-02-28 RX ORDER — LABETALOL 20 MG/4 ML (5 MG/ML) INTRAVENOUS SYRINGE
10 EVERY 6 HOURS PRN
Status: DISCONTINUED | OUTPATIENT
Start: 2022-02-28 | End: 2022-03-04 | Stop reason: HOSPADM

## 2022-02-28 RX ORDER — POTASSIUM CHLORIDE 20 MEQ/1
20 TABLET, EXTENDED RELEASE ORAL DAILY
Status: DISCONTINUED | OUTPATIENT
Start: 2022-02-28 | End: 2022-03-03

## 2022-02-28 RX ADMIN — FERROUS GLUCONATE 324 MG: 324 TABLET ORAL at 17:18

## 2022-02-28 RX ADMIN — FUROSEMIDE 40 MG: 10 INJECTION, SOLUTION INTRAMUSCULAR; INTRAVENOUS at 10:08

## 2022-02-28 RX ADMIN — PRAVASTATIN SODIUM 40 MG: 40 TABLET ORAL at 17:18

## 2022-02-28 RX ADMIN — FUROSEMIDE 40 MG: 10 INJECTION, SOLUTION INTRAMUSCULAR; INTRAVENOUS at 17:18

## 2022-02-28 RX ADMIN — POTASSIUM CHLORIDE 20 MEQ: 1500 TABLET, EXTENDED RELEASE ORAL at 10:07

## 2022-02-28 RX ADMIN — FUROSEMIDE 40 MG: 10 INJECTION, SOLUTION INTRAMUSCULAR; INTRAVENOUS at 09:21

## 2022-02-28 RX ADMIN — POTASSIUM CHLORIDE 20 MEQ: 1500 TABLET, EXTENDED RELEASE ORAL at 12:31

## 2022-02-28 RX ADMIN — ASPIRIN 81 MG: 81 TABLET, COATED ORAL at 09:20

## 2022-02-28 RX ADMIN — ENOXAPARIN SODIUM 40 MG: 40 INJECTION SUBCUTANEOUS at 09:21

## 2022-02-28 RX ADMIN — AMIODARONE HYDROCHLORIDE 400 MG: 200 TABLET ORAL at 10:08

## 2022-02-28 RX ADMIN — Medication 12.5 MG: at 17:20

## 2022-02-28 RX ADMIN — Medication 12.5 MG: at 22:26

## 2022-02-28 RX ADMIN — MAGNESIUM SULFATE IN WATER 2 G: 40 INJECTION, SOLUTION INTRAVENOUS at 10:08

## 2022-02-28 RX ADMIN — Medication 12.5 MG: at 09:20

## 2022-02-28 RX ADMIN — POTASSIUM CHLORIDE 40 MEQ: 1500 TABLET, EXTENDED RELEASE ORAL at 09:20

## 2022-02-28 RX ADMIN — FINASTERIDE 5 MG: 5 TABLET, FILM COATED ORAL at 09:20

## 2022-02-28 RX ADMIN — FERROUS GLUCONATE 324 MG: 324 TABLET ORAL at 09:21

## 2022-02-28 NOTE — CASE MANAGEMENT
Case Management Progress Note    Patient name Ethyl Jose  [facility] S /S -00 MRN [id number]  : 1934 Date 2022       LOS (days): 0  Geometric Mean LOS (GMLOS) (days):   Days to GMLOS:        OBJECTIVE:        Current admission status: Inpatient  [facility] [address]  Phone: [phone] Fax: [phone]    Primary Care Provider: Victorina Pham DO    Primary Insurance: MEDICARE  Secondary Insurance: [address],[facility] SHIELD    PROGRESS NOTE:    Pt is very [place]  CM TC pt's spouse-Rosalia ([phone]) for initial assessment  CM left a detailed message requesting return call

## 2022-02-28 NOTE — PLAN OF CARE
Problem: Potential for Falls  Goal: Patient will remain free of falls  Description: INTERVENTIONS:  - Educate patient/family on patient safety including physical limitations  - Instruct patient to call for assistance with activity   - Consult OT/PT to assist with strengthening/mobility   - Keep Call bell within reach  - Keep bed low and locked with side rails adjusted as appropriate  - Keep care items and personal belongings within reach  - Initiate and maintain comfort rounds  - Make Fall Risk Sign visible to staff  - Offer Toileting every  Hours, in advance of need  - Initiate/Maintain alarm  - Obtain necessary fall risk management equipment:   - Apply yellow socks and bracelet for high fall risk patients  - Consider moving patient to room near nurses station  Outcome: Progressing     Problem: MOBILITY - ADULT  Goal: Maintain or return to baseline ADL function  Description: INTERVENTIONS:  -  Assess patient's ability to carry out ADLs; assess patient's baseline for ADL function and identify physical deficits which impact ability to perform ADLs (bathing, care of mouth/teeth, toileting, grooming, dressing, etc )  - Assess/evaluate cause of self-care deficits   - Assess range of motion  - Assess patient's mobility; develop plan if impaired  - Assess patient's need for assistive devices and provide as appropriate  - Encourage maximum independence but intervene and supervise when necessary  - Involve family in performance of ADLs  - Assess for home care needs following discharge   - Consider OT consult to assist with ADL evaluation and planning for discharge  - Provide patient education as appropriate  Outcome: Progressing  Goal: Maintains/Returns to pre admission functional level  Description: INTERVENTIONS:  - Perform BMAT or MOVE assessment daily    - Set and communicate daily mobility goal to care team and patient/family/caregiver     - Collaborate with rehabilitation services on mobility goals if consulted  - Perform Range of Motion  times a day  - Reposition patient every  hours    - Dangle patient  times a day  - Stand patient  times a day  - Ambulate patient  times a day  - Out of bed to chair  times a day   - Out of bed for david times a day  - Out of bed for toileting  - Record patient progress and toleration of activity level   Outcome: Progressing     Problem: Prexisting or High Potential for Compromised Skin Integrity  Goal: Skin integrity is maintained or improved  Description: INTERVENTIONS:  - Identify patients at risk for skin breakdown  - Assess and monitor skin integrity  - Assess and monitor nutrition and hydration status  - Monitor labs   - Assess for incontinence   - Turn and reposition patient  - Assist with mobility/ambulation  - Relieve pressure over bony prominences  - Avoid friction and shearing  - Provide appropriate hygiene as needed including keeping skin clean and dry  - Evaluate need for skin moisturizer/barrier cream  - Collaborate with interdisciplinary team   - Patient/family teaching  - Consider wound care consult   Outcome: Progressing

## 2022-02-28 NOTE — ASSESSMENT & PLAN NOTE
POA: D-Dimer 0 66    Age adjusted - within normal limits    Plan:  Monitor for symptoms   No PE workup (CTA, V/Q scan) at this time

## 2022-02-28 NOTE — PROGRESS NOTES
12 beats V-tach noted on telemetry Patient asymptomatic  Was out of bed to chair speaking with resident at the time  SLIM aware  Labs added on  Will continue to monitor patient, call bell within reach

## 2022-02-28 NOTE — ASSESSMENT & PLAN NOTE
Home medications: Lopressor 12 5mg BID, HCT 12 5mg QD    BP Readings from Last 1 Encounters:   02/27/22 (!) 178/80      Plan:  Continue Lopressor  Hold HCT in setting of Lasix   Hydralazine PRN for BP >180

## 2022-02-28 NOTE — ASSESSMENT & PLAN NOTE
Lab Results   Component Value Date    EGFR 52 03/01/2022    EGFR 55 02/28/2022    EGFR 63 02/28/2022    CREATININE 1 22 03/01/2022    CREATININE 1 16 02/28/2022    CREATININE 1 04 02/28/2022     CKD Stage 2/ CKD Stage 3, likely  due to HTN, evidenced Cr 0 96> 1 04 with GFR 70>63 (also see labs below), treated with careful diuresis, renal function assessment, and lab monitoring       CKD Stage 2  GFR  60-89  CKD Stage 3a  GFR 45-59    Findings:  2/5/22 Cr: 1 08  with GFR 61  1/18/22 Cr  1 13 with GFR  58  7/20/21 Cr  0 88  with GFR  77    Continue to monitor

## 2022-02-28 NOTE — ASSESSMENT & PLAN NOTE
Home medications: Lopressor 12 5mg BID, HCT 12 5mg QD    BP Readings from Last 1 Encounters:   02/28/22 140/70     Plan:  Continue Lopressor  Hold HCT in setting of Lasix   Hydralazine PRN for BP >180  Consider ACEi/ARB once euvolemic

## 2022-02-28 NOTE — ASSESSMENT & PLAN NOTE
Hx of PAF - Diagnosed 9/2020 at [address] [address] started on eliquis at that time    Currently not on [facility] d/t fall risk per Dr Chantal Villa notes  Has been Sinus with PVCs    Currently on Telemetry

## 2022-02-28 NOTE — ASSESSMENT & PLAN NOTE
Wt Readings from Last 3 Encounters:   02/27/22 61 8 kg (136 lb 3 9 oz)   05/14/21 66 2 kg (146 lb)   11/20/20 63 5 kg (140 lb)     POA:  - BNP 6109  - Received IV 40mg Lasix in ED    Echo 1/13/22: LV mildly dilated  Ejection fraction of 20-25%  Grade II   (moderate) diastolic dysfunction  LA moderately dilated   RV cavity & systolic function normal    Plan:  · Cardiology Consult appreciated  · IV Lasix tomorrow & Reassess  · Daily Weights   · I/O's  · Sodium restricted diet with fluid restriction 1500mL

## 2022-02-28 NOTE — CONSULTS
Consultation - Cardiology   Kusum Borden 80 y o  male MRN: [id number]  Unit/Bed#: S -01 Encounter: [id number]    Assessment/Plan     Principal Problem:    Diastolic dysfunction with heart failure [facility])  Active Problems:    Essential hypertension    Hyperlipidemia    Elevated d-dimer    BPH (benign prostatic hyperplasia)      Assessment/Plan    1  Acute on chronic combined heart failure  ProBNP 6109  EKG- NSR bigeminy PVC's  Weight last month 148 lbs  Currently 135 bed scale  40 mg IV Lasix x1 given  Recorded net -390  Patient reports diuresis however  Not much symptomatic improvement  Continue 40 mg IV Lasix b i d  Strict I&O/daily weights  2 g sodium restriction, 1500 cc fluid restriction  TTE 1/2020 LVEF 20-25% global hypokinesis  TTE 9/2020- LVEF 45% with LAD WMA    2  CAD with history of MI 2012  Unknown coronary anatomy  On aspirin 81 mg, BB-Lopressor 12 5 every 12 hours, statin- pravastatin 40 mg  Troponin negative  Patient without complaints of chest pain or pressure  DSE 6/2020- negative for ischemia  Now with drop in LV function  May need updated ischemic evaluation  3  Paroxysmal atrial fibrillation  Diagnosed 9/2020 at [facility]- he was started on [facility]- eliquis at that time  Not on [facility] d/t fall risk per Dr Jayshree Herrera notes  Patient unaware of ever being on [facility]  He however is a poor historian  Denies history of falls  Reports previous recurrent epistaxis  Maintaining sinus rhythm    4  Essential HTN  Hypertensive at times  Monitor with volume removal  Continue beta-blocker  We will be initiating ACE/ARB once more euvolemic    5  NSVT- 12 beat NSVT  Keep potassium greater than 4 Mag greater than 2  Monitor with diuresis closely  History of frequent PVCs  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  Holter 1/2022- PVCs comprised 26% of beats  3 ventricular runs up to 13 beat  Increase lopressor to 25mg BID      History of Present Illness   Physician Requesting Consult: Corrinne Ina Mcneil Gowers, MD  Reason for Consult / Principal Problem:  Acute on chronic diastolic heart failure     HPI: Jaiden Buck is a 80y o  year old male with HTN, chronic diastolic HF, CAD/MI 8847 , new onset atrial fibrillation 9/2020 who presents with progressive dyspnea over the past month  No chest pain or pressure  Care everywhere we have reviewed  He is followed by Dr Celinda Riedel  It appears he is to be on 20 mg of PO Lasix with taking an extra 20 mg p r n  Shortness of breath or weight gain  Is unclear whether he is taking a diuretic  Patient is a poor historian  Weight Jan 2022- 148 lbs  Current weight bed scale 135 lbs  HS troponin 10, 10, 12  ProBNP 6 109  D-dimer 0 66  COVID-19 negative    He was seen by [facility] in the hospital for new onset atrial fibrillation with RVR 09/2020  Known CAD with prior 2012 with unknown details  His beta-blocker was increased and started on Eliquis for anticoagulation  Echocardiogram at that time LVEF 45% with LAD wall motion abnormality  He is followed by Dr Tyrone Nunes, last seen 1/4/2022 for evaluation of SOB  Noted frequent PVC's with stress test 2020  Holter monitor and echocardiogram were ordered  TTE 9/2020- LVEF 45% with hypokinesis of the mid anteroseptal, apical inferior, apical septal and apical walls  Grade 1 diastolic dysfunction  Mild-to-moderate AI  Mild TR  Aortic root mildly dilated  06/2020-negative stress echo for ischemia  Poor exercise tolerance  Frequent PVCs likely originating from RV OT  Holter monitor January 17, 2022-minimum heart rate 45 with a maximum of 96 an average of 67  PVCs comprised 26% of beats, couplets 1216, triplet 17, ventricular runs 3, 5 and 13   PACs 3 7%  Atrial pairs 67, atrial run 26 beats  No atrial fibrillation  Predominant rhythm sinus rhythm  TTE January 13, 2022-LVEF 20-25%, global hypokinesis with grade 2 diastolic dysfunction  RV normal size and function  Mild MR  Mild TR  Mild AI    No pericardial effusion  Inpatient consult to Cardiology  Consult performed by: CLOVER Redding  Consult ordered by: Piedad Patel DO          Review of Systems   Constitutional: Positive for activity change and unexpected weight change  HENT: Negative  Eyes: Negative  Respiratory: Positive for shortness of breath  Cardiovascular: Positive for leg swelling  Negative for chest pain and palpitations  Gastrointestinal: Negative  Endocrine: Negative  Genitourinary: Negative  Musculoskeletal: Positive for gait problem  Skin: Negative  Allergic/Immunologic: Negative  Hematological: Negative  Psychiatric/Behavioral: Negative  All other systems reviewed and are negative  Historical Information   Past Medical History:   Diagnosis Date    OLLIE [facility]) [facility] [address] [facility]     Cognitive communication deficit     Dementia ([facility] [address] [facility]     Fracture of fifth lumbar vertebra ([facility] [address] [facility]     Fracture of first lumbar vertebra ([facility] [address] [facility]     Fracture of fourth lumbar vertebra ([facility] [address] [facility]     Fracture of second lumbar vertebra ([facility] [address] [facility]     Fracture of T11 vertebra with routine healing     Fracture of T12 vertebra with routine healing     Fracture of third thoracic vertebra ([facility] [address] [facility]     Gait abnormality     Hyperlipidemia     Hypertension     Irregular heartbeat 8/9/2020    Lightheadedness 7/13/2021    Macular degeneration     Normal pressure hydrocephalus (HCC)     Positive blood culture 9/18/2020     History reviewed  No pertinent surgical history  Social History     Substance and Sexual Activity   Alcohol Use Never     Social History     Substance and Sexual Activity   Drug Use Never     Social History     Tobacco Use   Smoking Status Never Smoker   Smokeless Tobacco Never Used     Family History: History reviewed  No pertinent family history      Meds/Allergies   current meds:   Current Facility-Administered Medications   Medication Dose Route Frequency    acetaminophen (TYLENOL) tablet 650 mg  650 mg Oral Q6H PRN    aspirin (ECOTRIN LOW STRENGTH) EC tablet 81 mg  81 mg Oral Daily    enoxaparin (LOVENOX) subcutaneous injection 40 mg  40 mg Subcutaneous Daily    ferrous gluconate (FERGON) tablet 324 mg  324 mg Oral BID AC    finasteride (PROSCAR) tablet 5 mg  5 mg Oral Daily    furosemide (LASIX) injection 40 mg  40 mg Intravenous Once    Labetalol HCl (NORMODYNE) injection 10 mg  10 mg Intravenous Q6H PRN    metoprolol tartrate (LOPRESSOR) partial tablet 12 5 mg  12 5 mg Oral Q12H [address]    potassium chloride (K-DUR,KLOR-CON) CR tablet 40 mEq  40 mEq Oral Once    pravastatin (PRAVACHOL) tablet 40 mg  40 mg Oral Daily With Dinner    and PTA meds:    Medications Prior to Admission   Medication    acetaminophen (TYLENOL) 325 mg tablet    aspirin (ECOTRIN LOW STRENGTH) 81 mg EC tablet    B Complex Vitamins (VITAMIN B COMPLEX PO)    cholecalciferol (VITAMIN D3) 1,000 units tablet    finasteride (PROSCAR) 5 mg tablet    hydrochlorothiazide (MICROZIDE) 12 5 mg capsule    metoprolol tartrate (LOPRESSOR) 25 mg tablet    Multiple Vitamins-Minerals (PRESERVISION AREDS 2 PO)    Omega-3 1000 MG CAPS    simvastatin (ZOCOR) 20 mg tablet    ferrous gluconate (FERGON) 324 mg tablet    gabapentin (NEURONTIN) 100 mg capsule    polyethylene glycol (MIRALAX) 17 g packet     No Known Allergies    Objective   Vitals: Blood pressure 140/70, pulse 72, temperature 98 7 °F (37 1 °C), temperature source Oral, resp  rate (!) 23, height 5' (1 524 m), weight 61 5 kg (135 lb 9 3 oz), SpO2 95 %    Orthostatic Blood Pressures      Most Recent Value   Blood Pressure 140/70 filed at 02/28/2022 6950   Patient Position - Orthostatic VS Lying filed at 02/28/2022 0651            Intake/Output Summary (Last 24 hours) at 2/28/2022 0845  Last data filed at 2/28/2022 0301  Gross per 24 hour   Intake 360 ml   Output 750 ml   Net -390 ml       Invasive Devices  Report    Peripheral Intravenous Line            Peripheral IV 02/27/22 Right;Lateral Forearm <1 day                Physical Exam: /70 (BP Location: Left arm)   Pulse 72   Temp 98 7 °F (37 1 °C) (Oral)   Resp (!) 23   Ht 5' (1 524 m)   Wt 61 5 kg (135 lb 9 3 oz)   SpO2 95%   BMI 26 48 kg/m²   General Appearance:    Alert, cooperative, no distress, appears stated age   Head:    Normocephalic, no scleral icterus   Eyes:    PERRL   Nose:   Nares normal, septum midline, mucosa normal, no drainage    Throat:   Lips, mucosa, and tongue normal   Neck:   Supple, symmetrical, trachea midline            Lungs:     Rales bases  to auscultation bilaterally, respirations labored conversing        Heart:    Regular rate and rhythm, S1 and S2 normal, no murmur, rub   or gallop   Abdomen:     Soft, distended    Extremities:   Extremities normal, atraumatic, no cyanosis or edema       Skin:   Skin warm   Neurologic:   Alert and oriented to person place and time   No focal deficits       Lab Results:   Recent Results (from the past 72 hour(s))   CBC and differential    Collection Time: 02/27/22  2:16 PM   Result Value Ref Range    WBC 8 03 4 31 - 10 16 Thousand/uL    RBC 4 82 3 88 - 5 62 Million/uL    Hemoglobin 14 3 12 0 - 17 0 g/dL    Hematocrit 43 4 36 5 - 49 3 %    MCV 90 82 - 98 fL    MCH 29 7 26 8 - 34 3 pg    MCHC 32 9 31 4 - 37 4 g/dL    RDW 14 8 11 6 - 15 1 %    MPV 11 4 8 9 - 12 7 fL    Platelets 467 (L) 622 - 390 Thousands/uL    nRBC 0 /100 WBCs    Neutrophils Relative 70 43 - 75 %    Immat GRANS % 1 0 - 2 %    Lymphocytes Relative 18 14 - 44 %    Monocytes Relative 9 4 - 12 %    Eosinophils Relative 2 0 - 6 %    Basophils Relative 0 0 - 1 %    Neutrophils Absolute 5 63 1 85 - 7 62 Thousands/µL    Immature Grans Absolute 0 04 0 00 - 0 20 Thousand/uL    Lymphocytes Absolute 1 45 0 60 - 4 47 Thousands/µL    Monocytes Absolute 0 75 0 17 - 1 22 Thousand/µL    Eosinophils Absolute 0 13 0 00 - 0 61 Thousand/µL    Basophils Absolute 0 03 0 00 - 0 10 Thousands/µL   Comprehensive metabolic panel    Collection Time: 02/27/22  2:16 PM   Result Value Ref Range    Sodium 141 136 - 145 mmol/L    Potassium 3 9 3 5 - 5 3 mmol/L    Chloride 104 100 - 108 mmol/L    CO2 28 21 - 32 mmol/L    ANION GAP 9 4 - 13 mmol/L    BUN 14 5 - 25 mg/dL    Creatinine 0 96 0 60 - 1 30 mg/dL    Glucose 100 65 - 140 mg/dL    Calcium 9 3 8 3 - 10 1 mg/dL    AST 12 5 - 45 U/L    ALT 17 12 - 78 U/L    Alkaline Phosphatase 61 46 - 116 U/L    Total Protein 7 3 6 4 - 8 2 g/dL    Albumin 3 9 3 5 - 5 0 g/dL    Total Bilirubin 1 12 (H) 0 20 - 1 00 mg/dL    eGFR 70 ml/min/1 73sq m   NT-BNP PRO    Collection Time: 02/27/22  2:16 PM   Result Value Ref Range    NT-proBNP 6,109 (H) <450 pg/mL   COVID/FLU/RSV - 2 hour TAT    Collection Time: 02/27/22  2:16 PM    Specimen: Nose; Nares   Result Value Ref Range    SARS-CoV-2 Negative Negative    INFLUENZA A PCR Negative Negative    INFLUENZA B PCR Negative Negative    RSV PCR Negative Negative   HS Troponin 0hr (reflex protocol)    Collection Time: 02/27/22  2:16 PM   Result Value Ref Range    hs TnI 0hr 10 "Refer to ACS Flowchart"- see link ng/L   D-Dimer    Collection Time: 02/27/22  2:16 PM   Result Value Ref Range    D-Dimer, Quant 0 66 (H) <0 50 ug/ml FEU   HS Troponin I 2hr    Collection Time: 02/27/22  4:18 PM   Result Value Ref Range    hs TnI 2hr 10 "Refer to ACS Flowchart"- see link ng/L    Delta 2hr hsTnI 0 <20 ng/L   HS Troponin I 4hr    Collection Time: 02/27/22  7:33 PM   Result Value Ref Range    hs TnI 4hr 12 "Refer to ACS Flowchart"- see link ng/L    Delta 4hr hsTnI 2 <20 ng/L   Basic metabolic panel    Collection Time: 02/28/22  4:13 AM   Result Value Ref Range    Sodium 143 136 - 145 mmol/L    Potassium 3 7 3 5 - 5 3 mmol/L    Chloride 105 100 - 108 mmol/L    CO2 26 21 - 32 mmol/L    ANION GAP 12 4 - 13 mmol/L    BUN 16 5 - 25 mg/dL    Creatinine 1 04 0 60 - 1 30 mg/dL    Glucose 100 65 - 140 mg/dL    Calcium 9 4 8 3 - 10 1 mg/dL    eGFR 63 ml/min/1 73sq m   CBC and differential    Collection Time: 02/28/22  4:13 AM   Result Value Ref Range    WBC 6 78 4 31 - 10 16 Thousand/uL    RBC 4 85 3 88 - 5 62 Million/uL    Hemoglobin 14 5 12 0 - 17 0 g/dL    Hematocrit 42 7 36 5 - 49 3 %    MCV 88 82 - 98 fL    MCH 29 9 26 8 - 34 3 pg    MCHC 34 0 31 4 - 37 4 g/dL    RDW 15 1 11 6 - 15 1 %    MPV 11 4 8 9 - 12 7 fL    Platelets 343 (L) 089 - 390 Thousands/uL    nRBC 0 /100 WBCs    Neutrophils Relative 51 43 - 75 %    Immat GRANS % 0 0 - 2 %    Lymphocytes Relative 37 14 - 44 %    Monocytes Relative 10 4 - 12 %    Eosinophils Relative 2 0 - 6 %    Basophils Relative 0 0 - 1 %    Neutrophils Absolute 3 42 1 85 - 7 62 Thousands/µL    Immature Grans Absolute 0 02 0 00 - 0 20 Thousand/uL    Lymphocytes Absolute 2 49 0 60 - 4 47 Thousands/µL    Monocytes Absolute 0 70 0 17 - 1 22 Thousand/µL    Eosinophils Absolute 0 12 0 00 - 0 61 Thousand/µL    Basophils Absolute 0 03 0 00 - 0 10 Thousands/µL     Imaging: I have personally reviewed pertinent reports  EKG: big PVC's NSR  VTE Prophylaxis: Enoxaparin (Lovenox)    Code Status: Level 1 - Full Code  Advance Directive and Living Will:      Power of :    POLST:      Counseling / Coordination of Care  Total floor / unit time spent today 60 minutes  Greater than 50% of total time was spent with the patient and / or family counseling and / or coordination of care

## 2022-02-28 NOTE — ASSESSMENT & PLAN NOTE
12 beats caught on telemetry    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  Holter 1/2022- PVCs comprised 26% of beats    3 ventricular runs up to 13 beat    Plan:  Goal potassium >4 & Mag >2  Monitor with diuresis closely   Continue on telemetry  Increase lopressor to 12 5mg QID  · Beta-blocker should be effective for RVOT VT - can consider amiodarone while inpatient till beta-blockers effective

## 2022-02-28 NOTE — PROGRESS NOTES
[facility]  Progress Note - Carter Zamora 1934, 80 y o  male MRN: [id number]  Unit/Bed#: S -01 Encounter: [id number]  Primary Care Provider: Andres Danielson DO   Date and time admitted to hospital: 2/27/2022  1:23 PM    * Acute on chronic combined systolic and diastolic CHF (congestive heart failure) [facility])  Assessment & Plan  Wt Readings from Last 3 Encounters:   02/28/22 64 2 kg (141 lb 8 6 oz)   05/14/21 66 2 kg (146 lb)   11/20/20 63 5 kg (140 lb)     POA:  - BNP 6109  - Received IV 40mg Lasix in ED    Echo 1/13/22: LV mildly dilated  Ejection fraction of 20-25%  Grade II   (moderate) diastolic dysfunction  LA moderately dilated  RV cavity & systolic function normal    TTE 1/2020 LVEF 20-25% global hypokinesis  TTE 9/2020- LVEF 45% with LAD WMA    Plan:  · Cardiology Consult appreciated  · Continue 40 mg IV Lasix b i d   · Daily Weights   · I/O's  · Sodium restricted diet with fluid restriction 1500mL    Nonsustained ventricular tachycardia (HCC)  Assessment & Plan  12 beats caught on telemetry    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  Holter 1/2022- PVCs comprised 26% of beats  3 ventricular runs up to 13 beat    Plan:  Goal potassium >4 & Mag >2  Monitor with diuresis closely   Continue on telemetry  Increase lopressor to 12 5mg QID  · Beta-blocker should be effective for RVOT VT - can consider amiodarone while inpatient till beta-blockers effective    Elevated d-dimer  Assessment & Plan  POA: D-Dimer 0 66    Age adjusted - within normal limits    Plan:  Monitor for symptoms   No PE workup (CTA, V/Q scan) at this time    PAF (paroxysmal atrial fibrillation) [facility])  Assessment & Plan  Hx of PAF - Diagnosed 9/2020 at [address] started on eliquis at that time    Currently not on [facility] d/t fall risk per Dr Jerrel Cooks notes  Has been Sinus with PVCs    Currently on Telemetry      BPH (benign prostatic hyperplasia)  Assessment & Plan  Home Regimen: Finasteride 5mg QD    Continue home regimen    Hyperlipidemia  Assessment & Plan  Home Regimen: Simvastatin (ZOCOR) 20 mg QD    Plan: Pravastatin 40mg     Essential hypertension  Assessment & Plan  Home medications: Lopressor 12 5mg BID, HCT 12 5mg QD    BP Readings from Last 1 Encounters:   22 140/70     Plan:  Continue Lopressor  Hold HCT in setting of Lasix   Hydralazine PRN for BP >180  Consider ACEi/ARB once euvolemic    VTE Pharmacologic Prophylaxis: VTE Score: 7 High Risk (Score >/= 5) - Pharmacological DVT Prophylaxis Ordered: enoxaparin (Lovenox)  Sequential Compression Devices Ordered  Patient Centered Rounds: I performed bedside rounds with nursing staff today  Discussions with Specialists or Other Care Team Provider: Cardiology    Education and Discussions with Family / Patient: Updated  (wife) via phone  Called at 11am  Understanding of the current treatment plan  Answered all questions - no further questions/concerns  Current Length of Stay: 0 day(s)  Current Patient Status: Observation   Discharge Plan: Anticipate discharge in 24-48 hrs to home  Code Status: Level 1 - Full Code    Subjective:   Pt reports that he is breathing better  He notes the Lasix has been making him urinate much more frequently  He states that during his episode of PVCs caught on telemetry he did not notice any symptoms but states he has a "bad heart " He has no other questions or concerns at this time    Objective:     Vitals:   Temp (24hrs), Av 3 °F (36 8 °C), Min:97 8 °F (36 6 °C), Max:98 7 °F (37 1 °C)    Temp:  [97 8 °F (36 6 °C)-98 7 °F (37 1 °C)] 98 7 °F (37 1 °C)  HR:  [45-87] 72  Resp:  [22-26] 23  BP: (125-193)/(57-86) 140/70  SpO2:  [92 %-96 %] 95 %  Body mass index is 27 64 kg/m²  Input and Output Summary (last 24 hours):      Intake/Output Summary (Last 24 hours) at 2022 1058  Last data filed at 2022 0700  Gross per 24 hour   Intake 600 ml   Output 750 ml   Net -150 ml Physical Exam:   Physical Exam  Vitals and nursing note reviewed  Constitutional:       General: He is not in acute distress  Appearance: He is well-developed  HENT:      Head: Normocephalic and atraumatic  Eyes:      Conjunctiva/sclera: Conjunctivae normal    Cardiovascular:      Rate and Rhythm: Normal rate and regular rhythm  Heart sounds: No murmur heard  Pulmonary:      Effort: Pulmonary effort is normal  No respiratory distress  Abdominal:      Palpations: Abdomen is soft  Tenderness: There is no abdominal tenderness  There is no guarding or rebound  Musculoskeletal:      Cervical back: Neck supple  Right lower leg: Edema present  Left lower leg: Edema present  Comments: Mild LE edema bilaterally   Skin:     General: Skin is warm and dry  Neurological:      Mental Status: He is alert  Comments: Oriented x2 (person and place, not to day or year - believed 2012 but knows it is February because his birthday was recent)   Psychiatric:         Mood and Affect: Mood normal        Additional Data:     Labs:  Results from last 7 days   Lab Units 02/28/22  0413   WBC Thousand/uL 6 78   HEMOGLOBIN g/dL 14 5   HEMATOCRIT % 42 7   PLATELETS Thousands/uL 117*   NEUTROS PCT % 51   LYMPHS PCT % 37   MONOS PCT % 10   EOS PCT % 2     Results from last 7 days   Lab Units 02/28/22  0413 02/27/22  1416 02/27/22  1416   SODIUM mmol/L 143   < > 141   POTASSIUM mmol/L 3 7   < > 3 9   CHLORIDE mmol/L 105   < > 104   CO2 mmol/L 26   < > 28   BUN mg/dL 16   < > 14   CREATININE mg/dL 1 04   < > 0 96   ANION GAP mmol/L 12   < > 9   CALCIUM mg/dL 9 4   < > 9 3   ALBUMIN g/dL  --   --  3 9   TOTAL BILIRUBIN mg/dL  --   --  1 12*   ALK PHOS U/L  --   --  61   ALT U/L  --   --  17   AST U/L  --   --  12   GLUCOSE RANDOM mg/dL 100   < > 100    < > = values in this interval not displayed                         Lines/Drains:  Invasive Devices  Report    Peripheral Intravenous Line Peripheral IV 02/27/22 Right;Lateral Forearm <1 day                  Telemetry:  Telemetry Orders (From admission, onward)             48 Hour Telemetry Monitoring  Continuous x 48 hours        References:    Telemetry Guidelines   Question:  Reason for 48 Hour Telemetry  Answer:  Acute Decompensated CHF (continuous diuretic infusion or total diuretic dose > 200 mg daily, associated electrolyte derangement, ionotropic drip, history of ventricular arrhythmia, or new EF <35%)                 Telemetry Reviewed: PVCs  Indication for Continued Telemetry Use: Arrthymias requiring medical therapy             Imaging: No pertinent imaging reviewed  All prior images previously reviewed  Recent Cultures (last 7 days):         Last 24 Hours Medication List:   Current Facility-Administered Medications   Medication Dose Route Frequency Provider Last Rate    acetaminophen  650 mg Oral Q6H PRN Ariela Mckenzie DO      amiodarone  400 mg Oral BID With Meals Melvaibhav Brownlee, FREDANP      aspirin  81 mg Oral Daily Ariela Mckenzie,       enoxaparin  40 mg Subcutaneous Daily Ariela Mckenzie,       ferrous gluconate  324 mg Oral BID AC Ariela Mckenzie,       finasteride  5 mg Oral Daily Ariela Mckenzie,       furosemide  40 mg Intravenous BID (diuretic) Melvenia Kem, CRNP      Labetalol HCl  10 mg Intravenous Q6H PRN Noe Ortega MD      magnesium sulfate  2 g Intravenous Once Melvaibhav Brownlee, CLOVER      metoprolol tartrate  12 5 mg Oral Q6H Melvenjuice Brownlee, CRNP      potassium chloride  20 mEq Oral Daily Melanandia Kem, CRNP      pravastatin  40 mg Oral Daily With Everardo Howell DO          Today, Patient Was Seen By: Ariela Mckenzie DO    **Please Note: This note may have been constructed using a voice recognition system  **

## 2022-02-28 NOTE — PLAN OF CARE
Problem: Potential for Falls  Goal: Patient will remain free of falls  Description: INTERVENTIONS:  - Educate patient/family on patient safety including physical limitations  - Instruct patient to call for assistance with activity   - Consult OT/PT to assist with strengthening/mobility   - Keep Call bell within reach  - Keep bed low and locked with side rails adjusted as appropriate  - Keep care items and personal belongings within reach  - Initiate and maintain comfort rounds  - Make Fall Risk Sign visible to staff  - Offer Toileting every 2 Hours, in advance of need  - Initiate/Maintain bed alarm  - Obtain necessary fall risk management equipment: bed alarm  - Apply yellow socks and bracelet for high fall risk patients  - Consider moving patient to room near nurses station  Outcome: Progressing     Problem: MOBILITY - ADULT  Goal: Maintain or return to baseline ADL function  Description: INTERVENTIONS:  -  Assess patient's ability to carry out ADLs; assess patient's baseline for ADL function and identify physical deficits which impact ability to perform ADLs (bathing, care of mouth/teeth, toileting, grooming, dressing, etc )  - Assess/evaluate cause of self-care deficits   - Assess range of motion  - Assess patient's mobility; develop plan if impaired  - Assess patient's need for assistive devices and provide as appropriate  - Encourage maximum independence but intervene and supervise when necessary  - Involve family in performance of ADLs  - Assess for home care needs following discharge   - Consider OT consult to assist with ADL evaluation and planning for discharge  - Provide patient education as appropriate  Outcome: Progressing  Goal: Maintains/Returns to pre admission functional level  Description: INTERVENTIONS:  - Perform BMAT or MOVE assessment daily    - Set and communicate daily mobility goal to care team and patient/family/caregiver     - Collaborate with rehabilitation services on mobility goals if consulted  - Perform Range of Motion 2 times a day  - Reposition patient every 2 hours    - Dangle patient 2 times a day  - Stand patient 2 times a day  - Ambulate patient 2 times a day  - Out of bed to chair 2 times a day   - Out of bed for meals 2 times a day  - Out of bed for toileting  - Record patient progress and toleration of activity level   Outcome: Progressing     Problem: Prexisting or High Potential for Compromised Skin Integrity  Goal: Skin integrity is maintained or improved  Description: INTERVENTIONS:  - Identify patients at risk for skin breakdown  - Assess and monitor skin integrity  - Assess and monitor nutrition and hydration status  - Monitor labs   - Assess for incontinence   - Turn and reposition patient  - Assist with mobility/ambulation  - Relieve pressure over bony prominences  - Avoid friction and shearing  - Provide appropriate hygiene as needed including keeping skin clean and dry  - Evaluate need for skin moisturizer/barrier cream  - Collaborate with interdisciplinary team   - Patient/family teaching  - Consider wound care consult   Outcome: Progressing

## 2022-02-28 NOTE — ASSESSMENT & PLAN NOTE
Wt Readings from Last 3 Encounters:   02/28/22 64 2 kg (141 lb 8 6 oz)   05/14/21 66 2 kg (146 lb)   11/20/20 63 5 kg (140 lb)     POA:  - BNP 6109  - Received IV 40mg Lasix in ED    Echo 1/13/22: LV mildly dilated  Ejection fraction of 20-25%  Grade II   (moderate) diastolic dysfunction  LA moderately dilated   RV cavity & systolic function normal    TTE 1/2020 LVEF 20-25% global hypokinesis  TTE 9/2020- LVEF 45% with LAD WMA    Plan:  · Cardiology Consult appreciated  · Continue 40 mg IV Lasix b i d   · Daily Weights   · I/O's  · Sodium restricted diet with fluid restriction 1500mL

## 2022-03-01 ENCOUNTER — APPOINTMENT (INPATIENT)
Dept: RADIOLOGY | Facility: HOSPITAL | Age: 87
DRG: 291 | End: 2022-03-01
Payer: MEDICARE

## 2022-03-01 LAB
ANION GAP SERPL CALCULATED.3IONS-SCNC: 13 MMOL/L (ref 4–13)
BASOPHILS # BLD AUTO: 0.03 THOUSANDS/ΜL (ref 0–0.1)
BASOPHILS NFR BLD AUTO: 0 % (ref 0–1)
BUN SERPL-MCNC: 23 MG/DL (ref 5–25)
CALCIUM SERPL-MCNC: 9.3 MG/DL (ref 8.3–10.1)
CHLORIDE SERPL-SCNC: 104 MMOL/L (ref 100–108)
CO2 SERPL-SCNC: 24 MMOL/L (ref 21–32)
CREAT SERPL-MCNC: 1.22 MG/DL (ref 0.6–1.3)
EOSINOPHIL # BLD AUTO: 0.06 THOUSAND/ΜL (ref 0–0.61)
EOSINOPHIL NFR BLD AUTO: 1 % (ref 0–6)
ERYTHROCYTE [DISTWIDTH] IN BLOOD BY AUTOMATED COUNT: 14.7 % (ref 11.6–15.1)
GFR SERPL CREATININE-BSD FRML MDRD: 52 ML/MIN/1.73SQ M
GLUCOSE SERPL-MCNC: 119 MG/DL (ref 65–140)
HCT VFR BLD AUTO: 41.4 % (ref 36.5–49.3)
HGB BLD-MCNC: 14.4 G/DL (ref 12–17)
IMM GRANULOCYTES # BLD AUTO: 0.03 THOUSAND/UL (ref 0–0.2)
IMM GRANULOCYTES NFR BLD AUTO: 0 % (ref 0–2)
LYMPHOCYTES # BLD AUTO: 1.45 THOUSANDS/ΜL (ref 0.6–4.47)
LYMPHOCYTES NFR BLD AUTO: 19 % (ref 14–44)
MAGNESIUM SERPL-MCNC: 2.6 MG/DL (ref 1.6–2.6)
MCH RBC QN AUTO: 30.4 PG (ref 26.8–34.3)
MCHC RBC AUTO-ENTMCNC: 34.8 G/DL (ref 31.4–37.4)
MCV RBC AUTO: 87 FL (ref 82–98)
MONOCYTES # BLD AUTO: 0.83 THOUSAND/ΜL (ref 0.17–1.22)
MONOCYTES NFR BLD AUTO: 11 % (ref 4–12)
NEUTROPHILS # BLD AUTO: 5.45 THOUSANDS/ΜL (ref 1.85–7.62)
NEUTS SEG NFR BLD AUTO: 69 % (ref 43–75)
NRBC BLD AUTO-RTO: 0 /100 WBCS
PLATELET # BLD AUTO: 135 THOUSANDS/UL (ref 149–390)
PMV BLD AUTO: 11.1 FL (ref 8.9–12.7)
POTASSIUM SERPL-SCNC: 4.1 MMOL/L (ref 3.5–5.3)
RBC # BLD AUTO: 4.74 MILLION/UL (ref 3.88–5.62)
SODIUM SERPL-SCNC: 141 MMOL/L (ref 136–145)
WBC # BLD AUTO: 7.85 THOUSAND/UL (ref 4.31–10.16)

## 2022-03-01 PROCEDURE — 99233 SBSQ HOSP IP/OBS HIGH 50: CPT | Performed by: INTERNAL MEDICINE

## 2022-03-01 PROCEDURE — 85025 COMPLETE CBC W/AUTO DIFF WBC: CPT

## 2022-03-01 PROCEDURE — 83735 ASSAY OF MAGNESIUM: CPT | Performed by: INTERNAL MEDICINE

## 2022-03-01 PROCEDURE — 71045 X-RAY EXAM CHEST 1 VIEW: CPT

## 2022-03-01 PROCEDURE — 99232 SBSQ HOSP IP/OBS MODERATE 35: CPT | Performed by: INTERNAL MEDICINE

## 2022-03-01 PROCEDURE — 80048 BASIC METABOLIC PNL TOTAL CA: CPT

## 2022-03-01 RX ORDER — ISOSORBIDE DINITRATE 10 MG/1
10 TABLET ORAL
Status: DISCONTINUED | OUTPATIENT
Start: 2022-03-01 | End: 2022-03-04

## 2022-03-01 RX ADMIN — Medication 12.5 MG: at 22:37

## 2022-03-01 RX ADMIN — ISOSORBIDE DINITRATE 10 MG: 10 TABLET ORAL at 12:00

## 2022-03-01 RX ADMIN — Medication 12.5 MG: at 03:22

## 2022-03-01 RX ADMIN — Medication 12.5 MG: at 08:19

## 2022-03-01 RX ADMIN — FINASTERIDE 5 MG: 5 TABLET, FILM COATED ORAL at 08:19

## 2022-03-01 RX ADMIN — FUROSEMIDE 40 MG: 10 INJECTION, SOLUTION INTRAMUSCULAR; INTRAVENOUS at 08:19

## 2022-03-01 RX ADMIN — FUROSEMIDE 40 MG: 10 INJECTION, SOLUTION INTRAMUSCULAR; INTRAVENOUS at 15:54

## 2022-03-01 RX ADMIN — Medication 12.5 MG: at 15:54

## 2022-03-01 RX ADMIN — ISOSORBIDE DINITRATE 10 MG: 10 TABLET ORAL at 17:06

## 2022-03-01 RX ADMIN — ENOXAPARIN SODIUM 40 MG: 40 INJECTION SUBCUTANEOUS at 08:18

## 2022-03-01 RX ADMIN — POTASSIUM CHLORIDE 20 MEQ: 1500 TABLET, EXTENDED RELEASE ORAL at 08:18

## 2022-03-01 RX ADMIN — FERROUS GLUCONATE 324 MG: 324 TABLET ORAL at 15:55

## 2022-03-01 RX ADMIN — ASPIRIN 81 MG: 81 TABLET, COATED ORAL at 08:18

## 2022-03-01 RX ADMIN — PRAVASTATIN SODIUM 40 MG: 40 TABLET ORAL at 15:54

## 2022-03-01 RX ADMIN — FERROUS GLUCONATE 324 MG: 324 TABLET ORAL at 08:19

## 2022-03-01 NOTE — PLAN OF CARE
Problem: Potential for Falls  Goal: Patient will remain free of falls  Description: INTERVENTIONS:  - Educate patient/family on patient safety including physical limitations  - Instruct patient to call for assistance with activity   - Consult OT/PT to assist with strengthening/mobility   - Keep Call bell within reach  - Keep bed low and locked with side rails adjusted as appropriate  - Keep care items and personal belongings within reach  - Initiate and maintain comfort rounds  - Make Fall Risk Sign visible to staff  - Offer Toileting every  Hours, in advance of need  - Initiate/Maintain alarm  - Obtain necessary fall risk management equipment:   - Apply yellow socks and bracelet for high fall risk patients  - Consider moving patient to room near nurses station  3/1/2022 0804 by Tammy Jonas RN  Outcome: Progressing  3/1/2022 0804 by Tammy Jonas RN  Outcome: Progressing     Problem: MOBILITY - ADULT  Goal: Maintain or return to baseline ADL function  Description: INTERVENTIONS:  -  Assess patient's ability to carry out ADLs; assess patient's baseline for ADL function and identify physical deficits which impact ability to perform ADLs (bathing, care of mouth/teeth, toileting, grooming, dressing, etc )  - Assess/evaluate cause of self-care deficits   - Assess range of motion  - Assess patient's mobility; develop plan if impaired  - Assess patient's need for assistive devices and provide as appropriate  - Encourage maximum independence but intervene and supervise when necessary  - Involve family in performance of ADLs  - Assess for home care needs following discharge   - Consider OT consult to assist with ADL evaluation and planning for discharge  - Provide patient education as appropriate  3/1/2022 0804 by Tammy Jonas RN  Outcome: Progressing  3/1/2022 0804 by Tammy Jonas RN  Outcome: Progressing  Goal: Maintains/Returns to pre admission functional level  Description: INTERVENTIONS:  - Perform BMAT or MOVE assessment daily    - Set and communicate daily mobility goal to care team and patient/family/caregiver  - Collaborate with rehabilitation services on mobility goals if consulted  - Perform Range of Motion  times a day  - Reposition patient every  hours    - Dangle patien times a day  - Stand patient  times a day  - Ambulate patient  times a day  - Out of bed to chair  times a day   - Out of bed for meals  times a day  - Out of bed for toileting  - Record patient progress and toleration of activity level   3/1/2022 0804 by Valdo Barton RN  Outcome: Progressing  3/1/2022 0804 by Valdo Barton RN  Outcome: Progressing     Problem: Prexisting or High Potential for Compromised Skin Integrity  Goal: Skin integrity is maintained or improved  Description: INTERVENTIONS:  - Identify patients at risk for skin breakdown  - Assess and monitor skin integrity  - Assess and monitor nutrition and hydration status  - Monitor labs   - Assess for incontinence   - Turn and reposition patient  - Assist with mobility/ambulation  - Relieve pressure over bony prominences  - Avoid friction and shearing  - Provide appropriate hygiene as needed including keeping skin clean and dry  - Evaluate need for skin moisturizer/barrier cream  - Collaborate with interdisciplinary team   - Patient/family teaching  - Consider wound care consult   3/1/2022 0804 by Valdo Barton RN  Outcome: Progressing  3/1/2022 0804 by Valdo Barton RN  Outcome: Progressing     Problem: CARDIOVASCULAR - ADULT  Goal: Maintains optimal cardiac output and hemodynamic stability  Description: INTERVENTIONS:  - Monitor I/O, vital signs and rhythm  - Monitor for S/S and trends of decreased cardiac output  - Administer and titrate ordered vasoactive medications to optimize hemodynamic stability  - Assess quality of pulses, skin color and temperature  - Assess for signs of decreased coronary artery perfusion  - Instruct patient to report change in severity of symptoms  Outcome: Progressing  Goal: Absence of cardiac dysrhythmias or at baseline rhythm  Description: INTERVENTIONS:  - Continuous cardiac monitoring, vital signs, obtain 12 lead EKG if ordered  - Administer antiarrhythmic and heart rate control medications as ordered  - Monitor electrolytes and administer replacement therapy as ordered  Outcome: Progressing     Problem: RESPIRATORY - ADULT  Goal: Achieves optimal ventilation and oxygenation  Description: INTERVENTIONS:  - Assess for changes in respiratory status  - Assess for changes in mentation and behavior  - Position to facilitate oxygenation and minimize respiratory effort  - Oxygen administered by appropriate delivery if ordered  - Initiate smoking cessation education as indicated  - Encourage broncho-pulmonary hygiene including cough, deep breathe, Incentive Spirometry  - Assess the need for suctioning and aspirate as needed  - Assess and instruct to report SOB or any respiratory difficulty  - Respiratory Therapy support as indicated  Outcome: Progressing     Problem: METABOLIC, FLUID AND ELECTROLYTES - ADULT  Goal: Electrolytes maintained within normal limits  Description: INTERVENTIONS:  - Monitor labs and assess patient for signs and symptoms of electrolyte imbalances  - Administer electrolyte replacement as ordered  - Monitor response to electrolyte replacements, including repeat lab results as appropriate  - Instruct patient on fluid and nutrition as appropriate  Outcome: Progressing  Goal: Fluid balance maintained  Description: INTERVENTIONS:  - Monitor labs   - Monitor I/O and WT  - Instruct patient on fluid and nutrition as appropriate  - Assess for signs & symptoms of volume excess or deficit  Outcome: Progressing

## 2022-03-01 NOTE — CASE MANAGEMENT
Case Management Assessment & Discharge Planning Note    Patient name Isabel Abraham  Location S /S -87 MRN [id number]  : 1934 Date 3/1/2022       Current Admission Date: 2022  Current Admission Diagnosis:Acute on chronic combined systolic and diastolic CHF (congestive heart failure) [facility])   Patient Active Problem List    Diagnosis Date Noted    Nonsustained ventricular tachycardia ([facility] ) 2022    PAF (paroxysmal atrial fibrillation) ([place] [address] ) 2022    CKD (chronic kidney disease) stage 2, GFR 60-89 ml/min 2022    Elevated d-dimer 2022    Acute on chronic combined systolic and diastolic CHF (congestive heart failure) ([place] [address] ) 2022    BPH (benign prostatic hyperplasia) 2022    Reactive depression 2021    Hyperbilirubinemia 2021    SIRS (systemic inflammatory response syndrome) ([place] [address] )     Fever     Neuropathy 10/22/2020    Chronic diastolic congestive heart failure ([place] [address] ) 2020    Paraphimosis 2020    Atrial fibrillation with rapid ventricular response ([facility] ) 2020    Sepsis ([place] [address] ) 2020    UTI (urinary tract infection) 2020    Hypokalemia 2020    Generalized weakness 2020    Urinary retention 2020    Anemia 2020    Macular degeneration 2020    Dyspepsia 2020    NPH (normal pressure hydrocephalus) ([place] [address] ) 08/10/2020    Closed compression fracture of thoracolumbar vertebra ([facility] ) 2020    Ambulatory dysfunction 2020    Constipation 2020    Hyperlipidemia     Essential hypertension 2019    Dyslipidemia 2019      LOS (days): 1  Geometric Mean LOS (GMLOS) (days): 3 00  Days to GMLOS:2 1     OBJECTIVE:    Risk of Unplanned Readmission Score: 11         Current admission status: Inpatient       Preferred Pharmacy:   [facility] [place] [address] [id number] [place] [id number]  Phone: [phone] Fax: [phone]    Primary Care Provider: Sabra Bauer DO    Primary Insurance: MEDICARE  Secondary Insurance: [address],[facility] SHIELD    ASSESSMENT:  [address],  [facility] Representative - Spouse   Primary Phone: [phone] (Home)  Work Phone: [phone]               Patient Information  Admitted from[de-identified] Home  Mental Status: Alert (Very Hard of Hearing)  During Assessment patient was accompanied by: Not accompanied during assessment  Assessment information provided by[de-identified] Other - please comment,Spouse (Spouse-Rosalia Duncan)  Primary Caregiver: Spouse  Caregiver's Name[de-identified] Sabina Stringer  Caregiver's Relationship to Patient[de-identified] Significant Other  Caregiver's Telephone Number[de-identified] [phone]  Support Systems: Spouse/significant other  What city do you live in?: [place]  Type of Current Residence: [address] home  Upon entering residence, is there a bedroom on the main floor (no further steps)?: No  A bedroom is located on the following floor levels of residence (select all that apply):: 2nd Floor  Upon entering residence, is there a bathroom on the main floor (no further steps)?: Yes  Number of steps to 2nd floor from main floor: One Flight  In the last 12 months, was there a time when you were not able to pay the mortgage or rent on time?: No  In the last 12 months, how many places have you lived?: 1  In the last 12 months, was there a time when you did not have a steady place to sleep or slept in a shelter (including now)?: No  Homeless/housing insecurity resource given?: N/A  Living Arrangements: Lives w/ Spouse/significant other  Is patient a ?: No    Activities of Daily Living Prior to Admission  Functional Status: Independent  Completes ADLs independently?: Yes  Ambulates independently?: Yes  Does patient use assisted devices?: No  Does patient currently own DME?: No  Does patient have a history of Outpatient Therapy (PT/OT)?: No  Does the patient have a history of Short-Term Rehab?: Yes ([facility] [address])  Does patient have a history of HHC?: Yes  [facility] No         Patient Information Continued  Does patient have prescription coverage?: Yes  Within the past 12 months, you worried that your food would run out before you got the money to buy more : Never true  Within the past 12 months, the food you bought just didnt last and you didnt have money to get more : Never true  Food insecurity resource given?: N/A  Does patient receive dialysis treatments?: No  Does patient have a history of substance abuse?: No  Does patient have a history of Mental Health Diagnosis?: No         Means of Transportation  Means of Transport to Appts[de-identified] Family transport  In the past 12 months, has lack of transportation kept you from medical appointments or from getting medications?: No  In the past 12 months, has lack of transportation kept you from meetings, work, or from getting things needed for daily living?: No  Was application for public transport provided?: N/A        DISCHARGE DETAILS:       Contacts  Patient Contacts: Rosalia-spouse  Relationship to Patient[de-identified] Family  Contact Method: Phone  Phone Number: [phone]  Reason/Outcome: Emergency Contact,Referral,Discharge Planning

## 2022-03-01 NOTE — QUICK NOTE
Updated wife at bedside at 2:45pm      Pt said she was on her way here or already here when interviewer attempted to call  She understands workup & treatment plan for pt  No further questions or concerns at this time

## 2022-03-01 NOTE — ASSESSMENT & PLAN NOTE
Hx of PAF - Diagnosed 9/2020 at [address] [facility] [address] started on eliquis at that time    Currently not on [facility] d/t fall risk per Dr Agus García notes  Has been Sinus with PVCs    Telemetry: Found tohave Sinus with runs of nonsustained VT  After initiation of increased betablocker bigeminy with PVCs without VT runs      Plan: Continue on telemetry

## 2022-03-01 NOTE — ASSESSMENT & PLAN NOTE
Wt Readings from Last 3 Encounters:   03/01/22 63 6 kg (140 lb 3 4 oz)   05/14/21 66 2 kg (146 lb)   11/20/20 63 5 kg (140 lb)     POA:  - BNP 6109  - Received IV 40mg Lasix in ED    Echo 1/13/22: LV mildly dilated  Ejection fraction of 20-25%  Grade II   (moderate) diastolic dysfunction  LA moderately dilated  RV cavity & systolic function normal  Recent Holter with 26% burden at [facility]    TTE 1/2020 LVEF 20-25% global hypokinesis  TTE 9/2020- LVEF 45% with LAD WMA    Plan:  · Cardiology Consult appreciated  · Continue 40 mg IV Lasix b i d    · Begin isosorbide dinitrate 10 mg t i d  for afterload reduction with plan to transition to Entresto low-dose closer to discharge him once euvolemic  · Favor of ICD and wishes to be full code  · Daily Weights   · I/O's - Condom catheter placed for better tracking  · Sodium restricted diet with fluid restriction 1500mL

## 2022-03-01 NOTE — PROGRESS NOTES
General Cardiology Progress Note   Lay Mendoza 80 y o  male MRN: [id number]  Unit/Bed#: S -01 Encounter: [id number]      Assessment:  Principal Problem:    Acute on chronic combined systolic and diastolic CHF (congestive heart failure) (HCC)  Active Problems:    Essential hypertension    Hyperlipidemia    Elevated d-dimer    BPH (benign prostatic hyperplasia)    Nonsustained ventricular tachycardia (HCC)    PAF (paroxysmal atrial fibrillation) (HCC)    CKD (chronic kidney disease) stage 2, GFR 60-89 ml/min      Impression:     Acute on chronic systolic and diastolic CHF  o Remains volume overloaded   Severe cardiomyopathy-new reduction to 25% 1/22, from prior 45%   High PVC burden with nonsustained VT  o Recent Holter with 26% burden at LVH  o No further episodes of nonsustained VT today in the setting of diuresis, normalization of hypokalemia, and initiation of beta-blocker therapy   Reported history of CAD but no prior revascularization or invasive workup and dobutamine stress echo 6/20 [facility] without ischemia   Hypertension  o Came in initially normotensive, subsequently severe hypertension, currently moderately controlled   Paroxysmal atrial fibrillation  o Taken off anticoagulation due to prior frequent fall/bleeding    Plan:     Continue diuresis   Condom catheter will be placed for more objective ins and outs management   Start isosorbide dinitrate 10 mg t i d  For afterload reduction, with a plan to transition to Entresto low-dose closer to discharge him once euvolemic   He tells me he would be in favor of ICD and wishes to be full code    Subjective:   Patient seen and examined        Shortness of breath, polyuria, fatigue, no chest pain, no palpitations  Diuresis is slow  No further nonsustained VT on telemetry  Frequent PVCs noted still, with bigeminy and trigeminy  Renal function stable in the setting of diuresis  Weights have unchanged    Review of Systems Constitutional: Negative for diaphoresis, fever, malaise/fatigue and night sweats  Cardiovascular: Positive for dyspnea on exertion and orthopnea  Negative for chest pain, leg swelling, palpitations and syncope  Respiratory: Positive for shortness of breath  Negative for cough, sputum production and wheezing  Skin: Negative for itching and rash  Gastrointestinal: Negative for abdominal pain, change in bowel habit and diarrhea  Neurological: Negative for dizziness, focal weakness, light-headedness and weakness  All other systems reviewed and are negative  Objective   Vitals: Blood pressure 152/82, pulse 73, temperature 97 6 °F (36 4 °C), temperature source Oral, resp  rate 18, height 5' (1 524 m), weight 63 6 kg (140 lb 3 4 oz), SpO2 96 %  , Body mass index is 27 38 kg/m² , I/O last 3 completed shifts: In: 840 [P O :840]  Out: 1450 [Urine:1450]  No intake/output data recorded  Wt Readings from Last 3 Encounters:   03/01/22 63 6 kg (140 lb 3 4 oz)   05/14/21 66 2 kg (146 lb)   11/20/20 63 5 kg (140 lb)       Intake/Output Summary (Last 24 hours) at 3/1/2022 1024  Last data filed at 3/1/2022 0601  Gross per 24 hour   Intake 240 ml   Output 1000 ml   Net -760 ml     I/O last 3 completed shifts: In: 840 [P O :840]  Out: 1450 [Urine:1450]    No significant arrhythmias seen on telemetry review  Physical Exam  Constitutional:       General: He is not in acute distress  Appearance: He is well-developed  He is not diaphoretic  HENT:      Head: Normocephalic and atraumatic  Nose: Nose normal    Eyes:      General: No scleral icterus  Conjunctiva/sclera: Conjunctivae normal       Pupils: Pupils are equal, round, and reactive to light  Neck:      Thyroid: No thyromegaly  Vascular: Hepatojugular reflux and JVD present  Trachea: No tracheal deviation  Cardiovascular:      Rate and Rhythm: Normal rate and regular rhythm  Heart sounds: Normal heart sounds   No murmur heard   No gallop  Pulmonary:      Effort: Pulmonary effort is normal  No respiratory distress  Breath sounds: Rales present  No wheezing  Abdominal:      General: Bowel sounds are normal  There is no distension  Palpations: Abdomen is soft  Tenderness: There is no abdominal tenderness  Musculoskeletal:         General: No tenderness or deformity  Normal range of motion  Cervical back: Normal range of motion and neck supple  Right lower leg: No edema  Left lower leg: No edema  Skin:     General: Skin is dry  Capillary Refill: Capillary refill takes less than 2 seconds  Findings: No erythema or rash  Comments: Legs are cool at the ankle   Neurological:      General: No focal deficit present  Mental Status: He is alert and oriented to person, place, and time  Cranial Nerves: No cranial nerve deficit        Coordination: Coordination normal    Psychiatric:         Behavior: Behavior normal          Meds/Allergies   No Known Allergies    Current Facility-Administered Medications:     acetaminophen (TYLENOL) tablet 650 mg, 650 mg, Oral, Q6H PRN, Eppie Service, DO    aspirin (ECOTRIN LOW STRENGTH) EC tablet 81 mg, 81 mg, Oral, Daily, Eppie Service, DO, 81 mg at 03/01/22 0818    enoxaparin (LOVENOX) subcutaneous injection 40 mg, 40 mg, Subcutaneous, Daily, [facility], DO, 40 mg at 03/01/22 0818    ferrous gluconate (FERGON) tablet 324 mg, 324 mg, Oral, BID AC, [facility] Service, DO, 324 mg at 03/01/22 1556    finasteride (PROSCAR) tablet 5 mg, 5 mg, Oral, Daily, Eppie Service, DO, 5 mg at 03/01/22 0819    furosemide (LASIX) injection 40 mg, 40 mg, Intravenous, BID (diuretic), CLOVER Mary, 40 mg at 03/01/22 0819    Labetalol HCl (NORMODYNE) injection 10 mg, 10 mg, Intravenous, Q6H PRN, Noe Ortega MD    metoprolol tartrate (LOPRESSOR) partial tablet 12 5 mg, 12 5 mg, Oral, Q6H, CLOVER Lemos, 12 5 mg at 03/01/22 0819    potassium chloride (K-DUR,KLOR-CON) CR tablet 20 mEq, 20 mEq, Oral, Daily, Bailey Quintero, CRNP, 20 mEq at 03/01/22 0818    pravastatin (PRAVACHOL) tablet 40 mg, 40 mg, Oral, Daily With Mildred Damian DO, 40 mg at 02/28/22 1718    Laboratory Results:        CBC with diff:   Results from last 7 days   Lab Units 03/01/22  0507 02/28/22  0413 02/27/22  1416   WBC Thousand/uL 7 85 6 78 8 03   HEMOGLOBIN g/dL 14 4 14 5 14 3   HEMATOCRIT % 41 4 42 7 43 4   MCV fL 87 88 90   PLATELETS Thousands/uL 135* 117* 114*   MCH pg 30 4 29 9 29 7   MCHC g/dL 34 8 34 0 32 9   RDW % 14 7 15 1 14 8   MPV fL 11 1 11 4 11 4   NRBC AUTO /100 WBCs 0 0 0       CMP:  Results from last 7 days   Lab Units 03/01/22  0507 02/28/22  1433 02/28/22  0413 02/27/22  1416   SODIUM mmol/L 141 140 143 141   POTASSIUM mmol/L 4 1 4 5 3 7 3 9   CHLORIDE mmol/L 104 103 105 104   CO2 mmol/L 24 26 26 28   BUN mg/dL 23 19 16 14   CREATININE mg/dL 1 22 1 16 1 04 0 96   CALCIUM mg/dL 9 3 9 3 9 4 9 3   AST U/L  --   --   --  12   ALT U/L  --   --   --  17   ALK PHOS U/L  --   --   --  61   EGFR ml/min/1 73sq m 52 55 63 70       BMP:  Results from last 7 days   Lab Units 03/01/22  0507 02/28/22  1433 02/28/22  0413 02/27/22  1416   SODIUM mmol/L 141 140 143 141   POTASSIUM mmol/L 4 1 4 5 3 7 3 9   CHLORIDE mmol/L 104 103 105 104   CO2 mmol/L 24 26 26 28   BUN mg/dL 23 19 16 14   CREATININE mg/dL 1 22 1 16 1 04 0 96   CALCIUM mg/dL 9 3 9 3 9 4 9 3       NT-proBNP:   Recent Labs     02/27/22  1416   NTBNP 6,109*        Magnesium:   Results from last 7 days   Lab Units 03/01/22  0507 02/28/22  1433 02/28/22  0413   MAGNESIUM mg/dL 2 6 2 9* 2 2       Coags:       TSH:        Hemoglobin A1C )      Lipid Profile:   No results found for: CHOL  No results found for: HDL  No results found for: LDLCALC  No results found for: LDLDIRECT  No results found for: TRIG    Cardiac testing:   EKG personally reviewed by Rosa Johsi MD      Cath:  ECHO:  1/13/2022-[address] 96-18%, grade 2 diastolic dysfunction, moderately dilated left atrium, mild MR, mild TR, mild AI  Stress TEST:  Other:  Holter 1/13/2022-[facility]-frequent PVCs, 26%, nonsustained VT x2        Dale Cesar MD    Portions of the record may have been created with voice recognition software  Occasional wrong word or "sound a like" substitutions may have occurred due to the inherent limitations of voice recognition software  Read the chart carefully and recognize, using context, where substitutions have occurred

## 2022-03-01 NOTE — PROGRESS NOTES
[facility]  Progress Note - Dirnaheed Sale 1934, 80 y o  male MRN: [id number]  Unit/Bed#: S -01 Encounter: [id number]  Primary Care Provider: Nikki Villagomez DO   Date and time admitted to hospital: 2/27/2022  1:23 PM    * Acute on chronic combined systolic and diastolic CHF (congestive heart failure) [facility])  Assessment & Plan  Wt Readings from Last 3 Encounters:   03/01/22 63 6 kg (140 lb 3 4 oz)   05/14/21 66 2 kg (146 lb)   11/20/20 63 5 kg (140 lb)     POA:  - BNP 6109  - Received IV 40mg Lasix in ED    Echo 1/13/22: LV mildly dilated  Ejection fraction of 20-25%  Grade II   (moderate) diastolic dysfunction  LA moderately dilated  RV cavity & systolic function normal  Recent Holter with 26% burden at LVH    TTE 1/2020 LVEF 20-25% global hypokinesis  TTE 9/2020- LVEF 45% with LAD WMA    Plan:  · Cardiology Consult appreciated  · Continue 40 mg IV Lasix b i d  · Begin isosorbide dinitrate 10 mg t i d  for afterload reduction with plan to transition to Entresto low-dose closer to discharge him once euvolemic  · Favor of ICD and wishes to be full code  · Daily Weights   · I/O's - Condom catheter placed for better tracking  · Sodium restricted diet with fluid restriction 1500mL    Nonsustained ventricular tachycardia (HCC)  Assessment & Plan  12 beats caught on telemetry    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  Holter 1/2022- PVCs comprised 26% of beats    3 ventricular runs up to 13 beat    Plan:  Goal potassium >4 & Mag >2  Monitor with diuresis closely   Continue on telemetry  Increase lopressor to 12 5mg QID  · Beta-blocker should be effective for RVOT VT - can consider amiodarone while inpatient till beta-blockers effective    Elevated d-dimer  Assessment & Plan  POA: D-Dimer 0 66    Age adjusted - within normal limits    Plan:  Monitor for symptoms   No PE workup (CTA, V/Q scan) at this time    CKD (chronic kidney disease) stage 2, GFR 60-89 ml/min  Assessment & Plan  Lab Results   Component Value Date    EGFR 52 03/01/2022    EGFR 55 02/28/2022    EGFR 63 02/28/2022    CREATININE 1 22 03/01/2022    CREATININE 1 16 02/28/2022    CREATININE 1 04 02/28/2022     CKD Stage 2/ CKD Stage 3, likely  due to HTN, evidenced Cr 0 96> 1 04 with GFR 70>63 (also see labs below), treated with careful diuresis, renal function assessment, and lab monitoring  CKD Stage 2  GFR  60-89  CKD Stage 3a  GFR 45-59    Findings:  2/5/22 Cr: 1 08  with GFR 61  1/18/22 Cr  1 13 with GFR  58  7/20/21 Cr  0 88  with GFR  77    Continue to monitor    PAF (paroxysmal atrial fibrillation) ([facility] [address] )  Assessment & Plan  Hx of PAF - Diagnosed 9/2020 at [facility] started on eliquis at that time    Currently not on [facility] d/t fall risk per Dr Isabel Vences notes  Has been Sinus with PVCs    Telemetry:  Day 1: Sinus with runs of nonsustained VT  Day 2: Bigeminy - episode of few beats of PVCs     Plan: Continue on telemetry    BPH (benign prostatic hyperplasia)  Assessment & Plan  Home Regimen: Finasteride 5mg QD    Continue home regimen    Hyperlipidemia  Assessment & Plan  Home Regimen: Simvastatin (ZOCOR) 20 mg QD    Plan: Pravastatin 40mg     Essential hypertension  Assessment & Plan  Home medications: Lopressor 12 5mg BID, HCT 12 5mg QD    BP Readings from Last 1 Encounters:   03/01/22 152/82     Plan:  Lopressor qid for NSVT  Hold HCT in setting of Lasix   Hydralazine PRN for BP >180  Consider ACEi/ARB once euvolemic - cardiology recommendation for eventual transition to [facility]      VTE Pharmacologic Prophylaxis: VTE Score: 7 High Risk (Score >/= 5) - Pharmacological DVT Prophylaxis Ordered: enoxaparin (Lovenox)  Sequential Compression Devices Ordered  Patient Centered Rounds: I performed bedside rounds with nursing staff today  Discussions with Specialists or Other Care Team Provider: Cardiology    Education and Discussions with Family / Patient: Attempted to update  (wife) via phone  Unable to contact  Called at 1:40pm       Current Length of Stay: 1 day(s)  Current Patient Status: Inpatient   Discharge Plan: Anticipate discharge in 24-48 hrs to home  with possible need for home services  Code Status: Level 1 - Full Code    Subjective:   Pt notes he is feeling "ok " He continues to not have any symptoms but remains on the 2L of O2  He would like his wife to be updated about everything going on  On second meeting during rounds he reports being "not good" because of the news that was brought by the cardiology team      Objective:     Vitals:   Temp (24hrs), Av 1 °F (36 7 °C), Min:97 6 °F (36 4 °C), Max:98 5 °F (36 9 °C)    Temp:  [97 6 °F (36 4 °C)-98 5 °F (36 9 °C)] 97 6 °F (36 4 °C)  HR:  [52-89] 73  Resp:  [18] 18  BP: (133-166)/(64-82) 152/82  SpO2:  [96 %] 96 %  Body mass index is 27 38 kg/m²  Input and Output Summary (last 24 hours): Intake/Output Summary (Last 24 hours) at 3/1/2022 1330  Last data filed at 3/1/2022 1029  Gross per 24 hour   Intake 120 ml   Output 968 ml   Net -848 ml       Physical Exam:   Physical Exam  Vitals and nursing note reviewed  Constitutional:       Appearance: He is well-developed  HENT:      Head: Normocephalic and atraumatic  Eyes:      Conjunctiva/sclera: Conjunctivae normal    Neck:      Comments: Noted jvd  Cardiovascular:      Rate and Rhythm: Normal rate and regular rhythm  Heart sounds: No murmur heard  Pulmonary:      Effort: No respiratory distress  Breath sounds: Normal breath sounds  Comments: Currently on 2L O2  Decreased breath sounds  Abdominal:      Palpations: Abdomen is soft  Tenderness: There is no abdominal tenderness  There is no guarding or rebound  Musculoskeletal:      Cervical back: Neck supple  Comments: No significant LE edema   Skin:     General: Skin is warm and dry  Neurological:      Mental Status: He is alert             Additional Data:     Labs:  Results from last 7 days Lab Units 03/01/22  0507   WBC Thousand/uL 7 85   HEMOGLOBIN g/dL 14 4   HEMATOCRIT % 41 4   PLATELETS Thousands/uL 135*   NEUTROS PCT % 69   LYMPHS PCT % 19   MONOS PCT % 11   EOS PCT % 1     Results from last 7 days   Lab Units 03/01/22  0507 02/28/22  0413 02/27/22  1416   SODIUM mmol/L 141   < > 141   POTASSIUM mmol/L 4 1   < > 3 9   CHLORIDE mmol/L 104   < > 104   CO2 mmol/L 24   < > 28   BUN mg/dL 23   < > 14   CREATININE mg/dL 1 22   < > 0 96   ANION GAP mmol/L 13   < > 9   CALCIUM mg/dL 9 3   < > 9 3   ALBUMIN g/dL  --   --  3 9   TOTAL BILIRUBIN mg/dL  --   --  1 12*   ALK PHOS U/L  --   --  61   ALT U/L  --   --  17   AST U/L  --   --  12   GLUCOSE RANDOM mg/dL 119   < > 100    < > = values in this interval not displayed                         Lines/Drains:  Invasive Devices  Report    Peripheral Intravenous Line            Peripheral IV 02/27/22 Right;Lateral Forearm 1 day                  Telemetry:  Telemetry Orders (From admission, onward)             48 Hour Telemetry Monitoring  Continuous x 48 hours        References:    Telemetry Guidelines   Question:  Reason for 48 Hour Telemetry  Answer:  Acute Decompensated CHF (continuous diuretic infusion or total diuretic dose > 200 mg daily, associated electrolyte derangement, ionotropic drip, history of ventricular arrhythmia, or new EF <35%)                 Telemetry Reviewed: PVCs  Indication for Continued Telemetry Use: Arrthymias requiring medical therapy             Imaging: Reviewed radiology reports from this admission including: chest xray    Recent Cultures (last 7 days):         Last 24 Hours Medication List:   Current Facility-Administered Medications   Medication Dose Route Frequency Provider Last Rate    acetaminophen  650 mg Oral Q6H PRN Cathren Amour, DO      aspirin  81 mg Oral Daily Cathren Amour, DO      enoxaparin  40 mg Subcutaneous Daily Cathren Amour, DO      ferrous gluconate  324 mg Oral BID AC Cathren Amour, DO      finasteride  5 mg Oral Daily Jessica Ness DO      furosemide  40 mg Intravenous BID (diuretic) CLOVER Bright      isosorbide dinitrate  10 mg Oral TID after meals Dale Cesar MD      Labetalol HCl  10 mg Intravenous Q6H PRN Noe Ortega MD      metoprolol tartrate  12 5 mg Oral Q6H CLOVER Lemos      potassium chloride  20 mEq Oral Daily CLOVER Bright      pravastatin  40 mg Oral Daily With Diego Garber DO          Today, Patient Was Seen By: Jessica Ness DO    **Please Note: This note may have been constructed using a voice recognition system  **

## 2022-03-01 NOTE — ASSESSMENT & PLAN NOTE
Home medications: Lopressor 12 5mg BID, HCT 12 5mg QD    BP Readings from Last 1 Encounters:   03/01/22 152/82     Plan:  Lopressor qid for NSVT  Hold HCT in setting of Lasix   Hydralazine PRN for BP >180  Consider ACEi/ARB once euvolemic - cardiology recommendation for eventual transition to [facility]

## 2022-03-02 LAB
ANION GAP SERPL CALCULATED.3IONS-SCNC: 13 MMOL/L (ref 4–13)
BUN SERPL-MCNC: 34 MG/DL (ref 5–25)
CALCIUM SERPL-MCNC: 8.9 MG/DL (ref 8.3–10.1)
CHLORIDE SERPL-SCNC: 104 MMOL/L (ref 100–108)
CO2 SERPL-SCNC: 26 MMOL/L (ref 21–32)
CREAT SERPL-MCNC: 1.47 MG/DL (ref 0.6–1.3)
GFR SERPL CREATININE-BSD FRML MDRD: 41 ML/MIN/1.73SQ M
GLUCOSE SERPL-MCNC: 117 MG/DL (ref 65–140)
MAGNESIUM SERPL-MCNC: 2.4 MG/DL (ref 1.6–2.6)
POTASSIUM SERPL-SCNC: 4.1 MMOL/L (ref 3.5–5.3)
SODIUM SERPL-SCNC: 143 MMOL/L (ref 136–145)

## 2022-03-02 PROCEDURE — 99232 SBSQ HOSP IP/OBS MODERATE 35: CPT | Performed by: INTERNAL MEDICINE

## 2022-03-02 PROCEDURE — 83735 ASSAY OF MAGNESIUM: CPT

## 2022-03-02 PROCEDURE — 80048 BASIC METABOLIC PNL TOTAL CA: CPT

## 2022-03-02 RX ORDER — HEPARIN SODIUM 5000 [USP'U]/ML
5000 INJECTION, SOLUTION INTRAVENOUS; SUBCUTANEOUS EVERY 8 HOURS SCHEDULED
Status: DISCONTINUED | OUTPATIENT
Start: 2022-03-03 | End: 2022-03-04 | Stop reason: HOSPADM

## 2022-03-02 RX ORDER — LANOLIN ALCOHOL/MO/W.PET/CERES
3 CREAM (GRAM) TOPICAL
Status: DISCONTINUED | OUTPATIENT
Start: 2022-03-02 | End: 2022-03-04 | Stop reason: HOSPADM

## 2022-03-02 RX ORDER — METOPROLOL SUCCINATE 25 MG/1
25 TABLET, EXTENDED RELEASE ORAL 2 TIMES DAILY
Status: DISCONTINUED | OUTPATIENT
Start: 2022-03-02 | End: 2022-03-04 | Stop reason: HOSPADM

## 2022-03-02 RX ADMIN — ASPIRIN 81 MG: 81 TABLET, COATED ORAL at 08:50

## 2022-03-02 RX ADMIN — METOPROLOL SUCCINATE 25 MG: 25 TABLET, EXTENDED RELEASE ORAL at 17:28

## 2022-03-02 RX ADMIN — Medication 12.5 MG: at 03:23

## 2022-03-02 RX ADMIN — Medication 3 MG: at 23:27

## 2022-03-02 RX ADMIN — FERROUS GLUCONATE 324 MG: 324 TABLET ORAL at 16:43

## 2022-03-02 RX ADMIN — Medication 12.5 MG: at 08:51

## 2022-03-02 RX ADMIN — FERROUS GLUCONATE 324 MG: 324 TABLET ORAL at 08:51

## 2022-03-02 RX ADMIN — FUROSEMIDE 40 MG: 10 INJECTION, SOLUTION INTRAMUSCULAR; INTRAVENOUS at 10:08

## 2022-03-02 RX ADMIN — FINASTERIDE 5 MG: 5 TABLET, FILM COATED ORAL at 08:50

## 2022-03-02 RX ADMIN — PRAVASTATIN SODIUM 40 MG: 40 TABLET ORAL at 16:43

## 2022-03-02 RX ADMIN — ISOSORBIDE DINITRATE 10 MG: 10 TABLET ORAL at 12:31

## 2022-03-02 RX ADMIN — POTASSIUM CHLORIDE 20 MEQ: 1500 TABLET, EXTENDED RELEASE ORAL at 08:50

## 2022-03-02 RX ADMIN — ISOSORBIDE DINITRATE 10 MG: 10 TABLET ORAL at 17:28

## 2022-03-02 RX ADMIN — ENOXAPARIN SODIUM 40 MG: 40 INJECTION SUBCUTANEOUS at 08:50

## 2022-03-02 RX ADMIN — ISOSORBIDE DINITRATE 10 MG: 10 TABLET ORAL at 08:50

## 2022-03-02 NOTE — PLAN OF CARE
Problem: Potential for Falls  Goal: Patient will remain free of falls  Description: INTERVENTIONS:  - Educate patient/family on patient safety including physical limitations  - Instruct patient to call for assistance with activity   - Consult OT/PT to assist with strengthening/mobility   - Keep Call bell within reach  - Keep bed low and locked with side rails adjusted as appropriate  - Keep care items and personal belongings within reach  - Initiate and maintain comfort rounds  - Make Fall Risk Sign visible to staff  - Offer Toileting every  Hours, in advance of need  - Initiate/Maintain alarm  - Obtain necessary fall risk management equipment:   - Apply yellow socks and bracelet for high fall risk patients  - Consider moving patient to room near nurses station  Outcome: Progressing     Problem: MOBILITY - ADULT  Goal: Maintain or return to baseline ADL function  Description: INTERVENTIONS:  -  Assess patient's ability to carry out ADLs; assess patient's baseline for ADL function and identify physical deficits which impact ability to perform ADLs (bathing, care of mouth/teeth, toileting, grooming, dressing, etc )  - Assess/evaluate cause of self-care deficits   - Assess range of motion  - Assess patient's mobility; develop plan if impaired  - Assess patient's need for assistive devices and provide as appropriate  - Encourage maximum independence but intervene and supervise when necessary  - Involve family in performance of ADLs  - Assess for home care needs following discharge   - Consider OT consult to assist with ADL evaluation and planning for discharge  - Provide patient education as appropriate  Outcome: Progressing  Goal: Maintains/Returns to pre admission functional level  Description: INTERVENTIONS:  - Perform BMAT or MOVE assessment daily    - Set and communicate daily mobility goal to care team and patient/family/caregiver     - Collaborate with rehabilitation services on mobility goals if consulted  - Perform Range of Motion  times a day  - Reposition patient every hours    - Dangle patient  times a day  - Stand patient  times a day  - Ambulate patient  times a day  - Out of bed to chair  times a day   - Out of bed for meals  times a day  - Out of bed for toileting  - Record patient progress and toleration of activity level   Outcome: Progressing     Problem: Prexisting or High Potential for Compromised Skin Integrity  Goal: Skin integrity is maintained or improved  Description: INTERVENTIONS:  - Identify patients at risk for skin breakdown  - Assess and monitor skin integrity  - Assess and monitor nutrition and hydration status  - Monitor labs   - Assess for incontinence   - Turn and reposition patient  - Assist with mobility/ambulation  - Relieve pressure over bony prominences  - Avoid friction and shearing  - Provide appropriate hygiene as needed including keeping skin clean and dry  - Evaluate need for skin moisturizer/barrier cream  - Collaborate with interdisciplinary team   - Patient/family teaching  - Consider wound care consult   Outcome: Progressing     Problem: CARDIOVASCULAR - ADULT  Goal: Maintains optimal cardiac output and hemodynamic stability  Description: INTERVENTIONS:  - Monitor I/O, vital signs and rhythm  - Monitor for S/S and trends of decreased cardiac output  - Administer and titrate ordered vasoactive medications to optimize hemodynamic stability  - Assess quality of pulses, skin color and temperature  - Assess for signs of decreased coronary artery perfusion  - Instruct patient to report change in severity of symptoms  Outcome: Progressing  Goal: Absence of cardiac dysrhythmias or at baseline rhythm  Description: INTERVENTIONS:  - Continuous cardiac monitoring, vital signs, obtain 12 lead EKG if ordered  - Administer antiarrhythmic and heart rate control medications as ordered  - Monitor electrolytes and administer replacement therapy as ordered  Outcome: Progressing     Problem: RESPIRATORY - ADULT  Goal: Achieves optimal ventilation and oxygenation  Description: INTERVENTIONS:  - Assess for changes in respiratory status  - Assess for changes in mentation and behavior  - Position to facilitate oxygenation and minimize respiratory effort  - Oxygen administered by appropriate delivery if ordered  - Initiate smoking cessation education as indicated  - Encourage broncho-pulmonary hygiene including cough, deep breathe, Incentive Spirometry  - Assess the need for suctioning and aspirate as needed  - Assess and instruct to report SOB or any respiratory difficulty  - Respiratory Therapy support as indicated  Outcome: Progressing     Problem: METABOLIC, FLUID AND ELECTROLYTES - ADULT  Goal: Electrolytes maintained within normal limits  Description: INTERVENTIONS:  - Monitor labs and assess patient for signs and symptoms of electrolyte imbalances  - Administer electrolyte replacement as ordered  - Monitor response to electrolyte replacements, including repeat lab results as appropriate  - Instruct patient on fluid and nutrition as appropriate  Outcome: Progressing  Goal: Fluid balance maintained  Description: INTERVENTIONS:  - Monitor labs   - Monitor I/O and WT  - Instruct patient on fluid and nutrition as appropriate  - Assess for signs & symptoms of volume excess or deficit  Outcome: Progressing

## 2022-03-02 NOTE — ASSESSMENT & PLAN NOTE
Wt Readings from Last 3 Encounters:   03/02/22 62 9 kg (138 lb 10 7 oz)   05/14/21 66 2 kg (146 lb)   11/20/20 63 5 kg (140 lb)     Weight (last 2 days)     Date/Time Weight    03/02/22 0558 62 9 (138 67)    03/01/22 0600 63 6 (140 21)    02/28/22 0900 64 2 (141 54)    02/28/22 0540 61 5 (135 58)        POA:  - BNP 6109  - Received IV 40mg Lasix in ED    Echo 1/13/22: LV mildly dilated  Ejection fraction of 20-25%  Grade II   (moderate) diastolic dysfunction  LA moderately dilated   RV cavity & systolic function normal  Recent Holter with 26% burden at LVH    TTE 1/2020 LVEF 20-25% global hypokinesis  TTE 9/2020- LVEF 45% with LAD WMA    Plan:  · Cardiology Consult appreciated  · Continue 40 mg IV Lasix b i d   · Continue isosorbide dinitrate 10 mg t i d  for afterload reduction with plan to transition to Entresto low-dose closer to discharge him once euvolemic  · Favor of ICD and wishes to be full code  · Daily Weights   · I/O's  · Sodium restricted diet with fluid restriction 1500mL

## 2022-03-02 NOTE — ASSESSMENT & PLAN NOTE
Home medications: Lopressor 12 5mg BID, HCT 12 5mg QD    BP Readings from Last 1 Encounters:   03/02/22 105/53     Plan:  Lopressor qid for NSVT  Hold HCT in setting of Lasix   Hydralazine PRN for BP >180  Consider ACEi/ARB once euvolemic - cardiology recommendation for eventual transition to [facility]

## 2022-03-02 NOTE — ASSESSMENT & PLAN NOTE
Lab Results   Component Value Date    EGFR 41 03/02/2022    EGFR 52 03/01/2022    EGFR 55 02/28/2022    CREATININE 1 47 (H) 03/02/2022    CREATININE 1 22 03/01/2022    CREATININE 1 16 02/28/2022     CKD Stage 2/ CKD Stage 3, likely  due to HTN, evidenced Cr 0 96> 1 04 with GFR 70>63 (also see labs below), treated with careful diuresis, renal function assessment, and lab monitoring       CKD Stage 2  GFR  60-89  CKD Stage 3a  GFR 45-59    Findings:  2/5/22 Cr: 1 08  with GFR 61  1/18/22 Cr  1 13 with GFR  58  7/20/21 Cr  0 88  with GFR  77    Continue to diuresis while monitoring creatinine

## 2022-03-02 NOTE — ASSESSMENT & PLAN NOTE
Hx of PAF - Diagnosed 9/2020 at [address] started on eliquis at that time    Currently not on [facility] d/t fall risk per Dr Mary nAn Palma notes  Has been Sinus with PVCs    Telemetry: Found tohave Sinus with runs of nonsustained VT  After initiation of increased betablocker bigeminy with PVCs without VT runs      Plan: Continue on telemetry

## 2022-03-02 NOTE — ASSESSMENT & PLAN NOTE
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

## 2022-03-02 NOTE — PROGRESS NOTES
Progress Note - Cardiology   Ruth Templeton 80 y o  male MRN: [id number]  Unit/Bed#: S -01 Encounter: [id number]        Principal Problem:    Acute on chronic combined systolic and diastolic CHF (congestive heart failure) ([facility] [address] )  Active Problems:    Essential hypertension    Hyperlipidemia    Elevated d-dimer    BPH (benign prostatic hyperplasia)    Nonsustained ventricular tachycardia (HCC)    PAF (paroxysmal atrial fibrillation) (HCC)    CKD (chronic kidney disease) stage 2, GFR 60-89 ml/min           Assessment/Plan     1  Acute on chronic combined heart failure  ProBNP 6109  EKG- NSR bigeminy PVC's  Dry weight per wife 147 lbs  Currently 138  IV lasix 40mg BID, creat bump  Off oxygen  SOB improved  D/C IV lasix  PTA- Lasix 20 mg daily but stopped a few weeks ago  Creatinine stable morning likely resume oral Lasix at 40 mg daily  Strict I&O/daily weights  2 g sodium restriction, 1500 cc fluid restriction  BB- changing to toprol  Started isordil 3/1  TTE 1/2020 LVEF 20-25% global hypokinesis  TTE 9/2020- LVEF 45% with LAD WMA     2  CAD with history of MI 2012  Unknown coronary anatomy  On aspirin 81 mg, BB-Lopressor 12 5 every 6 hours- change to toprol XL 25mg every 12 , statin- pravastatin 40 mg  Troponin negative  Patient without complaints of chest pain or pressure  DSE 6/2020- negative for ischemia  Now with drop in LV function  May need updated ischemic evaluation  Defer to Dr Aziza Shelton  Has appointment 2 weeks     3  Paroxysmal atrial fibrillation  Diagnosed 9/2020 at [address] [place] [address]- he was started on [facility]- eliquis at that time  Not on [facility] d/t fall risk per Dr Aziza Shelton notes  Patient unaware of ever being on [facility]  He however is a poor historian  Denies history of falls  Reports previous recurrent epistaxis  Maintaining sinus rhythm     4  Essential HTN  Hypertensive at times  Monitor with volume removal  Continue beta-blocker  initiated Isordil for afterload reduction      5   NSVT- runs NSVT within 1st 24 hours of admission  Diuresed  Repleted potassium  Keep potassium greater than 4 Mag greater than 2  Telemetry reviewed-no further runs of NSVT  History of frequent PVCs  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  Holter 1/2022- PVCs comprised 26% of beats  3 ventricular runs up to 13 beat  Was on Lopressor 12 5 mg daily, currently receiving every 6 hours  Changed to Toprol 25 mg b i d  Reviewed role of ICD with patient and family  Will f/u          Subjective/Objective   Chief Complaint/Subjective  Wife at bedside  No complaints of chest pain or shortness of breath  Has been weaned off oxygen        Vitals: /53 (BP Location: Left arm)   Pulse 70   Temp 98 3 °F (36 8 °C) (Oral)   Resp 18   Ht 5' (1 524 m)   Wt 62 9 kg (138 lb 10 7 oz)   SpO2 92%   BMI 27 08 kg/m²     Vitals:    03/01/22 0600 03/02/22 0558   Weight: 63 6 kg (140 lb 3 4 oz) 62 9 kg (138 lb 10 7 oz)     Orthostatic Blood Pressures      Most Recent Value   Blood Pressure 105/53 filed at 03/02/2022 6955   Patient Position - Orthostatic VS Lying filed at 03/02/2022 9128            Intake/Output Summary (Last 24 hours) at 3/2/2022 1217  Last data filed at 3/2/2022 0905  Gross per 24 hour   Intake 880 ml   Output 425 ml   Net 455 ml       Invasive Devices  Report    Peripheral Intravenous Line            Peripheral IV 02/27/22 Right;Lateral Forearm 2 days                Current Facility-Administered Medications   Medication Dose Route Frequency    acetaminophen (TYLENOL) tablet 650 mg  650 mg Oral Q6H PRN    aspirin (ECOTRIN LOW STRENGTH) EC tablet 81 mg  81 mg Oral Daily    enoxaparin (LOVENOX) subcutaneous injection 40 mg  40 mg Subcutaneous Daily    ferrous gluconate (FERGON) tablet 324 mg  324 mg Oral BID AC    finasteride (PROSCAR) tablet 5 mg  5 mg Oral Daily    isosorbide dinitrate (ISORDIL) tablet 10 mg  10 mg Oral TID after meals    Labetalol HCl (NORMODYNE) injection 10 mg  10 mg Intravenous Q6H PRN    metoprolol tartrate (LOPRESSOR) partial tablet 12 5 mg  12 5 mg Oral Q6H    potassium chloride (K-DUR,KLOR-CON) CR tablet 20 mEq  20 mEq Oral Daily    pravastatin (PRAVACHOL) tablet 40 mg  40 mg Oral Daily With Dinner         Physical Exam: /53 (BP Location: Left arm)   Pulse 70   Temp 98 3 °F (36 8 °C) (Oral)   Resp 18   Ht 5' (1 524 m)   Wt 62 9 kg (138 lb 10 7 oz)   SpO2 92%   BMI 27 08 kg/m²     General Appearance:    Alert, cooperative, no distress, appears stated age   Head:    Normocephalic, no scleral icterus   Eyes:    PERRL   Nose:   Nares normal, septum midline, no drainage    Throat:   Lips, mucosa, and tongue normal   Neck:   Supple, symmetrical, trachea midline,              Lungs:     Clear to auscultation bilaterally, respirations unlabored   Chest Wall:    No tenderness or deformity    Heart:    Regular rate and rhythm, S1 and S2 normal, no murmur   Abdomen:     Soft, semi firm   Extremities:   Extremities normal, atraumatic, no cyanosis or edema       Skin:   Skin warm    Neurologic:   Alert and oriented to person place and time, no focal deficits                 Lab Results:   Recent Results (from the past 72 hour(s))   CBC and differential    Collection Time: 02/27/22  2:16 PM   Result Value Ref Range    WBC 8 03 4 31 - 10 16 Thousand/uL    RBC 4 82 3 88 - 5 62 Million/uL    Hemoglobin 14 3 12 0 - 17 0 g/dL    Hematocrit 43 4 36 5 - 49 3 %    MCV 90 82 - 98 fL    MCH 29 7 26 8 - 34 3 pg    MCHC 32 9 31 4 - 37 4 g/dL    RDW 14 8 11 6 - 15 1 %    MPV 11 4 8 9 - 12 7 fL    Platelets 015 (L) 288 - 390 Thousands/uL    nRBC 0 /100 WBCs    Neutrophils Relative 70 43 - 75 %    Immat GRANS % 1 0 - 2 %    Lymphocytes Relative 18 14 - 44 %    Monocytes Relative 9 4 - 12 %    Eosinophils Relative 2 0 - 6 %    Basophils Relative 0 0 - 1 %    Neutrophils Absolute 5 63 1 85 - 7 62 Thousands/µL    Immature Grans Absolute 0 04 0 00 - 0 20 Thousand/uL    Lymphocytes Absolute 1 45 0 60 - 4 47 Thousands/µL    Monocytes Absolute 0 75 0 17 - 1 22 Thousand/µL    Eosinophils Absolute 0 13 0 00 - 0 61 Thousand/µL    Basophils Absolute 0 03 0 00 - 0 10 Thousands/µL   Comprehensive metabolic panel    Collection Time: 02/27/22  2:16 PM   Result Value Ref Range    Sodium 141 136 - 145 mmol/L    Potassium 3 9 3 5 - 5 3 mmol/L    Chloride 104 100 - 108 mmol/L    CO2 28 21 - 32 mmol/L    ANION GAP 9 4 - 13 mmol/L    BUN 14 5 - 25 mg/dL    Creatinine 0 96 0 60 - 1 30 mg/dL    Glucose 100 65 - 140 mg/dL    Calcium 9 3 8 3 - 10 1 mg/dL    AST 12 5 - 45 U/L    ALT 17 12 - 78 U/L    Alkaline Phosphatase 61 46 - 116 U/L    Total Protein 7 3 6 4 - 8 2 g/dL    Albumin 3 9 3 5 - 5 0 g/dL    Total Bilirubin 1 12 (H) 0 20 - 1 00 mg/dL    eGFR 70 ml/min/1 73sq m   NT-BNP PRO    Collection Time: 02/27/22  2:16 PM   Result Value Ref Range    NT-proBNP 6,109 (H) <450 pg/mL   COVID/FLU/RSV - 2 hour TAT    Collection Time: 02/27/22  2:16 PM    Specimen: Nose; Nares   Result Value Ref Range    SARS-CoV-2 Negative Negative    INFLUENZA A PCR Negative Negative    INFLUENZA B PCR Negative Negative    RSV PCR Negative Negative   HS Troponin 0hr (reflex protocol)    Collection Time: 02/27/22  2:16 PM   Result Value Ref Range    hs TnI 0hr 10 "Refer to ACS Flowchart"- see link ng/L   D-Dimer    Collection Time: 02/27/22  2:16 PM   Result Value Ref Range    D-Dimer, Quant 0 66 (H) <0 50 ug/ml FEU   ECG 12 lead    Collection Time: 02/27/22  2:16 PM   Result Value Ref Range    Ventricular Rate 79 BPM    Atrial Rate 80 BPM    PA Interval 264 ms    QRSD Interval 88 ms    QT Interval 384 ms    QTC Interval 441 ms    P Axis 37 degrees    QRS Axis 34 degrees    T Wave [place] 122 degrees   HS Troponin I 2hr    Collection Time: 02/27/22  4:18 PM   Result Value Ref Range    hs TnI 2hr 10 "Refer to ACS Flowchart"- see link ng/L    Delta 2hr hsTnI 0 <20 ng/L   ECG 12 lead    Collection Time: 02/27/22  4:24 PM   Result Value Ref Range    Ventricular Rate 85 BPM    Atrial Rate 88 BPM    NJ Interval 248 ms    QRSD Interval 80 ms    QT Interval 418 ms    QTC Interval 498 ms    P Axis 26 degrees    QRS Axis 31 degrees    T Wave [place] 206 degrees   HS Troponin I 4hr    Collection Time: 02/27/22  7:33 PM   Result Value Ref Range    hs TnI 4hr 12 "Refer to ACS Flowchart"- see link ng/L    Delta 4hr hsTnI 2 <20 ng/L   Basic metabolic panel    Collection Time: 02/28/22  4:13 AM   Result Value Ref Range    Sodium 143 136 - 145 mmol/L    Potassium 3 7 3 5 - 5 3 mmol/L    Chloride 105 100 - 108 mmol/L    CO2 26 21 - 32 mmol/L    ANION GAP 12 4 - 13 mmol/L    BUN 16 5 - 25 mg/dL    Creatinine 1 04 0 60 - 1 30 mg/dL    Glucose 100 65 - 140 mg/dL    Calcium 9 4 8 3 - 10 1 mg/dL    eGFR 63 ml/min/1 73sq m   CBC and differential    Collection Time: 02/28/22  4:13 AM   Result Value Ref Range    WBC 6 78 4 31 - 10 16 Thousand/uL    RBC 4 85 3 88 - 5 62 Million/uL    Hemoglobin 14 5 12 0 - 17 0 g/dL    Hematocrit 42 7 36 5 - 49 3 %    MCV 88 82 - 98 fL    MCH 29 9 26 8 - 34 3 pg    MCHC 34 0 31 4 - 37 4 g/dL    RDW 15 1 11 6 - 15 1 %    MPV 11 4 8 9 - 12 7 fL    Platelets 873 (L) 295 - 390 Thousands/uL    nRBC 0 /100 WBCs    Neutrophils Relative 51 43 - 75 %    Immat GRANS % 0 0 - 2 %    Lymphocytes Relative 37 14 - 44 %    Monocytes Relative 10 4 - 12 %    Eosinophils Relative 2 0 - 6 %    Basophils Relative 0 0 - 1 %    Neutrophils Absolute 3 42 1 85 - 7 62 Thousands/µL    Immature Grans Absolute 0 02 0 00 - 0 20 Thousand/uL    Lymphocytes Absolute 2 49 0 60 - 4 47 Thousands/µL    Monocytes Absolute 0 70 0 17 - 1 22 Thousand/µL    Eosinophils Absolute 0 12 0 00 - 0 61 Thousand/µL    Basophils Absolute 0 03 0 00 - 0 10 Thousands/µL   Magnesium    Collection Time: 02/28/22  4:13 AM   Result Value Ref Range    Magnesium 2 2 1 6 - 2 6 mg/dL   Basic metabolic panel    Collection Time: 02/28/22  2:33 PM   Result Value Ref Range    Sodium 140 136 - 145 mmol/L    Potassium 4 5 3 5 - 5 3 mmol/L    Chloride 103 100 - 108 mmol/L    CO2 26 21 - 32 mmol/L    ANION GAP 11 4 - 13 mmol/L    BUN 19 5 - 25 mg/dL    Creatinine 1 16 0 60 - 1 30 mg/dL    Glucose 120 65 - 140 mg/dL    Calcium 9 3 8 3 - 10 1 mg/dL    eGFR 55 ml/min/1 73sq m   Magnesium    Collection Time: 02/28/22  2:33 PM   Result Value Ref Range    Magnesium 2 9 (H) 1 6 - 2 6 mg/dL   Basic metabolic panel    Collection Time: 03/01/22  5:07 AM   Result Value Ref Range    Sodium 141 136 - 145 mmol/L    Potassium 4 1 3 5 - 5 3 mmol/L    Chloride 104 100 - 108 mmol/L    CO2 24 21 - 32 mmol/L    ANION GAP 13 4 - 13 mmol/L    BUN 23 5 - 25 mg/dL    Creatinine 1 22 0 60 - 1 30 mg/dL    Glucose 119 65 - 140 mg/dL    Calcium 9 3 8 3 - 10 1 mg/dL    eGFR 52 ml/min/1 73sq m   CBC and differential    Collection Time: 03/01/22  5:07 AM   Result Value Ref Range    WBC 7 85 4 31 - 10 16 Thousand/uL    RBC 4 74 3 88 - 5 62 Million/uL    Hemoglobin 14 4 12 0 - 17 0 g/dL    Hematocrit 41 4 36 5 - 49 3 %    MCV 87 82 - 98 fL    MCH 30 4 26 8 - 34 3 pg    MCHC 34 8 31 4 - 37 4 g/dL    RDW 14 7 11 6 - 15 1 %    MPV 11 1 8 9 - 12 7 fL    Platelets 188 (L) 446 - 390 Thousands/uL    nRBC 0 /100 WBCs    Neutrophils Relative 69 43 - 75 %    Immat GRANS % 0 0 - 2 %    Lymphocytes Relative 19 14 - 44 %    Monocytes Relative 11 4 - 12 %    Eosinophils Relative 1 0 - 6 %    Basophils Relative 0 0 - 1 %    Neutrophils Absolute 5 45 1 85 - 7 62 Thousands/µL    Immature Grans Absolute 0 03 0 00 - 0 20 Thousand/uL    Lymphocytes Absolute 1 45 0 60 - 4 47 Thousands/µL    Monocytes Absolute 0 83 0 17 - 1 22 Thousand/µL    Eosinophils Absolute 0 06 0 00 - 0 61 Thousand/µL    Basophils Absolute 0 03 0 00 - 0 10 Thousands/µL   Magnesium    Collection Time: 03/01/22  5:07 AM   Result Value Ref Range    Magnesium 2 6 1 6 - 2 6 mg/dL   Basic metabolic panel    Collection Time: 03/02/22  5:21 AM   Result Value Ref Range    Sodium 143 136 - 145 mmol/L    Potassium 4 1 3 5 - 5 3 mmol/L Chloride 104 100 - 108 mmol/L    CO2 26 21 - 32 mmol/L    ANION GAP 13 4 - 13 mmol/L    BUN 34 (H) 5 - 25 mg/dL    Creatinine 1 47 (H) 0 60 - 1 30 mg/dL    Glucose 117 65 - 140 mg/dL    Calcium 8 9 8 3 - 10 1 mg/dL    eGFR 41 ml/min/1 73sq m   Magnesium    Collection Time: 03/02/22  5:21 AM   Result Value Ref Range    Magnesium 2 4 1 6 - 2 6 mg/dL     Imaging: I have personally reviewed pertinent reports  Tele- NSR      Counseling / Coordination of Care  Total time spent today 30 minutes  Greater than 50% of total time was spent with the patient and / or family counseling and / or coordination of care

## 2022-03-02 NOTE — DISCHARGE SUMMARY
[facility]  Discharge- Brick Michelle 1934, 80 y o  male MRN: [id number]  Unit/Bed#: S -01 Encounter: [id number]  Primary Care Provider: Wendy Soto DO   Date and time admitted to hospital: 2/27/2022  1:23 PM    * Acute on chronic combined systolic and diastolic CHF (congestive heart failure) [facility])  Assessment & Plan  Wt Readings from Last 3 Encounters:   03/04/22 63 4 kg (139 lb 12 8 oz)   05/14/21 66 2 kg (146 lb)   11/20/20 63 5 kg (140 lb)     Dry weight per wife 147 lbs  Weight (last 2 days)     Date/Time Weight    03/04/22 0956 63 4 (139 8)    03/04/22 0600 62 2 (137 13)    03/03/22 1300 63 4 (139 77)    03/03/22 0533 63 (138 89)    03/02/22 0558 62 9 (138 67)      POA:  - BNP 6109  - Received IV 40mg Lasix in ED    Echo 1/13/22: LV mildly dilated  Ejection fraction of 20-25%  Grade II   (moderate) diastolic dysfunction  LA moderately dilated  RV cavity & systolic function normal  Recent Holter with 26% burden at LVH    TTE 1/2020 LVEF 20-25% global hypokinesis  TTE 9/2020- LVEF 45% with LAD WMA    Plan:  · Cardiology Consult appreciated  · Isosorbide dinitrate 10 mg t i d   · Toprol - 25mg bid  · Favor of ICD and wishes to be full code - ICD planned discussion with outpt cardiologist, Dr Tanner Esparza  · Creat above baseline but improving, will Resume lasix at 20mg tomorrow F/u with Dr Jonna Barton as may need higher dosing  · Discuss entresto as outpatient  · BMP few days  · Stable for discharge from cards perspective    Nonsustained ventricular tachycardia [facility])  Assessment & Plan  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  Holter 1/2022 - PVCs comprised 26% of beats    3 ventricular runs up to 13 beat    NSVT seen on telemetry resulting in lopressor 12 5 qid then transitioned to toprol 25mg bid  Monitored electrolytes    Plan:  Toprol 25mg BID    Elevated d-dimer  Assessment & Plan  POA: D-Dimer 0 66    Age adjusted - within normal limits    Plan:  Monitor for symptoms   No PE workup (CTA, V/Q scan) at this time    OLLIE (acute kidney injury) ([facility] [address] )  Assessment & Plan  POA: 0 96  Baseline appears: 1-1 2    Creatinine   Date Value Ref Range Status   03/04/2022 1 45 (H) 0 60 - 1 30 mg/dL Final     Comment:     Standardized to IDMS reference method      Likely in setting of diuresis which will be held for now  F/u bmp in few days    CKD (chronic kidney disease) stage 2, GFR 60-89 ml/min  Assessment & Plan  Lab Results   Component Value Date    EGFR 42 03/04/2022    EGFR 38 03/03/2022    EGFR 41 03/02/2022    CREATININE 1 45 (H) 03/04/2022    CREATININE 1 57 (H) 03/03/2022    CREATININE 1 47 (H) 03/02/2022     CKD Stage 2/ CKD Stage 3, likely  due to HTN, evidenced Cr 0 96> 1 04 with GFR 70>63 (also see labs below), treated with careful diuresis, renal function assessment, and lab monitoring  CKD Stage 2  GFR  60-89  CKD Stage 3a  GFR 45-59    Findings:  2/5/22 Cr: 1 08  with GFR 61  1/18/22 Cr  1 13 with GFR  58  7/20/21 Cr  0 88  with GFR  77    F/u bmp in few days  Resume lasix tomorrow    PAF (paroxysmal atrial fibrillation) [facility])  Assessment & Plan  Hx of PAF - Diagnosed 9/2020 at [address] [place] [address] started on eliquis at that time    Currently not on [facility] d/t fall risk per Dr Diana La notes  Has been Sinus with PVCs    Telemetry: Found to have Sinus with runs of NSVT  After initiation of increased betablocker bigeminy with PVCs without VT runs      Discharge on Toprol 25mg BID    BPH (benign prostatic hyperplasia)  Assessment & Plan  Home Regimen: Finasteride 5mg QD    Continue home regimen    Hyperlipidemia  Assessment & Plan  Home Regimen: Simvastatin (ZOCOR) 20 mg QD    During admission given Pravastatin 40mg     Resume home med    Essential hypertension  Assessment & Plan  Home medications: Lopressor 12 5mg BID, HCT 12 5mg QD    BP Readings from Last 1 Encounters:   03/04/22 123/60     Initially switched to lopressor qid after NSVT then transitioned to toprol    Plan:  Toprol 25mg bid  Discontinue HCT  Begin Lasix 20mg QD  Recommendation for Entresto discussion with outpt cardiology      Medical Problems             Resolved Problems  Date Reviewed: 3/1/2022    None              Discharging Resident: Malinda Betts DO  Discharging Attending: Nilam Medina MD  PCP: Phu Mallory DO  Admission Date:   Admission Orders (From admission, onward)     Ordered        02/28/22 1102  Inpatient Admission  Once            02/27/22 1541  Place in Observation  Once                      Discharge Date: 03/04/22    Consultations During Hospital Stay:  · Cardiology    Procedures Performed:   · None    Significant Findings / Test Results:   CXR: No acute disease in the chest  · BNP: 6109  · Reviewed Echo 1/13/22: Ejection fraction 20-25% (Grade 2 DD)  · Telemetry with NSVT & PVCs  · D-dimer: 0 66  · Covid Negative    Incidental Findings:   · None    Test Results Pending at Discharge (will require follow up): · None     Outpatient Tests Requested:  · BMP in few days    Complications:  None    Reason for Admission: Shortness of breath    Hospital Course:   Selena Soni is a 80 y o  male patient with PmHx of CHF, CKD, HTN, HLD, BPH who originally presented to the hospital on 2/27/2022 due to increasing shortness of breath over the past week and a half  Cardiology was consulted and began to diuresis the patient  Due to his new findings of EF 20-25%, discussion of ICD was discussed  While on diuresis, pt's creatinine elevated and it was held temporarily but as kidney functions improved pt was cleared from cardiology standpoint to go home  Pt breathing continued to improve and felt ready and was eager to go home  Upon discharge, pt medication adjustments include: Discontinuing gabapentin, hydrochlorothiazide, lopressor, miralax, and fergon  Begin taking isordil 20mg 3 times a day after meal, toprol-xl 25mg 2 times a day, Lasix 20mg every day      Please see above list of diagnoses and related plan for additional information  Condition at Discharge: stable    Discharge Day Visit / Exam:   Subjective:  Pt reports ready to go home  He notes that his breathing has been stabilized and has been consistent the past few days including today  He denies any new concerns or complaints at this time  Vitals: Blood Pressure: 123/60 (03/04/22 0708)  Pulse: 61 (03/04/22 0708)  Temperature: 97 6 °F (36 4 °C) (03/04/22 0708)  Temp Source: Oral (03/04/22 0708)  Respirations: 18 (03/04/22 0708)  Height: 5' (152 4 cm) (02/27/22 1327)  Weight - Scale: 63 4 kg (139 lb 12 8 oz) (03/04/22 0956)  SpO2: 95 % (03/04/22 0708)  Exam:   Physical Exam  Vitals and nursing note reviewed  Constitutional:       Appearance: He is well-developed  HENT:      Head: Normocephalic and atraumatic  Eyes:      Conjunctiva/sclera: Conjunctivae normal    Cardiovascular:      Rate and Rhythm: Normal rate and regular rhythm  Heart sounds: Normal heart sounds  No murmur heard  Pulmonary:      Effort: Pulmonary effort is normal  No respiratory distress  Breath sounds: Normal breath sounds  No wheezing or rales  Chest:      Chest wall: No tenderness  Abdominal:      Palpations: Abdomen is soft  Tenderness: There is no abdominal tenderness  There is no guarding or rebound  Musculoskeletal:      Cervical back: Neck supple  Right lower leg: No edema  Left lower leg: No edema  Skin:     General: Skin is warm and dry  Neurological:      Mental Status: He is alert  Discussion with Family: Attempted to update  (wife) via phone  Left voicemail  Attempted call at 11:45pm      Discharge instructions/Information to patient and family:   See after visit summary for information provided to patient and family  Provisions for Follow-Up Care:  See after visit summary for information related to follow-up care and any pertinent home health orders         Disposition: Acute Rehab    Planned Readmission: No    Discharge Medications:  See after visit summary for reconciled discharge medications provided to patient and/or family        **Please Note: This note may have been constructed using a voice recognition system**

## 2022-03-02 NOTE — PROGRESS NOTES
[facility]  Progress Note - Fredi Wu 1934, 80 y o  male MRN: [id number]  Unit/Bed#: S -01 Encounter: [id number]  Primary Care Provider: Elenita Espitia DO   Date and time admitted to hospital: 2/27/2022  1:23 PM    * Acute on chronic combined systolic and diastolic CHF (congestive heart failure) [facility])  Assessment & Plan  Wt Readings from Last 3 Encounters:   03/02/22 62 9 kg (138 lb 10 7 oz)   05/14/21 66 2 kg (146 lb)   11/20/20 63 5 kg (140 lb)     Weight (last 2 days)     Date/Time Weight    03/02/22 0558 62 9 (138 67)    03/01/22 0600 63 6 (140 21)    02/28/22 0900 64 2 (141 54)    02/28/22 0540 61 5 (135 58)        POA:  - BNP 6109  - Received IV 40mg Lasix in ED    Echo 1/13/22: LV mildly dilated  Ejection fraction of 20-25%  Grade II   (moderate) diastolic dysfunction  LA moderately dilated  RV cavity & systolic function normal  Recent Holter with 26% burden at LVH    TTE 1/2020 LVEF 20-25% global hypokinesis  TTE 9/2020- LVEF 45% with LAD WMA    Plan:  · Cardiology Consult appreciated  · Continue 40 mg IV Lasix b i d   · Continue isosorbide dinitrate 10 mg t i d  for afterload reduction with plan to transition to Entresto low-dose closer to discharge him once euvolemic  · Favor of ICD and wishes to be full code  · Daily Weights   · I/O's  · Sodium restricted diet with fluid restriction 1500mL    Nonsustained ventricular tachycardia (HCC)  Assessment & Plan  12 beats caught on telemetry    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  Holter 1/2022 - PVCs comprised 26% of beats    3 ventricular runs up to 13 beat    Plan:  Goal potassium >4 & Mag >2  Monitor with diuresis closely   Continue on telemetry  Increase lopressor to 12 5mg QID  · Beta-blocker should be effective for RVOT VT - can consider amiodarone while inpatient till beta-blockers effective    Elevated d-dimer  Assessment & Plan  POA: D-Dimer 0 66    Age adjusted - within normal limits    Plan:  Monitor for symptoms   No PE workup (CTA, V/Q scan) at this time    CKD (chronic kidney disease) stage 2, GFR 60-89 ml/min  Assessment & Plan  Lab Results   Component Value Date    EGFR 41 03/02/2022    EGFR 52 03/01/2022    EGFR 55 02/28/2022    CREATININE 1 47 (H) 03/02/2022    CREATININE 1 22 03/01/2022    CREATININE 1 16 02/28/2022     CKD Stage 2/ CKD Stage 3, likely  due to HTN, evidenced Cr 0 96> 1 04 with GFR 70>63 (also see labs below), treated with careful diuresis, renal function assessment, and lab monitoring  CKD Stage 2  GFR  60-89  CKD Stage 3a  GFR 45-59    Findings:  2/5/22 Cr: 1 08  with GFR 61  1/18/22 Cr  1 13 with GFR  58  7/20/21 Cr  0 88  with GFR  77    Continue to diuresis while monitoring creatinine    PAF (paroxysmal atrial fibrillation) ([facility] [address] )  Assessment & Plan  Hx of PAF - Diagnosed 9/2020 at [facility] started on eliquis at that time    Currently not on [facility] d/t fall risk per Dr Raynell Cockayne notes  Has been Sinus with PVCs    Telemetry: Found tohave Sinus with runs of nonsustained VT  After initiation of increased betablocker bigeminy with PVCs without VT runs  Plan: Continue on telemetry    BPH (benign prostatic hyperplasia)  Assessment & Plan  Home Regimen: Finasteride 5mg QD    Continue home regimen    Hyperlipidemia  Assessment & Plan  Home Regimen: Simvastatin (ZOCOR) 20 mg QD    Plan: Pravastatin 40mg     Essential hypertension  Assessment & Plan  Home medications: Lopressor 12 5mg BID, HCT 12 5mg QD    BP Readings from Last 1 Encounters:   03/02/22 105/53     Plan:  Lopressor qid for NSVT  Hold HCT in setting of Lasix   Hydralazine PRN for BP >180  Consider ACEi/ARB once euvolemic - cardiology recommendation for eventual transition to [facility]      VTE Pharmacologic Prophylaxis: VTE Score: 7 High Risk (Score >/= 5) - Pharmacological DVT Prophylaxis Ordered: enoxaparin (Lovenox)  Sequential Compression Devices Ordered  Patient Centered Rounds:  I performed bedside rounds with nursing staff today  Discussions with Specialists or Other Care Team Provider: cardiology    Education and Discussions with Family / Patient: Updated  (wife) at bedside  Spoke at 11:45pm  She is updated of current treatment plan  No additional questions at this time  Current Length of Stay: 2 day(s)  Current Patient Status: Inpatient   Discharge Plan: Pending cardiology & ICD decision    Code Status: Level 1 - Full Code    Subjective:   Pt notes that he is doing well and he is breathing "good " He has no complaints and offers no concerns at this time  Wife believes he is breathing better and not as labored as he was previously  Objective:     Vitals:   Temp (24hrs), Av 1 °F (36 7 °C), Min:97 9 °F (36 6 °C), Max:98 3 °F (36 8 °C)    Temp:  [97 9 °F (36 6 °C)-98 3 °F (36 8 °C)] 98 3 °F (36 8 °C)  HR:  [56-93] 70  Resp:  [18-20] 18  BP: (105-136)/(53-65) 105/53  SpO2:  [92 %-98 %] 92 %  Body mass index is 27 08 kg/m²  Input and Output Summary (last 24 hours): Intake/Output Summary (Last 24 hours) at 3/2/2022 1225  Last data filed at 3/2/2022 0905  Gross per 24 hour   Intake 880 ml   Output 425 ml   Net 455 ml       Physical Exam:   Physical Exam  Vitals and nursing note reviewed  Constitutional:       Appearance: He is well-developed  HENT:      Head: Normocephalic and atraumatic  Eyes:      Conjunctiva/sclera: Conjunctivae normal    Cardiovascular:      Rate and Rhythm: Normal rate and regular rhythm  Heart sounds: Normal heart sounds  No murmur heard  Pulmonary:      Effort: No respiratory distress  Breath sounds: No wheezing  Comments: Improved but still increased respiratory effort  Improved rales  Chest:      Chest wall: No tenderness  Abdominal:      Palpations: Abdomen is soft  Tenderness: There is no abdominal tenderness  There is no guarding  Musculoskeletal:      Cervical back: Neck supple        Right lower leg: No edema  Left lower leg: No edema  Skin:     General: Skin is warm and dry  Comments: Dressing on left elbow   Neurological:      Mental Status: He is alert  Additional Data:     Labs:  Results from last 7 days   Lab Units 22  0507   WBC Thousand/uL 7 85   HEMOGLOBIN g/dL 14 4   HEMATOCRIT % 41 4   PLATELETS Thousands/uL 135*   NEUTROS PCT % 69   LYMPHS PCT % 19   MONOS PCT % 11   EOS PCT % 1     Results from last 7 days   Lab Units 22  0521 22  0413 22  1416   SODIUM mmol/L 143   < > 141   POTASSIUM mmol/L 4 1   < > 3 9   CHLORIDE mmol/L 104   < > 104   CO2 mmol/L 26   < > 28   BUN mg/dL 34*   < > 14   CREATININE mg/dL 1 47*   < > 0 96   ANION GAP mmol/L 13   < > 9   CALCIUM mg/dL 8 9   < > 9 3   ALBUMIN g/dL  --   --  3 9   TOTAL BILIRUBIN mg/dL  --   --  1 12*   ALK PHOS U/L  --   --  61   ALT U/L  --   --  17   AST U/L  --   --  12   GLUCOSE RANDOM mg/dL 117   < > 100    < > = values in this interval not displayed  Lines/Drains:  Invasive Devices  Report    Peripheral Intravenous Line            Peripheral IV 22 Right;Lateral Forearm 2 days                  Telemetry:  Telemetry Orders (From admission, onward)             48 Hour Telemetry Monitoring  Continuous x 48 hours           References:    Telemetry Guidelines   Question:  Reason for 48 Hour Telemetry  Answer:  Acute Decompensated CHF (continuous diuretic infusion or total diuretic dose > 200 mg daily, associated electrolyte derangement, ionotropic drip, history of ventricular arrhythmia, or new EF <35%)                 Telemetry Reviewed: PVCs  Indication for Continued Telemetry Use: Acute CHF on >200 mg lasix/day or equivalent dose or with new reduced EF  Imaging: No pertinent imaging reviewed  Prior imaging previously reviewed      Recent Cultures (last 7 days):         Last 24 Hours Medication List:   Current Facility-Administered Medications Medication Dose Route Frequency Provider Last Rate    acetaminophen  650 mg Oral Q6H PRN Erendira Sandhu DO      aspirin  81 mg Oral Daily Erendira Sandhu DO      enoxaparin  40 mg Subcutaneous Daily Erendira Sandhu DO      ferrous gluconate  324 mg Oral BID AC Erendira Sandhu DO      finasteride  5 mg Oral Daily Erendira Sandhu DO      isosorbide dinitrate  10 mg Oral TID after meals Diego Ly MD      Labetalol HCl  10 mg Intravenous Q6H PRN Noe Ortega MD      metoprolol tartrate  12 5 mg Oral Q6H CLOVER Lemos      potassium chloride  20 mEq Oral Daily CLOVER Schwab      pravastatin  40 mg Oral Daily With Bailey Rangel DO          Today, Patient Was Seen By: Erendira Sandhu DO    **Please Note: This note may have been constructed using a voice recognition system  **

## 2022-03-03 PROBLEM — N18.2 CKD (CHRONIC KIDNEY DISEASE) STAGE 2, GFR 60-89 ML/MIN: Chronic | Status: ACTIVE | Noted: 2022-02-28

## 2022-03-03 LAB
ANION GAP SERPL CALCULATED.3IONS-SCNC: 8 MMOL/L (ref 4–13)
BUN SERPL-MCNC: 43 MG/DL (ref 5–25)
CALCIUM SERPL-MCNC: 9.4 MG/DL (ref 8.3–10.1)
CHLORIDE SERPL-SCNC: 105 MMOL/L (ref 100–108)
CO2 SERPL-SCNC: 27 MMOL/L (ref 21–32)
CREAT SERPL-MCNC: 1.57 MG/DL (ref 0.6–1.3)
GFR SERPL CREATININE-BSD FRML MDRD: 38 ML/MIN/1.73SQ M
GLUCOSE SERPL-MCNC: 105 MG/DL (ref 65–140)
MAGNESIUM SERPL-MCNC: 2.5 MG/DL (ref 1.6–2.6)
POTASSIUM SERPL-SCNC: 4.4 MMOL/L (ref 3.5–5.3)
SODIUM SERPL-SCNC: 140 MMOL/L (ref 136–145)

## 2022-03-03 PROCEDURE — 80048 BASIC METABOLIC PNL TOTAL CA: CPT

## 2022-03-03 PROCEDURE — 83735 ASSAY OF MAGNESIUM: CPT

## 2022-03-03 PROCEDURE — 97163 PT EVAL HIGH COMPLEX 45 MIN: CPT

## 2022-03-03 PROCEDURE — 97116 GAIT TRAINING THERAPY: CPT

## 2022-03-03 PROCEDURE — 99232 SBSQ HOSP IP/OBS MODERATE 35: CPT | Performed by: INTERNAL MEDICINE

## 2022-03-03 RX ORDER — CALCIUM CARBONATE 200(500)MG
500 TABLET,CHEWABLE ORAL DAILY PRN
Status: DISCONTINUED | OUTPATIENT
Start: 2022-03-03 | End: 2022-03-04 | Stop reason: HOSPADM

## 2022-03-03 RX ADMIN — HEPARIN SODIUM 5000 UNITS: 5000 INJECTION INTRAVENOUS; SUBCUTANEOUS at 06:10

## 2022-03-03 RX ADMIN — METOPROLOL SUCCINATE 25 MG: 25 TABLET, EXTENDED RELEASE ORAL at 08:32

## 2022-03-03 RX ADMIN — PRAVASTATIN SODIUM 40 MG: 40 TABLET ORAL at 17:19

## 2022-03-03 RX ADMIN — Medication 3 MG: at 21:52

## 2022-03-03 RX ADMIN — HEPARIN SODIUM 5000 UNITS: 5000 INJECTION INTRAVENOUS; SUBCUTANEOUS at 21:52

## 2022-03-03 RX ADMIN — ISOSORBIDE DINITRATE 10 MG: 10 TABLET ORAL at 11:38

## 2022-03-03 RX ADMIN — ASPIRIN 81 MG: 81 TABLET, COATED ORAL at 08:32

## 2022-03-03 RX ADMIN — FINASTERIDE 5 MG: 5 TABLET, FILM COATED ORAL at 08:33

## 2022-03-03 RX ADMIN — HEPARIN SODIUM 5000 UNITS: 5000 INJECTION INTRAVENOUS; SUBCUTANEOUS at 13:57

## 2022-03-03 RX ADMIN — FERROUS GLUCONATE 324 MG: 324 TABLET ORAL at 17:19

## 2022-03-03 RX ADMIN — ISOSORBIDE DINITRATE 10 MG: 10 TABLET ORAL at 17:19

## 2022-03-03 RX ADMIN — METOPROLOL SUCCINATE 25 MG: 25 TABLET, EXTENDED RELEASE ORAL at 17:19

## 2022-03-03 RX ADMIN — POTASSIUM CHLORIDE 20 MEQ: 1500 TABLET, EXTENDED RELEASE ORAL at 08:33

## 2022-03-03 RX ADMIN — FERROUS GLUCONATE 324 MG: 324 TABLET ORAL at 06:10

## 2022-03-03 RX ADMIN — ISOSORBIDE DINITRATE 10 MG: 10 TABLET ORAL at 08:32

## 2022-03-03 NOTE — ASSESSMENT & PLAN NOTE
Wt Readings from Last 3 Encounters:   03/03/22 63 kg (138 lb 14 2 oz)   05/14/21 66 2 kg (146 lb)   11/20/20 63 5 kg (140 lb)     Dry weight per wife 147 lbs  Weight (last 2 days)     Date/Time Weight    03/03/22 0533 63 (138 89)    03/02/22 0558 62 9 (138 67)    03/01/22 0600 63 6 (140 21)      POA:  - BNP 6109  - Received IV 40mg Lasix in ED    Echo 1/13/22: LV mildly dilated  Ejection fraction of 20-25%  Grade II   (moderate) diastolic dysfunction  LA moderately dilated   RV cavity & systolic function normal  Recent Holter with 26% burden at LVH    TTE 1/2020 LVEF 20-25% global hypokinesis  TTE 9/2020- LVEF 45% with LAD WMA    Plan:  · Cardiology Consult appreciated  · Currently hold 40 mg IV Lasix b i d  in setting of increased creatinine  · Continue isosorbide dinitrate 10 mg t i d   · Favor of ICD and wishes to be full code - ICD planned discussion with outpt cardiologist, Dr Tani Michele  · Daily Weights   · I/O's  · Sodium restricted diet with fluid restriction 1500mL  · PT evaluation

## 2022-03-03 NOTE — PROGRESS NOTES
[facility]  Progress Note - Minetta Render 1934, 80 y o  male MRN: [id number]  Unit/Bed#: S -01 Encounter: [id number]  Primary Care Provider: Jaycee Shin DO   Date and time admitted to hospital: 2/27/2022  1:23 PM    * Acute on chronic combined systolic and diastolic CHF (congestive heart failure) [facility])  Assessment & Plan  Wt Readings from Last 3 Encounters:   03/03/22 63 kg (138 lb 14 2 oz)   05/14/21 66 2 kg (146 lb)   11/20/20 63 5 kg (140 lb)     Dry weight per wife 147 lbs  Weight (last 2 days)     Date/Time Weight    03/03/22 0533 63 (138 89)    03/02/22 0558 62 9 (138 67)    03/01/22 0600 63 6 (140 21)      POA:  - BNP 6109  - Received IV 40mg Lasix in ED    Echo 1/13/22: LV mildly dilated  Ejection fraction of 20-25%  Grade II   (moderate) diastolic dysfunction  LA moderately dilated  RV cavity & systolic function normal  Recent Holter with 26% burden at LVH    TTE 1/2020 LVEF 20-25% global hypokinesis  TTE 9/2020- LVEF 45% with LAD WMA    Plan:  · Cardiology Consult appreciated  · Currently hold 40 mg IV Lasix b i d  in setting of increased creatinine  · Continue isosorbide dinitrate 10 mg t i d   · Favor of ICD and wishes to be full code - ICD planned discussion with outpt cardiologist, Dr Agus García  · Daily Weights   · I/O's  · Sodium restricted diet with fluid restriction 1500mL  · PT evaluation    Nonsustained ventricular tachycardia ([address] )  Assessment & Plan  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  Holter 1/2022 - PVCs comprised 26% of beats    3 ventricular runs up to 13 beat    NSVT seen on telemetry resulting in lopressor 12 5 qid then transitioned to toprol 25mg bid    Plan:  Goal potassium >4 & Mag >2  Monitor with diuresis closely   Continue on telemetry  Toprol 25mg BID    Elevated d-dimer  Assessment & Plan  POA: D-Dimer 0 66    Age adjusted - within normal limits    Plan:  Monitor for symptoms   No PE workup (CTA, V/Q scan) at this time    OLLIE (acute kidney injury) ([facility] [address] )  Assessment & Plan  POA: 0 96  Baseline appears: 1-1 2    Creatinine   Date Value Ref Range Status   03/03/2022 1 57 (H) 0 60 - 1 30 mg/dL Final     Comment:     Standardized to IDMS reference method      Likely in setting of diuresis which will be held for now  Continue to monitor BMP    CKD (chronic kidney disease) stage 2, GFR 60-89 ml/min  Assessment & Plan  Lab Results   Component Value Date    EGFR 38 03/03/2022    EGFR 41 03/02/2022    EGFR 52 03/01/2022    CREATININE 1 57 (H) 03/03/2022    CREATININE 1 47 (H) 03/02/2022    CREATININE 1 22 03/01/2022     CKD Stage 2/ CKD Stage 3, likely  due to HTN, evidenced Cr 0 96> 1 04 with GFR 70>63 (also see labs below), treated with careful diuresis, renal function assessment, and lab monitoring  CKD Stage 2  GFR  60-89  CKD Stage 3a  GFR 45-59    Findings:  2/5/22 Cr: 1 08  with GFR 61  1/18/22 Cr  1 13 with GFR  58  7/20/21 Cr  0 88  with GFR  77    Diuresis held with increasing creatinine     PAF (paroxysmal atrial fibrillation) (HCC)  Assessment & Plan  Hx of PAF - Diagnosed 9/2020 at [facility] started on eliquis at that time    Currently not on [facility] d/t fall risk per Dr Mast Room notes  Has been Sinus with PVCs    Telemetry: Found tohave Sinus with runs of NSVT  After initiation of increased betablocker bigeminy with PVCs without VT runs      Plan: Continue on telemetry    BPH (benign prostatic hyperplasia)  Assessment & Plan  Home Regimen: Finasteride 5mg QD    Continue home regimen    Hyperlipidemia  Assessment & Plan  Home Regimen: Simvastatin (ZOCOR) 20 mg QD    Plan: Pravastatin 40mg     Essential hypertension  Assessment & Plan  Home medications: Lopressor 12 5mg BID, HCT 12 5mg QD    BP Readings from Last 1 Encounters:   03/03/22 117/64     Initially switched to lopressor qid after NSVT then transitioned to toprol    Plan:  Toprol 25mg bid  Hold HCT in setting of Lasix   Hydralazine PRN for BP >180  Consider ACEi/ARB once euvolemic - cardiology recommendation for eventual transition to [facility]      VTE Pharmacologic Prophylaxis: VTE Score: 7 High Risk (Score >/= 5) - Pharmacological DVT Prophylaxis Ordered: heparin  Sequential Compression Devices Ordered  Patient Centered Rounds: I performed bedside rounds with nursing staff today  Discussions with Specialists or Other Care Team Provider: Cardiology    Education and Discussions with Family / Patient: Updated  (wife) at bedside  Seen and talked to during rounds around 10am     Current Length of Stay: 3 day(s)  Current Patient Status: Inpatient   Discharge Plan: Anticipate discharge in 24-48 hrs to home with possible services pending PT/OT    Code Status: Level 1 - Full Code    Subjective:   Pt reports feeling similar to yesterday  He understands that diuresis is pending his kidney levels improving  He denies any new concerns or complaints at this time  He understands he must follow with Dr Diana La when discharged  Objective:     Vitals:   Temp (24hrs), Av 4 °F (36 9 °C), Min:98 2 °F (36 8 °C), Max:98 7 °F (37 1 °C)    Temp:  [98 2 °F (36 8 °C)-98 7 °F (37 1 °C)] 98 2 °F (36 8 °C)  HR:  [54-95] 85  Resp:  [16-18] 16  BP: (117-146)/(60-77) 117/64  SpO2:  [91 %-94 %] 94 %  Body mass index is 27 13 kg/m²  Input and Output Summary (last 24 hours): Intake/Output Summary (Last 24 hours) at 3/3/2022 1215  Last data filed at 3/2/2022 2330  Gross per 24 hour   Intake 240 ml   Output 300 ml   Net -60 ml       Physical Exam:   Physical Exam  Vitals and nursing note reviewed  Constitutional:       Appearance: He is well-developed  HENT:      Head: Normocephalic and atraumatic  Eyes:      Conjunctiva/sclera: Conjunctivae normal    Cardiovascular:      Rate and Rhythm: Normal rate and regular rhythm  Heart sounds: Normal heart sounds  No murmur heard  Pulmonary:      Effort: Pulmonary effort is normal  No respiratory distress        Breath sounds: Normal breath sounds  No wheezing  Chest:      Chest wall: No tenderness  Abdominal:      Palpations: Abdomen is soft  Tenderness: There is no abdominal tenderness  There is no guarding  Musculoskeletal:      Cervical back: Neck supple  Right lower leg: No edema  Left lower leg: No edema  Skin:     General: Skin is warm and dry  Neurological:      Mental Status: He is alert  Additional Data:     Labs:  Results from last 7 days   Lab Units 03/01/22  0507   WBC Thousand/uL 7 85   HEMOGLOBIN g/dL 14 4   HEMATOCRIT % 41 4   PLATELETS Thousands/uL 135*   NEUTROS PCT % 69   LYMPHS PCT % 19   MONOS PCT % 11   EOS PCT % 1     Results from last 7 days   Lab Units 03/03/22  0449 02/28/22  0413 02/27/22  1416   SODIUM mmol/L 140   < > 141   POTASSIUM mmol/L 4 4   < > 3 9   CHLORIDE mmol/L 105   < > 104   CO2 mmol/L 27   < > 28   BUN mg/dL 43*   < > 14   CREATININE mg/dL 1 57*   < > 0 96   ANION GAP mmol/L 8   < > 9   CALCIUM mg/dL 9 4   < > 9 3   ALBUMIN g/dL  --   --  3 9   TOTAL BILIRUBIN mg/dL  --   --  1 12*   ALK PHOS U/L  --   --  61   ALT U/L  --   --  17   AST U/L  --   --  12   GLUCOSE RANDOM mg/dL 105   < > 100    < > = values in this interval not displayed                         Lines/Drains:  Invasive Devices  Report    Peripheral Intravenous Line            Peripheral IV 02/27/22 Right;Lateral Forearm 3 days                  Telemetry:  Telemetry Orders (From admission, onward)             48 Hour Telemetry Monitoring  Continuous x 48 hours        References:    Telemetry Guidelines   Question:  Reason for 48 Hour Telemetry  Answer:  Acute Decompensated CHF (continuous diuretic infusion or total diuretic dose > 200 mg daily, associated electrolyte derangement, ionotropic drip, history of ventricular arrhythmia, or new EF <35%)                 Telemetry Reviewed: PVCs  Indication for Continued Telemetry Use: Acute CHF on >200 mg lasix/day or equivalent dose or with new reduced EF  Imaging: No pertinent imaging reviewed  All prior images previously reviewed  Recent Cultures (last 7 days):         Last 24 Hours Medication List:   Current Facility-Administered Medications   Medication Dose Route Frequency Provider Last Rate    acetaminophen  650 mg Oral Q6H PRN Mandy Hamman, DO      aspirin  81 mg Oral Daily Mandy Hamman, DO      calcium carbonate  500 mg Oral Daily PRN Mandy Hamman, DO      ferrous gluconate  324 mg Oral BID AC Mandy Hamman, DO      finasteride  5 mg Oral Daily Mandy Hamman, DO      heparin (porcine)  5,000 Units Subcutaneous [facility], DO      isosorbide dinitrate  10 mg Oral TID after meals Mario Mayer MD      Labetalol HCl  10 mg Intravenous Q6H PRN Noe Ortega MD      melatonin  3 mg Oral HS Mikaela Mary MD      metoprolol succinate  25 mg Oral BID CLOVER Caballero      pravastatin  40 mg Oral Daily With Calvin Vance DO          Today, Patient Was Seen By: Mandy Hamman, DO    **Please Note: This note may have been constructed using a voice recognition system  **

## 2022-03-03 NOTE — ASSESSMENT & PLAN NOTE
Lab Results   Component Value Date    EGFR 38 03/03/2022    EGFR 41 03/02/2022    EGFR 52 03/01/2022    CREATININE 1 57 (H) 03/03/2022    CREATININE 1 47 (H) 03/02/2022    CREATININE 1 22 03/01/2022     CKD Stage 2/ CKD Stage 3, likely  due to HTN, evidenced Cr 0 96> 1 04 with GFR 70>63 (also see labs below), treated with careful diuresis, renal function assessment, and lab monitoring       CKD Stage 2  GFR  60-89  CKD Stage 3a  GFR 45-59    Findings:  2/5/22 Cr: 1 08  with GFR 61  1/18/22 Cr  1 13 with GFR  58  7/20/21 Cr  0 88  with GFR  77    Diuresis held with increasing creatinine

## 2022-03-03 NOTE — ASSESSMENT & PLAN NOTE
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

## 2022-03-03 NOTE — PLAN OF CARE
Problem: Potential for Falls  Goal: Patient will remain free of falls  Description: INTERVENTIONS:  - Educate patient/family on patient safety including physical limitations  - Instruct patient to call for assistance with activity   - Consult OT/PT to assist with strengthening/mobility   - Keep Call bell within reach  - Keep bed low and locked with side rails adjusted as appropriate  - Keep care items and personal belongings within reach  - Initiate and maintain comfort rounds  - Make Fall Risk Sign visible to staff  - Offer Toileting every 2 Hours, in advance of need  - Initiate/Maintain bed/chair alarm  - Obtain necessary fall risk management equipment  - Apply yellow socks and bracelet for high fall risk patients  - Consider moving patient to room near nurses station  Outcome: Progressing     Problem: MOBILITY - ADULT  Goal: Maintain or return to baseline ADL function  Description: INTERVENTIONS:  -  Assess patient's ability to carry out ADLs; assess patient's baseline for ADL function and identify physical deficits which impact ability to perform ADLs (bathing, care of mouth/teeth, toileting, grooming, dressing, etc )  - Assess/evaluate cause of self-care deficits   - Assess range of motion  - Assess patient's mobility; develop plan if impaired  - Assess patient's need for assistive devices and provide as appropriate  - Encourage maximum independence but intervene and supervise when necessary  - Involve family in performance of ADLs  - Assess for home care needs following discharge   - Consider OT consult to assist with ADL evaluation and planning for discharge  - Provide patient education as appropriate  Outcome: Progressing  Goal: Maintains/Returns to pre admission functional level  Description: INTERVENTIONS:  - Perform BMAT or MOVE assessment daily    - Set and communicate daily mobility goal to care team and patient/family/caregiver     - Collaborate with rehabilitation services on mobility goals if consulted  - Perform Range of Motion   - Reposition patient every 2 hours    - Dangle patient   - Stand patient   - Ambulate patient  - Out of bed to chair   - Out of bed for meals   - Out of bed for toileting  - Record patient progress and toleration of activity level   Outcome: Progressing     Problem: Prexisting or High Potential for Compromised Skin Integrity  Goal: Skin integrity is maintained or improved  Description: INTERVENTIONS:  - Identify patients at risk for skin breakdown  - Assess and monitor skin integrity  - Assess and monitor nutrition and hydration status  - Monitor labs   - Assess for incontinence   - Turn and reposition patient  - Assist with mobility/ambulation  - Relieve pressure over bony prominences  - Avoid friction and shearing  - Provide appropriate hygiene as needed including keeping skin clean and dry  - Evaluate need for skin moisturizer/barrier cream  - Collaborate with interdisciplinary team   - Patient/family teaching  - Consider wound care consult   Outcome: Progressing     Problem: CARDIOVASCULAR - ADULT  Goal: Maintains optimal cardiac output and hemodynamic stability  Description: INTERVENTIONS:  - Monitor I/O, vital signs and rhythm  - Monitor for S/S and trends of decreased cardiac output  - Administer and titrate ordered vasoactive medications to optimize hemodynamic stability  - Assess quality of pulses, skin color and temperature  - Assess for signs of decreased coronary artery perfusion  - Instruct patient to report change in severity of symptoms  Outcome: Progressing  Goal: Absence of cardiac dysrhythmias or at baseline rhythm  Description: INTERVENTIONS:  - Continuous cardiac monitoring, vital signs, obtain 12 lead EKG if ordered  - Administer antiarrhythmic and heart rate control medications as ordered  - Monitor electrolytes and administer replacement therapy as ordered  Outcome: Progressing     Problem: RESPIRATORY - ADULT  Goal: Achieves optimal ventilation and oxygenation  Description: INTERVENTIONS:  - Assess for changes in respiratory status  - Assess for changes in mentation and behavior  - Position to facilitate oxygenation and minimize respiratory effort  - Oxygen administered by appropriate delivery if ordered  - Initiate smoking cessation education as indicated  - Encourage broncho-pulmonary hygiene including cough, deep breathe, Incentive Spirometry  - Assess the need for suctioning and aspirate as needed  - Assess and instruct to report SOB or any respiratory difficulty  - Respiratory Therapy support as indicated  Outcome: Progressing     Problem: METABOLIC, FLUID AND ELECTROLYTES - ADULT  Goal: Electrolytes maintained within normal limits  Description: INTERVENTIONS:  - Monitor labs and assess patient for signs and symptoms of electrolyte imbalances  - Administer electrolyte replacement as ordered  - Monitor response to electrolyte replacements, including repeat lab results as appropriate  - Instruct patient on fluid and nutrition as appropriate  Outcome: Progressing  Goal: Fluid balance maintained  Description: INTERVENTIONS:  - Monitor labs   - Monitor I/O and WT  - Instruct patient on fluid and nutrition as appropriate  - Assess for signs & symptoms of volume excess or deficit  Outcome: Progressing

## 2022-03-03 NOTE — PROGRESS NOTES
Progress Note - Cardiology   [place] Varina Michelle 80 y o  male MRN: [id number]  Unit/Bed#: S -01 Encounter: [id number]        Principal Problem:    Acute on chronic combined systolic and diastolic CHF (congestive heart failure) (HCC)  Active Problems:    Essential hypertension    Hyperlipidemia    Elevated d-dimer    BPH (benign prostatic hyperplasia)    Nonsustained ventricular tachycardia (HCC)    PAF (paroxysmal atrial fibrillation) (HCC)    CKD (chronic kidney disease) stage 2, GFR 60-89 ml/min        Assessment/Plan     1  Acute on chronic combined heart failure  ProBNP 6109  EKG- NSR bigeminy PVC's  Dry weight per wife 147 lbs  Currently 138  IV lasix 40mg BID, creat bump  Off oxygen  SOB improved  D/C IV lasix- LD 3/2  PTA- Lasix 20 mg daily but stopped a few weeks ago  If Creatinine stable morning likely resume oral Lasix at 40 mg daily  Strict I&O/daily weights  2 g sodium restriction, 1500 cc fluid restriction  BB- changed to toprol  Started isordil 3/1  TTE 1/2020 LVEF 20-25% global hypokinesis  TTE 9/2020- LVEF 45% with LAD WMA     2  CAD with history of MI 2012  Unknown coronary anatomy  On aspirin 81 mg, BB-Lopressor 12 5 every 6 hours- changed to toprol XL 25mg every 12 , statin- pravastatin 40 mg  Troponin negative  Patient without complaints of chest pain or pressure  DSE 6/2020- negative for ischemia  Now with drop in LV function   consider  updated ischemic evaluation  Defer to Dr Tanner Esparza  Has appointment 2 weeks     3  Paroxysmal atrial fibrillation  Diagnosed 9/2020 at [facility]- he was started on [facility]- eliquis at that time  Not on [facility] d/t fall risk per Dr Rachel Randle  Patient unaware of ever being on AC   He however is a poor historian   Denies history of falls   Reports previous recurrent epistaxis    Maintaining sinus rhythm     4  Essential HTN  Hypertensive at times, overall improved  Monitor with volume removal  Continue beta-blocker    initiated Isordil for afterload reduction      5  NSVT- runs NSVT within 1st 24 hours of admission  Diuresed  Repleted potassium  Keep potassium greater than 4 Mag greater than 2  Telemetry reviewed-no further runs of NSVT  History of frequent PVCs  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  Holter 1/2022- PVCs comprised 26% of beats   3 ventricular runs up to 13 beat  Was on Lopressor 12 5 mg daily, currently receiving every 6 hours  Changed to Toprol 25 mg b i d  Reviewed role of ICD with patient and family  Patient will d/u with Dr Alexx Pérez   Chief Complaint/Subjective  Patient without chest pain or shortness of breath  Really upset he cannot go home today  Wife at bedside  Reviewed plan of care          Vitals: /64   Pulse 85   Temp 98 2 °F (36 8 °C) (Oral)   Resp 16   Ht 5' (1 524 m)   Wt 63 kg (138 lb 14 2 oz)   SpO2 94%   BMI 27 13 kg/m²     Vitals:    03/02/22 0558 03/03/22 0533   Weight: 62 9 kg (138 lb 10 7 oz) 63 kg (138 lb 14 2 oz)     Orthostatic Blood Pressures      Most Recent Value   Blood Pressure 117/64 filed at 03/03/2022 1130   Patient Position - Orthostatic VS Lying filed at 03/03/2022 0735            Intake/Output Summary (Last 24 hours) at 3/3/2022 1150  Last data filed at 3/2/2022 2330  Gross per 24 hour   Intake 240 ml   Output 300 ml   Net -60 ml       Invasive Devices  Report    Peripheral Intravenous Line            Peripheral IV 02/27/22 Right;Lateral Forearm 3 days                Current Facility-Administered Medications   Medication Dose Route Frequency    acetaminophen (TYLENOL) tablet 650 mg  650 mg Oral Q6H PRN    aspirin (ECOTRIN LOW STRENGTH) EC tablet 81 mg  81 mg Oral Daily    calcium carbonate (TUMS) chewable tablet 500 mg  500 mg Oral Daily PRN    ferrous gluconate (FERGON) tablet 324 mg  324 mg Oral BID AC    finasteride (PROSCAR) tablet 5 mg  5 mg Oral Daily    heparin (porcine) subcutaneous injection 5,000 Units  5,000 Units Subcutaneous Q8H [address]    isosorbide dinitrate (ISORDIL) tablet 10 mg  10 mg Oral TID after meals    Labetalol HCl (NORMODYNE) injection 10 mg  10 mg Intravenous Q6H PRN    melatonin tablet 3 mg  3 mg Oral HS    metoprolol succinate (TOPROL-XL) 24 hr tablet 25 mg  25 mg Oral BID    potassium chloride (K-DUR,KLOR-CON) CR tablet 20 mEq  20 mEq Oral Daily    pravastatin (PRAVACHOL) tablet 40 mg  40 mg Oral Daily With Dinner         Physical Exam: /64   Pulse 85   Temp 98 2 °F (36 8 °C) (Oral)   Resp 16   Ht 5' (1 524 m)   Wt 63 kg (138 lb 14 2 oz)   SpO2 94%   BMI 27 13 kg/m²     General Appearance:    Alert, cooperative, no distress, appears stated age   Head:    Normocephalic, no scleral icterus   Eyes:    PERRL   Nose:   Nares normal, septum midline, no drainage    Throat:   Lips, mucosa, and tongue normal   Neck:   Supple, symmetrical, trachea midline,              Lungs:     Clear to auscultation bilaterally, respirations unlabored   Chest Wall:    No tenderness or deformity    Heart:    Regular rate and rhythm, S1 and S2 normal, no murmur       Extremities:   Extremities normal, atraumatic, no cyanosis or edema       Skin:   Skin color, texture, turgor normal, no rashes or lesions   Neurologic:   Alert and oriented to person place and time, no focal deficits                 Lab Results:   Recent Results (from the past 72 hour(s))   Basic metabolic panel    Collection Time: 02/28/22  2:33 PM   Result Value Ref Range    Sodium 140 136 - 145 mmol/L    Potassium 4 5 3 5 - 5 3 mmol/L    Chloride 103 100 - 108 mmol/L    CO2 26 21 - 32 mmol/L    ANION GAP 11 4 - 13 mmol/L    BUN 19 5 - 25 mg/dL    Creatinine 1 16 0 60 - 1 30 mg/dL    Glucose 120 65 - 140 mg/dL    Calcium 9 3 8 3 - 10 1 mg/dL    eGFR 55 ml/min/1 73sq m   Magnesium    Collection Time: 02/28/22  2:33 PM   Result Value Ref Range    Magnesium 2 9 (H) 1 6 - 2 6 mg/dL   Basic metabolic panel    Collection Time: 03/01/22  5:07 AM   Result Value Ref Range Sodium 141 136 - 145 mmol/L    Potassium 4 1 3 5 - 5 3 mmol/L    Chloride 104 100 - 108 mmol/L    CO2 24 21 - 32 mmol/L    ANION GAP 13 4 - 13 mmol/L    BUN 23 5 - 25 mg/dL    Creatinine 1 22 0 60 - 1 30 mg/dL    Glucose 119 65 - 140 mg/dL    Calcium 9 3 8 3 - 10 1 mg/dL    eGFR 52 ml/min/1 73sq m   CBC and differential    Collection Time: 03/01/22  5:07 AM   Result Value Ref Range    WBC 7 85 4 31 - 10 16 Thousand/uL    RBC 4 74 3 88 - 5 62 Million/uL    Hemoglobin 14 4 12 0 - 17 0 g/dL    Hematocrit 41 4 36 5 - 49 3 %    MCV 87 82 - 98 fL    MCH 30 4 26 8 - 34 3 pg    MCHC 34 8 31 4 - 37 4 g/dL    RDW 14 7 11 6 - 15 1 %    MPV 11 1 8 9 - 12 7 fL    Platelets 465 (L) 619 - 390 Thousands/uL    nRBC 0 /100 WBCs    Neutrophils Relative 69 43 - 75 %    Immat GRANS % 0 0 - 2 %    Lymphocytes Relative 19 14 - 44 %    Monocytes Relative 11 4 - 12 %    Eosinophils Relative 1 0 - 6 %    Basophils Relative 0 0 - 1 %    Neutrophils Absolute 5 45 1 85 - 7 62 Thousands/µL    Immature Grans Absolute 0 03 0 00 - 0 20 Thousand/uL    Lymphocytes Absolute 1 45 0 60 - 4 47 Thousands/µL    Monocytes Absolute 0 83 0 17 - 1 22 Thousand/µL    Eosinophils Absolute 0 06 0 00 - 0 61 Thousand/µL    Basophils Absolute 0 03 0 00 - 0 10 Thousands/µL   Magnesium    Collection Time: 03/01/22  5:07 AM   Result Value Ref Range    Magnesium 2 6 1 6 - 2 6 mg/dL   Basic metabolic panel    Collection Time: 03/02/22  5:21 AM   Result Value Ref Range    Sodium 143 136 - 145 mmol/L    Potassium 4 1 3 5 - 5 3 mmol/L    Chloride 104 100 - 108 mmol/L    CO2 26 21 - 32 mmol/L    ANION GAP 13 4 - 13 mmol/L    BUN 34 (H) 5 - 25 mg/dL    Creatinine 1 47 (H) 0 60 - 1 30 mg/dL    Glucose 117 65 - 140 mg/dL    Calcium 8 9 8 3 - 10 1 mg/dL    eGFR 41 ml/min/1 73sq m   Magnesium    Collection Time: 03/02/22  5:21 AM   Result Value Ref Range    Magnesium 2 4 1 6 - 2 6 mg/dL   Basic metabolic panel    Collection Time: 03/03/22  4:49 AM   Result Value Ref Range Sodium 140 136 - 145 mmol/L    Potassium 4 4 3 5 - 5 3 mmol/L    Chloride 105 100 - 108 mmol/L    CO2 27 21 - 32 mmol/L    ANION GAP 8 4 - 13 mmol/L    BUN 43 (H) 5 - 25 mg/dL    Creatinine 1 57 (H) 0 60 - 1 30 mg/dL    Glucose 105 65 - 140 mg/dL    Calcium 9 4 8 3 - 10 1 mg/dL    eGFR 38 ml/min/1 73sq m   Magnesium    Collection Time: 03/03/22  4:49 AM   Result Value Ref Range    Magnesium 2 5 1 6 - 2 6 mg/dL     Imaging: I have personally reviewed pertinent reports  Tele- nsr      Counseling / Coordination of Care  Total time spent today 25 minutes  Greater than 50% of total time was spent with the patient and / or family counseling and / or coordination of care

## 2022-03-03 NOTE — ASSESSMENT & PLAN NOTE
Hx of PAF - Diagnosed 9/2020 at [address] [facility] [address] started on eliquis at that time    Currently not on [facility] d/t fall risk per Dr Kari Westbrook notes  Has been Sinus with PVCs    Telemetry: Found tohave Sinus with runs of NSVT  After initiation of increased betablocker bigeminy with PVCs without VT runs      Plan: Continue on telemetry

## 2022-03-03 NOTE — PHYSICAL THERAPY NOTE
PHYSICAL THERAPY EVALUATION NOTE    Patient Name: Carter ACEVEDOR'S Date: 3/3/2022  AGE:   80 y o  Mrn:   [id number]  ADMIT DX:  Acute respiratory distress [C59 12]  Chronic diastolic congestive heart failure (HCC) [I50 32]  Acute on chronic diastolic congestive heart failure (HCC) [I50 33]    Past Medical History:   Diagnosis Date    [facility] [address] [facility]     Cognitive communication deficit     Dementia ([facility] [address] [facility]     Fracture of fifth lumbar vertebra ([facility] [address] [facility] )     Fracture of first lumbar vertebra ([facility] [address] [facility]     Fracture of fourth lumbar vertebra ([facility] [address] [facility]     Fracture of second lumbar vertebra ([facility] [address] [facility]     Fracture of T11 vertebra with routine healing     Fracture of T12 vertebra with routine healing     Fracture of third thoracic vertebra ([facility] [address] [facility] )     Gait abnormality     Hyperlipidemia     Hypertension     Irregular heartbeat 8/9/2020    Lightheadedness 7/13/2021    Macular degeneration     Normal pressure hydrocephalus (HCC)     Positive blood culture 9/18/2020     Length Of Stay: 3  PHYSICAL THERAPY EVALUATION :    03/03/22 1423   PT Last Visit   PT Visit Date 03/03/22   Pain Assessment   Pain Assessment Tool 0-10   Pain Score No Pain   Restrictions/Precautions   Other Precautions Chair Alarm; Bed Alarm;Telemetry; Fall Risk;Hard of hearing   Home Living   Type of [address] [place] [address] Two level;1/2 bath on main level;Bed/bath upstairs; Other (Comment)  (4 [address] w/ bilateral railings)   Additional Comments lives w/ spouse  ambulates w/o assistive device  owns cane, quad cane, and walker  independent w/ ADLs and driving  no falls in last 6 months  General   Additional Pertinent History 3/2/22 at 15:30, heart rate was 54 BPM    Family/Caregiver Present No   Cognition   Arousal/Participation Cooperative   Orientation Level Oriented to person; Other (Comment)  (pt was identified w/ full name, birth date)   Following Commands Follows one step commands with increased time or repetition   Comments room air resting pulse ox 95% and 73 BPM, active 91% and 85 BPM    Subjective   Subjective pt seen sitting out of bed in chair  agreed to participate in PT eval  denied pain or dizziness  input was needed for task focus  RUE Assessment   RUE Assessment WFL  (3+ to 4-/5)   LUE Assessment   LUE Assessment WFL  (3+ to 4-/5)   RLE Assessment   RLE Assessment WFL  (3+ to 4-/5)   LLE Assessment   LLE Assessment WFL  (3+ to 4-/5)   Light Touch   RLE Light Touch Grossly intact   LLE Light Touch Grossly intact   Transfers   Sit to Stand 4  Minimal assistance   Additional items Assist x 1; Increased time required;Verbal cues  (for hand placement)   Stand to Sit 4  Minimal assistance   Additional items Assist x 1; Impulsive;Verbal cues  (poorly controlled descent to chair  input for body positioni)   Additional Comments 30 second chair stand test: 2   Ambulation/Elevation   Gait pattern Forward Flexion; Short stride; Inconsistent kenia  (right trunk lean)   Gait Assistance 3  Moderate assist   Additional items Assist x 1;Verbal cues  (for posture, breathing technique)   Assistive Device None   Distance 10 feet x2 w/ standing rest break  (additional not possible due to fatigue)   Stair Management Assistance 2  Maximal assist   Additional items Assist x 1;Verbal cues; Increased time required  (for railing use)   Stair Management Technique Two rails; Step to pattern   Number of Stairs 1   Balance   Static Sitting Fair +   Static Standing Poor +   Ambulatory Poor   Activity Tolerance   Activity Tolerance Patient limited by fatigue   Nurse Made Aware spoke to Heather Diaz CM   Assessment   Prognosis Fair   Problem List Decreased strength;Decreased endurance; Impaired balance;Decreased mobility; Decreased safety awareness   Assessment Pt presents with increasing shortness of breath over the past week and a half   Dx: acute on chronic diastolic and systolic CHF, nonsustained v-tach, elevated d-dimer, OLLIE, and PAF  order placed for PT eval and tx  pt presents w/ comorbidities of CHF, dementia, vertebral fx, hyperlipidemia, macular degeneration, NPH, and HTN and personal factors of advanced age, living in 2 story house and stair(s) to enter home  pt presents w/ weakness, decreased endurance, impaired balance, gait deviations, decreased safety awareness and fall risk  these impairments are evident in findings from physical examination (weakness), mobility assessment (need for min to max assist w/ all phases of mobility when usually mobilizing independently, tolerance to only 10 feet of ambulation and need for cueing for mobility technique), and Barthel Index: 45/100 and 30 second chair stand test: 2 (less than 8 indicates fall risk in males 80to 80years old)  pt needed input for task focus and mobility technique/safety  pt is at risk for falls due to physical and safety awareness deficits  pt's clinical presentation is unstable/unpredictable (evident in bradycardia, need for assist w/ all phases of mobility when usually mobilizing independently, tolerance to only 10 feet of ambulation and need for input for task focus and mobility technique)  pt needs inpatient PT tx to improve mobility deficits and progress mobility training as appropriate  discharge recommendation is for inpatient rehab to reduce fall risk and maximize level of functional independence  Pt would benefit from Gerontology consult to address cognition and aging related issues  Goals   Patient Goals walk and see people  STG Expiration Date 03/13/22   Short Term Goal #1 pt will:  Increase bilateral LE strength 1/2 grade to facilitate independent mobility, Perform all bed mobility tasks w/ supervision to decrease fall risk factors, Perform all transfers w/ supervision to improve independence, Ambulate 150 ft  with least restrictive assistive device w/ supervision w/o LOB to expedite return to independent level of function, Navigate 3 stairs w/ minx1 with bilateral handrails to facilitate return to previous living environment, Increase all balance 1 grade to decrease risk for falls, Complete exercise program w/ less than 25% input from therapist during session to increase strength and endurance, Tolerate 3 hr OOB to faciliate upright tolerance, Improve Barthel Index score to 70 or greater to facilitate independence and Increase 30 second chair stand test score to 6 or greater seconds to decrease risk for falls   PT Treatment Day 1   Plan   Treatment/Interventions Functional transfer training;LE strengthening/ROM; Elevations; Therapeutic exercise; Endurance training;Cognitive reorientation;Patient/family training;Equipment eval/education; Bed mobility;Gait training   PT Frequency 3-5x/wk   Recommendation   PT Discharge Recommendation Post acute rehabilitation services   Additional Comments Pt would benefit from Gerontology consult to address cognition and aging related issues  AM-PAC Basic Mobility Inpatient   Turning in Bed Without Bedrails 3   Lying on Back to Sitting on Edge of Flat Bed 2   Moving Bed to Chair 3   Standing Up From Chair 3   Walk in Room 2   Climb 3-5 Stairs 2   Basic Mobility Inpatient Raw Score 15   Basic Mobility Standardized Score 36 97   Highest Level Of Mobility   -[facility] Goal 4: Move to chair/commode   -[facility] Highest Level of Mobility 7: Walk 25 feet or more   -HL Goal Achieved Yes   Barthel Index   Feeding 10   Bathing 0   Grooming Score 5   Dressing Score 5   Bladder Score 5   Bowels Score 5   Toilet Use Score 5   Transfers (Bed/Chair) Score 10   Mobility (Level Surface) Score 0   Stairs Score 0   Barthel Index Score 45   Additional Treatment Session   Start Time 1423   End Time 1433   Treatment Assessment Therapist introduced roller walker use w/ mobility to address impairments noted during evaluation  sit <---> stand transfers w/ minx1  Ambulated 20 feet, 30 feet x2 w/ seated rest breaks  additional not possible due to fatigue  Pt was noted to have improvement in mobility status w/ use of roller walker w/ decreased level of assistance and improved ambulation tolerance  Pt continues to require min assist and verbal cues w/ mobility for proper technique/safety  Pt is at risk for falling  continued inpatient PT tx is indicated to reduce fall risk factors  Equipment Use roller walker   Additional Treatment Day 1   End of Consult   Patient Position at End of Consult Bedside chair; All needs within reach;Bed/Chair alarm activated     30 second chair stand test: 2 (less than 8 indicates fall risk in males 80to 80years old)  The patient's AM-PAC Basic Mobility Inpatient Short Form Raw Score is 15  A Raw score of less than or equal to 16 suggests the patient may benefit from discharge to post-acute rehabilitation services  Please also refer to the recommendation of the Physical Therapist for safe discharge planning  Skilled PT recommended while in hospital and upon DC to progress pt toward treatment goals       Ashutosh Alexandre, COURTNEY

## 2022-03-03 NOTE — ASSESSMENT & PLAN NOTE
POA: 0 96  Baseline appears: 1-1 2    Creatinine   Date Value Ref Range Status   03/03/2022 1 57 (H) 0 60 - 1 30 mg/dL Final     Comment:     Standardized to IDMS reference method      Likely in setting of diuresis which will be held for now    Continue to monitor BMP

## 2022-03-03 NOTE — DISCHARGE INSTRUCTIONS
Take your medications as directed, and keep your follow up appointments  Adhere to a heart healthy lifestyle, maintaining a low sodium diet  Daily weight and record  If your weight increases 2-3 lbs in one day, or 5 lbs in 2 days, you are short of breath or have lower extremity swelling, please call your cardiologist    Heart Failure   WHAT YOU NEED TO KNOW:   Heart failure is a condition that does not allow your heart to fill or pump properly  Not enough oxygen in your blood gets to your organs and tissues  Fluid may not move through your body properly  Fluid builds up and causes swelling and trouble breathing  This is known as congestive heart failure  Heart failure may start in the left or right ventricle  Heart failure is often caused by damage or injury to your heart  The damage may be caused by other heart problems, diabetes, or high blood pressure  The damage may have also been caused by an infection  Heart failure is a long-term condition that tends to get worse over time  It is important to manage your health to improve your quality of life  DISCHARGE INSTRUCTIONS:   Call your local emergency number (911 in the [address] [place] [address],[address]) if:   · You have any of the following signs of a heart attack:      ? Squeezing, pressure, or pain in your chest    ? You may  also have any of the following:     § Discomfort or pain in your back, neck, jaw, stomach, or arm    § Shortness of breath    § Nausea or vomiting    § Lightheadedness or a sudden cold sweat      Call your doctor if:   · Your heartbeat is fast, slow, or uneven all the time  · You have symptoms of worsening heart failure:      ? Shortness of breath at rest, at night, or that is getting worse in any way    ? Weight gain of 3 or more pounds (1 4 kg) in a day, or more than your healthcare provider says is okay    ? More swelling in your legs or ankles    ? Abdominal pain or swelling    ? More coughing    ? Loss of appetite    ?  Feeling tired all the time    · You feel hopeless or depressed, or you have lost interest in things you used to enjoy  · You often feel worried or afraid  · You have questions or concerns about your condition or care  Medicines:   · Medicines  may be given to help regulate your heart rhythm and lower your blood pressure  You may also need medicines to help decrease extra fluids  Medicines, such as NSAIDs, may be stopped if they are causing your heart failure to become worse  Do not stop any of your medicines on your own  · Take your medicine as directed  Contact your healthcare provider if you think your medicine is not helping or if you have side effects  Tell him or her if you are allergic to any medicine  Keep a list of the medicines, vitamins, and herbs you take  Include the amounts, and when and why you take them  Bring the list or the pill bottles to follow-up visits  Carry your medicine list with you in case of an emergency  Go to cardiac rehab if directed:  Cardiac rehab is a program run by specialists who will help you safely strengthen your heart  In the program you will learn about exercise, relaxation, stress management, and heart-healthy nutrition  Manage swelling from extra fluid:   · Elevate (raise) your legs above the level of your heart  This will help with fluid that builds up in your legs or ankles  Elevate your legs as often as possible during the day  Prop your legs on pillows or blankets to keep them elevated comfortably  Try not to stand for long periods of time during the day  Move around to keep your blood circulating  · Limit sodium (salt)  Ask how much sodium you can have each day  Your healthcare provider may give you a limit, such as 2,300 milligrams (mg) a day  Your provider or a dietitian can teach you how to read food labels for the number of mg in a food  He or she can also help you find ways to have less salt  For example, if you add salt to food as you cook, do not add more at the table  · Drink liquids as directed  You may need to limit the amount of liquid you drink within 24 hours  Your healthcare provider will tell you how much liquid to have and which liquids are best for you  He or she may tell you to limit liquid to 1 5 to 2 liters in a day  He or she will also tell you how often to drink liquid throughout the day  · Weigh yourself every morning  Use the same scale, in the same spot  Do this after you use the bathroom, but before you eat or drink  Wear the same type of clothing each time  Write down your weight and call your healthcare provider if you have a sudden weight gain  Swelling and weight gain are signs of fluid buildup  Manage heart failure: Your quality of life may improve with treatment and the following:  · Do not smoke  Nicotine and other chemicals in cigarettes and cigars can cause lung and heart damage  Ask your healthcare provider for information if you currently smoke and need help to quit  E-cigarettes or smokeless tobacco still contain nicotine  Talk to your healthcare provider before you use these products  · Do not drink alcohol or use illegal drugs  Alcohol and drugs can increase your risk for high blood pressure, diabetes, and coronary artery disease  · Eat heart-healthy foods  Heart-healthy foods include fruits, vegetables, lean meat (such as beef, chicken, or pork), and low-fat dairy products  Fatty fish such as salmon and tuna are also heart healthy  Other heart-healthy foods include walnuts, whole-grain breads, beans, and cooked beans  Replace butter and margarine with heart-healthy oils such as olive oil or canola oil  Your provider or a dietitian can help you create heart-healthy meal plans  · Manage any chronic health conditions you have  These include high blood pressure, diabetes, obesity, high cholesterol, metabolic syndrome, and COPD  You will have fewer symptoms if you manage these health conditions   Follow your healthcare provider's recommendations and follow up with him or her regularly  · Maintain a healthy weight  Being overweight can increase your risk for high blood pressure, diabetes, and coronary artery disease  These conditions can make your symptoms worse  Ask your healthcare provider how much you should weigh  Ask him or her to help you create a weight loss plan if you are overweight  · Stay active  Activity can help keep your symptoms from getting worse  Walking is a type of physical activity that helps maintain your strength and improve your mood  Physical activity also helps you manage your weight  Work with your healthcare provider to create an exercise plan that is right for you  · Get vaccines as directed  The flu and pneumonia can be severe for a person who has heart failure  Vaccines protect you from these infections  Get a flu shot every year as soon as it is recommended, usually in September or October  You may also need the pneumonia vaccine  Your healthcare provider can tell you if you need other vaccines, and when to get them  Follow up with your doctor within 7 days and as directed: You may need to return for other tests  You may need home health care  A healthcare provider will monitor your vital signs, weight, and make sure your medicines are working  Write down your questions so you remember to ask them during your visits  Join a support group:  Heart failure can be difficult to manage  It may be helpful to talk with others who have heart failure  You may learn how to better manage your condition or get emotional support  For more information:  · [address]  [place] , [place]   Phone: 5- [phone]  Web Address: https://www strong com/  [address] 2022 Information is for End User's use only and may not be sold, redistributed or otherwise used for commercial purposes   All illustrations and images included in CareNotes® are the copyrighted property of [employer]  or Ronan Triana   The above information is an  only  It is not intended as medical advice for individual conditions or treatments  Talk to your doctor, nurse or pharmacist before following any medical regimen to see if it is safe and effective for you

## 2022-03-03 NOTE — PLAN OF CARE
Problem: PHYSICAL THERAPY ADULT  Goal: Performs mobility at highest level of function for planned discharge setting  See evaluation for individualized goals  Description: Treatment/Interventions: Functional transfer training,LE strengthening/ROM,Elevations,Therapeutic exercise,Endurance training,Cognitive reorientation,Patient/family training,Equipment eval/education,Bed mobility,Gait training          See flowsheet documentation for full assessment, interventions and recommendations  Note: Prognosis: Fair  Problem List: Decreased strength,Decreased endurance,Impaired balance,Decreased mobility,Decreased safety awareness  Assessment: Pt presents with increasing shortness of breath over the past week and a half  Dx: acute on chronic diastolic and systolic CHF, nonsustained v-tach, elevated d-dimer, OLLIE, and PAF  order placed for PT eval and tx  pt presents w/ comorbidities of CHF, dementia, vertebral fx, hyperlipidemia, macular degeneration, NPH, and HTN and personal factors of advanced age, living in 2 story house and stair(s) to enter home  pt presents w/ weakness, decreased endurance, impaired balance, gait deviations, decreased safety awareness and fall risk  these impairments are evident in findings from physical examination (weakness), mobility assessment (need for min to max assist w/ all phases of mobility when usually mobilizing independently, tolerance to only 10 feet of ambulation and need for cueing for mobility technique), and Barthel Index: 45/100 and 30 second chair stand test: 2 (less than 8 indicates fall risk in males 80to 80years old)  pt needed input for task focus and mobility technique/safety  pt is at risk for falls due to physical and safety awareness deficits   pt's clinical presentation is unstable/unpredictable (evident in bradycardia, need for assist w/ all phases of mobility when usually mobilizing independently, tolerance to only 10 feet of ambulation and need for input for task focus and mobility technique)  pt needs inpatient PT tx to improve mobility deficits and progress mobility training as appropriate  discharge recommendation is for inpatient rehab to reduce fall risk and maximize level of functional independence  Pt would benefit from Gerontology consult to address cognition and aging related issues  PT Discharge Recommendation: Post acute rehabilitation services          See flowsheet documentation for full assessment

## 2022-03-03 NOTE — ASSESSMENT & PLAN NOTE
Home medications: Lopressor 12 5mg BID, HCT 12 5mg QD    BP Readings from Last 1 Encounters:   03/03/22 117/64     Initially switched to lopressor qid after NSVT then transitioned to toprol    Plan:  Toprol 25mg bid  Hold HCT in setting of Lasix   Hydralazine PRN for BP >180  Consider ACEi/ARB once euvolemic - cardiology recommendation for eventual transition to [facility]

## 2022-03-04 VITALS
BODY MASS INDEX: 27.45 KG/M2 | WEIGHT: 139.8 LBS | TEMPERATURE: 97.6 F | DIASTOLIC BLOOD PRESSURE: 60 MMHG | OXYGEN SATURATION: 95 % | RESPIRATION RATE: 18 BRPM | HEART RATE: 61 BPM | SYSTOLIC BLOOD PRESSURE: 123 MMHG | HEIGHT: 60 IN

## 2022-03-04 LAB
ANION GAP SERPL CALCULATED.3IONS-SCNC: 10 MMOL/L (ref 4–13)
BASOPHILS # BLD AUTO: 0.03 THOUSANDS/ΜL (ref 0–0.1)
BASOPHILS NFR BLD AUTO: 1 % (ref 0–1)
BUN SERPL-MCNC: 37 MG/DL (ref 5–25)
CALCIUM SERPL-MCNC: 8.9 MG/DL (ref 8.3–10.1)
CHLORIDE SERPL-SCNC: 103 MMOL/L (ref 100–108)
CO2 SERPL-SCNC: 27 MMOL/L (ref 21–32)
CREAT SERPL-MCNC: 1.45 MG/DL (ref 0.6–1.3)
EOSINOPHIL # BLD AUTO: 0.15 THOUSAND/ΜL (ref 0–0.61)
EOSINOPHIL NFR BLD AUTO: 2 % (ref 0–6)
ERYTHROCYTE [DISTWIDTH] IN BLOOD BY AUTOMATED COUNT: 14.5 % (ref 11.6–15.1)
GFR SERPL CREATININE-BSD FRML MDRD: 42 ML/MIN/1.73SQ M
GLUCOSE SERPL-MCNC: 93 MG/DL (ref 65–140)
HCT VFR BLD AUTO: 41.4 % (ref 36.5–49.3)
HGB BLD-MCNC: 13.4 G/DL (ref 12–17)
IMM GRANULOCYTES # BLD AUTO: 0.02 THOUSAND/UL (ref 0–0.2)
IMM GRANULOCYTES NFR BLD AUTO: 0 % (ref 0–2)
LYMPHOCYTES # BLD AUTO: 2.33 THOUSANDS/ΜL (ref 0.6–4.47)
LYMPHOCYTES NFR BLD AUTO: 36 % (ref 14–44)
MAGNESIUM SERPL-MCNC: 2.4 MG/DL (ref 1.6–2.6)
MCH RBC QN AUTO: 30.3 PG (ref 26.8–34.3)
MCHC RBC AUTO-ENTMCNC: 32.4 G/DL (ref 31.4–37.4)
MCV RBC AUTO: 94 FL (ref 82–98)
MONOCYTES # BLD AUTO: 0.72 THOUSAND/ΜL (ref 0.17–1.22)
MONOCYTES NFR BLD AUTO: 11 % (ref 4–12)
NEUTROPHILS # BLD AUTO: 3.19 THOUSANDS/ΜL (ref 1.85–7.62)
NEUTS SEG NFR BLD AUTO: 50 % (ref 43–75)
NRBC BLD AUTO-RTO: 0 /100 WBCS
PLATELET # BLD AUTO: 131 THOUSANDS/UL (ref 149–390)
PMV BLD AUTO: 11.4 FL (ref 8.9–12.7)
POTASSIUM SERPL-SCNC: 4.1 MMOL/L (ref 3.5–5.3)
RBC # BLD AUTO: 4.42 MILLION/UL (ref 3.88–5.62)
SODIUM SERPL-SCNC: 140 MMOL/L (ref 136–145)
WBC # BLD AUTO: 6.44 THOUSAND/UL (ref 4.31–10.16)

## 2022-03-04 PROCEDURE — 83735 ASSAY OF MAGNESIUM: CPT

## 2022-03-04 PROCEDURE — 99232 SBSQ HOSP IP/OBS MODERATE 35: CPT | Performed by: INTERNAL MEDICINE

## 2022-03-04 PROCEDURE — 99239 HOSP IP/OBS DSCHRG MGMT >30: CPT | Performed by: INTERNAL MEDICINE

## 2022-03-04 PROCEDURE — 85025 COMPLETE CBC W/AUTO DIFF WBC: CPT

## 2022-03-04 PROCEDURE — 80048 BASIC METABOLIC PNL TOTAL CA: CPT

## 2022-03-04 RX ORDER — METOPROLOL SUCCINATE 25 MG/1
25 TABLET, EXTENDED RELEASE ORAL 2 TIMES DAILY
Qty: 60 TABLET | Refills: 0
Start: 2022-03-04 | End: 2022-03-08 | Stop reason: SDUPTHER

## 2022-03-04 RX ORDER — FUROSEMIDE 20 MG/1
40 TABLET ORAL DAILY
Qty: 30 TABLET | Refills: 0
Start: 2022-03-05 | End: 2022-03-08 | Stop reason: SDUPTHER

## 2022-03-04 RX ORDER — FUROSEMIDE 20 MG/1
20 TABLET ORAL DAILY
Status: DISCONTINUED | OUTPATIENT
Start: 2022-03-05 | End: 2022-03-04

## 2022-03-04 RX ORDER — FUROSEMIDE 20 MG/1
20 TABLET ORAL DAILY
Qty: 30 TABLET | Refills: 0
Start: 2022-03-04 | End: 2022-03-04 | Stop reason: SDUPTHER

## 2022-03-04 RX ORDER — FUROSEMIDE 40 MG/1
40 TABLET ORAL DAILY
Status: DISCONTINUED | OUTPATIENT
Start: 2022-03-05 | End: 2022-03-04 | Stop reason: HOSPADM

## 2022-03-04 RX ORDER — ISOSORBIDE DINITRATE 10 MG/1
20 TABLET ORAL
Status: DISCONTINUED | OUTPATIENT
Start: 2022-03-04 | End: 2022-03-04

## 2022-03-04 RX ORDER — ISOSORBIDE DINITRATE 20 MG/1
20 TABLET ORAL
Qty: 90 TABLET | Refills: 0
Start: 2022-03-04 | End: 2022-03-08 | Stop reason: SDUPTHER

## 2022-03-04 RX ORDER — ISOSORBIDE DINITRATE 10 MG/1
20 TABLET ORAL
Status: DISCONTINUED | OUTPATIENT
Start: 2022-03-04 | End: 2022-03-04 | Stop reason: HOSPADM

## 2022-03-04 RX ADMIN — HEPARIN SODIUM 5000 UNITS: 5000 INJECTION INTRAVENOUS; SUBCUTANEOUS at 06:07

## 2022-03-04 RX ADMIN — FINASTERIDE 5 MG: 5 TABLET, FILM COATED ORAL at 09:17

## 2022-03-04 RX ADMIN — METOPROLOL SUCCINATE 25 MG: 25 TABLET, EXTENDED RELEASE ORAL at 09:17

## 2022-03-04 RX ADMIN — ASPIRIN 81 MG: 81 TABLET, COATED ORAL at 09:17

## 2022-03-04 RX ADMIN — FERROUS GLUCONATE 324 MG: 324 TABLET ORAL at 06:07

## 2022-03-04 RX ADMIN — ISOSORBIDE DINITRATE 20 MG: 10 TABLET ORAL at 10:02

## 2022-03-04 NOTE — ASSESSMENT & PLAN NOTE
POA: 0 96  Baseline appears: 1-1 2    Creatinine   Date Value Ref Range Status   03/04/2022 1 45 (H) 0 60 - 1 30 mg/dL Final     Comment:     Standardized to IDMS reference method      Likely in setting of diuresis which will be held for now      F/u bmp in few days

## 2022-03-04 NOTE — PROGRESS NOTES
Progress Note - Cardiology   Kari Ventura 80 y o  male MRN: [id number]  Unit/Bed#: S -01 Encounter: [id number]        Principal Problem:    Acute on chronic combined systolic and diastolic CHF (congestive heart failure) ([facility] )  Active Problems:    Essential hypertension    OLLIE (acute kidney injury) ([facility] )    Hyperlipidemia    Elevated d-dimer    BPH (benign prostatic hyperplasia)    Nonsustained ventricular tachycardia (HCC)    PAF (paroxysmal atrial fibrillation) (HCC)    CKD (chronic kidney disease) stage 2, GFR 60-89 ml/min          Assessment/Plan     1  Acute on chronic combined heart failure  ProBNP 6109  EKG- NSR bigeminy PVC's  Dry weight per wife 147 lbs   Currently 138  IV lasix 40mg BID, creat bump  Off oxygen  SOB improved  D/C IV lasix- LD 3/2  PTA- Lasix 20 mg daily but stopped a few weeks ago  Creat above baseline but improving, will Resume lasix at 20mg tomorrow  F/u closely with Dr Vanesa Ray as may need higher dosing  BMP few days  Stable for discharge from cards perspective  BB- changed to toprol  Started isordil 3/1  Once renal function stable transition to entresto  TTE 1/2020 LVEF 20-25% global hypokinesis  TTE 9/2020- LVEF 45% with LAD WMA     2  CAD with history of MI 2012  Unknown coronary anatomy  On aspirin 81 mg, BB-Lopressor 12 5 every 6 hours- changed to toprol XL 25mg every 12 , statin- pravastatin 40 mg  Troponin negative  Patient without complaints of chest pain or pressure  DSE 6/2020- negative for ischemia  Now with drop in LV function   consider  updated ischemic evaluation  Defer to Dr Kadie John  Has appointment 2 weeks     3  Paroxysmal atrial fibrillation  Diagnosed 9/2020 at [address] [place] [address]- he was started on [facility]- eliquis at that time  Not on [facility] d/t fall risk per Dr Mirela Dodge  Patient unaware of ever being on AC   He however is a poor historian   Denies history of falls   Reports previous recurrent epistaxis    Maintaining sinus rhythm     4  Essential HTN  Hypertensive at times, overall improved  Monitor with volume removal  Continue beta-blocker    initiated Isordil for afterload reduction      5  NSVT- runs NSVT within 1st 24 hours of admission  Diuresed   Repleted potassium  Keep potassium greater than 4 Mag greater than 2  History of frequent PVCs  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  Holter 1/2022- PVCs comprised 26% of beats   3 ventricular runs up to 13 beat  BB-Toprol 25 mg b i d  Reviewed role of ICD with patient and family  Patient will d/u with Dr Anshu Rogel   Chief Complaint/Subjective  Patient without complaints of chest pain or shortness of breath  Wants to go home  Upset he has not walking more          Vitals: /60 (BP Location: Left arm)   Pulse 61   Temp 97 6 °F (36 4 °C) (Oral)   Resp 18   Ht 5' (1 524 m)   Wt 62 2 kg (137 lb 2 oz)   SpO2 95%   BMI 26 78 kg/m²     Vitals:    03/03/22 1300 03/04/22 0600   Weight: 63 4 kg (139 lb 12 4 oz) 62 2 kg (137 lb 2 oz)     Orthostatic Blood Pressures      Most Recent Value   Blood Pressure 123/60 filed at 03/04/2022 0708   Patient Position - Orthostatic VS Lying filed at 03/04/2022 0708            Intake/Output Summary (Last 24 hours) at 3/4/2022 0858  Last data filed at 3/4/2022 0819  Gross per 24 hour   Intake 550 ml   Output 175 ml   Net 375 ml       Invasive Devices  Report    Peripheral Intravenous Line            Peripheral IV 02/27/22 Right;Lateral Forearm 4 days                Current Facility-Administered Medications   Medication Dose Route Frequency    acetaminophen (TYLENOL) tablet 650 mg  650 mg Oral Q6H PRN    aspirin (ECOTRIN LOW STRENGTH) EC tablet 81 mg  81 mg Oral Daily    calcium carbonate (TUMS) chewable tablet 500 mg  500 mg Oral Daily PRN    ferrous gluconate (FERGON) tablet 324 mg  324 mg Oral BID AC    finasteride (PROSCAR) tablet 5 mg  5 mg Oral Daily    heparin (porcine) subcutaneous injection 5,000 Units  5,000 Units Subcutaneous Q8H [address]    isosorbide dinitrate (ISORDIL) tablet 10 mg  10 mg Oral TID after meals    Labetalol HCl (NORMODYNE) injection 10 mg  10 mg Intravenous Q6H PRN    melatonin tablet 3 mg  3 mg Oral HS    metoprolol succinate (TOPROL-XL) 24 hr tablet 25 mg  25 mg Oral BID    pravastatin (PRAVACHOL) tablet 40 mg  40 mg Oral Daily With Dinner         Physical Exam: /60 (BP Location: Left arm)   Pulse 61   Temp 97 6 °F (36 4 °C) (Oral)   Resp 18   Ht 5' (1 524 m)   Wt 62 2 kg (137 lb 2 oz)   SpO2 95%   BMI 26 78 kg/m²     General Appearance:    Alert, cooperative, no distress, appears stated age   Head:    Normocephalic, no scleral icterus   Eyes:    PERRL   Nose:   Nares normal, septum midline, no drainage    Throat:   Lips, mucosa, and tongue normal   Neck:   Supple, symmetrical, trachea midline,              Lungs:     Clear to auscultation bilaterally, respirations unlabored   Chest Wall:    No tenderness or deformity    Heart:    Regular rate and rhythm, S1 and S2 normal, no murmur   Abdomen:     Soft, non-tender   Extremities:   Extremities normal, atraumatic, no cyanosis or edema       Skin:   Skin warm   Neurologic:   Alert and oriented to person place and time, no focal deficits                 Lab Results:   Recent Results (from the past 72 hour(s))   Basic metabolic panel    Collection Time: 03/02/22  5:21 AM   Result Value Ref Range    Sodium 143 136 - 145 mmol/L    Potassium 4 1 3 5 - 5 3 mmol/L    Chloride 104 100 - 108 mmol/L    CO2 26 21 - 32 mmol/L    ANION GAP 13 4 - 13 mmol/L    BUN 34 (H) 5 - 25 mg/dL    Creatinine 1 47 (H) 0 60 - 1 30 mg/dL    Glucose 117 65 - 140 mg/dL    Calcium 8 9 8 3 - 10 1 mg/dL    eGFR 41 ml/min/1 73sq m   Magnesium    Collection Time: 03/02/22  5:21 AM   Result Value Ref Range    Magnesium 2 4 1 6 - 2 6 mg/dL   Basic metabolic panel    Collection Time: 03/03/22  4:49 AM   Result Value Ref Range    Sodium 140 136 - 145 mmol/L    Potassium 4 4 3 5 - 5 3 mmol/L Chloride 105 100 - 108 mmol/L    CO2 27 21 - 32 mmol/L    ANION GAP 8 4 - 13 mmol/L    BUN 43 (H) 5 - 25 mg/dL    Creatinine 1 57 (H) 0 60 - 1 30 mg/dL    Glucose 105 65 - 140 mg/dL    Calcium 9 4 8 3 - 10 1 mg/dL    eGFR 38 ml/min/1 73sq m   Magnesium    Collection Time: 03/03/22  4:49 AM   Result Value Ref Range    Magnesium 2 5 1 6 - 2 6 mg/dL   Basic metabolic panel    Collection Time: 03/04/22  5:02 AM   Result Value Ref Range    Sodium 140 136 - 145 mmol/L    Potassium 4 1 3 5 - 5 3 mmol/L    Chloride 103 100 - 108 mmol/L    CO2 27 21 - 32 mmol/L    ANION GAP 10 4 - 13 mmol/L    BUN 37 (H) 5 - 25 mg/dL    Creatinine 1 45 (H) 0 60 - 1 30 mg/dL    Glucose 93 65 - 140 mg/dL    Calcium 8 9 8 3 - 10 1 mg/dL    eGFR 42 ml/min/1 73sq m   CBC and differential    Collection Time: 03/04/22  5:02 AM   Result Value Ref Range    WBC 6 44 4 31 - 10 16 Thousand/uL    RBC 4 42 3 88 - 5 62 Million/uL    Hemoglobin 13 4 12 0 - 17 0 g/dL    Hematocrit 41 4 36 5 - 49 3 %    MCV 94 82 - 98 fL    MCH 30 3 26 8 - 34 3 pg    MCHC 32 4 31 4 - 37 4 g/dL    RDW 14 5 11 6 - 15 1 %    MPV 11 4 8 9 - 12 7 fL    Platelets 147 (L) 105 - 390 Thousands/uL    nRBC 0 /100 WBCs    Neutrophils Relative 50 43 - 75 %    Immat GRANS % 0 0 - 2 %    Lymphocytes Relative 36 14 - 44 %    Monocytes Relative 11 4 - 12 %    Eosinophils Relative 2 0 - 6 %    Basophils Relative 1 0 - 1 %    Neutrophils Absolute 3 19 1 85 - 7 62 Thousands/µL    Immature Grans Absolute 0 02 0 00 - 0 20 Thousand/uL    Lymphocytes Absolute 2 33 0 60 - 4 47 Thousands/µL    Monocytes Absolute 0 72 0 17 - 1 22 Thousand/µL    Eosinophils Absolute 0 15 0 00 - 0 61 Thousand/µL    Basophils Absolute 0 03 0 00 - 0 10 Thousands/µL   Magnesium    Collection Time: 03/04/22  5:02 AM   Result Value Ref Range    Magnesium 2 4 1 6 - 2 6 mg/dL     Imaging: I have personally reviewed pertinent reports      Tele- NSR Freq PVC's    Counseling / Coordination of Care  Total time spent today 25 minutes  Greater than 50% of total time was spent with the patient and / or family counseling and / or coordination of care

## 2022-03-04 NOTE — ASSESSMENT & PLAN NOTE
Home Regimen: Simvastatin (ZOCOR) 20 mg QD    During admission given Pravastatin 40mg     Resume home med

## 2022-03-04 NOTE — ASSESSMENT & PLAN NOTE
Hx of PAF - Diagnosed 9/2020 at [address] started on eliquis at that time    Currently not on [facility] d/t fall risk per Dr Kris Polanco notes  Has been Sinus with PVCs    Telemetry: Found to have Sinus with runs of NSVT  After initiation of increased betablocker bigeminy with PVCs without VT runs      Discharge on Toprol 25mg BID

## 2022-03-04 NOTE — ASSESSMENT & PLAN NOTE
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

## 2022-03-04 NOTE — QUICK NOTE
Reached out to Dr Heriberto Jara with [place] team to inform her that the patient lasix dose on discharge is 40 mg qd instead of 20 mg qd starting from tomorrow 3/5/2022 as the change was made after he was discharged  Called the patient's wife multiple times to update her about the achange but unfortunately no answer  I left a voice message explaining the change of dose

## 2022-03-04 NOTE — ASSESSMENT & PLAN NOTE
Lab Results   Component Value Date    EGFR 42 03/04/2022    EGFR 38 03/03/2022    EGFR 41 03/02/2022    CREATININE 1 45 (H) 03/04/2022    CREATININE 1 57 (H) 03/03/2022    CREATININE 1 47 (H) 03/02/2022     CKD Stage 2/ CKD Stage 3, likely  due to HTN, evidenced Cr 0 96> 1 04 with GFR 70>63 (also see labs below), treated with careful diuresis, renal function assessment, and lab monitoring  CKD Stage 2  GFR  60-89  CKD Stage 3a  GFR 45-59    Findings:  2/5/22 Cr: 1 08  with GFR 61  1/18/22 Cr  1 13 with GFR  58  7/20/21 Cr  0 88  with GFR  77    F/u bmp in few days   Resume lasix tomorrow

## 2022-03-04 NOTE — ASSESSMENT & PLAN NOTE
Home medications: Lopressor 12 5mg BID, HCT 12 5mg QD    BP Readings from Last 1 Encounters:   03/04/22 123/60     Initially switched to lopressor qid after NSVT then transitioned to toprol    Plan:  Toprol 25mg bid  Discontinue HCT  Begin Lasix 20mg QD  Recommendation for Entresto discussion with outpt cardiology

## 2022-03-04 NOTE — CASE MANAGEMENT
Case Management Discharge Planning Note    Patient name Jaiden Buck  Location S /S -43 MRN [id number]  : 1934 Date 3/4/2022       Current Admission Date: 2022  Current Admission Diagnosis:Acute on chronic combined systolic and diastolic CHF (congestive heart failure) [facility])   Patient Active Problem List    Diagnosis Date Noted    Nonsustained ventricular tachycardia ([place] [address] ) 2022    PAF (paroxysmal atrial fibrillation) ([place] [address] ) 2022    CKD (chronic kidney disease) stage 2, GFR 60-89 ml/min 2022    Elevated d-dimer 2022    Acute on chronic combined systolic and diastolic CHF (congestive heart failure) ([place] [address] ) 2022    BPH (benign prostatic hyperplasia) 2022    Reactive depression 2021    Hyperbilirubinemia 2021    SIRS (systemic inflammatory response syndrome) ([place] [address] )     Fever     Neuropathy 10/22/2020    Chronic diastolic congestive heart failure ([place] [address] ) 2020    Paraphimosis 2020    Atrial fibrillation with rapid ventricular response ([place] [address] ) 2020    Sepsis ([place] [address] ) 2020    UTI (urinary tract infection) 2020    Hypokalemia 2020    Generalized weakness 2020    Urinary retention 2020    Anemia 2020    Macular degeneration 2020    Dyspepsia 2020    NPH (normal pressure hydrocephalus) ([place] [address] ) 08/10/2020    Closed compression fracture of thoracolumbar vertebra ([place] [address] ) 2020    Ambulatory dysfunction 2020    Constipation 2020    Hyperlipidemia     Essential hypertension 2019    Dyslipidemia 2019    OLLIE (acute kidney injury) ([place] [address] ) 2019      LOS (days): 4  Geometric Mean LOS (GMLOS) (days): 3 80  Days to GMLOS:-0 3     OBJECTIVE:  Risk of Unplanned Readmission Score: 15         Current admission status: Inpatient   [facility] Pharmacy:   [facility] [place] [address] [address] [facility]  [place] [facility] [address]  Phone: [phone] Fax: [phone]    Primary Care Provider: Lamonte Reynaga DO    Primary Insurance: MEDICARE  Secondary Insurance: [address] [place] [address] DETAILS:    Other Referral/Resources/Interventions Provided:  Referral Comments: Pt accepted to several facilities  CM met with pt and spouse, pt prefers [facility] at this time  CM requested [facility] transport for 230 p/u, awaiting confirmation of time    CM confirmed open visitation at [facility] and informed spouse        IMM Given (Date):: 03/04/22  IMM Given to[de-identified] Family  Family notified[de-identified] Kelsey Benites Name, Lety [address] : [address]  Receiving Facility/Agency Phone Number: [phone]  Facility/Agency Fax Number: [phone]

## 2022-03-04 NOTE — ASSESSMENT & PLAN NOTE
Wt Readings from Last 3 Encounters:   03/04/22 63 4 kg (139 lb 12 8 oz)   05/14/21 66 2 kg (146 lb)   11/20/20 63 5 kg (140 lb)     Dry weight per wife 147 lbs  Weight (last 2 days)     Date/Time Weight    03/04/22 0956 63 4 (139 8)    03/04/22 0600 62 2 (137 13)    03/03/22 1300 63 4 (139 77)    03/03/22 0533 63 (138 89)    03/02/22 0558 62 9 (138 67)      POA:  - BNP 6109  - Received IV 40mg Lasix in ED    Echo 1/13/22: LV mildly dilated  Ejection fraction of 20-25%  Grade II   (moderate) diastolic dysfunction  LA moderately dilated  RV cavity & systolic function normal  Recent Holter with 26% burden at LVH    TTE 1/2020 LVEF 20-25% global hypokinesis  TTE 9/2020- LVEF 45% with LAD WMA    Plan:  · Cardiology Consult appreciated  · Isosorbide dinitrate 10 mg t i d   · Toprol - 25mg bid  · Favor of ICD and wishes to be full code - ICD planned discussion with outpt cardiologist, Dr Jerrel Cooks  · Creat above baseline but improving, will Resume lasix at 20mg tomorrow F/u with Dr Abdiel Brand as may need higher dosing     · Discuss entresto as outpatient  · BMP few days  · Stable for discharge from cards perspective

## 2022-03-04 NOTE — CASE MANAGEMENT
Case Management Discharge Planning Note    Patient name Curry Catherine  Location S /S -94 MRN [id number]  : 1934 Date 3/4/2022       Current Admission Date: 2022  Current Admission Diagnosis:Acute on chronic combined systolic and diastolic CHF (congestive heart failure) [facility])   Patient Active Problem List    Diagnosis Date Noted    Nonsustained ventricular tachycardia ([address] [facility] ) 2022    PAF (paroxysmal atrial fibrillation) ([place] [address] ) 2022    CKD (chronic kidney disease) stage 2, GFR 60-89 ml/min 2022    Elevated d-dimer 2022    Acute on chronic combined systolic and diastolic CHF (congestive heart failure) ([place] [address] ) 2022    BPH (benign prostatic hyperplasia) 2022    Reactive depression 2021    Hyperbilirubinemia 2021    SIRS (systemic inflammatory response syndrome) ([place] [address] )     Fever     Neuropathy 10/22/2020    Chronic diastolic congestive heart failure ([place] [address] ) 2020    Paraphimosis 2020    Atrial fibrillation with rapid ventricular response ([facility] ) 2020    Sepsis ([place] [address] ) 2020    UTI (urinary tract infection) 2020    Hypokalemia 2020    Generalized weakness 2020    Urinary retention 2020    Anemia 2020    Macular degeneration 2020    Dyspepsia 2020    NPH (normal pressure hydrocephalus) ([place] [address] ) 08/10/2020    Closed compression fracture of thoracolumbar vertebra ([place] [address] ) 2020    Ambulatory dysfunction 2020    Constipation 2020    Hyperlipidemia     Essential hypertension 2019    Dyslipidemia 2019    OLLIE (acute kidney injury) ([facility] ) 2019      LOS (days): 4  Geometric Mean LOS (GMLOS) (days): 3 80  Days to GMLOS:-0 1     OBJECTIVE:  Risk of Unplanned Readmission Score: 14         Current admission status: Inpatient   Preferred Pharmacy:   [facility] [address] [facility] [address] [address] [facility]  [place] [facility] [address]  Phone: [phone] Fax: [phone]    Primary Care Provider: Champ Dubon DO    Primary Insurance: MEDICARE  Secondary Insurance: [address]  DETAILS:    Discharge planning discussed with[de-identified] Rosalia-spouse  Freedom of Choice: Yes  Comments - Freedom of Choice: FOC discussed regarding IP rehab recs    CM contacted family/caregiver?: Yes  Were Treatment Team discharge recommendations reviewed with patient/caregiver?: Yes  Did patient/caregiver verbalize understanding of patient care needs?: Yes       Contacts  Patient Contacts: Rosalia-spouse  Relationship to Patient[de-identified] Family  Contact Method: Phone  Phone Number: [phone]  Reason/Outcome: Emergency Contact,Discharge Planning,Referral      Other Referral/Resources/Interventions Provided:  Interventions: Short Term Rehab  Referral Comments: CM placed referral within 15 mile radius to [facility] per request of spouse-Rosalia and will choose preference from beds available

## 2022-03-07 ENCOUNTER — NURSING HOME VISIT (OUTPATIENT)
Dept: GERIATRICS | Facility: OTHER | Age: 87
End: 2022-03-07
Payer: MEDICARE

## 2022-03-07 DIAGNOSIS — E78.2 MIXED HYPERLIPIDEMIA: Chronic | ICD-10-CM

## 2022-03-07 DIAGNOSIS — I10 ESSENTIAL HYPERTENSION: Primary | Chronic | ICD-10-CM

## 2022-03-07 DIAGNOSIS — I50.32 CHRONIC DIASTOLIC CONGESTIVE HEART FAILURE (HCC): ICD-10-CM

## 2022-03-07 DIAGNOSIS — R33.9 URINARY RETENTION: ICD-10-CM

## 2022-03-07 DIAGNOSIS — G91.2 NPH (NORMAL PRESSURE HYDROCEPHALUS) (HCC): ICD-10-CM

## 2022-03-07 DIAGNOSIS — I48.0 PAF (PAROXYSMAL ATRIAL FIBRILLATION) (HCC): Chronic | ICD-10-CM

## 2022-03-07 DIAGNOSIS — N18.2 CKD (CHRONIC KIDNEY DISEASE) STAGE 2, GFR 60-89 ML/MIN: Chronic | ICD-10-CM

## 2022-03-07 PROCEDURE — 99306 1ST NF CARE HIGH MDM 50: CPT | Performed by: FAMILY MEDICINE

## 2022-03-07 NOTE — PROGRESS NOTES
[address]  [address]   [place] [facility]  History and Physical    NAME: Amelia Umanzor  AGE: 80 y o  SEX: male [id number]    DATE OF ENCOUNTER: 3/7/2022    Code status:  CPR    Assessment and Plan     1  Essential hypertension  -Continue heart failure medication  -Stable now     2  PAF (paroxysmal atrial fibrillation) (HCC)  -Continue metoprolol-xl 25 mg BID  - Currently rate controlled   -Not anticoagulated per Cardiology given patient is a high fall risk     3  Chronic diastolic congestive heart failure (HCC)  -HFrEF 20-25%  -In discussion with his cardiologist regarding ICD   -Continue isordil 10 mg TID  -Continue Lasix 40 mg QD    4  NPH (normal pressure hydrocephalus) (HCC)  - Has gait instability   -Fall precautions   -PT/OT    5  Urinary retention  -Continue finasteride 5 mg QD    6  CKD (chronic kidney disease) stage 3  -Tolerating a high cr for adequate diuresis  - New baseline of 1 4    7  Mixed hyperlipidemia  -Continue Simvastatin       All medications and routine orders were reviewed and updated as needed  Plan discussed with: Patient    Chief Complaint     Seen for admission at [address] [place] [address]    History of Present Illness     Amelia Umanzor is a 79 y/o M with PMH of HFrEF, CAD, CKD, HTN, HLD, BPH who originally presented to the hospital on 2/27/2022 due to worsening SOB and WILLIS  On presentation patient was volume overloaded requiring IV diuresis  Patient was found to have newly decreased EF during the recent hospital admission  Will require follow up with outpatient cardiology for ICD consideration  Patient was d/c to [facility] for strengthening and prevention of falls       HISTORY:  Past Medical History:   Diagnosis Date    OLLIE (acute kidney injury) ([address] )     Cognitive communication deficit     Dementia ([address] )     Fracture of fifth lumbar vertebra ([address] )     Fracture of first lumbar vertebra ([address] )     Fracture of fourth lumbar vertebra ([address] )  Fracture of second lumbar vertebra (HCC)     Fracture of T11 vertebra with routine healing     Fracture of T12 vertebra with routine healing     Fracture of third thoracic vertebra (HCC)     Gait abnormality     Hyperlipidemia     Hypertension     Irregular heartbeat 8/9/2020    Lightheadedness 7/13/2021    Macular degeneration     Normal pressure hydrocephalus (HCC)     Positive blood culture 9/18/2020     No family history on file  Social History     Socioeconomic History    Marital status: /Civil Union     Spouse name: Not on file    Number of children: Not on file    Years of education: Not on file    Highest education level: Not on file   Occupational History    Not on file   Tobacco Use    Smoking status: Never Smoker    Smokeless tobacco: Never Used   Vaping Use    Vaping Use: Never used   Substance and Sexual Activity    Alcohol use: Never    Drug use: Never    Sexual activity: Not Currently   Other Topics Concern    Not on file   Social History Narrative    Not on file     Social Determinants of Health     Financial Resource Strain: Not on file   Food Insecurity: No Food Insecurity    Worried About Running Out of Food in the Last Year: Never true    [address] in the Last Year: Never true   Transportation Needs: No Transportation Needs    Lack of Transportation (Medical): No    Lack of Transportation (Non-Medical): No   Physical Activity: Not on file   Stress: Not on file   Social Connections: Not on file   Intimate Partner Violence: Not on file   Housing Stability: Low Risk     Unable to Pay for Housing in the Last Year: No    Number of Places Lived in the Last Year: 1    Unstable Housing in the Last Year: No       Allergies:  No Known Allergies    Review of Systems     Review of Systems   Constitutional: Negative for chills, fatigue and fever  HENT: Negative for ear discharge, ear pain and sore throat  Eyes: Negative for pain and discharge  Respiratory: Negative for apnea, chest tightness, shortness of breath and wheezing  Cardiovascular: Negative for chest pain and palpitations  Gastrointestinal: Negative for abdominal distention and abdominal pain  Endocrine: Negative for polyphagia and polyuria  Genitourinary: Negative for difficulty urinating, dysuria and frequency  Musculoskeletal: Negative for arthralgias and joint swelling  Neurological: Positive for weakness  Negative for dizziness, tremors, syncope, facial asymmetry, light-headedness and headaches  Hematological: Does not bruise/bleed easily  Psychiatric/Behavioral: Negative for agitation and behavioral problems  Medications and orders     All medications reviewed and updated in Nursing Home EMR  Objective     Vitals:   T: 98 6  Pulse: 78  RR: 16  BP: 124/62  O2: 93%  Wt: 142    Physical Exam  Vitals reviewed  Constitutional:       Appearance: Normal appearance  HENT:      Head: Normocephalic and atraumatic  Right Ear: Tympanic membrane normal       Left Ear: Tympanic membrane normal       Nose: Nose normal    Eyes:      Extraocular Movements: Extraocular movements intact  Pupils: Pupils are equal, round, and reactive to light  Cardiovascular:      Rate and Rhythm: Normal rate  Rhythm irregular  Pulses: Normal pulses  Heart sounds: No murmur heard  No gallop  Pulmonary:      Effort: Pulmonary effort is normal  No respiratory distress  Breath sounds: No stridor  No wheezing  Chest:      Chest wall: No tenderness  Abdominal:      General: Abdomen is flat  Bowel sounds are normal  There is no distension  Palpations: Abdomen is soft  Tenderness: There is no abdominal tenderness  Musculoskeletal:         General: No swelling  Right lower leg: No edema  Left lower leg: No edema  Skin:     General: Skin is warm and dry  Neurological:      General: No focal deficit present        Mental Status: He is alert and oriented to person, place, and time  Cranial Nerves: No cranial nerve deficit  Motor: No weakness  Psychiatric:         Mood and Affect: Mood normal          Behavior: Behavior normal          Pertinent Laboratory/Diagnostic Studies: The following labs/studies were reviewed please see chart or hospital paperwork for details       Sodium 143 136 - 145 mmol/L     Potassium 4 1 3 5 - 5 3 mmol/L     Chloride 104 100 - 108 mmol/L     CO2 26 21 - 32 mmol/L     ANION GAP 13 4 - 13 mmol/L     BUN 34 (H) 5 - 25 mg/dL     Creatinine 1 47 (H) 0 60 - 1 30 mg/dL     Glucose 117 65 - 140 mg/dL     Calcium 8 9 8 3 - 10 1 mg/dL     eGFR 41 ml/min/1 73sq m   Magnesium     Collection Time: 03/02/22  5:21 AM   Result Value Ref Range     Magnesium 2 4 1 6 - 2 6 mg/dL   Basic metabolic panel     Collection Time: 03/03/22  4:49 AM   Result Value Ref Range     Sodium 140 136 - 145 mmol/L     Potassium 4 4 3 5 - 5 3 mmol/L     Chloride 105 100 - 108 mmol/L     CO2 27 21 - 32 mmol/L     ANION GAP 8 4 - 13 mmol/L     BUN 43 (H) 5 - 25 mg/dL     Creatinine 1 57 (H) 0 60 - 1 30 mg/dL     Glucose 105 65 - 140 mg/dL     Calcium 9 4 8 3 - 10 1 mg/dL     eGFR 38 ml/min/1 73sq m   Magnesium     Collection Time: 03/03/22  4:49 AM   Result Value Ref Range     Magnesium 2 5 1 6 - 2 6 mg/dL   Basic metabolic panel     Collection Time: 03/04/22  5:02 AM   Result Value Ref Range     Sodium 140 136 - 145 mmol/L     Potassium 4 1 3 5 - 5 3 mmol/L     Chloride 103 100 - 108 mmol/L     CO2 27 21 - 32 mmol/L     ANION GAP 10 4 - 13 mmol/L     BUN 37 (H) 5 - 25 mg/dL     Creatinine 1 45 (H) 0 60 - 1 30 mg/dL     Glucose 93 65 - 140 mg/dL     Calcium 8 9 8 3 - 10 1 mg/dL     eGFR 42 ml/min/1 73sq m   CBC and differential     Collection Time: 03/04/22  5:02 AM   Result Value Ref Range     WBC 6 44 4 31 - 10 16 Thousand/uL     RBC 4 42 3 88 - 5 62 Million/uL     Hemoglobin 13 4 12 0 - 17 0 g/dL     Hematocrit 41 4 36 5 - 49 3 %     MCV 94 82 - 98 fL     MCH 30 3 26 8 - 34 3 pg     MCHC 32 4 31 4 - 37 4 g/dL     RDW 14 5 11 6 - 15 1 %     MPV 11 4 8 9 - 12 7 fL     Platelets 199 (L) 437 - 390 Thousands/uL     nRBC 0 /100 WBCs     Neutrophils Relative 50 43 - 75 %     Immat GRANS % 0 0 - 2 %     Lymphocytes Relative 36 14 - 44 %     Monocytes Relative 11 4 - 12 %     Eosinophils Relative 2 0 - 6 %     Basophils Relative 1 0 - 1 %     Neutrophils Absolute 3 19 1 85 - 7 62 Thousands/µL     Immature Grans Absolute 0 02 0 00 - 0 20 Thousand/uL     Lymphocytes Absolute 2 33 0 60 - 4 47 Thousands/µL     Monocytes Absolute 0 72 0 17 - 1 22 Thousand/µL     Eosinophils Absolute 0 15 0 00 - 0 61 Thousand/µL     Basophils Absolute 0 03 0 00 - 0 10 Thousands/µL           - Counseling Documentation: patient was counseled regarding: diagnostic results, instructions for management, risk factor reductions, prognosis, risks and benefits of treatment options and importance of compliance with treatment

## 2022-03-08 ENCOUNTER — NURSING HOME VISIT (OUTPATIENT)
Dept: GERIATRICS | Facility: OTHER | Age: 87
End: 2022-03-08
Payer: MEDICARE

## 2022-03-08 VITALS
RESPIRATION RATE: 18 BRPM | HEART RATE: 78 BPM | TEMPERATURE: 98.5 F | WEIGHT: 140 LBS | DIASTOLIC BLOOD PRESSURE: 70 MMHG | OXYGEN SATURATION: 96 % | SYSTOLIC BLOOD PRESSURE: 134 MMHG | BODY MASS INDEX: 27.34 KG/M2

## 2022-03-08 DIAGNOSIS — I48.91 ATRIAL FIBRILLATION WITH RAPID VENTRICULAR RESPONSE (HCC): Primary | ICD-10-CM

## 2022-03-08 DIAGNOSIS — G91.2 NPH (NORMAL PRESSURE HYDROCEPHALUS) (HCC): ICD-10-CM

## 2022-03-08 DIAGNOSIS — I50.32 CHRONIC DIASTOLIC CONGESTIVE HEART FAILURE (HCC): ICD-10-CM

## 2022-03-08 DIAGNOSIS — N18.32 STAGE 3B CHRONIC KIDNEY DISEASE (HCC): ICD-10-CM

## 2022-03-08 PROCEDURE — 99316 NF DSCHRG MGMT 30 MIN+: CPT | Performed by: NURSE PRACTITIONER

## 2022-03-08 RX ORDER — ISOSORBIDE DINITRATE 20 MG/1
20 TABLET ORAL
Qty: 90 TABLET | Refills: 0 | Status: SHIPPED | OUTPATIENT
Start: 2022-03-08 | End: 2022-04-07

## 2022-03-08 RX ORDER — METOPROLOL SUCCINATE 25 MG/1
25 TABLET, EXTENDED RELEASE ORAL 2 TIMES DAILY
Qty: 60 TABLET | Refills: 0 | Status: SHIPPED | OUTPATIENT
Start: 2022-03-08 | End: 2022-04-07

## 2022-03-08 RX ORDER — SIMVASTATIN 20 MG
20 TABLET ORAL DAILY
Qty: 30 TABLET | Refills: 0 | Status: SHIPPED | OUTPATIENT
Start: 2022-03-08

## 2022-03-08 RX ORDER — FUROSEMIDE 40 MG/1
40 TABLET ORAL DAILY
Qty: 30 TABLET | Refills: 0 | Status: SHIPPED | OUTPATIENT
Start: 2022-03-08

## 2022-03-08 NOTE — ASSESSMENT & PLAN NOTE
· Continue Metoprolol Succinate 25 mg BID  · Currently rate controlled   · Not anticoagulated per Cardiology given patient is a high fall risk   · OP f/u for ICD consideration

## 2022-03-08 NOTE — PROGRESS NOTES
[facility]  [address]  [phone]  [address]  Code 31    NAME: Gerry Miller  AGE: 80 y o  SEX: male   CODE STATUS: CPR    DATE OF ADMISSION: 3/4/2022   DATE OF DISCHARGE: 3/10/2022   DISCHARGE DISPOSITION: Stable for discharge to home with family support and home health PT/OT/SN services  Reason for Admission: Patient was admitted to [facility] for rehabilitation after hospitalization for CHF exacerbation  Past Medical and Surgical History:   Past Medical History:   Diagnosis Date    OLLIE (acute kidney injury) ([address] )     Cognitive communication deficit     Dementia ([address] )     Fracture of fifth lumbar vertebra ([address] )     Fracture of first lumbar vertebra ([address] )     Fracture of fourth lumbar vertebra ([address] )     Fracture of second lumbar vertebra ([address] )     Fracture of T11 vertebra with routine healing     Fracture of T12 vertebra with routine healing     Fracture of third thoracic vertebra ([address] )     Gait abnormality     Hyperlipidemia     Hypertension     Irregular heartbeat 8/9/2020    Lightheadedness 7/13/2021    Macular degeneration     Normal pressure hydrocephalus (HCC)     Positive blood culture 9/18/2020      No past surgical history on file  Course of stay:   Gerry Miller is a 80year old male admitted to [facility] on 3/4/2022 for STR  Past Medical Hx including but not limited to CHF, CKD, HTN, HLD, BPH  seen in collaboration with nursing for medical mgmt and discharge visit  Hospital Course:   Presented to [facility] 2/27/2022 through 3/4/2022 with a diagnosis of acute on chronic combined systolic and diastolic CHF  He presented with increased shortness of breath over the past week and a half  Cardiology was consulted and he was treated with diuretics  Echocardiogram revealed new EF 20-25%  Cardiology discussed with patient need for ICD in future this can be done as an outpatient    Patient's creatinine did elevate while on diuretics but improved prior to discharge  medication adjustments during hospitalization include: Discontinuing gabapentin, hydrochlorothiazide, Lopressor, MiraLax and Fergon  Patient was started on Isordil 20 mg t i d , Toprol XL 25 mg b i d , Lasix 20 mg daily  Rehab:   Patient found ambulating in halls with rolling walker  Gait appears stable  Patient is a reliable historian  He is AAO x3 he is hard of hearing  Patient offers no complaints today  Patient states he feels good and is eager to go home  Discussed with patient importance of following up with Cardiology as outpatient  Patient denies CP/SOB/N/V/D  Denies lightheadedness, dizziness, headaches, vision changes  Patient states they are eating well and staying hydrated  Denies any bowel or bladder issues  Per review of SNF records, Patient is eating 3 meals per day, consuming  %  Last documented BM was 3/6/2022  Patient has actively participated in therapy and met all goals  No concerns from nursing at this time  ROS:  Review of Systems   Constitutional: Negative for activity change, appetite change, fatigue and fever  HENT: Positive for hearing loss  Negative for ear pain, rhinorrhea and trouble swallowing  Eyes: Negative for pain and visual disturbance  Respiratory: Negative for cough, shortness of breath and wheezing  Cardiovascular: Negative for chest pain and palpitations  Gastrointestinal: Negative for abdominal distention, abdominal pain, blood in stool, constipation, diarrhea, nausea and vomiting  Genitourinary: Negative for decreased urine volume, difficulty urinating, dysuria, frequency and hematuria  Musculoskeletal: Negative for arthralgias, back pain and gait problem  Skin: Negative for color change and rash  Neurological: Negative for dizziness, syncope, weakness, light-headedness, numbness and headaches     Psychiatric/Behavioral: Negative for dysphoric mood and sleep disturbance  PHYSICAL EXAM:  VITALS:   Vitals:    03/08/22 0820   BP: 134/70   Pulse: 78   Resp: 18   Temp: 98 5 °F (36 9 °C)   SpO2: 96%        Physical Exam  Vitals and nursing note reviewed  Constitutional:       General: He is not in acute distress  Appearance: Normal appearance  HENT:      Head: Normocephalic and atraumatic  Nose: No congestion or rhinorrhea  Mouth/Throat:      Mouth: Mucous membranes are moist    Eyes:      General: No scleral icterus  Extraocular Movements: Extraocular movements intact  Conjunctiva/sclera: Conjunctivae normal       Pupils: Pupils are equal, round, and reactive to light  Cardiovascular:      Rate and Rhythm: Normal rate  Rhythm irregular  Pulses: Normal pulses  Heart sounds: Normal heart sounds  No murmur heard  Pulmonary:      Effort: Pulmonary effort is normal       Breath sounds: Normal breath sounds  No wheezing, rhonchi or rales  Abdominal:      General: Bowel sounds are normal  There is no distension  Palpations: Abdomen is soft  Tenderness: There is no abdominal tenderness  Musculoskeletal:         General: No swelling or signs of injury  Right lower leg: No edema  Left lower leg: No edema  Lymphadenopathy:      Cervical: No cervical adenopathy  Skin:     General: Skin is warm and dry  Findings: No erythema  Neurological:      Mental Status: He is alert and oriented to person, place, and time  Sensory: No sensory deficit  Motor: No weakness  Gait: Gait abnormal (Ambulates with rolling walker gait appears steady on exam)  Psychiatric:         Mood and Affect: Mood normal          Behavior: Behavior normal          Admission Diagnoses:   1   Atrial fibrillation with rapid ventricular response (HCC)  Assessment & Plan:  · Continue Metoprolol Succinate 25 mg BID  · Currently rate controlled   · Not anticoagulated per Cardiology given patient is a high fall risk · OP f/u for ICD consideration     Orders:  -     isosorbide dinitrate (ISORDIL) 20 mg tablet; Take 1 tablet (20 mg total) by mouth 3 (three) times daily after meals  -     metoprolol succinate (TOPROL-XL) 25 mg 24 hr tablet; Take 1 tablet (25 mg total) by mouth 2 (two) times a day    2  Chronic diastolic congestive heart failure (HCC)  Assessment & Plan:  Wt Readings from Last 3 Encounters:   03/08/22 63 5 kg (140 lb)   03/04/22 63 4 kg (139 lb 12 8 oz)   05/14/21 66 2 kg (146 lb)     · Dry weight per wife 147 lb   · Echo revealed worsening EF 20-25%   · outpatient follow-up with Cardiology for ICD consideration   · Medications adjusted while inpatient  · continue the following  · Lasix 40 mg daily   · metoprolol succinate 25 mg q 12 hours  · isosorbide 20 mg 3 times a day  · simvastatin 20 mg daily  · continue daily weights low-salt diet        Orders:  -     metoprolol succinate (TOPROL-XL) 25 mg 24 hr tablet; Take 1 tablet (25 mg total) by mouth 2 (two) times a day  -     simvastatin (ZOCOR) 20 mg tablet; Take 1 tablet (20 mg total) by mouth daily  -     furosemide (LASIX) 40 mg tablet; Take 1 tablet (40 mg total) by mouth daily    3  Stage 3b chronic kidney disease [facility])  Assessment & Plan:  Lab Results   Component Value Date    EGFR 42 03/04/2022    EGFR 38 03/03/2022    EGFR 41 03/02/2022    CREATININE 1 45 (H) 03/04/2022    CREATININE 1 57 (H) 03/03/2022    CREATININE 1 47 (H) 03/02/2022     · New Baseline creatinine likely 1 3-1 5  · Now with CKD3 due to HTN and need to continue Lasix for worsened EF 20-25%  · BMP at [facility]- Creatinine 1 23 on 3/7/22- stable   · Recommend OP f/u with Nephrology at D/C from [facility]  · Renally dose medications  · Avoid NSAIDs  · Avoid Hypotension       4   NPH (normal pressure hydrocephalus) ([facility])  Assessment & Plan:  · With gait dysfunction  · [address] PT/OT vs Outpatient PT/OT if patient more ambulatory at d/c from [facility]  · Fall Precautions          Follow-up Recommendations:     Outpatient Follow up with PCP in the next 2 weeks  [address] [place] [address] PT/OT/SN services     Labs and testing performed during stay:    Collection Date:03/07/2022 04:05    CBC NO DIFF  HEMOGLOBIN 13 4 g/dL 12 5-17 0 Final  HEMATOCRIT 40 4 % 37 0-48 0 Final  WBC 6 1 thou/cmm 4 0-10 5 Final  RBC 4 55 mill/cmm 4 00-5 40 Final  PLATELET COUNT 872 thou/cmm 140-350 L Final  MPV 9 8 fL 7 5-11 3 Final  MCV 89 fL  Final  MCH 29 4 pg 27 0-36 0 Final  MCHC 33 0 g/dL 32 0-37 0 Final  RDW 14 9 % 12 0-16 0 Final    BASIC METABOLIC PNL  GLUCOSE 98 mg/dL 65-99 Final  BUN 25 mg/dL 7-28 Final  CREATININE 1 23 mg/dL 0 53-1 30 Final  SODIUM 141 mmol/L 135-145 Final  POTASSIUM 3 9 mmol/L 3 5-5 2 Final  CHLORIDE 106 mmol/L 100-109 Final  CARBON DIOXIDE 28 mmol/L 23-31 Final  CALCIUM 9 1 mg/dL 8 5-10 1 Final  ANION GAP 7 3-11 Final  eGFRcr 56 >59 L Final     Discharge Medications: See discharge medication list which was reviewed and signed        Current Outpatient Medications:     acetaminophen (TYLENOL) 325 mg tablet, Take 2 tablets (650 mg total) by mouth every 6 (six) hours as needed for mild pain, headaches or fever, Disp: , Rfl: 0    aspirin (ECOTRIN LOW STRENGTH) 81 mg EC tablet, Take 1 tablet (81 mg total) by mouth daily, Disp: 14 tablet, Rfl: 0    B Complex Vitamins (VITAMIN B COMPLEX PO), Take 482 2 mg by mouth daily, Disp: , Rfl:     cholecalciferol (VITAMIN D3) 1,000 units tablet, Take 1,000 Units by mouth 2 (two) times a day , Disp: , Rfl:     finasteride (PROSCAR) 5 mg tablet, Take 1 tablet (5 mg total) by mouth daily, Disp: 90 tablet, Rfl: 3    furosemide (LASIX) 40 mg tablet, Take 1 tablet (40 mg total) by mouth daily, Disp: 30 tablet, Rfl: 0    isosorbide dinitrate (ISORDIL) 20 mg tablet, Take 1 tablet (20 mg total) by mouth 3 (three) times daily after meals, Disp: 90 tablet, Rfl: 0    metoprolol succinate (TOPROL-XL) 25 mg 24 hr tablet, Take 1 tablet (25 mg total) by mouth 2 (two) times a day, Disp: 60 tablet, Rfl: 0    Multiple Vitamins-Minerals (PRESERVISION AREDS 2 PO), Take 24 mg by mouth 2 (two) times a day, Disp: , Rfl:     Omega-3 1000 MG CAPS, Take 1 tablet by mouth daily, Disp: , Rfl:     simvastatin (ZOCOR) 20 mg tablet, Take 1 tablet (20 mg total) by mouth daily, Disp: 30 tablet, Rfl: 0     Discussion with patient/family and further instructions:  -Fall precautions  -Aspiration precautions  -Bleeding precautions  -Monitor for signs/symptoms of infection  -Medication list was reviewed and updated    Status at time of discharge: Stable    Plan discussed with Dr Rasheed Pepper noted agreement with assessment and plan  Billing based on time  Time spent on unit, 40 minutes  Time spent counseling pt on debility/condition, 30 minutes  Please note:  Voice-recognition software may have been used in the preparation of this document  Occasional wrong word or "sound-alike" substitutions may have occurred due to the inherent limitations of voice recognition software  Interpretation should be guided by context          CLOVER Brannon  3/8/2022

## 2022-03-08 NOTE — ASSESSMENT & PLAN NOTE
Lab Results   Component Value Date    EGFR 42 03/04/2022    EGFR 38 03/03/2022    EGFR 41 03/02/2022    CREATININE 1 45 (H) 03/04/2022    CREATININE 1 57 (H) 03/03/2022    CREATININE 1 47 (H) 03/02/2022     · New Baseline creatinine likely 1 3-1 5  · Now with CKD3 due to HTN and need to continue Lasix for worsened EF 20-25%  · BMP at [facility]- Creatinine 1 23 on 3/7/22- stable   · Recommend OP f/u with Nephrology at D/C from [facility]  · Renally dose medications  · Avoid NSAIDs  · Avoid Hypotension

## 2022-03-08 NOTE — ASSESSMENT & PLAN NOTE
· With gait dysfunction  · [address] PT/OT vs Outpatient PT/OT if patient more ambulatory at d/c from SNF  · Fall Precautions

## 2022-03-08 NOTE — ASSESSMENT & PLAN NOTE
Wt Readings from Last 3 Encounters:   03/08/22 63 5 kg (140 lb)   03/04/22 63 4 kg (139 lb 12 8 oz)   05/14/21 66 2 kg (146 lb)     · Dry weight per wife 147 lb   · Echo revealed worsening EF 20-25%   · outpatient follow-up with Cardiology for ICD consideration   · Medications adjusted while inpatient  · continue the following  · Lasix 40 mg daily   · metoprolol succinate 25 mg q 12 hours  · isosorbide 20 mg 3 times a day  · simvastatin 20 mg daily  · continue daily weights low-salt diet

## 2022-03-14 ENCOUNTER — PATIENT OUTREACH (OUTPATIENT)
Dept: CASE MANAGEMENT | Facility: HOSPITAL | Age: 87
End: 2022-03-14

## 2022-03-16 ENCOUNTER — PATIENT OUTREACH (OUTPATIENT)
Dept: CASE MANAGEMENT | Facility: OTHER | Age: 87
End: 2022-03-16

## 2022-03-16 NOTE — PROGRESS NOTES
Outreach TC to patient for CM assessment  No answer to call  Left message on voicemail requesting a return call from patient to Delfina Leslie 12, BSN; Outpatient Care Manager @ [phone]  First unanswered call to patient  Chart review reveals that patient was initially inpatient 2/27/22-3/4/22 secondary to exacerbation of CHF  Patient was diuresed  Patient experienced OLLIE and diuretics were held  Diuretics were restarted upon discharge  Outreach will continue

## 2022-04-22 ENCOUNTER — PATIENT OUTREACH (OUTPATIENT)
Dept: CASE MANAGEMENT | Facility: OTHER | Age: 87
End: 2022-04-22

## 2022-04-22 NOTE — PROGRESS NOTES
Chart review reveals that patient was seen by [facility] cardiology on 3/22/22  Cardizem was stopped and metoprolol was increased to 75 mg po 2x daily  Eliquis was increased to 5 mg po 2x day  Patient then saw his PCP on 3/28/22  No changes were made to medications  Patient was in the ED at [address]  [phone] on 4/1/22 secondary to SOB  Patient was evaluated and a CXR was completed and  Results were "no acute cardiopulmonary pathology  " Patient was discharged to home  On 4/5/22 patient had a Holter monitor placed as an outpatient  Patient saw PCP on 4/7/22 for an ER f/u appointment  No changes to medications  Holter monitor did reveal A-fib  Patient has a scheduled appointment with cardiology on 5/3/22  Chart review will continue as patient did not engage with this RN care manager

## 2022-06-01 ENCOUNTER — PATIENT OUTREACH (OUTPATIENT)
Dept: CASE MANAGEMENT | Facility: OTHER | Age: 87
End: 2022-06-01

## 2022-06-01 NOTE — PROGRESS NOTES
Chart review reveals that patient saw cardiology at [facility] on 5/3/22  No changes to medication or to plan of care  Patient will return to cardiology office on 6/3/22  Patient was instructed on diet and exercise for increased health  BPCI episode end  The episode has been resolved  I removed myself from the care team and the care coordination note as been updated

## 2022-09-26 NOTE — ASSESSMENT & PLAN NOTE
Patient and family given discharge instructions     AVS med list reviewed, no duplicates noted. D/C instructions reviewed, opportunity for questions. · Patient with history of worsening ambulatory dysfunction over the past couple of weeks  · CT of the head in the hospital concerning for disproportionate enlargement of the lateral and 3rd ventricles relative to the peripheral cortical sulcal enlargement  Finding suspicious for NPH  · Evaluated per nurse surgery in the hospital, recommendation made for NPH clinic referral   · We have referred patient to Dr Lisa Bowman and he has a scheduled appointment on 09/22/2020

## 2022-12-29 ENCOUNTER — NURSING HOME VISIT (OUTPATIENT)
Dept: GERIATRICS | Facility: OTHER | Age: 87
End: 2022-12-29

## 2022-12-29 VITALS
OXYGEN SATURATION: 97 % | HEART RATE: 74 BPM | WEIGHT: 137.8 LBS | SYSTOLIC BLOOD PRESSURE: 123 MMHG | DIASTOLIC BLOOD PRESSURE: 65 MMHG | TEMPERATURE: 98.2 F | BODY MASS INDEX: 26.91 KG/M2

## 2022-12-29 DIAGNOSIS — I50.32 CHRONIC DIASTOLIC CONGESTIVE HEART FAILURE (HCC): ICD-10-CM

## 2022-12-29 DIAGNOSIS — S22.000D COMPRESSION FRACTURE OF THORACIC VERTEBRA WITH ROUTINE HEALING, UNSPECIFIED THORACIC VERTEBRAL LEVEL, SUBSEQUENT ENCOUNTER: Primary | ICD-10-CM

## 2022-12-29 DIAGNOSIS — I48.91 ATRIAL FIBRILLATION WITH RAPID VENTRICULAR RESPONSE (HCC): ICD-10-CM

## 2022-12-29 PROBLEM — S22.000A THORACIC COMPRESSION FRACTURE (HCC): Status: ACTIVE | Noted: 2022-12-29

## 2022-12-29 RX ORDER — DIGOXIN 250 MCG
125 TABLET ORAL DAILY
COMMUNITY
End: 2023-01-05 | Stop reason: SDUPTHER

## 2022-12-29 NOTE — PROGRESS NOTES
[facility]  [address] [place] [address], [address] [place] [address]  [id number]    Nursing Home Admission    NAME: Marisela Leung  AGE: 80 y o  SEX: male [id number]      Patient Location     [address] [place] [address]    Patient’s care was coordinated with nursing facility staff  Recent vitals, labs and updated medications were reviewed on [facility]  Past Medical, surgical, social, medication and allergy history and patient’s previous records reviewed  Assessment/Plan:    Thoracic compression fracture ([address] )  History of recent fall  Imaging studies in the ER yesterday revealed multiple thoracic and lumbar compression fractures which appear to be chronic  Patient currently denies any back pain  Continue as needed Tylenol      Hyperlipidemia:  Continue simvastatin and fish oil capule    Ambulatory dysfunction:  Continue PT OT  Continue to implement fall precautions    Hypokalemia:  Recent K was normal   Continue potassium chloride 20 mEq daily    PAF:  Heart rate stable 75 mg daily and [id number] MCG daily  Continue apixaban    BPH:  Continue finasteride    History of CVA:  History of recent CVA in November/22  Continue risk factor control  Patient remains on Eliquis and statin    UTI:  History of recent UTI per records  Patient completed antibiotic course    Chief Complaint     Ambulatory dysfunction, multiple thoracic compression fractures, NPH, history of stroke    HPI       Patient is a 80 y o  male with past medical history significant for CKD 3, CVA, osteoporosis, PAF on Eliquis, BPH, multiple lung nodules, NPH, history of stroke and ambulatory dysfunction  Patient was seen in ER yesterday with generalized weakness and difficulty walking  Of note patient was hospitalized from 11/ 23-11/28 for ambulatory dysfunction  Per records patient suffered a cerebellar stroke on 11/18/2022  Patient was discharged to inpatient rehab from where he was discharged home on 12/16/2022  PT OT had been coming at home twice weekly along with home health aide  Subsequently home health services were discontinued as patient was doing better  Patient was seen in ER yesterday for evaluation of 2 episodes of unwitnessed fall while attempting to get back onto bed from bedside commode, patient subsequently had difficulty ambulating and walking  Trauma work-up in the ER was negative except revealed evidence of chronic compression fractures  Patient was subsequently transferred to [address] [place] [address] for ongoing care directly from ER  He is being seen for initial nursing home admission  At the time of my evaluation patient is doing okay  Denies any active complaints         Past Medical History:   Diagnosis Date   • OLLIE (acute kidney injury) ([address] )    • Cognitive communication deficit    • Dementia ([address] )    • Fracture of fifth lumbar vertebra ([address] )    • Fracture of first lumbar vertebra ([address] )    • Fracture of fourth lumbar vertebra ([address] )    • Fracture of second lumbar vertebra ([address] )    • Fracture of T11 vertebra with routine healing    • Fracture of T12 vertebra with routine healing    • Fracture of third thoracic vertebra (HCC)    • Gait abnormality    • Hyperlipidemia    • Hypertension    • Irregular heartbeat 8/9/2020   • Lightheadedness 7/13/2021   • Macular degeneration    • Normal pressure hydrocephalus (HCC)    • Positive blood culture 9/18/2020       PSH:  No pertinent PSH per records    Social History     Tobacco Use   Smoking Status Never   Smokeless Tobacco Never       Family history:  NA    No Known Allergies       Current Outpatient Medications:   •  apixaban (ELIQUIS) 2 5 mg, Take by mouth 2 (two) times a day, Disp: , Rfl:   •  digoxin (LANOXIN) 0 25 mg tablet, Take 125 mcg by mouth daily, Disp: , Rfl:   •  METOPROLOL SUCCINATE PO, Take 75 mg by mouth in the morning, Disp: , Rfl:   •  acetaminophen (TYLENOL) 325 mg tablet, Take 2 tablets (650 mg total) by mouth every 6 (six) hours as needed for mild pain, headaches or fever, Disp: , Rfl: 0  •  cholecalciferol (VITAMIN D3) 1,000 units tablet, Take 1,000 Units by mouth 2 (two) times a day , Disp: , Rfl:   •  finasteride (PROSCAR) 5 mg tablet, Take 1 tablet (5 mg total) by mouth daily, Disp: 90 tablet, Rfl: 3  •  furosemide (LASIX) 40 mg tablet, Take 1 tablet (40 mg total) by mouth daily, Disp: 30 tablet, Rfl: 0  •  Multiple Vitamins-Minerals (PRESERVISION AREDS 2 PO), Take 24 mg by mouth 2 (two) times a day, Disp: , Rfl:   •  Omega-3 1000 MG CAPS, Take 1 tablet by mouth daily, Disp: , Rfl:   •  simvastatin (ZOCOR) 20 mg tablet, Take 1 tablet (20 mg total) by mouth daily, Disp: 30 tablet, Rfl: 0  No current facility-administered medications for this visit  Updated list was reviewed in [address] [place] [address] system of facility  Vitals:    12/29/22 1552   BP: 123/65   Pulse: 74   Temp: 98 2 °F (36 8 °C)   SpO2: 97%       Vital signs were reviewed in point click care    Review of Systems   Constitutional: Negative for chills, fatigue and fever  HENT: Negative for nosebleeds and rhinorrhea  Eyes: Negative for discharge and redness  Respiratory: Negative for cough, chest tightness, shortness of breath, wheezing and stridor  Cardiovascular: Negative for chest pain and leg swelling  Gastrointestinal: Negative for abdominal distention, abdominal pain, diarrhea and vomiting  Genitourinary: Negative for dysuria, flank pain and hematuria  Musculoskeletal: Positive for gait problem  Negative for arthralgias and back pain  Skin: Negative for pallor  Neurological: Positive for weakness (Generalized)  Negative for tremors, seizures, syncope and headaches  History of recent falls   Psychiatric/Behavioral: Negative for agitation, behavioral problems and confusion  Physical Exam  Constitutional:       General: He is not in acute distress  HENT:      Head: Normocephalic and atraumatic  Eyes:      General: No scleral icterus  Right eye: No discharge  Cardiovascular:      Rate and Rhythm: Normal rate and regular rhythm  Heart sounds: Murmur heard  Pulmonary:      Breath sounds: No wheezing, rhonchi or rales  Abdominal:      Palpations: Abdomen is soft  Tenderness: There is no abdominal tenderness  There is no guarding  Musculoskeletal:      Cervical back: Neck supple  Right lower leg: No edema  Left lower leg: No edema  Skin:     Coloration: Skin is not jaundiced  Neurological:      General: No focal deficit present  Cranial Nerves: No cranial nerve deficit  Motor: Weakness present  Comments: Oriented in month and year   Psychiatric:         Mood and Affect: Mood normal          Behavior: Behavior normal            Diagnostic Data       Recent labs and imaging studies were reviewed  Labs done on 12/28/2022 revealed blood glucose of 111, BUN 17, creatinine 1 96, sodium 137, potassium 4, calcium 9  Hemoglobin 13 8, WBC count 11 5, platelet count 143  Recent CT head was unremarkable, revealed white matter chronic ischemic changes  CT cervical spine revealed DJD changes    CT chest abdomen and pelvis revealed multiple chronic appearing vertebral body fractures in the spine likely due to osteoporosis  There was evidence of hepatic steatosis  CT scan of thoracic and lumbar spine showed multiple compression fractures in the thoracic and lumbar spine noted to be chronic         Code Status:      Full code               This note was electronically signed by Dr Akira Lopez

## 2022-12-29 NOTE — ASSESSMENT & PLAN NOTE
History of recent fall  Imaging studies in the ER yesterday revealed multiple thoracic and lumbar compression fractures which appear to be chronic  Patient currently denies any back pain    Continue as needed Tylenol

## 2023-01-02 ENCOUNTER — TELEPHONE (OUTPATIENT)
Dept: OTHER | Facility: OTHER | Age: 88
End: 2023-01-02

## 2023-01-03 ENCOUNTER — NURSING HOME VISIT (OUTPATIENT)
Dept: GERIATRICS | Facility: OTHER | Age: 88
End: 2023-01-03

## 2023-01-03 VITALS
OXYGEN SATURATION: 96 % | RESPIRATION RATE: 18 BRPM | SYSTOLIC BLOOD PRESSURE: 139 MMHG | HEART RATE: 73 BPM | TEMPERATURE: 97.8 F | DIASTOLIC BLOOD PRESSURE: 85 MMHG

## 2023-01-03 DIAGNOSIS — R26.2 AMBULATORY DYSFUNCTION: ICD-10-CM

## 2023-01-03 DIAGNOSIS — I50.32 CHRONIC DIASTOLIC CONGESTIVE HEART FAILURE (HCC): ICD-10-CM

## 2023-01-03 DIAGNOSIS — E78.2 MIXED HYPERLIPIDEMIA: Chronic | ICD-10-CM

## 2023-01-03 DIAGNOSIS — N40.0 BENIGN PROSTATIC HYPERPLASIA, UNSPECIFIED WHETHER LOWER URINARY TRACT SYMPTOMS PRESENT: Chronic | ICD-10-CM

## 2023-01-03 DIAGNOSIS — I48.0 PAF (PAROXYSMAL ATRIAL FIBRILLATION) (HCC): Chronic | ICD-10-CM

## 2023-01-03 DIAGNOSIS — S22.000D COMPRESSION FRACTURE OF THORACIC VERTEBRA WITH ROUTINE HEALING, UNSPECIFIED THORACIC VERTEBRAL LEVEL, SUBSEQUENT ENCOUNTER: Primary | ICD-10-CM

## 2023-01-03 NOTE — ASSESSMENT & PLAN NOTE
· Recent hospitalization for ambulatory dysfunction, falls  · Imaging done in ER shows multiple thoracic and lumbar compression fractures which appear to be chronic  · Patient denies pain on exam  · Continue Tylenol 500 mg every 6 hours as needed for pain

## 2023-01-03 NOTE — ASSESSMENT & PLAN NOTE
Wt Readings from Last 3 Encounters:   12/29/22 62 5 kg (137 lb 12 8 oz)   03/08/22 63 5 kg (140 lb)   03/04/22 63 4 kg (139 lb 12 8 oz)   · Weights have been stable  · Appears euvolemic  · No edema noted on exam  · Continue Lasix 40 mg daily  · Continue metoprolol 75 mg twice daily  · Outpatient follow-up with cardiology

## 2023-01-03 NOTE — PROGRESS NOTES
[facility]  [address]  [phone]  [place]  Code 31 (STR)        NAME: Amelia Umanzor  AGE: 80 y o  SEX: male CODE STATUS: CPR    DATE OF ENCOUNTER: 1/3/2023    Assessment and Plan     1  Compression fracture of thoracic vertebra with routine healing, unspecified thoracic vertebral level, subsequent encounter  Assessment & Plan:  · Recent hospitalization for ambulatory dysfunction, falls  · Imaging done in ER shows multiple thoracic and lumbar compression fractures which appear to be chronic  · Patient denies pain on exam  · Continue Tylenol 500 mg every 6 hours as needed for pain      2  Benign prostatic hyperplasia, unspecified whether lower urinary tract symptoms present  Assessment & Plan:  · Continue finasteride 5 mg daily      3  PAF (paroxysmal atrial fibrillation) ([facility])  Assessment & Plan:  · Heart rates have been controlled, trending 50s to 80s  · Morning heart rate 73  · Patient denies chest pain/palpitations  · Continue apixaban 2 5 mg twice daily  · Continue metoprolol 75 mg twice daily  · Continue digoxin 125 MCG daily      4  Chronic diastolic congestive heart failure (HCC)  Assessment & Plan:  Wt Readings from Last 3 Encounters:   12/29/22 62 5 kg (137 lb 12 8 oz)   03/08/22 63 5 kg (140 lb)   03/04/22 63 4 kg (139 lb 12 8 oz)   · Weights have been stable  · Appears euvolemic  · No edema noted on exam  · Continue Lasix 40 mg daily  · Continue metoprolol 75 mg twice daily  · Outpatient follow-up with cardiology          5  Mixed hyperlipidemia  Assessment & Plan:  · Continue simvastatin 20 mg daily      6   Ambulatory dysfunction  Assessment & Plan:  Multifactorial in the setting of multiple chronic compression fractures  Recent fall at home prior to hospitalization  Continue PT/OT  Fall/safety precautions  Ensure adequate nutrition/hydration   Monitor CBC/BMP    following for d/c planning          All medications and routine orders were reviewed and updated as needed  Chief Complaint     STR follow up visit    Patient's care was coordinated with nursing facility staff  Recent vitals, labs, and updated medications were review on [facility] system in facility  Past Medical and Surgical History      Past Medical History:   Diagnosis Date   • OLLIE (acute kidney injury) ([facility] [address] )    • Cognitive communication deficit    • Dementia ([facility] [address] )    • Fracture of fifth lumbar vertebra ([facility] [address] )    • Fracture of first lumbar vertebra ([address] )    • Fracture of fourth lumbar vertebra ([facility] [address] )    • Fracture of second lumbar vertebra ([facility] [address] )    • Fracture of T11 vertebra with routine healing    • Fracture of T12 vertebra with routine healing    • Fracture of third thoracic vertebra (HCC)    • Gait abnormality    • Hyperlipidemia    • Hypertension    • Irregular heartbeat 8/9/2020   • Lightheadedness 7/13/2021   • Macular degeneration    • Normal pressure hydrocephalus ([facility] [address] )    • Positive blood culture 9/18/2020     History reviewed  No pertinent surgical history  No Known Allergies       History of Present Illness     IZZY Parry is a 80year old male, he is a STR patient of [facility] since December 27, 2022  Past Medical Hx including but not limited to HTN, A  fib, CHF, neuropathy, BPH, CKD, HLD, macular degeneration  He was seen in collaboration with nursing for medical mgmt and STR follow up  Hospital course  Patient was recently admitted to the hospital 12/28/2022 for ambulatory dysfunction, weakness, fall  Patient was hospitalized in November 2022 for ambulatory dysfunction and CVA  He was discharged to inpatient rehab and then was discharged to home on 12/16/2022 with home PT/ OT twice a week  Patient had unwitnessed fall 2 days prior to hospitalization while trying to get back to bed from bedside commode  Trauma work-up was negative except for chronic compression fractures noted on imaging    Patient was recommended for rehab and transferred to [facility]  Rehab course  Keely Dallas was seen and examined at bedside today  Patient is a poor historian  He is alert to self but disoriented to time and place  On exam he is resting comfortably in bed and denies any pain  He states he has been sleeping well and participating in therapy  Denies CP/SOB/N/V/D  Denies lightheadedness, dizziness, headaches, vision changes  Patient states they are eating well and staying hydrated  Denies any bowel or bladder issues  Per review of SNF records, Patient is eating 3 meals per day, consuming %  Last documented BM 1/1/2023  No concerns from nursing at this time  The patient's allergies, past medical, surgical, social and family history were reviewed and unchanged  Review of Systems     Review of Systems   Constitutional: Positive for activity change  Negative for appetite change, chills and fever  HENT: Negative  Negative for congestion  Eyes: Negative  Respiratory: Negative  Negative for cough, shortness of breath and wheezing  Cardiovascular: Negative  Negative for chest pain, palpitations and leg swelling  Gastrointestinal: Negative for abdominal distention, abdominal pain, constipation, diarrhea, nausea and vomiting  Endocrine: Negative  Genitourinary: Negative for difficulty urinating  Musculoskeletal: Positive for gait problem  History of multiple compression fractures   Skin: Negative  Allergic/Immunologic: Negative  Neurological: Positive for weakness  Negative for dizziness, light-headedness and headaches  Hematological: Negative  Psychiatric/Behavioral: Positive for confusion  Negative for behavioral problems and sleep disturbance  Objective     Vitals:   Vitals:    01/03/23 1637   BP: 139/85   Pulse: 73   Resp: 18   Temp: 97 8 °F (36 6 °C)   SpO2: 96%         Physical Exam  Vitals and nursing note reviewed  Constitutional:       General: He is not in acute distress  Appearance: Normal appearance   He is not ill-appearing  HENT:      Head: Normocephalic and atraumatic  Nose: Nose normal  No congestion  Mouth/Throat:      Mouth: Mucous membranes are moist    Eyes:      Conjunctiva/sclera: Conjunctivae normal    Cardiovascular:      Rate and Rhythm: Normal rate  Rhythm irregular  Pulses: Normal pulses  Heart sounds: Normal heart sounds  Pulmonary:      Effort: Pulmonary effort is normal  No respiratory distress  Breath sounds: Normal breath sounds  No wheezing  Abdominal:      General: Bowel sounds are normal  There is no distension  Palpations: Abdomen is soft  Tenderness: There is no abdominal tenderness  Musculoskeletal:      Right lower leg: No edema  Left lower leg: No edema  Skin:     General: Skin is warm  Capillary Refill: Capillary refill takes more than 3 seconds  Neurological:      Mental Status: He is alert  He is disoriented  Motor: Weakness present  Gait: Gait abnormal    Psychiatric:         Mood and Affect: Mood normal          Behavior: Behavior normal          Pertinent Laboratory/Diagnostic Studies:   Reviewed in facility chart-stable      Current Medications   Medications reviewed and updated see facility [facility] for details        Current Outpatient Medications:   •  acetaminophen (TYLENOL) 325 mg tablet, Take 2 tablets (650 mg total) by mouth every 6 (six) hours as needed for mild pain, headaches or fever, Disp: , Rfl: 0  •  apixaban (ELIQUIS) 2 5 mg, Take by mouth 2 (two) times a day, Disp: , Rfl:   •  cholecalciferol (VITAMIN D3) 1,000 units tablet, Take 1,000 Units by mouth 2 (two) times a day , Disp: , Rfl:   •  digoxin (LANOXIN) 0 25 mg tablet, Take 125 mcg by mouth daily, Disp: , Rfl:   •  finasteride (PROSCAR) 5 mg tablet, Take 1 tablet (5 mg total) by mouth daily, Disp: 90 tablet, Rfl: 3  •  furosemide (LASIX) 40 mg tablet, Take 1 tablet (40 mg total) by mouth daily, Disp: 30 tablet, Rfl: 0  •  METOPROLOL SUCCINATE PO, Take 75 mg by mouth in the morning, Disp: , Rfl:   •  Multiple Vitamins-Minerals (PRESERVISION AREDS 2 PO), Take 24 mg by mouth 2 (two) times a day, Disp: , Rfl:   •  Omega-3 1000 MG CAPS, Take 1 tablet by mouth daily, Disp: , Rfl:   •  simvastatin (ZOCOR) 20 mg tablet, Take 1 tablet (20 mg total) by mouth daily, Disp: 30 tablet, Rfl: 0     Please note:  Voice-recognition software may have been used in the preparation of this document  Occasional wrong word or "sound-alike" substitutions may have occurred due to the inherent limitations of voice recognition software  Interpretation should be guided by context           [address] [place] [address]  1/3/2023  4:49 PM

## 2023-01-03 NOTE — ASSESSMENT & PLAN NOTE
Multifactorial in the setting of multiple chronic compression fractures  Recent fall at home prior to hospitalization  Continue PT/OT  Fall/safety precautions  Ensure adequate nutrition/hydration   Monitor CBC/BMP    following for d/c planning

## 2023-01-03 NOTE — ASSESSMENT & PLAN NOTE
· Heart rates have been controlled, trending 50s to 80s  · Morning heart rate 73  · Patient denies chest pain/palpitations  · Continue apixaban 2 5 mg twice daily  · Continue metoprolol 75 mg twice daily  · Continue digoxin 125 MCG daily

## 2023-01-05 ENCOUNTER — NURSING HOME VISIT (OUTPATIENT)
Dept: GERIATRICS | Facility: OTHER | Age: 88
End: 2023-01-05

## 2023-01-05 DIAGNOSIS — I63.9 CEREBROVASCULAR ACCIDENT (CVA), UNSPECIFIED MECHANISM (HCC): ICD-10-CM

## 2023-01-05 DIAGNOSIS — S22.000D COMPRESSION FRACTURE OF THORACIC VERTEBRA WITH ROUTINE HEALING, UNSPECIFIED THORACIC VERTEBRAL LEVEL, SUBSEQUENT ENCOUNTER: Primary | ICD-10-CM

## 2023-01-05 DIAGNOSIS — I50.32 CHRONIC DIASTOLIC CONGESTIVE HEART FAILURE (HCC): ICD-10-CM

## 2023-01-05 DIAGNOSIS — E78.2 MIXED HYPERLIPIDEMIA: Chronic | ICD-10-CM

## 2023-01-05 DIAGNOSIS — I48.0 PAF (PAROXYSMAL ATRIAL FIBRILLATION) (HCC): Chronic | ICD-10-CM

## 2023-01-05 DIAGNOSIS — R26.2 AMBULATORY DYSFUNCTION: ICD-10-CM

## 2023-01-05 DIAGNOSIS — N40.0 BENIGN PROSTATIC HYPERPLASIA, UNSPECIFIED WHETHER LOWER URINARY TRACT SYMPTOMS PRESENT: Chronic | ICD-10-CM

## 2023-01-05 DIAGNOSIS — R33.9 URINARY RETENTION: ICD-10-CM

## 2023-01-05 RX ORDER — FINASTERIDE 5 MG/1
5 TABLET, FILM COATED ORAL DAILY
Qty: 30 TABLET | Refills: 0 | Status: SHIPPED | OUTPATIENT
Start: 2023-01-05 | End: 2023-02-04

## 2023-01-05 RX ORDER — POTASSIUM CHLORIDE 20 MEQ/1
20 TABLET, EXTENDED RELEASE ORAL 2 TIMES DAILY
COMMUNITY
End: 2023-01-05 | Stop reason: SDUPTHER

## 2023-01-05 RX ORDER — FUROSEMIDE 40 MG/1
40 TABLET ORAL DAILY
Qty: 30 TABLET | Refills: 0 | Status: SHIPPED | OUTPATIENT
Start: 2023-01-05 | End: 2023-02-04

## 2023-01-05 RX ORDER — POTASSIUM CHLORIDE 20 MEQ/1
20 TABLET, EXTENDED RELEASE ORAL 2 TIMES DAILY
Qty: 60 TABLET | Refills: 0 | Status: SHIPPED | OUTPATIENT
Start: 2023-01-05 | End: 2023-02-04

## 2023-01-05 RX ORDER — SIMVASTATIN 20 MG
20 TABLET ORAL DAILY
Qty: 30 TABLET | Refills: 0 | Status: SHIPPED | OUTPATIENT
Start: 2023-01-05 | End: 2023-02-04

## 2023-01-05 RX ORDER — DIGOXIN 250 MCG
125 TABLET ORAL DAILY
Qty: 15 TABLET | Refills: 0 | Status: SHIPPED | OUTPATIENT
Start: 2023-01-05 | End: 2023-02-04

## 2023-01-05 RX ORDER — CALCIUM POLYCARBOPHIL 625 MG
625 TABLET ORAL DAILY
COMMUNITY
Start: 2022-12-22

## 2023-01-05 NOTE — ASSESSMENT & PLAN NOTE
Wt Readings from Last 3 Encounters:   12/29/22 62 5 kg (137 lb 12 8 oz)   03/08/22 63 5 kg (140 lb)   03/04/22 63 4 kg (139 lb 12 8 oz)     Stable  Weight 135 pounds  Continued digoxin, furosemide, metoprolol  Continue to monitor weight, avoid salty foods, follow-up cardiology

## 2023-01-05 NOTE — ASSESSMENT & PLAN NOTE
Completed PT/OT at [facility]  Had fall at [facility] on 1/4, no injury  Remains at high risk for falls, multifactorial  Discharge home with family support, PT/OT  Continue fall precautions

## 2023-01-05 NOTE — ASSESSMENT & PLAN NOTE
Hx of recent falls, imagining showing chronic appearing multiple thoracic and lumbar compression fx,  Continue PRN tylenol  Remains at high risk for recurrent falls/fx

## 2023-01-05 NOTE — ASSESSMENT & PLAN NOTE
Hx CVA R frontal cortex   Continue PT/OT, supportive care, ADL assistance  Continue Eliquis and Statin

## 2023-01-05 NOTE — LETTER
January 5, 2023     Jaycee Shin, [address] [facility] [address] [address]    Patient: Curry Catherine   YOB: 1934   Date of Visit: 1/5/2023       Dear Dr David Meléndez:    I had the pleasure of seeing Curry Catherine while at short term rehab  Below are my notes discharge  If you have questions, please do not hesitate to call me  I look forward to following your patient along with you  Sincerely,        CLOVER Hong        CC: No Recipients  Clara Esquivel, [address]  1/5/2023  4:39 PM  Incomplete  [place]    [address]  [phone]  [facility]: [place]  Code 32    NAME: Curry Catherine  AGE: 80 y o  SEX: male     DATE OF ADMISSION: 12/28/22  DATE OF DISCHARGE: 1/6/23   DISCHARGE DISPOSITION: Stable for discharge to home with family support and [facility] home health PT/OT/SN services  Reason for Admission: Patient was admitted to [facility] for rehabilitation after hospitalization for ambulatory dysfunction  Past Medical and Surgical History:   Past Medical History:   Diagnosis Date   • OLLIE (acute kidney injury) ([address] )    • Cognitive communication deficit    • Dementia ([address] )    • Fracture of fifth lumbar vertebra ([address] )    • Fracture of first lumbar vertebra ([address] )    • Fracture of fourth lumbar vertebra ([address] )    • Fracture of second lumbar vertebra ([address] )    • Fracture of T11 vertebra with routine healing    • Fracture of T12 vertebra with routine healing    • Fracture of third thoracic vertebra (HCC)    • Gait abnormality    • Hyperlipidemia    • Hypertension    • Irregular heartbeat 8/9/2020   • Lightheadedness 7/13/2021   • Macular degeneration    • Normal pressure hydrocephalus (HCC)    • Positive blood culture 9/18/2020      History reviewed  No pertinent surgical history      Course of stay:   Curry Catherine is an 55-year-old male with past medical history including but not limited to CVA, CKD 3, osteoporosis, BPH, lung nodules, NPH, PAF, ambulatory dysfunction seen today for short-term rehab follow-up and discharge  Hospital course:  He was admitted to [facility] in [place] difficulty ambulation  He had difficulty standing with trembling appearance of extremities  Work-up negative, noted chronic compression fractures  Was discharged to short-term rehab  To note, he does have recent hx of recent cerebellar stroke on 1/18 she went to inpatient rehab and was discharged home to family in 12/16  Short-term rehab:  He completed PT/OT at short term rehab  Therapy notes reviewed,  Per notes: "ambulated 125’ x 2 with RW and CG, exhibits shuffled gait pattern,   cues for safety; up and down 5 steps x 2 with RW and CG vcs for safe sequencing"  He had fall on 1/3, without injury imaging of left knee negative for fx  No reports of pain on today's visit  Appetite fair, last BM 1/4  Discharge:  Discharge coordinated by  with planned discharge on 1/6/23 with [facility] home health services for PT, OT, and SN      ROS:  Per history of present illness, all other systems reviewed and negative  Limited due to patient participation and willingness to answer  PHYSICAL EXAM:  VITALS:  /61, Temp 98, HR 67, RR 18, 02 97%    Physical Exam  Constitutional:       General: He is not in acute distress  Appearance: Normal appearance  He is not ill-appearing  HENT:      Head: Normocephalic  Right Ear: External ear normal       Left Ear: External ear normal       Nose: No congestion  Eyes:      General:         Right eye: No discharge  Left eye: No discharge  Extraocular Movements: Extraocular movements intact  Conjunctiva/sclera: Conjunctivae normal    Cardiovascular:      Rate and Rhythm: Normal rate and regular rhythm  Pulses: Normal pulses  Heart sounds: Normal heart sounds  Pulmonary:      Effort: Pulmonary effort is normal  No respiratory distress        Breath sounds: Normal breath sounds  No wheezing or rhonchi  Abdominal:      General: Bowel sounds are normal  There is no distension  Palpations: Abdomen is soft  Tenderness: There is no abdominal tenderness  There is no guarding  Musculoskeletal:         General: No tenderness  Normal range of motion  Cervical back: Normal range of motion and neck supple  No rigidity or tenderness  Right lower leg: No edema  Left lower leg: No edema  Skin:     General: Skin is warm  Coloration: Skin is not pale  Findings: No erythema or rash  Neurological:      General: No focal deficit present  Mental Status: He is alert  Mental status is at baseline  Motor: Weakness present  Gait: Gait abnormal       Comments: Declined to answer orientation questions   Psychiatric:         Behavior: Behavior is agitated  Comments: Agitated periods due to wanting to sleep         Admission Diagnoses:   1  Compression fracture of thoracic vertebra with routine healing, unspecified thoracic vertebral level, subsequent encounter  Assessment & Plan:  Hx of recent falls, imagining showing chronic appearing multiple thoracic and lumbar compression fx,  Continue PRN tylenol  Remains at high risk for recurrent falls/fx      2  Ambulatory dysfunction  Assessment & Plan:  [facility] PT[facility]OT at [facility]  Had fall at [employer] on 1/4, no injury  Remains at high risk for falls, multifactorial  Discharge home with family support, PT/OT  Continue fall precautions      3  Mixed hyperlipidemia  Assessment & Plan:  Continue daily simvastatin      4  PAF (paroxysmal atrial fibrillation) ([facility] [address] )  Assessment & Plan:  Stable  HR trends reviewed - avg 60s-70s  Continue Metoprolol, Digoxin, Eliquis   F/u cardiology 1/17    Orders:  -     potassium chloride (K-DUR,KLOR-CON) 20 mEq tablet; Take 1 tablet (20 mEq total) by mouth 2 (two) times a day  -     digoxin (LANOXIN) 0 25 mg tablet;  Take 0 5 tablets (125 mcg total) by mouth daily  - apixaban (ELIQUIS) 2 5 mg; Take 1 tablet (2 5 mg total) by mouth 2 (two) times a day    5  Benign prostatic hyperplasia, unspecified whether lower urinary tract symptoms present  Assessment & Plan:  Stable  Continue finasteride      6  Cerebrovascular accident (CVA), unspecified mechanism ([facility] [address] )  Assessment & Plan:  Hx CVA R frontal cortex   Continue PT/OT, supportive care, ADL assistance  Continue Eliquis and Statin      7  Chronic diastolic congestive heart failure (HCC)  Assessment & Plan:  Wt Readings from Last 3 Encounters:   12/29/22 62 5 kg (137 lb 12 8 oz)   03/08/22 63 5 kg (140 lb)   03/04/22 63 4 kg (139 lb 12 8 oz)     Stable  Weight 135 pounds  Continued digoxin, furosemide, metoprolol  Continue to monitor weight, avoid salty foods, follow-up cardiology    Orders:  -     simvastatin (ZOCOR) 20 mg tablet; Take 1 tablet (20 mg total) by mouth daily  -     potassium chloride (K-DUR,KLOR-CON) 20 mEq tablet; Take 1 tablet (20 mEq total) by mouth 2 (two) times a day  -     furosemide (LASIX) 40 mg tablet; Take 1 tablet (40 mg total) by mouth daily  -     digoxin (LANOXIN) 0 25 mg tablet; Take 0 5 tablets (125 mcg total) by mouth daily    8  Urinary retention  -     finasteride (PROSCAR) 5 mg tablet; Take 1 tablet (5 mg total) by mouth daily       Follow-up Recommendations:    • Outpatient Follow up with PCP in the next 2 weeks  • [id number]  PT/OT/SN services     Labs and testing performed during stay:    1/4 CBC/CMP: Hgb 13 6, WBC 6 0, platelet 236, BUN 15, creatininen 1 06, , K 4 0, eGFR 68      1/3: KNEE AP OR LAT 1- 2V, LEFT  See Note  FINDINGS: The left knee demonstrates no acute fracture  No joint dislocation  Mild bony demineralization  Unremarkable soft tissues  No definitive suprapatellar effusion  There are atherosclerotic calcifications  Mild degenerative medial compartment narrowing and small degenerative superior patellar spur    CONCLUSION: No radiographic evidence of an acute bony abnormality  Discharge Medications: See discharge medication list which was reviewed and signed  Current Outpatient Medications:   •  apixaban (ELIQUIS) 2 5 mg, Take 1 tablet (2 5 mg total) by mouth 2 (two) times a day, Disp: 60 tablet, Rfl: 0  •  Calcium Polycarbophil (Fiber) 625 MG TABS, Take 625 mg by mouth daily, Disp: , Rfl:   •  digoxin (LANOXIN) 0 25 mg tablet, Take 0 5 tablets (125 mcg total) by mouth daily, Disp: 15 tablet, Rfl: 0  •  finasteride (PROSCAR) 5 mg tablet, Take 1 tablet (5 mg total) by mouth daily, Disp: 30 tablet, Rfl: 0  •  furosemide (LASIX) 40 mg tablet, Take 1 tablet (40 mg total) by mouth daily, Disp: 30 tablet, Rfl: 0  •  OMEGA-3 FATTY ACIDS-VITAMIN E PO, Take 1 capsule by mouth daily, Disp: , Rfl:   •  potassium chloride (K-DUR,KLOR-CON) 20 mEq tablet, Take 1 tablet (20 mEq total) by mouth 2 (two) times a day, Disp: 60 tablet, Rfl: 0  •  simvastatin (ZOCOR) 20 mg tablet, Take 1 tablet (20 mg total) by mouth daily, Disp: 30 tablet, Rfl: 0  •  acetaminophen (TYLENOL) 325 mg tablet, Take 2 tablets (650 mg total) by mouth every 6 (six) hours as needed for mild pain, headaches or fever, Disp: , Rfl: 0  •  cholecalciferol (VITAMIN D3) 1,000 units tablet, Take 1,000 Units by mouth 2 (two) times a day , Disp: , Rfl:   •  METOPROLOL SUCCINATE PO, Take 75 mg by mouth in the morning, Disp: , Rfl:   •  Multiple Vitamins-Minerals (PRESERVISION AREDS 2 PO), Take 24 mg by mouth 2 (two) times a day, Disp: , Rfl:      Discussion with patient/family and further instructions:  -Fall precautions  -Aspiration precautions  -Bleeding precautions  -Monitor for signs/symptoms of infection  -Medication list was reviewed and updated      Status at time of discharge: Stable    Billing based on time  Time spent on unit, 40 minutes  Time spent counseling pt on debility/condition, 30 minutes  Please note:  Voice-recognition software may have been used in the preparation of this document    Occasional wrong word or "sound-alike" substitutions may have occurred due to the inherent limitations of voice recognition software  Interpretation should be guided by context  CLOVER Mary  1/5/2023  1:50 PM  Patient: [place] Doroteo Abbott   1/5/2023  1:52 PM  Sign when Signing Visit  [facility]  [address]  [phone]  [facility]  [address]    NAME: Lm Cotto  AGE: 80 y o  SEX: male     DATE OF ADMISSION: 12/28/22  DATE OF DISCHARGE: 1/6/23   DISCHARGE DISPOSITION: Stable for discharge to home with family support and [facility] home health PT/OT/SN services  Reason for Admission: Patient was admitted to [address] [place] [address] for rehabilitation after hospitalization for ambulatory dysfunction  Past Medical and Surgical History:   Past Medical History:   Diagnosis Date   • OLLIE (acute kidney injury) ([address] )    • Cognitive communication deficit    • Dementia ([address] )    • Fracture of fifth lumbar vertebra ([address] )    • Fracture of first lumbar vertebra ([address] )    • Fracture of fourth lumbar vertebra ([address] )    • Fracture of second lumbar vertebra ([address] )    • Fracture of T11 vertebra with routine healing    • Fracture of T12 vertebra with routine healing    • Fracture of third thoracic vertebra (HCC)    • Gait abnormality    • Hyperlipidemia    • Hypertension    • Irregular heartbeat 8/9/2020   • Lightheadedness 7/13/2021   • Macular degeneration    • Normal pressure hydrocephalus (HCC)    • Positive blood culture 9/18/2020      History reviewed  No pertinent surgical history  Course of stay:   Lm Cotto is an 80-year-old male with past medical history including but not limited to CVA, CKD 3, osteoporosis, BPH, lung nodules, NPH, PAF, ambulatory dysfunction seen today for short-term rehab follow-up and discharge  Hospital course:  He was admitted to [facility] in [place] difficulty ambulation    He had difficulty standing with trembling appearance of extremities  Work-up negative, noted chronic compression fractures  Was discharged to short-term rehab  To note, he does have recent hx of recent cerebellar stroke on 1/18 she went to inpatient rehab and was discharged home to family in 12/16  Short-term rehab:  He completed PT/OT at short term rehab  Therapy notes reviewed,  Per notes: "ambulated 125’ x 2 with RW and CG, exhibits shuffled gait pattern,   cues for safety; up and down 5 steps x 2 with RW and CG vcs for safe sequencing"  He had fall on 1/3, without injury imaging of left knee negative for fx  Appetite fair, last BM 1/4  Discharge:    Discharge coordinated by  with planned discharge on 1/6/23 with [facility] home health services for PT, OT, and SN      ROS:  Per history of present illness, all other systems reviewed and negative  PHYSICAL EXAM:  VITALS:  /61, Temp 98, HR 67, RR 18, 02 97%    Physical Exam  Constitutional:       General: He is not in acute distress  Appearance: Normal appearance  He is not ill-appearing  HENT:      Head: Normocephalic  Right Ear: External ear normal       Left Ear: External ear normal       Nose: Congestion present  Eyes:      General:         Right eye: No discharge  Left eye: No discharge  Extraocular Movements: Extraocular movements intact  Conjunctiva/sclera: Conjunctivae normal    Cardiovascular:      Rate and Rhythm: Normal rate and regular rhythm  Pulses: Normal pulses  Heart sounds: Normal heart sounds  Pulmonary:      Effort: Pulmonary effort is normal  No respiratory distress  Breath sounds: Normal breath sounds  No wheezing or rhonchi  Abdominal:      General: Bowel sounds are normal  There is no distension  Palpations: Abdomen is soft  Tenderness: There is no abdominal tenderness  There is no guarding  Musculoskeletal:         General: No tenderness  Normal range of motion        Cervical back: Normal range of motion and neck supple  No rigidity or tenderness  Right lower leg: No edema  Left lower leg: No edema  Skin:     General: Skin is warm  Coloration: Skin is not pale  Findings: No erythema or rash  Neurological:      General: No focal deficit present  Mental Status: He is alert  Mental status is at baseline  Motor: Weakness present  Gait: Gait abnormal       Comments: Declined to answer orientation questions   Psychiatric:         Behavior: Behavior is agitated  Comments: Agitated periods due to wanting to sleep         Admission Diagnoses:   1  Compression fracture of thoracic vertebra with routine healing, unspecified thoracic vertebral level, subsequent encounter  Assessment & Plan:  Hx of recent falls, imagining showing chronic appearing multiple thoracic and lumbar compression fx,  Continue PRN tylenol      2  Ambulatory dysfunction  Assessment & Plan:  Completed PT[facility]OT at [facility]  Had fall at [facility] on 1/4, no injury  Remains at high risk for falls, multifactorial  Discharge home with family support, PT/OT  Continue fall precautions      3  Mixed hyperlipidemia  Assessment & Plan:  Continue daily simvastatin      4  PAF (paroxysmal atrial fibrillation) ([facility])  Assessment & Plan:  Stable  HR trends reviewed - avg 60s-70s  Continue Metoprolol, Digoxin, Eliquis       5  Benign prostatic hyperplasia, unspecified whether lower urinary tract symptoms present  Assessment & Plan:  Stable  Continue finasteride      6   Cerebrovascular accident (CVA), unspecified mechanism ([facility] [address] )  Assessment & Plan:  Hx CVA R frontal cortex   Continue PT/OT, supportive care, ADL assistance  Continue Eliquis and Statin         Follow-up Recommendations:    • Outpatient Follow up with PCP in the next 2 weeks  • [id number] PT/OT/SN services     Labs and testing performed during stay:    1/4 CBC/CMP: Hgb 13 6, WBC 6 0, platelet 113, BUN 15, creatininen 1 06, , K 4 0, eGFR 76      1/3: KNEE AP OR LAT 1- 2V, LEFT  See Note  FINDINGS: The left knee demonstrates no acute fracture  No joint dislocation  Mild bony demineralization  Unremarkable soft tissues  No definitive suprapatellar effusion  There are atherosclerotic calcifications  Mild degenerative medial compartment narrowing and small degenerative superior patellar spur  CONCLUSION: No radiographic evidence of an acute bony abnormality  Discharge Medications: See discharge medication list which was reviewed and signed        Current Outpatient Medications:   •  Calcium Polycarbophil (Fiber) 625 MG TABS, Take 625 mg by mouth daily, Disp: , Rfl:   •  OMEGA-3 FATTY ACIDS-VITAMIN E PO, Take 1 capsule by mouth daily, Disp: , Rfl:   •  potassium chloride (K-DUR,KLOR-CON) 20 mEq tablet, Take 20 mEq by mouth 2 (two) times a day, Disp: , Rfl:   •  acetaminophen (TYLENOL) 325 mg tablet, Take 2 tablets (650 mg total) by mouth every 6 (six) hours as needed for mild pain, headaches or fever, Disp: , Rfl: 0  •  apixaban (ELIQUIS) 2 5 mg, Take by mouth 2 (two) times a day, Disp: , Rfl:   •  cholecalciferol (VITAMIN D3) 1,000 units tablet, Take 1,000 Units by mouth 2 (two) times a day , Disp: , Rfl:   •  digoxin (LANOXIN) 0 25 mg tablet, Take 125 mcg by mouth daily, Disp: , Rfl:   •  finasteride (PROSCAR) 5 mg tablet, Take 1 tablet (5 mg total) by mouth daily, Disp: 90 tablet, Rfl: 3  •  furosemide (LASIX) 40 mg tablet, Take 1 tablet (40 mg total) by mouth daily, Disp: 30 tablet, Rfl: 0  •  METOPROLOL SUCCINATE PO, Take 75 mg by mouth in the morning, Disp: , Rfl:   •  Multiple Vitamins-Minerals (PRESERVISION AREDS 2 PO), Take 24 mg by mouth 2 (two) times a day, Disp: , Rfl:   •  simvastatin (ZOCOR) 20 mg tablet, Take 1 tablet (20 mg total) by mouth daily, Disp: 30 tablet, Rfl: 0     Discussion with patient/family and further instructions:  -Fall precautions  -Aspiration precautions  -Bleeding precautions  -Monitor for signs/symptoms of infection  -Medication list was reviewed and updated      Status at time of discharge: Stable    Billing based on time  Time spent on unit, 40 minutes  Time spent counseling pt on debility/condition, 30 minutes  Please note:  Voice-recognition software may have been used in the preparation of this document  Occasional wrong word or "sound-alike" substitutions may have occurred due to the inherent limitations of voice recognition software  Interpretation should be guided by context          CLOVER Veras  1/5/2023  1:50 PM  Patient: Radha Villa

## 2023-01-05 NOTE — PROGRESS NOTES
[facility]  [address]  [phone]  [address]  [address]    NAME: Dar Quintanilla  AGE: 80 y o  SEX: male     DATE OF ADMISSION: 12/28/22  DATE OF DISCHARGE: 1/6/23   DISCHARGE DISPOSITION: Stable for discharge to home with family support and [facility] home health PT/OT/SN services  Reason for Admission: Patient was admitted to [facility] for rehabilitation after hospitalization for ambulatory dysfunction  Past Medical and Surgical History:   Past Medical History:   Diagnosis Date   • OLLIE (acute kidney injury) ([address] )    • Cognitive communication deficit    • Dementia ([address] )    • Fracture of fifth lumbar vertebra ([address] )    • Fracture of first lumbar vertebra ([address] )    • Fracture of fourth lumbar vertebra ([address] )    • Fracture of second lumbar vertebra ([address] )    • Fracture of T11 vertebra with routine healing    • Fracture of T12 vertebra with routine healing    • Fracture of third thoracic vertebra (HCC)    • Gait abnormality    • Hyperlipidemia    • Hypertension    • Irregular heartbeat 8/9/2020   • Lightheadedness 7/13/2021   • Macular degeneration    • Normal pressure hydrocephalus (HCC)    • Positive blood culture 9/18/2020      History reviewed  No pertinent surgical history  Course of stay:   Dar Quintanilla is an 49-year-old male with past medical history including but not limited to CVA, CKD 3, osteoporosis, BPH, lung nodules, NPH, PAF, ambulatory dysfunction seen today for short-term rehab follow-up and discharge  Hospital course:  He was admitted to [facility] in [facility] difficulty ambulation  He had difficulty standing with trembling appearance of extremities  Work-up negative, noted chronic compression fractures  Was discharged to short-term rehab  To note, he does have recent hx of recent cerebellar stroke on 1/18 she went to inpatient rehab and was discharged home to family in 12/16       Short-term rehab:  He completed PT/OT at short term rehab  Therapy notes reviewed,  Per notes: "ambulated 125’ x 2 with RW and CG, exhibits shuffled gait pattern,   cues for safety; up and down 5 steps x 2 with RW and CG vcs for safe sequencing"  He had fall on 1/3, without injury imaging of left knee negative for fx  No reports of pain on today's visit  Appetite fair, last BM 1/4  Discharge:  Discharge coordinated by  with planned discharge on 1/6/23 with [facility] health services for PT, OT, and SN      ROS:  Per history of present illness, all other systems reviewed and negative  Limited due to patient participation and willingness to answer  PHYSICAL EXAM:  VITALS:  /61, Temp 98, HR 67, RR 18, 02 97%    Physical Exam  Constitutional:       General: He is not in acute distress  Appearance: Normal appearance  He is not ill-appearing  HENT:      Head: Normocephalic  Right Ear: External ear normal       Left Ear: External ear normal       Nose: No congestion  Eyes:      General:         Right eye: No discharge  Left eye: No discharge  Extraocular Movements: Extraocular movements intact  Conjunctiva/sclera: Conjunctivae normal    Cardiovascular:      Rate and Rhythm: Normal rate and regular rhythm  Pulses: Normal pulses  Heart sounds: Normal heart sounds  Pulmonary:      Effort: Pulmonary effort is normal  No respiratory distress  Breath sounds: Normal breath sounds  No wheezing or rhonchi  Abdominal:      General: Bowel sounds are normal  There is no distension  Palpations: Abdomen is soft  Tenderness: There is no abdominal tenderness  There is no guarding  Musculoskeletal:         General: No tenderness  Normal range of motion  Cervical back: Normal range of motion and neck supple  No rigidity or tenderness  Right lower leg: No edema  Left lower leg: No edema  Skin:     General: Skin is warm  Coloration: Skin is not pale  Findings: No erythema or rash  Neurological:      General: No focal deficit present  Mental Status: He is alert  Mental status is at baseline  Motor: Weakness present  Gait: Gait abnormal       Comments: Declined to answer orientation questions   Psychiatric:         Behavior: Behavior is agitated  Comments: Agitated periods due to wanting to sleep         Admission Diagnoses:   1  Compression fracture of thoracic vertebra with routine healing, unspecified thoracic vertebral level, subsequent encounter  Assessment & Plan:  Hx of recent falls, imagining showing chronic appearing multiple thoracic and lumbar compression fx,  Continue PRN tylenol  Remains at high risk for recurrent falls/fx      2  Ambulatory dysfunction  Assessment & Plan:  Completed PT/OT at [facility]  Had fall at [facility] on 1/4, no injury  Remains at high risk for falls, multifactorial  Discharge home with family support, PT/OT  Continue fall precautions      3  Mixed hyperlipidemia  Assessment & Plan:  Continue daily simvastatin      4  PAF (paroxysmal atrial fibrillation) ([facility] )  Assessment & Plan:  Stable  HR trends reviewed - avg 60s-70s  Continue Metoprolol, Digoxin, Eliquis   F/u cardiology 1/17    Orders:  -     potassium chloride (K-DUR,KLOR-CON) 20 mEq tablet; Take 1 tablet (20 mEq total) by mouth 2 (two) times a day  -     digoxin (LANOXIN) 0 25 mg tablet; Take 0 5 tablets (125 mcg total) by mouth daily  -     apixaban (ELIQUIS) 2 5 mg; Take 1 tablet (2 5 mg total) by mouth 2 (two) times a day    5  Benign prostatic hyperplasia, unspecified whether lower urinary tract symptoms present  Assessment & Plan:  Stable  Continue finasteride      6  Cerebrovascular accident (CVA), unspecified mechanism ([facility] )  Assessment & Plan:  Hx CVA R frontal cortex   Continue PT/OT, supportive care, ADL assistance  Continue Eliquis and Statin      7   Chronic diastolic congestive heart failure (HCC)  Assessment & Plan:  Wt Readings from Last 3 Encounters:   12/29/22 62 5 kg (137 lb 12 8 oz)   03/08/22 63 5 kg (140 lb)   03/04/22 63 4 kg (139 lb 12 8 oz)     Stable  Weight 135 pounds  Continued digoxin, furosemide, metoprolol  Continue to monitor weight, avoid salty foods, follow-up cardiology    Orders:  -     simvastatin (ZOCOR) 20 mg tablet; Take 1 tablet (20 mg total) by mouth daily  -     potassium chloride (K-DUR,KLOR-CON) 20 mEq tablet; Take 1 tablet (20 mEq total) by mouth 2 (two) times a day  -     furosemide (LASIX) 40 mg tablet; Take 1 tablet (40 mg total) by mouth daily  -     digoxin (LANOXIN) 0 25 mg tablet; Take 0 5 tablets (125 mcg total) by mouth daily    8  Urinary retention  -     finasteride (PROSCAR) 5 mg tablet; Take 1 tablet (5 mg total) by mouth daily       Follow-up Recommendations:    • Outpatient Follow up with PCP in the next 2 weeks  • [id number]  PT/OT/SN services     Labs and testing performed during stay:    1/4 CBC/CMP: Hgb 13 6, WBC 6 0, platelet 813, BUN 15, creatininen 1 06, , K 4 0, eGFR 68      1/3: KNEE AP OR LAT 1- 2V, LEFT  See Note  FINDINGS: The left knee demonstrates no acute fracture  No joint dislocation  Mild bony demineralization  Unremarkable soft tissues  No definitive suprapatellar effusion  There are atherosclerotic calcifications  Mild degenerative medial compartment narrowing and small degenerative superior patellar spur  CONCLUSION: No radiographic evidence of an acute bony abnormality  Discharge Medications: See discharge medication list which was reviewed and signed        Current Outpatient Medications:   •  apixaban (ELIQUIS) 2 5 mg, Take 1 tablet (2 5 mg total) by mouth 2 (two) times a day, Disp: 60 tablet, Rfl: 0  •  Calcium Polycarbophil (Fiber) 625 MG TABS, Take 625 mg by mouth daily, Disp: , Rfl:   •  digoxin (LANOXIN) 0 25 mg tablet, Take 0 5 tablets (125 mcg total) by mouth daily, Disp: 15 tablet, Rfl: 0  •  finasteride (PROSCAR) 5 mg tablet, Take 1 tablet (5 mg total) by mouth daily, Disp: 30 tablet, Rfl: 0  •  furosemide (LASIX) 40 mg tablet, Take 1 tablet (40 mg total) by mouth daily, Disp: 30 tablet, Rfl: 0  •  OMEGA-3 FATTY ACIDS-VITAMIN E PO, Take 1 capsule by mouth daily, Disp: , Rfl:   •  potassium chloride (K-DUR,KLOR-CON) 20 mEq tablet, Take 1 tablet (20 mEq total) by mouth 2 (two) times a day, Disp: 60 tablet, Rfl: 0  •  simvastatin (ZOCOR) 20 mg tablet, Take 1 tablet (20 mg total) by mouth daily, Disp: 30 tablet, Rfl: 0  •  acetaminophen (TYLENOL) 325 mg tablet, Take 2 tablets (650 mg total) by mouth every 6 (six) hours as needed for mild pain, headaches or fever, Disp: , Rfl: 0  •  cholecalciferol (VITAMIN D3) 1,000 units tablet, Take 1,000 Units by mouth 2 (two) times a day , Disp: , Rfl:   •  METOPROLOL SUCCINATE PO, Take 75 mg by mouth in the morning, Disp: , Rfl:   •  Multiple Vitamins-Minerals (PRESERVISION AREDS 2 PO), Take 24 mg by mouth 2 (two) times a day, Disp: , Rfl:      Discussion with patient/family and further instructions:  -Fall precautions  -Aspiration precautions  -Bleeding precautions  -Monitor for signs/symptoms of infection  -Medication list was reviewed and updated      Status at time of discharge: Stable    Billing based on time  Time spent on unit, 40 minutes  Time spent counseling pt on debility/condition, 30 minutes  Please note:  Voice-recognition software may have been used in the preparation of this document  Occasional wrong word or "sound-alike" substitutions may have occurred due to the inherent limitations of voice recognition software  Interpretation should be guided by context          CLOVER Laboy  1/5/2023  1:50 PM  Patient: Vannessa Cody

## 2023-01-05 NOTE — ASSESSMENT & PLAN NOTE
Stable  HR trends reviewed - avg 60s-70s  Continue Metoprolol, Digoxin, Eliquis   F/u cardiology 1/17

## 2024-10-23 NOTE — ED NOTES
"Due to language barrier, a Diana  was present by phone during the history-taking and subsequent discussion (and for the physical exam) with this patient.    Assessment & Plan   (E11.21) Type 2 diabetes mellitus with diabetic nephropathy, without long-term current use of insulin (H)  (primary encounter diagnosis)  Comment: A1c at target. Continue current measures.   Plan: metFORMIN (GLUCOPHAGE) 500 MG tablet, losartan         (COZAAR) 100 MG tablet, Hemoglobin A1c, TSH         with free T4 reflex, FOOT EXAM          (I10) Essential hypertension with goal blood pressure less than 140/90  Comment: BP at target. Continue current meds.   Plan: losartan (COZAAR) 100 MG tablet,         hydrochlorothiazide (MICROZIDE) 12.5 MG         capsule, atenolol (TENORMIN) 50 MG tablet,         amLODIPine (NORVASC) 5 MG tablet          (E78.5) Hyperlipidemia LDL goal <100  Comment: LDL at target. Continue current meds.   Plan: simvastatin (ZOCOR) 20 MG tablet, Comprehensive        metabolic panel, Lipid panel reflex to direct         LDL Fasting          (K21.9) Gastroesophageal reflux disease without esophagitis  Comment: Continue famotidine twice daily.   Plan: famotidine (PEPCID) 20 MG tablet        ]          BMI  Estimated body mass index is 33.33 kg/m  as calculated from the following:    Height as of this encounter: 1.499 m (4' 11\").    Weight as of this encounter: 74.8 kg (165 lb).   Weight management plan: Discussed healthy diet and exercise guidelines      Patient Instructions   A1c and blood pressure look fine.   Last LDL-Cholesterol looks fine.     Okay to take famotidine TWICE a day.     Refilled all needed prescriptions.     See me in six months, with lab tests a few days in advance.     Sarabjit Saunders is a 83 year old, presenting for the following health issues:  Follow Up and Diabetes      10/23/2024     1:03 PM   Additional Questions   Roomed by Shavonne PENA CMA     History of Present Illness " 1  Chief Complaint-Bee sting (Pt stung by 40+hornets)  2  Admission related to injury?- no  3  Orientation status- alert and orientedx4  4  Abnormal labs, tests, vitals- Audible expiratory wheezes, pt placed on 4L O2 (Does not wear )2 at home)  5  Important drips- n/a  6  Time of last narcotics- 0 2 dilaudid at 15:00  7  IV lines, drains, tubes- 20 right hand  8  Isolation status- n/a  9  Skin Check- generalized bee stings, dryness on right forearm  10  Ambulation status- unknown  11   ED RN phone number- [facility], RN  08/06/19 [id number] "      Diabetes:   He presents for follow up of diabetes.  He is checking home blood glucose one time daily.   He checks blood glucose before meals.  Blood glucose is never over 200 and never under 70.  When his blood glucose is low, the patient is asymptomatic for confusion, blurred vision, lethargy and reports not feeling dizzy, shaky, or weak.   He has no concerns regarding his diabetes at this time.   He is not experiencing numbness or burning in feet, excessive thirst, blurry vision, weight changes or redness, sores or blisters on feet.           He eats 2-3 servings of fruits and vegetables daily.He consumes 0 sweetened beverage(s) daily.He exercises with enough effort to increase his heart rate 20 to 29 minutes per day.  He exercises with enough effort to increase his heart rate 3 or less days per week.   He is taking medications regularly.     The patient has felt well. Home glucoses have generally been under 135, rarely as high as 160.     He has had good control of heartburn when taking famotidine 20 mg twice daily.   The patient is tolerating his current medications without any adverse effects.     Past medical, family and social histories as well as medications reviewed and updated as needed.    No dyspnea or cough. No chest discomfort, dizziness or palpitations. No diarrhea, abdominal pain or rectal bleeding.   No acute problems with vision or speech, lateralizing weakness or paresthesias.    ROS: as above or negative for Respiratory, CV, GI, endocrine, neuro systems.       Objective    /70 (BP Location: Right arm, Patient Position: Sitting, Cuff Size: Adult Large)   Pulse 82   Temp 98.1  F (36.7  C) (Tympanic)   Resp 18   Ht 1.499 m (4' 11\")   Wt 74.8 kg (165 lb)   SpO2 95%   BMI 33.33 kg/m    Body mass index is 33.33 kg/m .    Physical Exam   GENERAL: alert and no distress  RESP: lungs clear to auscultation - no rales, rhonchi or wheezes  CV: regular rate and rhythm, normal S1 S2, no S3 or " S4, no murmur, click or rub, no peripheral edema  MS: no gross musculoskeletal defects noted, no edema  Diabetic foot exam: normal DP and PT pulses, no trophic changes or ulcerative lesions, and normal sensory exam            Signed Electronically by: Liam Esquivel MD,